# Patient Record
Sex: MALE | Race: WHITE | Employment: FULL TIME | URBAN - METROPOLITAN AREA
[De-identification: names, ages, dates, MRNs, and addresses within clinical notes are randomized per-mention and may not be internally consistent; named-entity substitution may affect disease eponyms.]

---

## 2020-05-29 ENCOUNTER — TELEPHONE (OUTPATIENT)
Dept: INTERNAL MEDICINE CLINIC | Age: 45
End: 2020-05-29

## 2020-05-29 NOTE — TELEPHONE ENCOUNTER
Spoke with patient. Two pt identifiers confirmed. Patient offered an appointment with Dr. Salty Farr on 06/09/2020. Appointment accepted. Pt verbalized understanding of information discussed w/ no further questions at this time.    Patient advised if anything changes or if unable to keep this appointment to call the office

## 2020-05-29 NOTE — TELEPHONE ENCOUNTER
Krystle Hotter [employer]   Phone Number: [phone]             Caller's first and last name: N/A   Reason for call: Pt wants new pt appointment in the month of June. Wants to establish doctor pt relationship.    Callback required yes/no and why: Yes, to inform   Best contact number(s): [phone]   Details to clarify the request: N/A     Copy/Paste  ENVERA

## 2020-06-09 ENCOUNTER — OFFICE VISIT (OUTPATIENT)
Dept: INTERNAL MEDICINE CLINIC | Age: 45
End: 2020-06-09

## 2020-06-09 VITALS
OXYGEN SATURATION: 98 % | RESPIRATION RATE: 18 BRPM | WEIGHT: 159 LBS | SYSTOLIC BLOOD PRESSURE: 156 MMHG | HEIGHT: 72 IN | TEMPERATURE: 98.1 F | HEART RATE: 65 BPM | DIASTOLIC BLOOD PRESSURE: 89 MMHG | BODY MASS INDEX: 21.54 KG/M2

## 2020-06-09 DIAGNOSIS — Z13.220 SCREENING CHOLESTEROL LEVEL: ICD-10-CM

## 2020-06-09 DIAGNOSIS — Z11.4 SCREENING FOR HIV (HUMAN IMMUNODEFICIENCY VIRUS): ICD-10-CM

## 2020-06-09 DIAGNOSIS — K62.5 RECTAL BLEEDING: Primary | ICD-10-CM

## 2020-06-09 DIAGNOSIS — R01.1 SYSTOLIC MURMUR: ICD-10-CM

## 2020-06-09 RX ORDER — PHENOL/SODIUM PHENOLATE
20 AEROSOL, SPRAY (ML) MUCOUS MEMBRANE DAILY
COMMUNITY

## 2020-06-09 NOTE — PROGRESS NOTES
Reviewed record in preparation for visit and have obtained necessary documentation. Identified pt with two pt identifiers(name and ). Chief Complaint   Patient presents with   [address] Rectal Bleeding       Health Maintenance Due   Topic Date Due    DTaP/Tdap/Td  (1 - Tdap) 1995    Cholesterol Test   2014       Mr. Carlos Toscano has a reminder for a \"due or due soon\" health maintenance. I have asked that he discuss this further with his primary care provider for follow-up on this health maintenance. Coordination of Care Questionnaire:  :     1) Have you been to an emergency room, urgent care clinic since your last visit? no   Hospitalized since your last visit? no             2) Have you seen or consulted any other health care providers outside of [address] [place] [address] since your last visit? no  (Include any pap smears or colon screenings in this section.)    3) In the event something were to happen to you and you were unable to speak on your behalf, do you have an Advance Directive/ Living Will in place stating your wishes? NO    Do you have an Advance Directive on file? no    4) Are you interested in receiving information on Advance Directives? NO    Patient is accompanied by self I have received verbal consent from Mary Lou Both to discuss any/all medical information while they are present in the room.

## 2020-06-09 NOTE — PROGRESS NOTES
Mr. Ritika Banks is a new patient who is here to establish care. CC:  Establish Care and Rectal Bleeding       HPI:    39 yo male presenting to establish care    Patient reports bleeding intermittently mixed in stool for one year   Showed pictured of blood mixed in the stool two weeks ago bright red blood mixed in regular stool  No blood noted upon wiping  Bleeding is random and painless  Denies straining and stool color is normal  Reports rare  anal intercourse no pain and no correlation with rectal bleeding - one partner     Taking prilosec for hiatal hernia   Does not have a gastroenterologist         for 3 years -  also my patient  No kids  3 cats   Works for [employer] smoking 3 years ago  Social ETOH       Review of systems:  Constitutional: negative for fever, chills, weight loss, night sweats   Eyes : negative for vision changes, eye pain and discharge  Nose and Throat: negative for tinnitus, sore throat   Cardiovascular: negative for chest pain, palpitations and shortness of breath  Respiratory: does note dyspnea with exertion and avoid exertion for years, quit smoking and no improvement  Gastroinstestinal: negative for abdominal pain, nausea, vomiting, diarrhea, constipation, and blood in the stool  Musculoskeletal: negative for back ache and joint ache   Genitourinary: negative for dysuria, nocturia, polyuria and hematuria   Neurologic: Negative for focal weakness, numbness or incoordination  Skin: negative for rash, pruritus  Hematologic: negative for easy bruising      Past Medical History:   Diagnosis Date    Hiatal hernia         History reviewed. No pertinent surgical history. Allergies not on file    Current Outpatient Medications on File Prior to Visit   Medication Sig Dispense Refill    Omeprazole delayed release (PRILOSEC D/R) 20 mg tablet Take 20 mg by mouth daily. No current facility-administered medications on file prior to visit.         family history includes Lung Cancer in his maternal grandmother; Other in his father. Social History     Socioeconomic History    Marital status: UNKNOWN     Spouse name: Not on file    Number of children: Not on file    Years of education: Not on file    Highest education level: Not on file   Occupational History    Not on file   Social Needs    Financial resource strain: Not on file    Food insecurity     Worry: Not on file     Inability: Not on file    Transportation needs     Medical: Not on file     Non-medical: Not on file   Tobacco Use    Smoking status: Former Smoker    Smokeless tobacco: Never Used   Substance and Sexual Activity    Alcohol use: Yes     Alcohol/week: 3.0 standard drinks     Types: 3 Glasses of wine per week     Comment: weekly    Drug use: Never    Sexual activity: Yes     Partners: Male   Lifestyle    Physical activity     Days per week: Not on file     Minutes per session: Not on file    Stress: Not on file   Relationships    Social connections     Talks on phone: Not on file     Gets together: Not on file     Attends Rastafari service: Not on file     Active member of club or organization: Not on file     Attends meetings of clubs or organizations: Not on file     Relationship status: Not on file    Intimate partner violence     Fear of current or ex partner: Not on file     Emotionally abused: Not on file     Physically abused: Not on file     Forced sexual activity: Not on file   Other Topics Concern    Not on file   Social History Narrative    Not on file       Visit Vitals  /89 (BP 1 Location: Right arm, BP Patient Position: Sitting)   Pulse 65   Temp 98.1 °F (36.7 °C) (Temporal)   Resp 18   Ht 6' (1.829 m)   Wt 159 lb (72.1 kg)   SpO2 98%   BMI 21.56 kg/m²     General:  Well appearing male no acute distress  HEENT:   PERRL,normal conjunctiva. External ear and canals normal, TMs normal.  Hearing normal to voice. Nose without edema or discharge, normal septum. Lips, teeth, gums normal.  Oropharynx: no erythema, no exudates, no lesions, normal tongue. Neck:  Supple. Thyroid normal size, nontender, without nodules. No carotid bruit. No masses or lymphadenopathy  Respiratory: no respiratory distress,  no wheezing, no rhonchi, no rales. No chest wall tenderness. Cardiovascular:  RRR, normal Z0T3, loud systolic murmur louderst on Right sternal border with radiation to carotid  Gastrointestinal: normal bowel sounds, soft, nontender, without masses. No hepatosplenomegaly. Extremities +2 pulses, no edema, normal sensation   Musculoskeletal:  Normal gait. Normal digits and nails. Normal strength and tone, no atrophy, and no abnormal movement. Skin:  Has a rash on legs bilateral which states chronic and has seen dermatologist  kin warm, normal turgor, without induration or nodules. Neuro:  A and OX4, fluent speech, cranial nerves normal 2-12. Sensation normal to light touch. DTR symmetrical  Psych:  Normal affect                Assessment and Plan:     39 yo male presenting to [facility] care    1. Rectal bleeding  Painless and intermittent for one year, showed picture of BRB mixed with stool.   - CBC WITH AUTOMATED DIFF  - METABOLIC PANEL, COMPREHENSIVE  - Rey Luo Dr Arlis Opitz   - SED RATE (ESR)  - CRP, HIGH SENSITIVITY    2. Screening cholesterol level  - LIPID PANEL    3. Screening for HIV (human immunodeficiency virus)  - HIV 1/2 AG/AB, 4TH GENERATION,W RFLX CONFIRM    4. Systolic murmur: loudest over aortic area with radiation to carotid, S1 and S2 present.  Does report dyspnea with exertion, will obtain TTE  - ECHO ADULT COMPLETE; Future       Follow up with be determined on results of labs and TTE     Aliza Stark MD

## 2020-06-09 NOTE — PATIENT INSTRUCTIONS
Schedule echo of the heart Schedule appointment with gastroenterology Will determine follow up based on results

## 2020-06-10 LAB
ALBUMIN SERPL-MCNC: 4.6 G/DL (ref 4–5)
ALBUMIN/GLOB SERPL: 2 {RATIO} (ref 1.2–2.2)
ALP SERPL-CCNC: 59 IU/L (ref 39–117)
ALT SERPL-CCNC: 12 IU/L (ref 0–44)
AST SERPL-CCNC: 16 IU/L (ref 0–40)
BASOPHILS # BLD AUTO: 0.1 X10E3/UL (ref 0–0.2)
BASOPHILS NFR BLD AUTO: 1 %
BILIRUB SERPL-MCNC: 0.4 MG/DL (ref 0–1.2)
BUN SERPL-MCNC: 16 MG/DL (ref 6–24)
BUN/CREAT SERPL: 14 (ref 9–20)
CALCIUM SERPL-MCNC: 9.9 MG/DL (ref 8.7–10.2)
CHLORIDE SERPL-SCNC: 103 MMOL/L (ref 96–106)
CHOLEST SERPL-MCNC: 204 MG/DL (ref 100–199)
CO2 SERPL-SCNC: 23 MMOL/L (ref 20–29)
CREAT SERPL-MCNC: 1.15 MG/DL (ref 0.76–1.27)
CRP SERPL HS-MCNC: 3.23 MG/L (ref 0–3)
EOSINOPHIL # BLD AUTO: 0.1 X10E3/UL (ref 0–0.4)
EOSINOPHIL NFR BLD AUTO: 3 %
ERYTHROCYTE [DISTWIDTH] IN BLOOD BY AUTOMATED COUNT: 13.1 % (ref 11.6–15.4)
ERYTHROCYTE [SEDIMENTATION RATE] IN BLOOD BY WESTERGREN METHOD: 7 MM/HR (ref 0–15)
GLOBULIN SER CALC-MCNC: 2.3 G/DL (ref 1.5–4.5)
GLUCOSE SERPL-MCNC: 98 MG/DL (ref 65–99)
HCT VFR BLD AUTO: 41.1 % (ref 37.5–51)
HDLC SERPL-MCNC: 50 MG/DL
HGB BLD-MCNC: 14.2 G/DL (ref 13–17.7)
HIV 1+2 AB+HIV1 P24 AG SERPL QL IA: NON REACTIVE
IMM GRANULOCYTES # BLD AUTO: 0 X10E3/UL (ref 0–0.1)
IMM GRANULOCYTES NFR BLD AUTO: 0 %
IRON SATN MFR SERPL: 44 % (ref 15–55)
IRON SERPL-MCNC: 129 UG/DL (ref 38–169)
LDLC SERPL CALC-MCNC: 131 MG/DL (ref 0–99)
LYMPHOCYTES # BLD AUTO: 1.2 X10E3/UL (ref 0.7–3.1)
LYMPHOCYTES NFR BLD AUTO: 22 %
MCH RBC QN AUTO: 32.6 PG (ref 26.6–33)
MCHC RBC AUTO-ENTMCNC: 34.5 G/DL (ref 31.5–35.7)
MCV RBC AUTO: 94 FL (ref 79–97)
MONOCYTES # BLD AUTO: 0.6 X10E3/UL (ref 0.1–0.9)
MONOCYTES NFR BLD AUTO: 11 %
NEUTROPHILS # BLD AUTO: 3.6 X10E3/UL (ref 1.4–7)
NEUTROPHILS NFR BLD AUTO: 63 %
PLATELET # BLD AUTO: 275 X10E3/UL (ref 150–450)
POTASSIUM SERPL-SCNC: 4.6 MMOL/L (ref 3.5–5.2)
PROT SERPL-MCNC: 6.9 G/DL (ref 6–8.5)
RBC # BLD AUTO: 4.36 X10E6/UL (ref 4.14–5.8)
SODIUM SERPL-SCNC: 140 MMOL/L (ref 134–144)
TIBC SERPL-MCNC: 292 UG/DL (ref 250–450)
TRIGL SERPL-MCNC: 114 MG/DL (ref 0–149)
TSH SERPL DL<=0.005 MIU/L-ACNC: 0.64 UIU/ML (ref 0.45–4.5)
UIBC SERPL-MCNC: 163 UG/DL (ref 111–343)
VLDLC SERPL CALC-MCNC: 23 MG/DL (ref 5–40)
WBC # BLD AUTO: 5.6 X10E3/UL (ref 3.4–10.8)

## 2020-06-19 ENCOUNTER — HOSPITAL ENCOUNTER (OUTPATIENT)
Dept: NON INVASIVE DIAGNOSTICS | Age: 45
Discharge: HOME | End: 2020-06-19
Attending: INTERNAL MEDICINE
Payer: COMMERCIAL

## 2020-06-19 VITALS — BODY MASS INDEX: 21.53 KG/M2 | HEIGHT: 72 IN | WEIGHT: 158.95 LBS

## 2020-06-19 DIAGNOSIS — R01.1 SYSTOLIC MURMUR: ICD-10-CM

## 2020-06-19 LAB
AV VELOCITY RATIO: 0.2
AV VTI RATIO: 0.2
ECHO AO ROOT DIAM: 3.39 CM
ECHO AV AREA PEAK VELOCITY: 0.7 CM2
ECHO AV AREA VTI: 0.7 CM2
ECHO AV AREA/BSA PEAK VELOCITY: 0.4 CM2/M2
ECHO AV AREA/BSA VTI: 0.4 CM2/M2
ECHO AV CUSP MM: 2.16 CM
ECHO AV MEAN GRADIENT: 27.8 MMHG
ECHO AV MEAN VELOCITY: 2.52 M/S
ECHO AV PEAK GRADIENT: 42.6 MMHG
ECHO AV PEAK VELOCITY: 326.17 CM/S
ECHO AV VTI: 79.26 CM
ECHO EST RA PRESSURE: 10 MMHG
ECHO LA AREA 4C: 14.8 CM2
ECHO LA MAJOR AXIS: 3.17 CM
ECHO LA TO AORTIC ROOT RATIO: 0.94
ECHO LA VOL 2C: 67.21 ML (ref 18–58)
ECHO LA VOL 4C: 39.12 ML (ref 18–58)
ECHO LA VOL BP: 61.36 ML (ref 18–58)
ECHO LA VOL/BSA BIPLANE: 31.76 ML/M2 (ref 16–28)
ECHO LA VOLUME INDEX A2C: 34.78 ML/M2 (ref 16–28)
ECHO LA VOLUME INDEX A4C: 20.25 ML/M2 (ref 16–28)
ECHO LV E' LATERAL VELOCITY: 11 CM/S
ECHO LV E' SEPTAL VELOCITY: 7.55 CM/S
ECHO LV EDV TEICHHOLZ: 64.63 ML
ECHO LV ESV TEICHHOLZ: 26.48 ML
ECHO LV INTERNAL DIMENSION DIASTOLIC: 5.07 CM (ref 4.2–5.9)
ECHO LV INTERNAL DIMENSION SYSTOLIC: 3.47 CM
ECHO LV IVSD: 1.82 CM (ref 0.6–1)
ECHO LV IVSS: 2.08 CM
ECHO LV MASS 2D: 449.1 G (ref 88–224)
ECHO LV MASS INDEX 2D: 232.4 G/M2 (ref 49–115)
ECHO LV POSTERIOR WALL DIASTOLIC: 1.43 CM (ref 0.6–1)
ECHO LV POSTERIOR WALL SYSTOLIC: 1.68 CM
ECHO LVOT CARDIAC OUTPUT: 3.2 L/MIN
ECHO LVOT DIAM: 2.1 CM
ECHO LVOT PEAK GRADIENT: 1.7 MMHG
ECHO LVOT PEAK VELOCITY: 64.69 CM/S
ECHO LVOT SV: 52.6 ML
ECHO LVOT VTI: 15.18 CM
ECHO MV A VELOCITY: 52.25 CM/S
ECHO MV E DECELERATION TIME (DT): 207.2 MS
ECHO MV E VELOCITY: 59.93 CM/S
ECHO MV E/A RATIO: 1.15
ECHO MV E/E' LATERAL: 5.45
ECHO MV E/E' RATIO (AVERAGED): 6.69
ECHO MV E/E' SEPTAL: 7.94
ECHO PV MAX VELOCITY: 83.73 CM/S
ECHO PV PEAK GRADIENT: 2.8 MMHG
ECHO RA AREA 4C: 12.1 CM2
ECHO RV INTERNAL DIMENSION: 3.05 CM
LVFS 2D: 31.44 %
LVOT MG: 1.07 MMHG
LVOT MV: 0.5 CM/S
LVSV (TEICH): 38.16 ML
MV DEC SLOPE: 2.89

## 2020-06-19 PROCEDURE — 93306 TTE W/DOPPLER COMPLETE: CPT

## 2020-06-22 NOTE — PROGRESS NOTES
Normal hemoglobin  Normal kidney and liver function  Normal thyroid and negative HIV  Has patient scheduled appointment with GI?   also he needs to schedule follow up on BP as his BP was high last visit.  In person BP check

## 2020-06-24 ENCOUNTER — PATIENT MESSAGE (OUTPATIENT)
Dept: INTERNAL MEDICINE CLINIC | Age: 45
End: 2020-06-24

## 2020-06-24 DIAGNOSIS — I35.0 AORTIC VALVE STENOSIS, ETIOLOGY OF CARDIAC VALVE DISEASE UNSPECIFIED: Primary | ICD-10-CM

## 2020-06-26 ENCOUNTER — TELEPHONE (OUTPATIENT)
Dept: INTERNAL MEDICINE CLINIC | Age: 45
End: 2020-06-26

## 2020-06-26 NOTE — TELEPHONE ENCOUNTER
Called patient and discussed results of ECHO of heart and moderate aortic stenosis, referral to Dr Nimisha Bustamante.   Discussed that if BP is high patient will need to start BP medication ( it was high during initial visit with me)

## 2020-07-16 ENCOUNTER — OFFICE VISIT (OUTPATIENT)
Dept: CARDIOLOGY CLINIC | Age: 45
End: 2020-07-16

## 2020-07-16 VITALS
RESPIRATION RATE: 16 BRPM | DIASTOLIC BLOOD PRESSURE: 102 MMHG | OXYGEN SATURATION: 98 % | BODY MASS INDEX: 21.29 KG/M2 | HEIGHT: 72 IN | WEIGHT: 157.2 LBS | HEART RATE: 67 BPM | SYSTOLIC BLOOD PRESSURE: 138 MMHG

## 2020-07-16 DIAGNOSIS — Z87.891 FORMER TOBACCO USE: ICD-10-CM

## 2020-07-16 DIAGNOSIS — I35.0 AORTIC VALVE STENOSIS, ETIOLOGY OF CARDIAC VALVE DISEASE UNSPECIFIED: Primary | ICD-10-CM

## 2020-07-16 DIAGNOSIS — Z82.49 FAMILY HISTORY OF CHRONIC ISCHEMIC HEART DISEASE: ICD-10-CM

## 2020-07-16 DIAGNOSIS — R06.09 DOE (DYSPNEA ON EXERTION): ICD-10-CM

## 2020-07-16 NOTE — PROGRESS NOTES
Subjective/HPI:     Filippo Serrano is a 38 y.o. male is here for new patient consultation. The patient has medical hx significant for a hiatal hernia. The pt presents with c/o GOODWIN. He reports he will get GOODWIN when walking up hills primarily. No worsening, however not improved either with exercising routinely. Symptoms started over a year ago, he quit smoking 3 years ago. He was seen by his PCP to establish care in June and was discussing this symptom. At that time a murmur was auscultated and an echo was performed. Echo showed NL EF and mod AS. Previous cardiac evaluation includes echo. Family med hx significant for maternal GF with MI in his 46s, and cancer. Patient Active Problem List    Diagnosis Date Noted    Hiatal hernia       Appa Evelio Rodriguez MD  Past Medical History:   Diagnosis Date    Hiatal hernia       History reviewed. No pertinent surgical history. No Known Allergies   Family History   Problem Relation Age of Onset    Other Father     Lung Cancer Maternal Grandmother     Coronary Artery Disease Maternal Grandfather     Heart Attack Maternal Grandfather       Current Outpatient Medications   Medication Sig    Omeprazole delayed release (PRILOSEC D/R) 20 mg tablet Take 20 mg by mouth daily. No current facility-administered medications for this visit. Vitals:    07/16/20 1009 07/16/20 1020   BP: (!) 142/110 (!) 138/102   Pulse: 67    Resp: 16    SpO2: 98%    Weight: 157 lb 3.2 oz (71.3 kg)    Height: 6' (1.829 m)        I have reviewed the nurses notes, vitals, problem list, allergy list, medical history, family, social history and medications. Review of Symptoms:  General: Pt denies excessive weight gain or loss. Pt is able to conduct ADL's  HEENT: Denies blurred vision, headaches, epistaxis and difficulty swallowing. Respiratory: Denies shortness of breath, +GOODWIN, denies wheezing or stridor.   Cardiovascular: Denies precordial pain, palpitations, edema or PND  Gastrointestinal: Denies poor appetite, indigestion, abdominal pain or blood in stool  Urinary: Denies dysuria, pyuria  Musculoskeletal: Denies pain or swelling from muscles or joints  Neurologic: Denies tremor, paresthesias, or sensory motor disturbance  Skin: Denies rash, itching or texture change. Psych: Denies depression        Physical Exam:      General: Well developed, cooperative, alert in no acute distress, appears states age. HEENT: Supple, No carotid bruits, no JVD, trach is midline. PERRL, EOM intact  Heart:  Normal S1/S2 negative S3 or S4. Regular, +VENESSA, no gallop or rub. Respiratory: Clear bilaterally x 4, no wheezing or rales  Abdomen:   Soft, non-tender, no masses, bowel sounds are active. Extremities:  No edema, normal cap refill, no cyanosis, atraumatic. Neuro: A&Ox3, speech clear, gait stable. Skin: Skin color is normal. No rashes or lesions. Non diaphoretic  Vascular: 2+ pulses symmetric in all extremities    Cardiographics    ECG: Sinus  Rhythm   -Peaked anterior T-waves    Echo 6/19/20  · Normal cavity size and systolic function (ejection fraction normal). Estimated left ventricular ejection fraction is 55 - 60%. Biplane method used to measure ejection fraction. Age-appropriate left ventricular diastolic function. · Probably trileaflet aortic valve. Aortic valve leaflet calcification present. Aortic valve mean gradient is 30.2 mmHg. Moderate aortic valve stenosis is present. · Trace mitral valve regurgitation is present.     Cardiology Labs:    Lab Results   Component Value Date/Time    Cholesterol, total 204 (H) 06/09/2020 09:54 AM    HDL Cholesterol 50 06/09/2020 09:54 AM    LDL, calculated 131 (H) 06/09/2020 09:54 AM    VLDL, calculated 23 06/09/2020 09:54 AM     Lab Results   Component Value Date/Time    Sodium 140 06/09/2020 09:54 AM    Potassium 4.6 06/09/2020 09:54 AM    Chloride 103 06/09/2020 09:54 AM    CO2 23 06/09/2020 09:54 AM    Glucose 98 06/09/2020 09:54 AM    BUN 16 06/09/2020 09:54 AM    Creatinine 1.15 06/09/2020 09:54 AM    BUN/Creatinine ratio 14 06/09/2020 09:54 AM    GFR est AA 88 06/09/2020 09:54 AM    GFR est non-AA 76 06/09/2020 09:54 AM    Calcium 9.9 06/09/2020 09:54 AM    Bilirubin, total 0.4 06/09/2020 09:54 AM    ALT (SGPT) 12 06/09/2020 09:54 AM    Alk. phosphatase 59 06/09/2020 09:54 AM    Protein, total 6.9 06/09/2020 09:54 AM    Albumin 4.6 06/09/2020 09:54 AM    A-G Ratio 2.0 06/09/2020 09:54 AM     No results found for: HBA1C, EZS8ISIO, VYN1RRYR, UXC2ZFBC     Assessment:     Assessment:      Diagnoses and all orders for this visit:    1. Aortic valve stenosis, etiology of cardiac valve disease unspecified  -     AMB POC EKG ROUTINE W/ 12 LEADS, INTER & REP  -     ECHO ADULT COMPLETE; Future  -     ECHO STRESS; Future    2. GOODWIN (dyspnea on exertion)  -     AMB POC EKG ROUTINE W/ 12 LEADS, INTER & REP  -     ECHO ADULT COMPLETE; Future  -     ECHO STRESS; Future    3. Former tobacco use  -     AMB POC EKG ROUTINE W/ 12 LEADS, INTER & REP  -     ECHO ADULT COMPLETE; Future  -     ECHO STRESS; Future      Specialty Problems     None          ICD-10-CM ICD-9-CM    1. Aortic valve stenosis, etiology of cardiac valve disease unspecified  I35.0 424.1 AMB POC EKG ROUTINE W/ 12 LEADS, INTER & REP      ECHO ADULT COMPLETE      ECHO STRESS   2. GOODWIN (dyspnea on exertion)  R06.00 786.09 AMB POC EKG ROUTINE W/ 12 LEADS, INTER & REP      ECHO ADULT COMPLETE      ECHO STRESS   3. Former tobacco use  Z87.891 V15.82 AMB POC EKG ROUTINE W/ 12 LEADS, INTER & REP      ECHO ADULT COMPLETE      ECHO STRESS         PLAN:  1. Aortic valve stenosis, etiology of cardiac valve disease unspecified: patient went to PCP, murmur identified and echo completed showing mod AS, mean gradient 30.2. Clinically, it seems most likely that the patient must have a bicuspid aortic valve.   Reviewed images personally, and it seems suspicious for bicuspid valve to me, but I cannot tell with certainty. Evaluate with repeat echo in 6 months. 2. GOODWIN:  With mod AS, fam hx of CAD from early onset, likely uncontrolled HTN, and former tobacco use will evaluate with stress echo. If stress echo is normal, coronary calcium scoring would be reassuring if totally normal and threshold for further testing/treatment would be decreased if high- the patient wishes to proceed. Test ordered to be scheduled only after we verify stress echo is normal.    3. Former tobacco use: quit smoking 3 years ago, remains abstinent from tobacco use. F/U in 6 months after echo if stress test is neg.     Tyrone Cochran MD

## 2020-07-16 NOTE — PROGRESS NOTES
1. Have you been to the ER, urgent care clinic since your last visit? Hospitalized since your last visit? No.    2. Have you seen or consulted any other health care providers outside of the [address] [place] [address] since your last visit? Include any pap smears or colon screening.    No.        Chief Complaint   Patient presents with    New Patient     referred by PCP for aortic valve stenosis-  pt denies any cardiac symptoms    Results     discuss echo results

## 2020-07-28 ENCOUNTER — TELEPHONE (OUTPATIENT)
Dept: CARDIOLOGY CLINIC | Age: 45
End: 2020-07-28

## 2020-07-28 NOTE — TELEPHONE ENCOUNTER
----- Message from Tawana Novak NP sent at 7/28/2020  2:10 PM EDT -----  Stress test was normal. Can proceed with CT heart scan as discussed

## 2021-01-28 ENCOUNTER — ANCILLARY PROCEDURE (OUTPATIENT)
Dept: CARDIOLOGY CLINIC | Age: 46
End: 2021-01-28
Payer: COMMERCIAL

## 2021-01-28 ENCOUNTER — OFFICE VISIT (OUTPATIENT)
Dept: CARDIOLOGY CLINIC | Age: 46
End: 2021-01-28

## 2021-01-28 VITALS
WEIGHT: 158 LBS | SYSTOLIC BLOOD PRESSURE: 140 MMHG | BODY MASS INDEX: 21.4 KG/M2 | DIASTOLIC BLOOD PRESSURE: 90 MMHG | HEIGHT: 72 IN

## 2021-01-28 VITALS
WEIGHT: 162.7 LBS | BODY MASS INDEX: 22.04 KG/M2 | HEIGHT: 72 IN | RESPIRATION RATE: 18 BRPM | SYSTOLIC BLOOD PRESSURE: 118 MMHG | HEART RATE: 56 BPM | OXYGEN SATURATION: 98 % | DIASTOLIC BLOOD PRESSURE: 88 MMHG

## 2021-01-28 DIAGNOSIS — E78.2 MIXED HYPERLIPIDEMIA: ICD-10-CM

## 2021-01-28 DIAGNOSIS — R06.09 DOE (DYSPNEA ON EXERTION): ICD-10-CM

## 2021-01-28 DIAGNOSIS — R01.1 SYSTOLIC MURMUR: ICD-10-CM

## 2021-01-28 DIAGNOSIS — I35.0 AORTIC VALVE STENOSIS, ETIOLOGY OF CARDIAC VALVE DISEASE UNSPECIFIED: Primary | ICD-10-CM

## 2021-01-28 DIAGNOSIS — I35.0 AORTIC VALVE STENOSIS, ETIOLOGY OF CARDIAC VALVE DISEASE UNSPECIFIED: ICD-10-CM

## 2021-01-28 DIAGNOSIS — Z82.49 FAMILY HISTORY OF CHRONIC ISCHEMIC HEART DISEASE: ICD-10-CM

## 2021-01-28 DIAGNOSIS — Q23.1 BICUSPID AORTIC VALVE: ICD-10-CM

## 2021-01-28 DIAGNOSIS — Z87.891 FORMER TOBACCO USE: ICD-10-CM

## 2021-01-28 LAB
ECHO AO ROOT DIAM: 3.5 CM
ECHO AV AREA PEAK VELOCITY: 0.81 CM2
ECHO AV AREA VTI: 0.91 CM2
ECHO AV AREA/BSA PEAK VELOCITY: 0.4 CM2/M2
ECHO AV AREA/BSA VTI: 0.5 CM2/M2
ECHO AV MEAN GRADIENT: 39.91 MMHG
ECHO AV PEAK GRADIENT: 68.75 MMHG
ECHO AV PEAK VELOCITY: 414.57 CM/S
ECHO AV VTI: 104.85 CM
ECHO EST RA PRESSURE: 3 MMHG
ECHO LA AREA 4C: 18.51 CM2
ECHO LA MAJOR AXIS: 3.34 CM
ECHO LA MINOR AXIS: 1.73 CM
ECHO LA VOL 2C: 57.13 ML (ref 18–58)
ECHO LA VOL 4C: 45.69 ML (ref 18–58)
ECHO LA VOL BP: 60.51 ML (ref 18–58)
ECHO LA VOL/BSA BIPLANE: 31.4 ML/M2 (ref 16–28)
ECHO LA VOLUME INDEX A2C: 29.64 ML/M2 (ref 16–28)
ECHO LA VOLUME INDEX A4C: 23.71 ML/M2 (ref 16–28)
ECHO LV E' LATERAL VELOCITY: 12.5 CM/S
ECHO LV INTERNAL DIMENSION DIASTOLIC: 4.55 CM (ref 4.2–5.9)
ECHO LV INTERNAL DIMENSION SYSTOLIC: 2.59 CM
ECHO LV ISOVOLUMETRIC RELAXATION TIME (IVRT): 93.24 MS
ECHO LV IVSD: 1.22 CM (ref 0.6–1)
ECHO LV MASS 2D: 202.7 G (ref 88–224)
ECHO LV MASS INDEX 2D: 105.2 G/M2 (ref 49–115)
ECHO LV POSTERIOR WALL DIASTOLIC: 1.19 CM (ref 0.6–1)
ECHO LVOT DIAM: 2.56 CM
ECHO LVOT PEAK GRADIENT: 1.72 MMHG
ECHO LVOT PEAK VELOCITY: 65.59 CM/S
ECHO LVOT SV: 95.3 ML
ECHO LVOT VTI: 18.55 CM
ECHO MV "A" WAVE DURATION: 157.94 MS
ECHO MV A VELOCITY: 59.33 CM/S
ECHO MV E DECELERATION TIME (DT): 211.86 MS
ECHO MV E VELOCITY: 76.34 CM/S
ECHO MV E/A RATIO: 1.29
ECHO MV E/E' LATERAL: 6.11
ECHO RIGHT VENTRICULAR SYSTOLIC PRESSURE (RVSP): 19.22 MMHG
ECHO RV TAPSE: 1.76 CM (ref 1.5–2)
ECHO TV REGURGITANT MAX VELOCITY: 201.36 CM/S
ECHO TV REGURGITANT PEAK GRADIENT: 16.22 MMHG
LA VOL DISK BP: 56.66 ML (ref 18–58)
LVOT MG: 1 MMHG

## 2021-01-28 PROCEDURE — 99214 OFFICE O/P EST MOD 30 MIN: CPT | Performed by: INTERNAL MEDICINE

## 2021-01-28 PROCEDURE — 93306 TTE W/DOPPLER COMPLETE: CPT | Performed by: INTERNAL MEDICINE

## 2021-01-28 PROCEDURE — 93000 ELECTROCARDIOGRAM COMPLETE: CPT | Performed by: INTERNAL MEDICINE

## 2021-01-28 NOTE — LETTER
1/28/2021 Patient: Brielle Toscano YOB: 1975 Date of Visit: 1/28/2021 Lele De La Rosa MD 
[address] [address] [address] [employer] Dear Lele De La Rosa MD, Thank you for referring Mr. Mukund Albarado to [address] [place] [address] for evaluation. My notes for this consultation are attached. If you have questions, please do not hesitate to call me. I look forward to following your patient along with you.  
 
 
Sincerely, 
 
Briseyda Rothman MD

## 2021-01-28 NOTE — PROGRESS NOTES
[address] [facility], [address], [address]  [phone]     Subjective:      Lizzette Houston is a 54 y.o. male is here for routine f/u. He has pmhx aortic stenosis, HLD and GERD. Last seen by us in 7/2020. He reports he has not had any change in his breathing. He gets a bit winded going up a hill/incline, but has not worsened compared to last year. He has been monitoring his BP and has trending in the 110s/70-80s. The patient denies chest pain, orthopnea, PND, LE edema, palpitations, syncope, or presyncope. Patient Active Problem List    Diagnosis Date Noted    Hiatal hernia       Appa Onelia Davis MD  Past Medical History:   Diagnosis Date    Hiatal hernia       History reviewed. No pertinent surgical history. No Known Allergies   Family History   Problem Relation Age of Onset    Other Father     Lung Cancer Maternal Grandmother     Coronary Artery Disease Maternal Grandfather     Heart Attack Maternal Grandfather       Social History     Socioeconomic History    Marital status:      Spouse name: Not on file    Number of children: Not on file    Years of education: Not on file    Highest education level: Not on file   Occupational History    Not on file   Social Needs    Financial resource strain: Not on file    Food insecurity     Worry: Not on file     Inability: Not on file    Transportation needs     Medical: Not on file     Non-medical: Not on file   Tobacco Use    Smoking status: Former Smoker     Types: Cigarettes     Quit date: 7/16/2017     Years since quitting: 3.5    Smokeless tobacco: Never Used   Substance and Sexual Activity    Alcohol use:  Yes     Alcohol/week: 3.0 standard drinks     Types: 3 Glasses of wine per week     Comment: weekly    Drug use: Never    Sexual activity: Yes     Partners: Male   Lifestyle    Physical activity     Days per week: Not on file     Minutes per session: Not on file    Stress: Not on file   Relationships    Social connections     Talks on phone: Not on file     Gets together: Not on file     Attends Yazidi service: Not on file     Active member of club or organization: Not on file     Attends meetings of clubs or organizations: Not on file     Relationship status: Not on file    Intimate partner violence     Fear of current or ex partner: Not on file     Emotionally abused: Not on file     Physically abused: Not on file     Forced sexual activity: Not on file   Other Topics Concern    Not on file   Social History Narrative    Not on file      Current Outpatient Medications   Medication Sig    Omeprazole delayed release (PRILOSEC D/R) 20 mg tablet Take 20 mg by mouth daily. No current facility-administered medications for this visit. Review of Symptoms:  11 systems reviewed, negative other than as stated in the HPI    Physical ExamPhysical Exam:    Vitals:    01/28/21 1034   BP: 118/88   Pulse: (!) 56   Resp: 18   SpO2: 98%   Weight: 162 lb 11.2 oz (73.8 kg)   Height: 6' (1.829 m)     Body mass index is 22.07 kg/m². General PE  Gen:  NAD  Mental Status - Alert. General Appearance - Not in acute distress. HEENT:  PERRL, no carotid bruits or JVD  Chest and Lung Exam   Inspection: Accessory muscles - No use of accessory muscles in breathing. Auscultation:   Breath sounds: - Normal.   Cardiovascular   Inspection: Jugular vein - Bilateral - Inspection Normal.   Palpation/Percussion:   Apical Impulse: - Normal.   Auscultation: Rhythm - Regular. Heart Sounds - S1 WNL and S2 WNL. No S3 or S4. Murmurs & Other Heart Sounds: Auscultation of the heart reveals -+VENESSA  Peripheral Vascular   Upper Extremity: Inspection - Bilateral - No Cyanotic nailbeds or Digital clubbing. Lower Extremity:   Palpation: Edema - Bilateral - No edema. Abdomen:   Soft, non-tender, bowel sounds are active.   Neuro: A&O times 3, CN and motor grossly WNL    Labs:   Lab Results   Component Value Date/Time    Cholesterol, total 204 (H) 06/09/2020 09:54 AM    HDL Cholesterol 50 06/09/2020 09:54 AM    LDL, calculated 131 (H) 06/09/2020 09:54 AM    Triglyceride 114 06/09/2020 09:54 AM     No results found for: CPK, CPKX, CPX  Lab Results   Component Value Date/Time    Sodium 140 06/09/2020 09:54 AM    Potassium 4.6 06/09/2020 09:54 AM    Chloride 103 06/09/2020 09:54 AM    CO2 23 06/09/2020 09:54 AM    Glucose 98 06/09/2020 09:54 AM    BUN 16 06/09/2020 09:54 AM    Creatinine 1.15 06/09/2020 09:54 AM    BUN/Creatinine ratio 14 06/09/2020 09:54 AM    GFR est AA 88 06/09/2020 09:54 AM    GFR est non-AA 76 06/09/2020 09:54 AM    Calcium 9.9 06/09/2020 09:54 AM    Bilirubin, total 0.4 06/09/2020 09:54 AM    Alk. phosphatase 59 06/09/2020 09:54 AM    Protein, total 6.9 06/09/2020 09:54 AM    Albumin 4.6 06/09/2020 09:54 AM    A-G Ratio 2.0 06/09/2020 09:54 AM    ALT (SGPT) 12 06/09/2020 09:54 AM       EKG: SR, peaked T waves, no significant change c/w ECG 7/24/20         Assessment:     Assessment:      1. Aortic valve stenosis, etiology of cardiac valve disease unspecified    2. Family history of chronic ischemic heart disease    3. GOODWIN (dyspnea on exertion)    4. Systolic murmur    5. Former tobacco use    6. Mixed hyperlipidemia    7.  Bicuspid aortic valve        Orders Placed This Encounter    CTA CHEST W OR W WO CONT     Standing Status:   Future     Standing Expiration Date:   2/28/2022     Scheduling Instructions:      Baseline detailed aortic measurements, high risk for aortopathy with bicuspid aortic valve     Order Specific Question:   STAT Creatinine as indicated     Answer:   Yes     Order Specific Question:   Reason for Exam     Answer:   TAVR protocol- bicuspid valve, severe AS    CTA ABDOMEN PELV W CONT     Standing Status:   Future     Standing Expiration Date:   2/28/2022     Scheduling Instructions:      Detailed baseline measurements, high risk for aortopathy with bicuspid aortic valve     Order Specific Question:   STAT Creatinine as indicated     Answer:   Yes     Order Specific Question:   Reason for Exam     Answer:   TAVR protocol- bicuspid valve, severe AS    AMB POC EKG ROUTINE W/ 12 LEADS, INTER & REP     Order Specific Question:   Reason for Exam:     Answer:   ROUTINE        Plan: Aortic valve stenosis, etiology of cardiac valve disease unspecified   He reports some SOB with steeper inclines, denies worsening. Denies other symptoms. Echo 6/2020 showing mod AS, mean gradient 30.2. Both clinically and after reviewing images it was felt that he must have a bicuspid aortic valve. Todays echo shows  bicuspid aortic valve with severe stenosis, mean gradient of 40  Recall ekg treadmill stress test 7/20 neg for ischemia, good exercise tolerance reaching stage 5 Saurabh protocol  As he is currently asymptomatic with excellent exercise tolerance, continue yearly surveillance and monitoring for symptoms. Discussed with Dr. Mark Mejía of the valve clinic who agrees. Check baseline chest abdomen pelvis CTA (TAVR protocol) for baseline measurements, high risk of aortopathy with bicuspid valve.     Hx GOODWIN:  With mod AS, fam hx of CAD from early onset, likely uncontrolled HTN, and former tobacco use will evaluate with stress echo. Normal stress EKG 7/2020  ekg treadmill stress test 7/20 neg for ischemia, good exercise tolerance reaching stage 5 Saurabh protocol     HLD  6/2020  Labs and lipids per PCP    GERD  On PPI    Hx tobacco use:   quit smoking 3 years ago, remains abstinent from tobacco use. Follow up in 1 year, sooner as needed.      Sonia Damico MD

## 2021-01-28 NOTE — PROGRESS NOTES
1. Have you been to the ER, urgent care clinic since your last visit? Hospitalized since your last visit? No    2. Have you seen or consulted any other health care providers outside of the [address] [place] [address] since your last visit? Include any pap smears or colon screening.  No       Chief Complaint   Patient presents with    Heart Problem     Pt denies cardiac symptoms

## 2021-02-12 ENCOUNTER — TELEPHONE (OUTPATIENT)
Dept: CARDIOLOGY CLINIC | Age: 46
End: 2021-02-12

## 2021-02-12 NOTE — TELEPHONE ENCOUNTER
[facility] @ [phone] auth # [id number] per Amisha Obando with insurance [address]

## 2021-02-12 NOTE — TELEPHONE ENCOUNTER
[facility] patient [facility] can't do the type of [facility] requested will need to have thru [facility].

## 2021-02-15 ENCOUNTER — TELEPHONE (OUTPATIENT)
Dept: CARDIOLOGY CLINIC | Age: 46
End: 2021-02-15

## 2021-02-15 NOTE — TELEPHONE ENCOUNTER
Please call Deejay Adjutant with [facility] informed  choice regarding CTA orders. Need orders faxed to [facility].     Thanks  Darcy Fischer

## 2021-02-25 ENCOUNTER — HOSPITAL ENCOUNTER (OUTPATIENT)
Dept: CT IMAGING | Age: 46
Discharge: HOME | End: 2021-02-25
Attending: INTERNAL MEDICINE
Payer: COMMERCIAL

## 2021-02-25 PROCEDURE — 74174 CTA ABD&PLVS W/CONTRAST: CPT

## 2021-02-25 PROCEDURE — 74011000636 HC RX REV CODE- 636: Performed by: INTERNAL MEDICINE

## 2021-02-25 PROCEDURE — 71275 CT ANGIOGRAPHY CHEST: CPT

## 2021-02-25 RX ADMIN — IOPAMIDOL 100 ML: 755 INJECTION, SOLUTION INTRAVENOUS at 10:32

## 2022-02-02 ENCOUNTER — OFFICE VISIT (OUTPATIENT)
Dept: CARDIOLOGY CLINIC | Age: 47
End: 2022-02-02

## 2022-02-02 VITALS
WEIGHT: 170.8 LBS | SYSTOLIC BLOOD PRESSURE: 128 MMHG | RESPIRATION RATE: 18 BRPM | OXYGEN SATURATION: 97 % | HEART RATE: 61 BPM | BODY MASS INDEX: 23.13 KG/M2 | DIASTOLIC BLOOD PRESSURE: 88 MMHG | HEIGHT: 72 IN

## 2022-02-02 DIAGNOSIS — I35.0 NONRHEUMATIC AORTIC VALVE STENOSIS: Primary | ICD-10-CM

## 2022-02-02 DIAGNOSIS — E78.2 MIXED HYPERLIPIDEMIA: ICD-10-CM

## 2022-02-02 DIAGNOSIS — Z82.49 FAMILY HISTORY OF CHRONIC ISCHEMIC HEART DISEASE: ICD-10-CM

## 2022-02-02 PROCEDURE — 99214 OFFICE O/P EST MOD 30 MIN: CPT | Performed by: INTERNAL MEDICINE

## 2022-02-02 PROCEDURE — 93000 ELECTROCARDIOGRAM COMPLETE: CPT | Performed by: INTERNAL MEDICINE

## 2022-02-02 NOTE — PROGRESS NOTES
1. Have you been to the ER, urgent care clinic since your last visit? Hospitalized since your last visit? No    2. Have you seen or consulted any other health care providers outside of the [address] [place] [address] since your last visit? Include any pap smears or colon screening.  No         Chief Complaint   Patient presents with    Follow-up     Pt denies cardiac symptoms

## 2022-02-02 NOTE — PROGRESS NOTES
Sravan Dooley, FNP-BC    Subjective/HPI:     Glendora Saint is a 51 y.o. male is here for routine f/u. He has a PMHx of bicuspid aortic valve with aortic stenosis, and hiatal hernia. He feels great. Bought a treadmill and runs 30 mins 5x/week, about 4.5 to 5 mph. Has no symptoms with this activity. Denies complaints of chest pains, dizziness, shortness of breath, LOC. Denies orthopnea, PND or edema. Past Medical History:   Diagnosis Date    Hiatal hernia     Murmur        History reviewed. No pertinent surgical history. Family History   Problem Relation Age of Onset    Other Father     Lung Cancer Maternal Grandmother     Coronary Art Dis Maternal Grandfather     Heart Attack Maternal Grandfather        Social History     Socioeconomic History    Marital status:      Spouse name: Not on file    Number of children: Not on file    Years of education: Not on file    Highest education level: Not on file   Occupational History    Not on file   Tobacco Use    Smoking status: Former Smoker     Types: Cigarettes     Quit date: 2017     Years since quittin.5    Smokeless tobacco: Never Used   Vaping Use    Vaping Use: Never used   Substance and Sexual Activity    Alcohol use: Yes     Alcohol/week: 3.0 standard drinks     Types: 3 Glasses of wine per week     Comment: weekly    Drug use: Never    Sexual activity: Yes     Partners: Male   Other Topics Concern    Not on file   Social History Narrative    Not on file     Social Determinants of Health     Financial Resource Strain:     Difficulty of Paying Living Expenses: Not on file   Food Insecurity:     Worried About Running Out of Food in the Last Year: Not on file    Jackelyn of Food in the Last Year: Not on file   Transportation Needs:     Lack of Transportation (Medical): Not on file    Lack of Transportation (Non-Medical):  Not on file   Physical Activity:     Days of Exercise per Week: Not on file    Minutes of Exercise per Session: Not on file   Stress:     Feeling of Stress : Not on file   Social Connections:     Frequency of Communication with Friends and Family: Not on file    Frequency of Social Gatherings with Friends and Family: Not on file    Attends Rastafari Services: Not on file    Active Member of [address] [place] [address] or Organizations: Not on file    Attends Club or Organization Meetings: Not on file    Marital Status: Not on file   Intimate Partner Violence:     Fear of Current or Ex-Partner: Not on file    Emotionally Abused: Not on file    Physically Abused: Not on file    Sexually Abused: Not on file   Housing Stability:     Unable to Pay for Housing in the Last Year: Not on file    Number of [place] in the Last Year: Not on file    Unstable Housing in the Last Year: Not on file       No Known Allergies    Current Outpatient Medications on File Prior to Visit   Medication Sig Dispense Refill    Omeprazole delayed release (PRILOSEC D/R) 20 mg tablet Take 20 mg by mouth daily. No current facility-administered medications on file prior to visit. Review of Symptoms:    Review of Systems   Constitutional: Negative for chills, fever and weight loss. HENT: Negative for nosebleeds. Eyes: Negative for blurred vision and double vision. Respiratory: Negative for cough, shortness of breath and wheezing. Cardiovascular: Negative for chest pain, palpitations, orthopnea, leg swelling and PND. Skin: Negative for rash. Neurological: Negative for dizziness and loss of consciousness. Physical Exam:      General: Well developed, in no acute distress, cooperative and alert  Heart:  reg rate and rhythm; normal S1; split S2; 2/6 VENESSA loudest over the apex; no gallops or rubs. Respiratory: Clear bilaterally x 4, no wheezing or rales  Extremities:  Normal cap refill, no cyanosis, atraumatic. No edema.   Vascular: 2+ pulses symmetric in all extremities    Vitals:    02/02/22 1358 BP: 128/88   BP 1 Location: Right upper arm   BP Patient Position: Sitting   BP Cuff Size: Large adult   Pulse: 61   Resp: 18   Height: 6' (1.829 m)   Weight: 170 lb 12.8 oz (77.5 kg)   SpO2: 97%       Lab Results   Component Value Date/Time    Cholesterol, total 204 (H) 06/09/2020 09:54 AM    HDL Cholesterol 50 06/09/2020 09:54 AM    LDL, calculated 131 (H) 06/09/2020 09:54 AM    VLDL, calculated 23 06/09/2020 09:54 AM    Triglyceride 114 06/09/2020 09:54 AM     Lab Results   Component Value Date/Time    WBC 5.6 06/09/2020 09:54 AM    HGB 14.2 06/09/2020 09:54 AM    HCT 41.1 06/09/2020 09:54 AM    PLATELET 077 97/05/2915 09:54 AM    MCV 94 06/09/2020 09:54 AM     Lab Results   Component Value Date/Time    Sodium 140 06/09/2020 09:54 AM    Potassium 4.6 06/09/2020 09:54 AM    Chloride 103 06/09/2020 09:54 AM    CO2 23 06/09/2020 09:54 AM    Glucose 98 06/09/2020 09:54 AM    BUN 16 06/09/2020 09:54 AM    Creatinine 1.15 06/09/2020 09:54 AM    BUN/Creatinine ratio 14 06/09/2020 09:54 AM    GFR est AA 88 06/09/2020 09:54 AM    GFR est non-AA 76 06/09/2020 09:54 AM    Calcium 9.9 06/09/2020 09:54 AM    Bilirubin, total 0.4 06/09/2020 09:54 AM    Alk. phosphatase 59 06/09/2020 09:54 AM    Protein, total 6.9 06/09/2020 09:54 AM    Albumin 4.6 06/09/2020 09:54 AM    A-G Ratio 2.0 06/09/2020 09:54 AM    ALT (SGPT) 12 06/09/2020 09:54 AM    AST (SGOT) 16 06/09/2020 09:54 AM       ECG done today sinus rhythm; RBBB     Assessment:       ICD-10-CM ICD-9-CM    1. Nonrheumatic aortic valve stenosis  I35.0 424.1 AMB POC EKG ROUTINE W/ 12 LEADS, INTER & REP   2. Mixed hyperlipidemia  E78.2 272.2    3. Family history of chronic ischemic heart disease  Z82.49 V17.3         Plan:     1. Nonrheumatic aortic valve stenosis  Echo done 1/2021 with preserved LVEF 55-60% with severe aortic stenosis with bicuspid aortic valve.  Mean PG 39.91 mmHg, Vmax 4.14 m/s, MUNIRA 0.91 cm^2  Abd/Chest CTA done 2/2021 with ectatic ascending aorta with mild aortic atherosclerosis  Exercise stress test done 7/2020 with excellent functional capacity  He continues to remain asymptomatic, excellent functional capacity -- running on a treadmill 5x/week without any new symptoms  Repeat echo for this year    2. Mixed hyperlipidemia  Continue statin therapy and low fat, low cholesterol diet  Lipids managed by PCP    F/u with Dr. Kitty Thomas in 1 year, sooner if new symptoms or echo abnormal    Patient seen and examined by me with the above nurse practitioner. I personally performed all components of the history, physical, and medical decision making and agree with the assessment and plan with minor modifications as noted. Today the patient presents with clinically stable, asymptomatic, borderline severe aortic stenosis through a bicuspid aortic valve. General PE  Gen:  NAD  Mental Status - Alert. General Appearance - Not in acute distress. HEENT:  PERRL, no carotid bruits or JVD  Chest and Lung Exam   Inspection: Accessory muscles - No use of accessory muscles in breathing. Auscultation:   Breath sounds: - Normal.   Cardiovascular   Inspection: Jugular vein - Bilateral - Inspection Normal.   Palpation/Percussion:   Apical Impulse: - Normal.   Auscultation: Rhythm - Regular. Heart Sounds - S1 WNL and S2 WNL. No S3 or S4. Murmurs & Other Heart Sounds: Auscultation of the heart reveals -2 out of 6 to 3 out of 6 murmur of aortic stenosis  Peripheral Vascular   Upper Extremity: Inspection - Bilateral - No Cyanotic nailbeds or Digital clubbing. Lower Extremity:   Palpation: Edema - Bilateral - No edema. Abdomen:   Soft, non-tender, bowel sounds are active. Neuro: A&O times 3, CN and motor grossly WNL    Check echo now to rule out significant change. As long as he continues to tolerate high levels of exercise, follow-up in 1 year.

## 2022-02-02 NOTE — LETTER
2/2/2022    Patient: Darylene Lawyer   YOB: 1975   Date of Visit: 2/2/2022     Shelia Koenig MD  [address]  [facility]  [address] [facility] [address]    Dear Shelia Koenig MD,      Thank you for referring Mr. Rudi Campbell to [address] [place] [address] for evaluation. My notes for this consultation are attached. If you have questions, please do not hesitate to call me. I look forward to following your patient along with you.       Sincerely,    Misty Pozo MD

## 2022-02-17 ENCOUNTER — ANCILLARY PROCEDURE (OUTPATIENT)
Dept: CARDIOLOGY CLINIC | Age: 47
End: 2022-02-17
Payer: COMMERCIAL

## 2022-02-17 VITALS
BODY MASS INDEX: 23.03 KG/M2 | HEIGHT: 72 IN | SYSTOLIC BLOOD PRESSURE: 128 MMHG | DIASTOLIC BLOOD PRESSURE: 88 MMHG | WEIGHT: 170 LBS

## 2022-02-17 DIAGNOSIS — I35.0 NONRHEUMATIC AORTIC VALVE STENOSIS: ICD-10-CM

## 2022-02-17 PROCEDURE — 93306 TTE W/DOPPLER COMPLETE: CPT | Performed by: INTERNAL MEDICINE

## 2022-02-21 LAB
ECHO AO ASC DIAM: 3.7 CM
ECHO AO ASCENDING AORTA INDEX: 1.86 CM/M2
ECHO AO ROOT DIAM: 3.3 CM
ECHO AO ROOT INDEX: 1.66 CM/M2
ECHO AV AREA PEAK VELOCITY: 0.6 CM2
ECHO AV AREA VTI: 0.8 CM2
ECHO AV AREA/BSA PEAK VELOCITY: 0.3 CM2/M2
ECHO AV AREA/BSA VTI: 0.4 CM2/M2
ECHO AV MEAN GRADIENT: 71 MMHG
ECHO AV MEAN VELOCITY: 4 M/S
ECHO AV PEAK GRADIENT: 134 MMHG
ECHO AV PEAK VELOCITY: 5.8 M/S
ECHO AV VELOCITY RATIO: 0.1
ECHO AV VTI: 123.9 CM
ECHO EST RA PRESSURE: 3 MMHG
ECHO LA DIAMETER INDEX: 1.81 CM/M2
ECHO LA DIAMETER: 3.6 CM
ECHO LA TO AORTIC ROOT RATIO: 1.09
ECHO LA VOL 2C: 51 ML (ref 18–58)
ECHO LA VOL 4C: 46 ML (ref 18–58)
ECHO LA VOL BP: 55 ML (ref 18–58)
ECHO LA VOL/BSA BIPLANE: 28 ML/M2 (ref 16–34)
ECHO LA VOLUME AREA LENGTH: 56 ML
ECHO LA VOLUME INDEX A2C: 26 ML/M2 (ref 16–34)
ECHO LA VOLUME INDEX A4C: 23 ML/M2 (ref 16–34)
ECHO LA VOLUME INDEX AREA LENGTH: 28 ML/M2 (ref 16–34)
ECHO LV E' LATERAL VELOCITY: 10 CM/S
ECHO LV E' SEPTAL VELOCITY: 7 CM/S
ECHO LV FRACTIONAL SHORTENING: 41 % (ref 28–44)
ECHO LV INTERNAL DIMENSION DIASTOLE INDEX: 2.46 CM/M2
ECHO LV INTERNAL DIMENSION DIASTOLIC: 4.9 CM (ref 4.2–5.9)
ECHO LV INTERNAL DIMENSION SYSTOLIC INDEX: 1.46 CM/M2
ECHO LV INTERNAL DIMENSION SYSTOLIC: 2.9 CM
ECHO LV ISOVOLUMETRIC RELAXATION TIME (IVRT): 78 MS
ECHO LV IVSD: 1.2 CM (ref 0.6–1)
ECHO LV MASS 2D: 226.4 G (ref 88–224)
ECHO LV MASS INDEX 2D: 113.8 G/M2 (ref 49–115)
ECHO LV POSTERIOR WALL DIASTOLIC: 1.2 CM (ref 0.6–1)
ECHO LV RELATIVE WALL THICKNESS RATIO: 0.49
ECHO LVOT AREA: 6.6 CM2
ECHO LVOT AV VTI INDEX: 0.13
ECHO LVOT DIAM: 2.9 CM
ECHO LVOT MEAN GRADIENT: 1 MMHG
ECHO LVOT PEAK GRADIENT: 1 MMHG
ECHO LVOT PEAK VELOCITY: 0.6 M/S
ECHO LVOT STROKE VOLUME INDEX: 52.1 ML/M2
ECHO LVOT SV: 103.6 ML
ECHO LVOT VTI: 15.7 CM
ECHO MV "A" WAVE DURATION: 144.6 MSEC
ECHO MV A VELOCITY: 0.7 M/S
ECHO MV E DECELERATION TIME (DT): 223 MS
ECHO MV E VELOCITY: 0.58 M/S
ECHO MV E/A RATIO: 0.83
ECHO MV E/E' LATERAL: 5.8
ECHO MV E/E' RATIO (AVERAGED): 7.04
ECHO MV E/E' SEPTAL: 8.29
ECHO RIGHT VENTRICULAR SYSTOLIC PRESSURE (RVSP): 19 MMHG
ECHO RV FREE WALL PEAK S': 12 CM/S
ECHO RV TAPSE: 1.7 CM (ref 1.5–2)
ECHO TV REGURGITANT MAX VELOCITY: 1.98 M/S
ECHO TV REGURGITANT PEAK GRADIENT: 16 MMHG

## 2022-02-21 NOTE — PROGRESS NOTES
Echo showed normal pumping heart strength. His aortic stenosis, however have progressed. Dr Aburto Punch left/right heart cath for workup for AVR/TAVR, likely on Tuesday. Will put orders in if in agreement. If he wants to speak with Ariel bueno, get best phone number to reach him and he can call tomorrow.   Thanks

## 2022-02-22 ENCOUNTER — TELEPHONE (OUTPATIENT)
Dept: CARDIOLOGY CLINIC | Age: 47
End: 2022-02-22

## 2022-02-22 NOTE — TELEPHONE ENCOUNTER
----- Message from Cammy Mireles NP sent at 2/21/2022  5:21 PM EST -----  Echo showed normal pumping heart strength. His aortic stenosis, however have progressed. Dr Pepe Rojo left/right heart cath for workup for AVR/TAVR, likely on Tuesday. Will put orders in if in agreement. If he wants to speak with Ariel bueno, get best phone number to reach him and he can call tomorrow.   Thanks

## 2022-03-01 NOTE — TELEPHONE ENCOUNTER
Please call pt he called today 3/1/22 and is returning your call.     [phone]     Thanks Santiago Moe

## 2022-03-22 ENCOUNTER — APPOINTMENT (OUTPATIENT)
Dept: CT IMAGING | Age: 47
End: 2022-03-22
Attending: NURSE PRACTITIONER
Payer: COMMERCIAL

## 2022-03-22 ENCOUNTER — HOSPITAL ENCOUNTER (OUTPATIENT)
Age: 47
Setting detail: OUTPATIENT SURGERY
Discharge: HOME | End: 2022-03-22
Attending: INTERNAL MEDICINE | Admitting: INTERNAL MEDICINE
Payer: COMMERCIAL

## 2022-03-22 ENCOUNTER — APPOINTMENT (OUTPATIENT)
Dept: VASCULAR SURGERY | Age: 47
End: 2022-03-22
Attending: NURSE PRACTITIONER
Payer: COMMERCIAL

## 2022-03-22 VITALS
DIASTOLIC BLOOD PRESSURE: 108 MMHG | OXYGEN SATURATION: 95 % | HEIGHT: 72 IN | BODY MASS INDEX: 22.08 KG/M2 | SYSTOLIC BLOOD PRESSURE: 151 MMHG | HEART RATE: 64 BPM | TEMPERATURE: 98.4 F | WEIGHT: 163 LBS | RESPIRATION RATE: 13 BRPM

## 2022-03-22 DIAGNOSIS — Z98.890 S/P CARDIAC CATH: ICD-10-CM

## 2022-03-22 DIAGNOSIS — I35.0 SEVERE AORTIC STENOSIS: ICD-10-CM

## 2022-03-22 DIAGNOSIS — R07.9 CHEST PAIN, UNSPECIFIED TYPE: ICD-10-CM

## 2022-03-22 DIAGNOSIS — R06.09 DOE (DYSPNEA ON EXERTION): ICD-10-CM

## 2022-03-22 DIAGNOSIS — I65.23 BILATERAL CAROTID ARTERY STENOSIS: ICD-10-CM

## 2022-03-22 DIAGNOSIS — Z01.810 PREOP CARDIOVASCULAR EXAM: ICD-10-CM

## 2022-03-22 DIAGNOSIS — Q23.1 BICUSPID AORTIC VALVE: ICD-10-CM

## 2022-03-22 LAB
ANION GAP SERPL CALC-SCNC: 2 MMOL/L (ref 5–15)
BUN SERPL-MCNC: 24 MG/DL (ref 6–20)
BUN/CREAT SERPL: 18 (ref 12–20)
CALCIUM SERPL-MCNC: 9.3 MG/DL (ref 8.5–10.1)
CHLORIDE SERPL-SCNC: 106 MMOL/L (ref 97–108)
CO2 SERPL-SCNC: 30 MMOL/L (ref 21–32)
CREAT SERPL-MCNC: 1.31 MG/DL (ref 0.7–1.3)
ERYTHROCYTE [DISTWIDTH] IN BLOOD BY AUTOMATED COUNT: 13.9 % (ref 11.5–14.5)
GLUCOSE SERPL-MCNC: 108 MG/DL (ref 65–100)
HCT VFR BLD AUTO: 45.6 % (ref 36.6–50.3)
HGB BLD-MCNC: 15.5 G/DL (ref 12.1–17)
LEFT CCA DIST DIAS: 25.8 CM/S
LEFT CCA DIST SYS: 62 CM/S
LEFT CCA PROX DIAS: 20.6 CM/S
LEFT CCA PROX SYS: 62 CM/S
LEFT ECA DIAS: 12.81 CM/S
LEFT ECA SYS: 54.2 CM/S
LEFT ICA DIST DIAS: 34.8 CM/S
LEFT ICA DIST SYS: 67.2 CM/S
LEFT ICA MID DIAS: 30.9 CM/S
LEFT ICA MID SYS: 63.3 CM/S
LEFT ICA PROX DIAS: 28.3 CM/S
LEFT ICA PROX SYS: 60.7 CM/S
LEFT ICA/CCA SYS: 1.08
LEFT VERTEBRAL DIAS: 23.16 CM/S
LEFT VERTEBRAL SYS: 49.1 CM/S
MCH RBC QN AUTO: 33.3 PG (ref 26–34)
MCHC RBC AUTO-ENTMCNC: 34 G/DL (ref 30–36.5)
MCV RBC AUTO: 97.9 FL (ref 80–99)
NRBC # BLD: 0 K/UL (ref 0–0.01)
NRBC BLD-RTO: 0 PER 100 WBC
PLATELET # BLD AUTO: 284 K/UL (ref 150–400)
PMV BLD AUTO: 9.6 FL (ref 8.9–12.9)
POTASSIUM SERPL-SCNC: 4.2 MMOL/L (ref 3.5–5.1)
RBC # BLD AUTO: 4.66 M/UL (ref 4.1–5.7)
RIGHT CCA DIST DIAS: 24.7 CM/S
RIGHT CCA DIST SYS: 66.4 CM/S
RIGHT CCA PROX DIAS: 15.5 CM/S
RIGHT CCA PROX SYS: 66.4 CM/S
RIGHT ECA DIAS: 11.97 CM/S
RIGHT ECA SYS: 49.3 CM/S
RIGHT ICA DIST DIAS: 38.4 CM/S
RIGHT ICA DIST SYS: 76.8 CM/S
RIGHT ICA MID DIAS: 34 CM/S
RIGHT ICA MID SYS: 79 CM/S
RIGHT ICA PROX DIAS: 19.7 CM/S
RIGHT ICA PROX SYS: 44.9 CM/S
RIGHT ICA/CCA SYS: 1.2
RIGHT VERTEBRAL DIAS: 12.74 CM/S
RIGHT VERTEBRAL SYS: 49 CM/S
SODIUM SERPL-SCNC: 138 MMOL/L (ref 136–145)
VAS LEFT SUBCLAVIAN PROX EDV: 0 CM/S
VAS LEFT SUBCLAVIAN PROX PSV: 103.2 CM/S
VAS RIGHT SUBCLAVIAN PROX EDV: 0 CM/S
VAS RIGHT SUBCLAVIAN PROX PSV: 76.8 CM/S
WBC # BLD AUTO: 4.7 K/UL (ref 4.1–11.1)

## 2022-03-22 PROCEDURE — 93880 EXTRACRANIAL BILAT STUDY: CPT

## 2022-03-22 PROCEDURE — 77030015766: Performed by: INTERNAL MEDICINE

## 2022-03-22 PROCEDURE — 93460 R&L HRT ART/VENTRICLE ANGIO: CPT | Performed by: INTERNAL MEDICINE

## 2022-03-22 PROCEDURE — 71250 CT THORAX DX C-: CPT

## 2022-03-22 PROCEDURE — 93880 EXTRACRANIAL BILAT STUDY: CPT | Performed by: PSYCHIATRY & NEUROLOGY

## 2022-03-22 PROCEDURE — 77030008543 HC TBNG MON PRSS MRTM -A: Performed by: INTERNAL MEDICINE

## 2022-03-22 PROCEDURE — 36415 COLL VENOUS BLD VENIPUNCTURE: CPT

## 2022-03-22 PROCEDURE — 77030042317 HC BND COMPR HEMSTAT -B: Performed by: INTERNAL MEDICINE

## 2022-03-22 PROCEDURE — 85027 COMPLETE CBC AUTOMATED: CPT

## 2022-03-22 PROCEDURE — 99153 MOD SED SAME PHYS/QHP EA: CPT | Performed by: INTERNAL MEDICINE

## 2022-03-22 PROCEDURE — 77030010221 HC SPLNT WR POS TELE -B: Performed by: INTERNAL MEDICINE

## 2022-03-22 PROCEDURE — 99152 MOD SED SAME PHYS/QHP 5/>YRS: CPT | Performed by: INTERNAL MEDICINE

## 2022-03-22 PROCEDURE — 74011250636 HC RX REV CODE- 250/636: Performed by: INTERNAL MEDICINE

## 2022-03-22 PROCEDURE — 77030004549 HC CATH ANGI DX PRF MRTM -A: Performed by: INTERNAL MEDICINE

## 2022-03-22 PROCEDURE — 77030019698 HC SYR ANGI MDLON MRTM -A: Performed by: INTERNAL MEDICINE

## 2022-03-22 PROCEDURE — 2709999900 HC NON-CHARGEABLE SUPPLY: Performed by: INTERNAL MEDICINE

## 2022-03-22 PROCEDURE — C1769 GUIDE WIRE: HCPCS | Performed by: INTERNAL MEDICINE

## 2022-03-22 PROCEDURE — 93456 R HRT CORONARY ARTERY ANGIO: CPT | Performed by: INTERNAL MEDICINE

## 2022-03-22 PROCEDURE — C1751 CATH, INF, PER/CENT/MIDLINE: HCPCS | Performed by: INTERNAL MEDICINE

## 2022-03-22 PROCEDURE — 74011000636 HC RX REV CODE- 636: Performed by: INTERNAL MEDICINE

## 2022-03-22 PROCEDURE — C1894 INTRO/SHEATH, NON-LASER: HCPCS | Performed by: INTERNAL MEDICINE

## 2022-03-22 PROCEDURE — 80048 BASIC METABOLIC PNL TOTAL CA: CPT

## 2022-03-22 PROCEDURE — APPNB30 APP NON BILLABLE TIME 0-30 MINS: Performed by: NURSE PRACTITIONER

## 2022-03-22 PROCEDURE — 74011000250 HC RX REV CODE- 250: Performed by: INTERNAL MEDICINE

## 2022-03-22 RX ORDER — HEPARIN SODIUM 1000 [USP'U]/ML
INJECTION, SOLUTION INTRAVENOUS; SUBCUTANEOUS AS NEEDED
Status: DISCONTINUED | OUTPATIENT
Start: 2022-03-22 | End: 2022-03-22 | Stop reason: HOSPADM

## 2022-03-22 RX ORDER — MIDAZOLAM HYDROCHLORIDE 1 MG/ML
INJECTION, SOLUTION INTRAMUSCULAR; INTRAVENOUS AS NEEDED
Status: DISCONTINUED | OUTPATIENT
Start: 2022-03-22 | End: 2022-03-22 | Stop reason: HOSPADM

## 2022-03-22 RX ORDER — HEPARIN SODIUM 200 [USP'U]/100ML
INJECTION, SOLUTION INTRAVENOUS
Status: COMPLETED | OUTPATIENT
Start: 2022-03-22 | End: 2022-03-22

## 2022-03-22 RX ORDER — LIDOCAINE HYDROCHLORIDE 10 MG/ML
INJECTION, SOLUTION EPIDURAL; INFILTRATION; INTRACAUDAL; PERINEURAL AS NEEDED
Status: DISCONTINUED | OUTPATIENT
Start: 2022-03-22 | End: 2022-03-22 | Stop reason: HOSPADM

## 2022-03-22 RX ORDER — FENTANYL CITRATE 50 UG/ML
INJECTION, SOLUTION INTRAMUSCULAR; INTRAVENOUS AS NEEDED
Status: DISCONTINUED | OUTPATIENT
Start: 2022-03-22 | End: 2022-03-22 | Stop reason: HOSPADM

## 2022-03-22 RX ORDER — VERAPAMIL HYDROCHLORIDE 2.5 MG/ML
INJECTION, SOLUTION INTRAVENOUS AS NEEDED
Status: DISCONTINUED | OUTPATIENT
Start: 2022-03-22 | End: 2022-03-22 | Stop reason: HOSPADM

## 2022-03-22 NOTE — Clinical Note
TRANSFER - OUT REPORT:     Verbal report given to: Lucio Barraza. Report consisted of patient's Situation, Background, Assessment and   Recommendations(SBAR). Opportunity for questions and clarification was provided. Patient transported with a Registered Nurse. Patient transported to: recovery.

## 2022-03-22 NOTE — Clinical Note
Sheath #1: Sheath: inserted. vein. Hemostasis achieved. Upon evaluation of the common femoral artery stick using fluoroscopy, the access site puncture was within the safe zone.

## 2022-03-22 NOTE — PROGRESS NOTES
Cardiac Cath Lab Recovery Arrival Note:      Jordan Crespo arrived to Cardiac Cath Lab, Recovery Area. Staff introduced to patient. Patient identifiers verified with NAME and DATE OF BIRTH. Procedure verified with patient. Consent forms reviewed and signed by patient or authorized representative and verified. Allergies verified. Patient and family oriented to department. Patient and family informed of procedure and plan of care. Questions answered with review. Patient prepped for procedure, per orders from physician, prior to arrival.    Patient on cardiac monitor, non-invasive blood pressure, SPO2 monitor. On RA. Patient is A&Ox 4. Patient reports no complaints. Patient in stretcher, in low position, with side rails up, call bell within reach, patient instructed to call if assistance as needed. Patient prep in: [address], [place] [address]. Patient family has pager #   Family in: Ame Corona, arian.    Prep by: Maximino Chen

## 2022-03-22 NOTE — PROGRESS NOTES
1000- hematoma noted around and above radial band. Pressure held. 2 cc air placed in band. Second TR band placed (with 10cc air) above original radial band.    Will monitor closely

## 2022-03-22 NOTE — H&P
Subjective/HPI:     Richard Rashid is a 51 y.o. male is here for routine f/u. He has a PMHx of bicuspid aortic valve with aortic stenosis, and hiatal hernia. Has some GOODWIN on heavier exertion. Recent echo with very severe AS through bicuspid valve, mean gradient 70 mm Hg. Denies complaints of chest pains, dizziness, LOC. Denies orthopnea, PND or edema. Past Medical History:   Diagnosis Date    Aortic stenosis     Bicuspid aortic valve     Hiatal hernia     Severe aortic stenosis 3/22/2022       No past surgical history on file. Family History   Problem Relation Age of Onset    Other Father     Lung Cancer Maternal Grandmother     Coronary Art Dis Maternal Grandfather     Heart Attack Maternal Grandfather        Social History     Socioeconomic History    Marital status:      Spouse name: Not on file    Number of children: Not on file    Years of education: Not on file    Highest education level: Not on file   Occupational History    Not on file   Tobacco Use    Smoking status: Former Smoker     Types: Cigarettes     Quit date: 2017     Years since quittin.6    Smokeless tobacco: Never Used   Vaping Use    Vaping Use: Never used   Substance and Sexual Activity    Alcohol use: Yes     Alcohol/week: 3.0 standard drinks     Types: 3 Glasses of wine per week     Comment: weekly    Drug use: Never    Sexual activity: Yes     Partners: Male   Other Topics Concern    Not on file   Social History Narrative    Not on file     Social Determinants of Health     Financial Resource Strain:     Difficulty of Paying Living Expenses: Not on file   Food Insecurity:     Worried About Running Out of Food in the Last Year: Not on file    Jackelyn of Food in the Last Year: Not on file   Transportation Needs:     Lack of Transportation (Medical): Not on file    Lack of Transportation (Non-Medical):  Not on file   Physical Activity:     Days of Exercise per Week: Not on file    Minutes of Exercise per Session: Not on file   Stress:     Feeling of Stress : Not on file   Social Connections:     Frequency of Communication with Friends and Family: Not on file    Frequency of Social Gatherings with Friends and Family: Not on file    Attends Cheondoism Services: Not on file    Active Member of Clubs or Organizations: Not on file    Attends Club or Organization Meetings: Not on file    Marital Status: Not on file   Intimate Partner Violence:     Fear of Current or Ex-Partner: Not on file    Emotionally Abused: Not on file    Physically Abused: Not on file    Sexually Abused: Not on file   Housing Stability:     Unable to Pay for Housing in the Last Year: Not on file    Number of [place] in the Last Year: Not on file    Unstable Housing in the Last Year: Not on file       No Known Allergies    No current facility-administered medications on file prior to encounter. Current Outpatient Medications on File Prior to Encounter   Medication Sig Dispense Refill    Omeprazole delayed release (PRILOSEC D/R) 20 mg tablet Take 20 mg by mouth daily. Review of Symptoms:    Review of Systems   Constitutional: Negative for chills, fever and weight loss. HENT: Negative for nosebleeds. Eyes: Negative for blurred vision and double vision. Respiratory: Negative for cough, shortness of breath and wheezing. Cardiovascular: Negative for chest pain, palpitations, orthopnea, leg swelling and PND. Skin: Negative for rash. Neurological: Negative for dizziness and loss of consciousness. Physical Exam:      General: Well developed, in no acute distress, cooperative and alert  Heart:  reg rate and rhythm; normal S1; split S2; 2/6 VENESSA loudest over the apex; no gallops or rubs. Respiratory: Clear bilaterally x 4, no wheezing or rales  Extremities:  Normal cap refill, no cyanosis, atraumatic. No edema.   Vascular: 2+ pulses symmetric in all extremities    Vitals: 03/22/22 0732   BP: (!) 185/115   BP 1 Location: Right upper arm   BP Patient Position: At rest   Pulse: 71   Temp: 98.4 °F (36.9 °C)   Resp: 17   Height: 6' (1.829 m)   Weight: 163 lb (73.9 kg)   SpO2: 99%       Lab Results   Component Value Date/Time    Cholesterol, total 204 (H) 06/09/2020 09:54 AM    HDL Cholesterol 50 06/09/2020 09:54 AM    LDL, calculated 131 (H) 06/09/2020 09:54 AM    VLDL, calculated 23 06/09/2020 09:54 AM    Triglyceride 114 06/09/2020 09:54 AM     Lab Results   Component Value Date/Time    WBC 4.7 03/22/2022 07:40 AM    HGB 15.5 03/22/2022 07:40 AM    HCT 45.6 03/22/2022 07:40 AM    PLATELET 560 95/66/2971 07:40 AM    MCV 97.9 03/22/2022 07:40 AM     Lab Results   Component Value Date/Time    Sodium 138 03/22/2022 07:40 AM    Potassium 4.2 03/22/2022 07:40 AM    Chloride 106 03/22/2022 07:40 AM    CO2 30 03/22/2022 07:40 AM    Anion gap 2 (L) 03/22/2022 07:40 AM    Glucose 108 (H) 03/22/2022 07:40 AM    BUN 24 (H) 03/22/2022 07:40 AM    Creatinine 1.31 (H) 03/22/2022 07:40 AM    BUN/Creatinine ratio 18 03/22/2022 07:40 AM    GFR est AA >60 03/22/2022 07:40 AM    GFR est non-AA 59 (L) 03/22/2022 07:40 AM    Calcium 9.3 03/22/2022 07:40 AM    Bilirubin, total 0.4 06/09/2020 09:54 AM    Alk. phosphatase 59 06/09/2020 09:54 AM    Protein, total 6.9 06/09/2020 09:54 AM    Albumin 4.6 06/09/2020 09:54 AM    A-G Ratio 2.0 06/09/2020 09:54 AM    ALT (SGPT) 12 06/09/2020 09:54 AM    AST (SGOT) 16 06/09/2020 09:54 AM       ECG done today sinus rhythm; RBBB       Assessment/ Plan:     1. Nonrheumatic aortic valve stenosis  Echo done 1/2021 with preserved LVEF 55-60% with severe aortic stenosis with bicuspid aortic valve.  Mean PG 39.91 mmHg, Vmax 4.14 m/s, MUNIRA 0.91 cm^2  Echo 3/2022 with mean gradient now 70 mm HG, some GOODWIN  Abd/Chest CTA done 2/2021 with ectatic ascending aorta with mild aortic atherosclerosis  I discussed the risks and benefits of cardiac catheterization +/- PCI with the patient who expressed understanding and wishes to proceed.      2. Mixed hyperlipidemia  Continue statin therapy and low fat, low cholesterol diet  Lipids managed by PCP

## 2022-03-22 NOTE — PROGRESS NOTES
Alba Howe is recovering post-procedure. R radial site dressing is CDI without swelling or bleeding. VSS. Rhythm sinus. Alba oHwe denies complaints at this time. If recovery continues to progress without complication, discharge is planned for later today after testing completed. Has follow up scheduled with valve clinic.            Pj Ronquillo NP DNP, RN, AGACNP-BC

## 2022-03-22 NOTE — PROGRESS NOTES
Discharge instructions reviewed with patient and . No medication changes. SAVR testing completed. IV removed, patient dressed and taken down in wheelchair.

## 2022-03-22 NOTE — DISCHARGE INSTRUCTIONS
[address]  [phone]        Patient ID:  Jordan Crespo  [id number]  46 y.o.  1975    Admit Date: 3/22/2022    Discharge Date: 3/22/2022     Admitting Physician: Tatiana Lieberman MD     Discharge Physician: Tatiana Lieberman MD    Admission Diagnoses:   Chest pain, unspecified type [R07.9]    Discharge Diagnoses:   Principal Problem:    Severe aortic stenosis (3/22/2022)    Active Problems:    Bicuspid aortic valve (3/22/2022)      S/P cardiac cath (3/22/2022)      Overview: 3/22/22: no CAD. Severe AS. Discharge Condition: Good    Cardiology Procedures this Admission:  Diagnostic left heart catheterization    Disposition: home    Reference discharge instructions provided by nursing for diet and activity. Signed:  Ramiro Mark NP  3/22/2022  12:32 PM        Radial Cardiac Catheterization/Angiography Discharge Instructions    It is normal to feel tired the first couple days. Take it easy and follow the physicians instructions. CHECK THE CATHETER INSERTION SITE DAILY:    Remove the wrist dressing 24 hours after the procedure. You may shower 24 hours after the procedure. Wash with soap and water and pat dry. Gentle cleaning of the site with soap and water is sufficient, cover with a dry clean dressing or bandage. Do not apply creams or powders to the area. No soaking the wrist for 3 days. Leave the puncture site open to air after 24 hours post-procedure. CALL THE PHYSICIANS:     If the site becomes red, swollen or feels warm to the touch  If there is bleeding or drainage or if there is unusual pain at the radial site. If there is any minor oozing, you may apply a band-aid and remove after 12 hours.    If the bleeding continues, hold pressure with the middle finger against the puncture site and the thumb against the back of the wrist,call 911 to be transported to the hospital.  DO NOT [address] ANYONE ELSE DRIVE YOU - CALL 360. ACTIVITY:   For the first 24 hours do not manipulate the wrist.  No lifting, pushing or pulling over 3-5 pounds with the affected wrist for 7 daysand no straining the insertion site. Do not life grocery bags or the garbage can, do not run the vacuum  or  for 7 days. Start with short walks as in the hospital and gradually increase as tolerated each day. It is recommended to walk 30 minutes 5-7 days per week. Follow your physicians instructions on activity. Avoid walking outside in extremes of heat or cold. Walk inside when it is cold and windy or hot and humid. Things to keep in mind:  No driving for at least 24 hours, or as designated by your physician. Limit the number of times you go up and down the stairs  Take rests and pace yourself with activity. Be careful and do not strain with bowel movements. MEDICATIONS:    Take all medications as prescribed  Call your physician if you have any questions  Keep an updated list of your medications with you at all times and give a list to your physician and pharmacist    SIGNS AND SYMPTOMS:   Be cautious of symptoms of angina or recurrent symptoms such as chest discomfort, unusual shortness of breath or fatigue. These could be symptoms of restenosis, a new blockage or a heart attack. If your symptoms are relieved with rest it is still recommended that you notify your physician of recurrent chest pain or discomfort. For CHEST PAIN or symptoms of angina not relieved with rest:  If the discomfort is not relieved with rest, and you have been prescribed Nitroglycerin, take as directed (taken under the tongue, one at a time 5 minutes apart for a total of 3 doses). If the discomfort is not relieved after the 3rd nitroglycerin, call 911. If you have not been prescribed Nitroglycerin  and your chest discomfort is not relieved with rest, call 911. AFTER CARE:   Follow up with your physician as instructed.   Follow a heart healthy diet with proper portion control, daily stress management, daily exercise, blood pressure and cholesterol control , and smoking cessation.

## 2022-03-22 NOTE — CARDIO/PULMONARY
Cardiac Rehab Note: chart review    CP. Cardiac cath, without intervention, 3/22/22    EF 55-60%  on 2/17/22 per Echo    Smoking history assessed. Patient is a former smoker who quit in 2017. Smoking Cessation Program link has not been added to the AVS.     Patient not seen at this time due to current condition listed above. No MI noted, \"Normal coronaries\" per cath report. EF does not qualify for CP Rehab at this time.     Pt being referred to the valve clinic to f/u OP

## 2022-03-22 NOTE — PROGRESS NOTES
1058- TR band removal initiated. TR band #2 removed. No hematoma noted. Radial band at site- 3 cc air removed. Site clear. 1130-  TR band removed. Site clear. No bleeding or hematoma noted. Area cleaned and dry gauze and tegaderm applied.

## 2022-03-24 PROBLEM — Q23.1 BICUSPID AORTIC VALVE: Status: ACTIVE | Noted: 2022-03-22

## 2022-03-24 PROBLEM — Q23.81 BICUSPID AORTIC VALVE: Status: ACTIVE | Noted: 2022-03-22

## 2022-03-24 PROBLEM — Z98.890 S/P CARDIAC CATH: Status: ACTIVE | Noted: 2022-03-22

## 2022-03-24 PROBLEM — I35.0 SEVERE AORTIC STENOSIS: Status: ACTIVE | Noted: 2022-03-22

## 2022-03-30 ENCOUNTER — OFFICE VISIT (OUTPATIENT)
Dept: CARDIOTHORACIC SURGERY | Age: 47
End: 2022-03-30
Payer: COMMERCIAL

## 2022-03-30 VITALS
BODY MASS INDEX: 22.59 KG/M2 | HEART RATE: 64 BPM | DIASTOLIC BLOOD PRESSURE: 80 MMHG | SYSTOLIC BLOOD PRESSURE: 164 MMHG | OXYGEN SATURATION: 98 % | HEIGHT: 72 IN | TEMPERATURE: 98.2 F | RESPIRATION RATE: 16 BRPM | WEIGHT: 166.8 LBS

## 2022-03-30 DIAGNOSIS — I35.0 SEVERE AORTIC STENOSIS: Primary | ICD-10-CM

## 2022-03-30 PROCEDURE — 99204 OFFICE O/P NEW MOD 45 MIN: CPT | Performed by: NURSE PRACTITIONER

## 2022-03-30 NOTE — PROGRESS NOTES
I agree with this consultation note. I personally interviewed and examined the patient, and reviewed their diagnostic studies in detail. I have also discussed the case in detail with the referring provider. After discussing the risks, benefits and alternatives to surgery with the patient, they requested that we proceed with surgery. We discussed options including mechanical aortic valves, tissue aortic valves and various considerations regarding his aortic aneurysm. His ascending aorta measured approximately 38 mm in diameter at the level of the main PA last year. This year it measures approximately 41 mm. It is currently growing at a rate of 2 to 3 mm/year. According to recent AHA guidelines for the management of thoracic aortic disease, intervention on this aorta in the setting of a bicuspid aortic valve would be warranted if the size was greater than 45 mm and/or it was growing at a rate of 5 mm/year or greater. In that regard, he does not meet either of these criteria. Moreover, his Kenn risk calculates out to 1.22, where a threshold of 1.5 would suggest that intervention should be considered. That being said, there is a significant probability that his aorta will continue to grow to a significant size over the next several years given his underlying aortopathy and the fact that he is only 51years old. After discussing all of these matters, the patient decided that he would like to have his ascending aorta replaced at the time of his aortic valve replacement. I believe this is an entirely reasonable course of action and given his aortic anatomy, does not come at significant increased risk at the time of operation. We will plan to proceed with an operation on May 2, 2022 comprising of replacement of his ascending aorta and an aortic valve replacement with an On-X valve. Emily Cook MD, PhD, [facility]. CSS   History and Physical    Subjective:      Braulio Villasenor is a 51 y.o. male who was referred for cardiac evaluation for severe AS from [facility]. He has a medical history of bicuspid AV, AS, hiatal hernia. BP elevated at doctors office but normal at home -likely white coat syndrome. He has SOB with activity -walking up hill. He is able to run slowly on treadmill -no SOB with that on flat surface. He denies dizziness. No falls. Chest tightness occasionally happens -at rest and activity. He is a former smoker -smoked 20 years. He drinks alcohol occasionally. He is , his  accompanied him to his appointment. He has a family history of heart disease. Covid vaccine moderna x3 He works as the  at [employer]. Cardiac Testing    Cardiac catheterization 3/22/22: Diagnostic  Dominance: Right    Left Main   The vessel is angiographically normal.   Left Anterior Descending   First Diagonal Branch   The vessel is angiographically normal.   Ramus Intermedius   The vessel is angiographically normal.   Left Circumflex   The vessel is angiographically normal.   First Obtuse Marginal Branch   The vessel is angiographically normal.   Second Obtuse Marginal Branch   The vessel is angiographically normal.   Right Coronary Artery   The vessel is angiographically normal.   Right Posterior Descending Artery   The vessel is angiographically normal.   Right Posterior Atrioventricular Artery   The vessel is angiographically normal.   First Right Posterolateral Branch   The vessel is small. The vessel is angiographically normal.   Second Right Posterolateral Branch   The vessel is small. The vessel is angiographically normal.     Intervention      No interventions have been documented. Right Heart    Right Heart Cath PA pressure = 26/7 mmHg. PA mean = 17 mmHg. PA Sat = 77.2 %. Mid RA mean = 7 mmHg. RV pressure = 26/7 mmHg. Wedge pressure = 12 mmHg. AO Sat = 97 %. Trang CO = 5.72 L/min.    Trang CI=2.93       ECHO 2/17/22:  Left Ventricle Left ventricle size is normal. Mildly increased wall thickness. Normal wall motion. Normal left ventricular systolic function with a visually estimated EF of 55 - 60%. Grade I diastolic dysfunction with normal LAP. There is a false chord. Left Atrium Left atrium size is normal.   Right Ventricle Right ventricle size is normal. Normal systolic function. Right Atrium Right atrium size is normal.   Aortic Valve Bicuspid valve. Moderately calcified cusp. Moderate annular calcification. Mild transvalvular regurgitation. Severe stenosis of the aortic valve. Mitral Valve Valve structure is normal. Mild transvalvular regurgitation. No stenosis noted. Tricuspid Valve Valve structure is normal. Trace transvalvular regurgitation. No stenosis noted. Normal RVSP. Pulmonic Valve Valve structure is normal. Mild transvalvular regurgitation. No stenosis noted. Aorta Normal sized aortic root and ascending aorta. Pericardium No pericardial effusion. Procedure Staff    Technologist/Clinician: Alfonso Saavedra  Supporting Staff: None  Performing Physician/Midlevel: None     Exam Completion Date/Time: 2/17/22 10:50 AM     Volumes and Function (Range)     ESV ESV Index   LA Biplane 55 mL (18 - 58)       28 ml/m2 (16 - 34)         LA Area-Length 56 mL       28 mL/m2 (16 - 34)         LA A4C 46 mL (18 - 58)       23 mL/m2 (16 - 34)         LA A2C 51 mL (18 - 58)       26 mL/m2 (16 - 34)           Left Ventricle Measurements    Dimensions   Fractional Shortening 2D 41 % (Range: 28 - 44)      LVIDd 4.9 cm (Range: 4.2 - 5. 9)      LVIDd Index 2.46 cm/m2      LVIDs 2.9 cm      LVIDs Index 1.46 cm/m2      IVSd 1.2 cm (Range: 0.6 - 1.0) Important       LVPWd 1.2 cm (Range: 0.6 - 1.0) Important       LV RWT Ratio 0.49       LV Mass 2D 226.4 g (Range: 88 - 224) Important       LV Mass 2D Index 113.8 g/m2 (Range: 49 - 115)             Right Ventricle Measurements    Dimensions   TAPSE 1.7 cm (Range: 1.5 - 2.0)       Function   RV Free Wall Peak S' 12 cm/s           Atrial Measurements    Left Atrium   LA Diameter 3.6 cm      LA Size Index 1.81 cm/m2      LA/AO Root Ratio 1.09               Tricuspid Valve Measurements    Regurgitation   Est. RA Pressure 3 mmHg      TR Max Velocity 1.98 m/s      RVSP 19 mmHg      TR Peak Gradient 16 mmHg             Aortic Valve Measurements    Stenosis   AV Mean Gradient 71 mmHg      AV Peak Gradient 134 mmHg      AV Peak Velocity 5.8 m/s      AV Velocity Ratio 0.1       AV .9 cm      LVOT:AV VTI Index 0.13       AV Mean Velocity 4 m/s      AV Area by VTI 0.8 cm2      MUNIRA/BSA VTI 0.4 cm2/m2      AV Area by Peak Velocity 0.6 cm2      MUNIRA/BSA Peak Velocity 0.3 cm2/m2       LVOT   LVOT Diameter 2.9 cm      LVOT Area 6.6 cm2      LVOT Mean Gradient 1 mmHg      LVOT Peak Gradient 1 mmHg      LVOT VTI 15.7 cm      LVOT Peak Velocity 0.6 m/s             Aorta Measurements    Dimensions   Measurement Value (Range)   Aortic Root 3.3 cm      Ao Root Index 1.66 cm/m2      Ascending Aorta 3.7 cm      Ascending Aorta Index 1.86 cm/m2            Diastolic Filling/Shunts    Diastolic Filling   MV E Velocity 0.58 m/s      MV A Velocity 0.7 m/s      MV E/A 0.83       E/E' Ratio (Averaged) 7.04       LV E' Lateral Velocity 10 cm/s      E/E' Lateral 5.8       LV E' Septal Velocity 7 cm/s      E/E' Septal 8.29       MV E Wave Deceleration Time 223 ms      MV \"A\" Wave Duration 144.6 msec      LV IVRT 78 ms       Shunt   LVOT Stroke Volume Index 52.1 mL/m2      LVOT .6 ml             CT Chest 3/22/22: FINDINGS:     THYROID: No nodule. MEDIASTINUM: No mass or lymphadenopathy. VALE: No mass or lymphadenopathy. THORACIC AORTA: There is calcification in the region of the aortic valve this is  greater than expected for patient this age. There continues to be mild  dilatation of ascending aorta. .     When measured at the level of the top of the right pulmonary artery. The  transverse diameter is 4.1 cm in AP diameter is 3.9 cm.  Previously the  measurements at this level were 3.8 cm in transverse diameter by 3.7 cm in AP  diameter.     When measured on axial image 30 at the level of the main pulmonary artery the  diameter is 4.1 cm. Previously the diameter was 3.9 cm.     When measured at the level of the aortic sinus the diameter is 3.9 cm. MAIN PULMONARY ARTERY: Normal in caliber. TRACHEA/BRONCHI: Patent. ESOPHAGUS: No wall thickening or dilatation. HEART: Normal in size. There is moderate coronary artery calcification. PLEURA: No effusion or pneumothorax. LUNGS: The lungs demonstrate stable 3 mm nodule in the lingula on image 75. No  new nodules are noted. There are mild changes of centrilobular and paraseptal  emphysema. There are subpleural curvilinear opacities dependently at each lung  base this is new in the interval. Most likely this is due to dependent  atelectasis. Interstitial lung abnormality could have similar appearance.     The central airways are patent. .  INCIDENTALLY IMAGED UPPER ABDOMEN: There is contrast material in the collecting  system of both kidneys and proximal ureters this is from recent intravenous  contrast administration. Clinical correlation is suggested. No adrenal lesions. Paullette Rakes BONES: No destructive bone lesion.     IMPRESSION     1. The dense calcification in the aortic valve is noted. 2. There is dilatation of ascending thoracic aorta measuring 4.1 x 3.9 cm. Previously this measured 3.8 x 3.7 cm.  2. New dependent opacities in the subpleural location of both lower lobes. Most  likely this is dependent atelectasis. Past Medical History:   Diagnosis Date    Aortic stenosis     Bicuspid aortic valve     Hiatal hernia     Severe aortic stenosis 3/22/2022     No past surgical history on file.    Social History     Tobacco Use    Smoking status: Former Smoker     Types: Cigarettes     Quit date: 2017     Years since quittin.7    Smokeless tobacco: Never Used   Substance Use Topics    Alcohol use: Yes     Alcohol/week: 3.0 standard drinks     Types: 3 Glasses of wine per week     Comment: weekly      Family History   Problem Relation Age of Onset    Other Father     Lung Cancer Maternal Grandmother     Coronary Art Dis Maternal Grandfather     Heart Attack Maternal Grandfather      Prior to Admission medications    Medication Sig Start Date End Date Taking? Authorizing Provider   Omeprazole delayed release (PRILOSEC D/R) 20 mg tablet Take 20 mg by mouth daily. Provider, Historical       No Known Allergies    Review of Systems: pertinent positives in HPI  Consititutional: Denies fever or chills. Eyes:  Denies use of glasses or vision problems(cataracts). ENT:  Denies hearing or swallowing difficulty. CV: Denies CP, claudication, HTN. Resp: Denies dyspnea, productive cough. : Denies dialysis or kidney problems. GI: Denies ulcers, esophageal strictures, liver problems. M/S: Denies joint or bone problems, or implanted artificial hardware. Skin: Denies varicose veins, edema. Neuro: Denies strokes, or TIAs. Psych: Denies anxiety or depression. Endocrine: Denies thyroid problems or diabetes. Heme/Lymphatic: Denies easy bruising or lymphedema. Objective:     VS:   Visit Vitals  BP (!) 164/80 (BP 1 Location: Right arm, BP Patient Position: Sitting)   Pulse 64   Temp 98.2 °F (36.8 °C) (Oral)   Resp 16   Ht 6' (1.829 m)   Wt 166 lb 12.8 oz (75.7 kg)   SpO2 98%   BMI 22.62 kg/m²       Physical Exam:    General appearance: alert, cooperative, no distress  Head: normocephalic, without obvious abnormality; atraumatic  Eyes: conjunctivae/corneas clear; EOM's intact. Nose: nares normal; no drainage. Neck: no carotid bruit and no JVD  Lungs: clear to auscultation bilaterally  Heart: regular rate and rhythm; + IV/VI murmur  Abdomen: soft, non-tender; bowel sounds normal  Extremities: moves all extremities; no weakness. Skin: Skin color normal; No varicose veins or edema.   Neurologic: Grossly normal Labs: No results for input(s): WBC, HGB, HCT, PLT, NA, K, BUN, CREA, GLU, GLUCPOC, INR, HGBEXT, HCTEXT, PLTEXT, INREXT in the last 72 hours. No lab exists for component: GLPOC    Diagnostics:   PA and lateral: none    Carotid doppler: Right Carotid    The right CCA is patent. The right ICA is normal. The right ECA is patent. The right vertebral is antegrade. The right subclavian is normal.     Left Carotid    The left CCA is patent. The left ICA is normal. The left ECA is patent. The left vertebral is antegrade. The left subclavian is normal.         PFTs -none    EKG: NSR RBBB    Assessment:     Severe AS    Plan:   The risk and benefit of surgery were reviewed with patient and family and all questions answered and the patient wishes to proceed. Risk include infection, bleeding, stroke, heart attack, irregular heart rhythm, kidney failure and death. Surgery is scheduled for 4/25/22. STS Risk Calculator V2.81 - Discussed by surgeon with patient. Procedure: Isolated AVR  Risk of Mortality:  0.412%  Renal Failure:  0.498%  Permanent Stroke:  0.386%  Prolonged Ventilation:  1.688%  DSW Infection:  0.039%  Reoperation:  2.298%  Morbidity or Mortality:  3.617%  Short Length of Stay:  77.608%  Long Length of Stay:  0.892%        Treatment Plan:    1. Severe AS: Bicuspid AV, MUNIAR 0.8 cm2 Mean 71 mmHg peak 5.8 m/s. Plan for AVR and ascending aorta replacement with Dr. Courtney Ayoub. 2. Dilated ascending aorta: Ascending aorta dilated and measures at 4.1 x 3.9 cm on CT last week. Last year this measured 3.8 x 3.7 cm. Long discussion between Dr. Courtney Ayoub and the patient about expected future changes in aorta size and timing of the need for surgery with this. Will plan on ascending aorta replacement at the time of AVR. 3. Hiatal hernia: on prilosec  4. White coat syndrome: Not on antihypertensives, pt monitors BP at home and numbers are all within normal limits, will monitor closely post op.        Signed By: Lizabeth Mac, DERIK     March 30, 2022

## 2022-03-30 NOTE — PROGRESS NOTES
Chief Complaint   Patient presents with    Aortic Stenosis     1. Have you been to the ER, urgent care clinic since your last visit? Hospitalized since your last visit? No    2. Have you seen or consulted any other health care providers outside of the [address] [place] [address] since your last visit? Include any pap smears or colon screening.  No

## 2022-04-20 ENCOUNTER — HOSPITAL ENCOUNTER (OUTPATIENT)
Dept: GENERAL RADIOLOGY | Age: 47
Discharge: HOME | End: 2022-04-20
Attending: NURSE PRACTITIONER
Payer: COMMERCIAL

## 2022-04-20 ENCOUNTER — HOSPITAL ENCOUNTER (OUTPATIENT)
Dept: PREADMISSION TESTING | Age: 47
Discharge: HOME | End: 2022-04-20
Attending: THORACIC SURGERY (CARDIOTHORACIC VASCULAR SURGERY)
Payer: COMMERCIAL

## 2022-04-20 VITALS
RESPIRATION RATE: 18 BRPM | WEIGHT: 168.43 LBS | BODY MASS INDEX: 22.81 KG/M2 | SYSTOLIC BLOOD PRESSURE: 133 MMHG | DIASTOLIC BLOOD PRESSURE: 88 MMHG | TEMPERATURE: 98.2 F | HEIGHT: 72 IN | HEART RATE: 63 BPM | OXYGEN SATURATION: 99 %

## 2022-04-20 DIAGNOSIS — I35.0 SEVERE AORTIC STENOSIS: Primary | ICD-10-CM

## 2022-04-20 LAB
ALBUMIN SERPL-MCNC: 3.5 G/DL (ref 3.5–5)
ALBUMIN/GLOB SERPL: 1 {RATIO} (ref 1.1–2.2)
ALP SERPL-CCNC: 50 U/L (ref 45–117)
ALT SERPL-CCNC: 22 U/L (ref 12–78)
ANION GAP SERPL CALC-SCNC: 5 MMOL/L (ref 5–15)
APPEARANCE UR: CLEAR
APTT PPP: 27.4 SEC (ref 22.1–31)
ARTERIAL PATENCY WRIST A: ABNORMAL
AST SERPL-CCNC: 12 U/L (ref 15–37)
ATRIAL RATE: 61 BPM
BACTERIA URNS QL MICRO: NEGATIVE /HPF
BASE DEFICIT BLDA-SCNC: 3.4 MMOL/L
BDY SITE: ABNORMAL
BILIRUB SERPL-MCNC: 0.2 MG/DL (ref 0.2–1)
BILIRUB UR QL: NEGATIVE
BNP SERPL-MCNC: 173 PG/ML
BUN SERPL-MCNC: 15 MG/DL (ref 6–20)
BUN/CREAT SERPL: 14 (ref 12–20)
CALCIUM SERPL-MCNC: 8.6 MG/DL (ref 8.5–10.1)
CALCULATED P AXIS, ECG09: 55 DEGREES
CALCULATED R AXIS, ECG10: 71 DEGREES
CALCULATED T AXIS, ECG11: 33 DEGREES
CHLORIDE SERPL-SCNC: 107 MMOL/L (ref 97–108)
CO2 SERPL-SCNC: 28 MMOL/L (ref 21–32)
COLOR UR: NORMAL
CREAT SERPL-MCNC: 1.1 MG/DL (ref 0.7–1.3)
DIAGNOSIS, 93000: NORMAL
EPITH CASTS URNS QL MICRO: NORMAL /LPF
ERYTHROCYTE [DISTWIDTH] IN BLOOD BY AUTOMATED COUNT: 13.2 % (ref 11.5–14.5)
EST. AVERAGE GLUCOSE BLD GHB EST-MCNC: 111 MG/DL
GLOBULIN SER CALC-MCNC: 3.6 G/DL (ref 2–4)
GLUCOSE SERPL-MCNC: 91 MG/DL (ref 65–100)
GLUCOSE UR STRIP.AUTO-MCNC: NEGATIVE MG/DL
HBA1C MFR BLD: 5.5 % (ref 4–5.6)
HCO3 BLDA-SCNC: 20 MMOL/L (ref 22–26)
HCT VFR BLD AUTO: 43.1 % (ref 36.6–50.3)
HGB BLD-MCNC: 14.3 G/DL (ref 12.1–17)
HGB UR QL STRIP: NEGATIVE
HISTORY CHECKED?,CKHIST: NORMAL
HYALINE CASTS URNS QL MICRO: NORMAL /LPF (ref 0–5)
INR PPP: 0.9 (ref 0.9–1.1)
KETONES UR QL STRIP.AUTO: NEGATIVE MG/DL
LEUKOCYTE ESTERASE UR QL STRIP.AUTO: NEGATIVE
MAGNESIUM SERPL-MCNC: 2.2 MG/DL (ref 1.6–2.4)
MCH RBC QN AUTO: 32.5 PG (ref 26–34)
MCHC RBC AUTO-ENTMCNC: 33.2 G/DL (ref 30–36.5)
MCV RBC AUTO: 98 FL (ref 80–99)
NITRITE UR QL STRIP.AUTO: NEGATIVE
NRBC # BLD: 0 K/UL (ref 0–0.01)
NRBC BLD-RTO: 0 PER 100 WBC
P-R INTERVAL, ECG05: 172 MS
PCO2 BLDA: 33 MMHG (ref 35–45)
PH BLDA: 7.4 [PH] (ref 7.35–7.45)
PH UR STRIP: 7 [PH] (ref 5–8)
PLATELET # BLD AUTO: 250 K/UL (ref 150–400)
PMV BLD AUTO: 9.8 FL (ref 8.9–12.9)
PO2 BLDA: 95 MMHG (ref 80–100)
POTASSIUM SERPL-SCNC: 4.1 MMOL/L (ref 3.5–5.1)
PROT SERPL-MCNC: 7.1 G/DL (ref 6.4–8.2)
PROT UR STRIP-MCNC: NEGATIVE MG/DL
PROTHROMBIN TIME: 9.7 SEC (ref 9–11.1)
Q-T INTERVAL, ECG07: 430 MS
QRS DURATION, ECG06: 160 MS
QTC CALCULATION (BEZET), ECG08: 432 MS
RBC # BLD AUTO: 4.4 M/UL (ref 4.1–5.7)
RBC #/AREA URNS HPF: NORMAL /HPF (ref 0–5)
SAO2 % BLD: 97 % (ref 92–97)
SAO2% DEVICE SAO2% SENSOR NAME: ABNORMAL
SODIUM SERPL-SCNC: 140 MMOL/L (ref 136–145)
SP GR UR REFRACTOMETRY: 1.01 (ref 1–1.03)
SPECIMEN SITE: ABNORMAL
THERAPEUTIC RANGE,PTTT: NORMAL SECS (ref 58–77)
TSH SERPL DL<=0.05 MIU/L-ACNC: 0.46 UIU/ML (ref 0.36–3.74)
UA: UC IF INDICATED,UAUC: NORMAL
UROBILINOGEN UR QL STRIP.AUTO: 0.2 EU/DL (ref 0.2–1)
VENTRICULAR RATE, ECG03: 61 BPM
WBC # BLD AUTO: 4.9 K/UL (ref 4.1–11.1)
WBC URNS QL MICRO: NORMAL /HPF (ref 0–4)

## 2022-04-20 PROCEDURE — 36600 WITHDRAWAL OF ARTERIAL BLOOD: CPT

## 2022-04-20 PROCEDURE — 81001 URINALYSIS AUTO W/SCOPE: CPT

## 2022-04-20 PROCEDURE — 83036 HEMOGLOBIN GLYCOSYLATED A1C: CPT

## 2022-04-20 PROCEDURE — 36415 COLL VENOUS BLD VENIPUNCTURE: CPT

## 2022-04-20 PROCEDURE — 84443 ASSAY THYROID STIM HORMONE: CPT

## 2022-04-20 PROCEDURE — 85730 THROMBOPLASTIN TIME PARTIAL: CPT

## 2022-04-20 PROCEDURE — 71046 X-RAY EXAM CHEST 2 VIEWS: CPT

## 2022-04-20 PROCEDURE — 93005 ELECTROCARDIOGRAM TRACING: CPT

## 2022-04-20 PROCEDURE — 83880 ASSAY OF NATRIURETIC PEPTIDE: CPT

## 2022-04-20 PROCEDURE — 85027 COMPLETE CBC AUTOMATED: CPT

## 2022-04-20 PROCEDURE — 86900 BLOOD TYPING SEROLOGIC ABO: CPT

## 2022-04-20 PROCEDURE — 86923 COMPATIBILITY TEST ELECTRIC: CPT

## 2022-04-20 PROCEDURE — 83735 ASSAY OF MAGNESIUM: CPT

## 2022-04-20 PROCEDURE — 82803 BLOOD GASES ANY COMBINATION: CPT

## 2022-04-20 PROCEDURE — 80053 COMPREHEN METABOLIC PANEL: CPT

## 2022-04-20 PROCEDURE — 85610 PROTHROMBIN TIME: CPT

## 2022-04-20 RX ORDER — CHLORHEXIDINE GLUCONATE 1.2 MG/ML
15 RINSE ORAL EVERY 12 HOURS
Qty: 420 ML | Refills: 0 | Status: SHIPPED | OUTPATIENT
Start: 2022-05-01 | End: 2022-05-07

## 2022-04-20 RX ORDER — MUPIROCIN 20 MG/G
OINTMENT TOPICAL DAILY
Qty: 22 G | Refills: 0 | Status: SHIPPED | OUTPATIENT
Start: 2022-05-01 | End: 2022-05-07

## 2022-04-20 NOTE — PERIOP NOTES
[facility]  Preoperative Instructions        Surgery Date Tuesday, May 3rd          Time of Arrival TBD/TBC @ [phone]    1. On the day of your surgery, please report to the Surgical Services Registration Desk and sign in at your designated time. The Surgery Center is located to the right of the Emergency Room. 2. You must have someone with you to drive you home. You should not drive a car for 24 hours following surgery. Please make arrangements for a friend or family member to stay with you for the first 24 hours after your surgery. 3. Do not have anything to eat or drink (including water, gum, mints, coffee, juice) after midnight Monday, May 2nd. ? This may not apply to medications prescribed by your physician. ?(Please note below the special instructions with medications to take the morning of your procedure.)    4. We recommend you do not drink any alcoholic beverages for 24 hours before and after your surgery. 5. Contact your surgeons office for instructions on the following medications: non-steroidal anti-inflammatory drugs (i.e. Advil, Aleve), vitamins, and supplements. (Some surgeons will want you to stop these medications prior to surgery and others may allow you to take them)  **If you are currently taking Plavix, Coumadin, Aspirin and/or other blood-thinning agents, contact your surgeon for instructions. ** Your surgeon will partner with the physician prescribing these medications to determine if it is safe to stop or if you need to continue taking. Please do not stop taking these medications without instructions from your surgeon    6. Wear comfortable clothes. Wear glasses instead of contacts. Do not bring any money or jewelry. Please bring picture ID, insurance card, and any prearranged co-payment or hospital payment. Do not wear make-up, particularly mascara the morning of your surgery.   Do not wear nail polish, particularly if you are having foot /hand surgery. Wear your hair loose or down, no ponytails, buns, yudy pins or clips. All body piercings must be removed. Please shower with antibacterial soap for three consecutive days before and on the morning of surgery, but do not apply any lotions, powders or deodorants after the shower on the day of surgery. Please use a fresh towels after each shower. Please sleep in clean clothes and change bed linens the night before surgery. Please do not shave for 48 hours prior to surgery. Shaving of the face is acceptable. 7. You should understand that if you do not follow these instructions your surgery may be cancelled. If your physical condition changes (I.e. fever, cold or flu) please contact your surgeon as soon as possible. 8. It is important that you be on time. If a situation occurs where you may be late, please call [phone] (OR Holding Area). 9. If you have any questions and or problems, please call [phone] (Pre-admission Testing). 10. Your surgery time may be subject to change. You will receive a phone call the evening prior if your time changes. Special Instructions: Start Bactroban nasal ointment and Peridex mouthwash on May 1st and bring with you day of surgery. TAKE ALL MEDICATIONS DAY OF SURGERY EXCEPT: no medications day of surgery      I understand a pre-operative phone call will be made to verify my surgery time. In the event that I am not available, I give permission for a message to be left on my answering service and/or with another person?   yes         ___________________      __________   _________    (Signature of Patient)             (Witness)                (Date and Time)

## 2022-04-20 NOTE — PERIOP NOTES
Hibiclens/Chlorhexidine    Preventing Infections Before and After - Your Surgery    IMPORTANT INSTRUCTIONS    Please read and follow these instructions carefully. If you are unable to comply with the below instructions your procedure will be cancelled. Every Night for Three (3) nights before your surgery:  1. Shower with an antibacterial soap, such as Dial, or the soap provided at your preassessment appointment. A shower is better than a bath for cleaning your skin. 2. If needed, ask someone to help you reach all areas of your body. Dont forget to clean your belly button with every shower. The night before your surgery: If you lose your Hibiclens/chlorhexidine please contact surgery center or you can purchase it at a local pharmacy  1. On the night before your surgery, shower with an antibacterial soap, such as Dial, or the soap provided at your preassessment appointment. 2. With one packet of Hibiclens/Chlorhexidine in hand, turn water off.  3. Apply Hibiclens antiseptic skin cleanser with a clean, freshly washed washcloth. ? Gently apply to your body from chin to toes (except the genital area) and especially the area(s) where your incision(s) will be. ? Leave Hibiclens/Chlorhexidine on your skin for at least 20 seconds. CAUTION: If needed, Hibiclens/chlorhexidine may be used to clean the folds of skin of the legs (such as in the area of the groin) and on your buttocks and hips. However, do not use Hibiclens/Chlorhexidine above the neck or in the genital area (your bottom) or put inside any area of your body. 4. Turn the water back on and rinse. 5. Dry gently with a clean, freshly washed towel. 6. After your shower, do not use any powder, deodorant, perfumes or lotion. 7. Use clean, freshly washed towels and washcloths every time you shower. 8. Wear clean, freshly washed pajamas to bed the night before surgery. 9. Sleep on clean, freshly washed sheets.   10. Do not allow pets to sleep in your bed with you. The Morning of your surgery:  1. Shower again thoroughly with an antibacterial soap, such as Dial or the soap provided at your preassessment appointment. If needed, ask someone for help to reach all areas of your body. Dont forget to clean your belly button! Rinse. 2. Dry gently with a clean, freshly washed towel. 3. After your shower, do not use any powder, deodorant, perfumes or lotion prior to surgery. 4. Put on clean, freshly washed clothing. Tips to help prevent infections after your surgery:  1. Protect your surgical wound from germs:  ? Hand washing is the most important thing you and your caregivers can do to prevent infections. ? Keep your bandage clean and dry! ? Do not touch your surgical wound. 2. Use clean, freshly washed towels and washcloths every time you shower; do not share bath linens with others. 3. Until your surgical wound is healed, wear clothing and sleep on bed linens each day that are clean and freshly washed. 4. Do not allow pets to sleep in your bed with you or touch your surgical wound. 5. Do not smoke - smoking delays wound healing. This may be a good time to stop smoking. 6. If you have diabetes, it is important for you to manage your blood sugar levels properly before your surgery as well as after your surgery. Poorly managed blood sugar levels slow down wound healing and prevent you from healing completely. If you lose your Hibiclens/chlorhexidine, please call the [facility], or it is available for purchase at your pharmacy.                ___________________      ___________________      ________________  (Signature of Patient)          (Witness)                   (Date and Time)

## 2022-04-20 NOTE — PERIOP NOTES
Incentive Spirometer        Using the incentive spirometer helps expand the small air sacs of your lungs, helps you breathe deeply, and helps improve your lung function. Use your incentive spirometer twice a day (10 breaths each time) prior to surgery. How to Use Your Incentive Spirometer:  1. Hold the incentive spirometer in an upright position. 2. Breathe out as usual.   3. Place the mouthpiece in your mouth and seal your lips tightly around it. 4. Take a deep breath. Breathe in slowly and as deeply as possible. Keep the blue flow rate guide between the arrows. 5. Hold your breath as long as possible. Then exhale slowly and allow the piston to fall to the bottom of the column. 6. Rest for a few seconds and repeat steps one through five at least 10 times. PAT Tidal Volume_____2500_______  X___2___  Date___4/20/22___    Cherl John THE INCENTIVE SPIROMETER WITH YOU TO THE HOSPITAL ON THE DAY OF YOUR SURGERY. Opportunity given to ask and answer questions as well as to observe return demonstration.       Patient signature_____________________________          Witness____________________________

## 2022-04-20 NOTE — PROGRESS NOTES
Anesthesia  Consult note     Anesthesia consult requested by the surgeon for:  suitability of patient for General anesthesia for  minimal  Invasive surgery   contraindications for  One lung ventilation  Contraindication for SPRING  Suitability for invasive lines    Subjective:      Patient:  Dale Pires     Procedure: Procedure(s):  AORTIC VALVE REPLACEMENT AND ASCENDING AORTA REPLACEMENT WITH CARDIOPLEGIA      Allergies   Allergen Reactions    Soap Rash     Dial Soap       No current facility-administered medications for this encounter. Current Outpatient Medications   Medication Sig    docosahexaenoic acid/epa (FISH OIL PO) Take 2 Tablets by mouth daily.  Omeprazole delayed release (PRILOSEC D/R) 20 mg tablet Take 20 mg by mouth daily. Past Medical History:   Diagnosis Date    Acid reflux     Aortic stenosis     Bicuspid aortic valve     Hiatal hernia     Murmur     Severe aortic stenosis 3/22/2022       Past Surgical History:   Procedure Laterality Date    HX ADENOIDECTOMY      HX HEART CATHETERIZATION         Social History     Tobacco Use    Smoking status: Former Smoker     Packs/day: 0.25     Years: 20.00     Pack years: 5.00     Types: Cigarettes     Quit date: 2017     Years since quittin.7    Smokeless tobacco: Never Used   Substance Use Topics    Alcohol use: Yes     Alcohol/week: 3.0 standard drinks     Types: 3 Glasses of wine per week     Comment: weekly         Anesthetic Problems: None in the past   patient denies any problems with swallowing, esophageal stricture, upper GI bleed, GI surgery. No contraindications for SPRING. Objective:     Physical Exam:    No data found. Temp (24hrs), Av.8 °C (98.2 °F), Min:36.8 °C (98.2 °F), Max:36.8 °C (98.2 °F)      Airway Class: 1  Heart-  Regular rate and rhythm,   s1 s2 heard 2/6 murmer.   Lungs- Clear bilateral on ascultation  Abd- Soft, non tender no organomegaly  Limbs- Normal  Neuro- Normal    Labs:   Recent Results (from the past 24 hour(s))   URINALYSIS W/ REFLEX CULTURE    Collection Time: 04/20/22  1:18 PM    Specimen: Urine   Result Value Ref Range    Color YELLOW/STRAW      Appearance CLEAR CLEAR      Specific gravity 1.009 1.003 - 1.030      pH (UA) 7.0 5.0 - 8.0      Protein Negative NEG mg/dL    Glucose Negative NEG mg/dL    Ketone Negative NEG mg/dL    Bilirubin Negative NEG      Blood Negative NEG      Urobilinogen 0.2 0.2 - 1.0 EU/dL    Nitrites Negative NEG      Leukocyte Esterase Negative NEG      WBC 0-4 0 - 4 /hpf    RBC 0-5 0 - 5 /hpf    Epithelial cells FEW FEW /lpf    Bacteria Negative NEG /hpf    UA:UC IF INDICATED CULTURE NOT INDICATED BY UA RESULT CNI      Hyaline cast 0-2 0 - 5 /lpf   CBC W/O DIFF    Collection Time: 04/20/22  1:18 PM   Result Value Ref Range    WBC 4.9 4.1 - 11.1 K/uL    RBC 4.40 4. 10 - 5.70 M/uL    HGB 14.3 12.1 - 17.0 g/dL    HCT 43.1 36.6 - 50.3 %    MCV 98.0 80.0 - 99.0 FL    MCH 32.5 26.0 - 34.0 PG    MCHC 33.2 30.0 - 36.5 g/dL    RDW 13.2 11.5 - 14.5 %    PLATELET 557 937 - 437 K/uL    MPV 9.8 8.9 - 12.9 FL    NRBC 0.0 0  WBC    ABSOLUTE NRBC 0.00 0.00 - 0.01 K/uL         Assessment:       Active Problems:    * No active hospital problems. *      ASA4    Plan/Recommendations/Medical Decision Making:       Anesthetic Plan: GETA with invasive monitoring, post op ventilation and SPRING monitoring  Risks and benefits of the anesthetic plan discussed and agreed upon by the patient.       no contraindications  For one lung ventilation, large bore lines, SPRING  Signed By: Mardy Gowers, MD  Date:           4/20/2022  Time:          2:17 PM

## 2022-04-20 NOTE — H&P
CSS   History and Physical    Subjective:    **information obtained from previous consult note -no changes    Dejuan Rincon is a 51 y.o. male who was referred for cardiac evaluation for severe AS from Tiffany Gutierrez. He has a medical history of bicuspid AV, AS, hiatal hernia. BP elevated at doctors office but normal at home -likely white coat syndrome. He has SOB with activity -walking up hill. He is able to run slowly on treadmill -no SOB with that on flat surface. He denies dizziness. No falls. Chest tightness occasionally happens -at rest and activity. He is a former smoker -smoked 20 years. He drinks alcohol occasionally. He is , his  accompanied him to his appointment. He has a family history of heart disease. Covid vaccine moderna x3. He works as the  at [employer]. Cardiac Testing    Cardiac catheterization 3/22/22: Diagnostic  Dominance: Right    Left Main   The vessel is angiographically normal.   Left Anterior Descending   First Diagonal Branch   The vessel is angiographically normal.   Ramus Intermedius   The vessel is angiographically normal.   Left Circumflex   The vessel is angiographically normal.   First Obtuse Marginal Branch   The vessel is angiographically normal.   Second Obtuse Marginal Branch   The vessel is angiographically normal.   Right Coronary Artery   The vessel is angiographically normal.   Right Posterior Descending Artery   The vessel is angiographically normal.   Right Posterior Atrioventricular Artery   The vessel is angiographically normal.   First Right Posterolateral Branch   The vessel is small. The vessel is angiographically normal.   Second Right Posterolateral Branch   The vessel is small. The vessel is angiographically normal.     Intervention      No interventions have been documented. Right Heart    Right Heart Cath PA pressure = 26/7 mmHg. PA mean = 17 mmHg. PA Sat = 77.2 %. Mid RA mean = 7 mmHg. RV pressure = 26/7 mmHg.  Wedge pressure = 12 mmHg. AO Sat = 97 %. Trang CO = 5.72 L/min. Trang CI=2.93       ECHO 2/17/22:  Left Ventricle Left ventricle size is normal. Mildly increased wall thickness. Normal wall motion. Normal left ventricular systolic function with a visually estimated EF of 55 - 60%. Grade I diastolic dysfunction with normal LAP. There is a false chord. Left Atrium Left atrium size is normal.   Right Ventricle Right ventricle size is normal. Normal systolic function. Right Atrium Right atrium size is normal.   Aortic Valve Bicuspid valve. Moderately calcified cusp. Moderate annular calcification. Mild transvalvular regurgitation. Severe stenosis of the aortic valve. Mitral Valve Valve structure is normal. Mild transvalvular regurgitation. No stenosis noted. Tricuspid Valve Valve structure is normal. Trace transvalvular regurgitation. No stenosis noted. Normal RVSP. Pulmonic Valve Valve structure is normal. Mild transvalvular regurgitation. No stenosis noted. Aorta Normal sized aortic root and ascending aorta. Pericardium No pericardial effusion. Procedure Staff    Technologist/Clinician: Courtney Carson  Supporting Staff: None  Performing Physician/Midlevel: None     Exam Completion Date/Time: 2/17/22 10:50 AM     Volumes and Function (Range)     ESV ESV Index   LA Biplane 55 mL (18 - 58)       28 ml/m2 (16 - 34)         LA Area-Length 56 mL       28 mL/m2 (16 - 34)         LA A4C 46 mL (18 - 58)       23 mL/m2 (16 - 34)         LA A2C 51 mL (18 - 58)       26 mL/m2 (16 - 34)           Left Ventricle Measurements    Dimensions   Fractional Shortening 2D 41 % (Range: 28 - 44)      LVIDd 4.9 cm (Range: 4.2 - 5. 9)      LVIDd Index 2.46 cm/m2      LVIDs 2.9 cm      LVIDs Index 1.46 cm/m2      IVSd 1.2 cm (Range: 0.6 - 1.0) Important       LVPWd 1.2 cm (Range: 0.6 - 1.0) Important       LV RWT Ratio 0.49       LV Mass 2D 226.4 g (Range: 88 - 224) Important       LV Mass 2D Index 113.8 g/m2 (Range: 49 - 115)             Right Ventricle Measurements    Dimensions   TAPSE 1.7 cm (Range: 1.5 - 2.0)       Function   RV Free Wall Peak S' 12 cm/s           Atrial Measurements    Left Atrium   LA Diameter 3.6 cm      LA Size Index 1.81 cm/m2      LA/AO Root Ratio 1.09               Tricuspid Valve Measurements    Regurgitation   Est. RA Pressure 3 mmHg      TR Max Velocity 1.98 m/s      RVSP 19 mmHg      TR Peak Gradient 16 mmHg             Aortic Valve Measurements    Stenosis   AV Mean Gradient 71 mmHg      AV Peak Gradient 134 mmHg      AV Peak Velocity 5.8 m/s      AV Velocity Ratio 0.1       AV .9 cm      LVOT:AV VTI Index 0.13       AV Mean Velocity 4 m/s      AV Area by VTI 0.8 cm2      MUNIRA/BSA VTI 0.4 cm2/m2      AV Area by Peak Velocity 0.6 cm2      MUNIRA/BSA Peak Velocity 0.3 cm2/m2       LVOT   LVOT Diameter 2.9 cm      LVOT Area 6.6 cm2      LVOT Mean Gradient 1 mmHg      LVOT Peak Gradient 1 mmHg      LVOT VTI 15.7 cm      LVOT Peak Velocity 0.6 m/s             Aorta Measurements    Dimensions   Measurement Value (Range)   Aortic Root 3.3 cm      Ao Root Index 1.66 cm/m2      Ascending Aorta 3.7 cm      Ascending Aorta Index 1.86 cm/m2            Diastolic Filling/Shunts    Diastolic Filling   MV E Velocity 0.58 m/s      MV A Velocity 0.7 m/s      MV E/A 0.83       E/E' Ratio (Averaged) 7.04       LV E' Lateral Velocity 10 cm/s      E/E' Lateral 5.8       LV E' Septal Velocity 7 cm/s      E/E' Septal 8.29       MV E Wave Deceleration Time 223 ms      MV \"A\" Wave Duration 144.6 msec      LV IVRT 78 ms       Shunt   LVOT Stroke Volume Index 52.1 mL/m2      LVOT .6 ml             CT Chest 3/22/22: FINDINGS:     THYROID: No nodule. MEDIASTINUM: No mass or lymphadenopathy. VALE: No mass or lymphadenopathy. THORACIC AORTA: There is calcification in the region of the aortic valve this is  greater than expected for patient this age. There continues to be mild  dilatation of ascending aorta. .     When measured at the level of the top of the right pulmonary artery. The  transverse diameter is 4.1 cm in AP diameter is 3.9 cm. Previously the  measurements at this level were 3.8 cm in transverse diameter by 3.7 cm in AP  diameter.     When measured on axial image 30 at the level of the main pulmonary artery the  diameter is 4.1 cm. Previously the diameter was 3.9 cm.     When measured at the level of the aortic sinus the diameter is 3.9 cm. MAIN PULMONARY ARTERY: Normal in caliber. TRACHEA/BRONCHI: Patent. ESOPHAGUS: No wall thickening or dilatation. HEART: Normal in size. There is moderate coronary artery calcification. PLEURA: No effusion or pneumothorax. LUNGS: The lungs demonstrate stable 3 mm nodule in the lingula on image 75. No  new nodules are noted. There are mild changes of centrilobular and paraseptal  emphysema. There are subpleural curvilinear opacities dependently at each lung  base this is new in the interval. Most likely this is due to dependent  atelectasis. Interstitial lung abnormality could have similar appearance.     The central airways are patent. .  INCIDENTALLY IMAGED UPPER ABDOMEN: There is contrast material in the collecting  system of both kidneys and proximal ureters this is from recent intravenous  contrast administration. Clinical correlation is suggested. No adrenal lesions. Thania Galloway BONES: No destructive bone lesion.     IMPRESSION     1. The dense calcification in the aortic valve is noted. 2. There is dilatation of ascending thoracic aorta measuring 4.1 x 3.9 cm. Previously this measured 3.8 x 3.7 cm.  2. New dependent opacities in the subpleural location of both lower lobes. Most  likely this is dependent atelectasis.     Past Medical History:   Diagnosis Date    Acid reflux     Aortic stenosis     Bicuspid aortic valve     Hiatal hernia     Murmur     Severe aortic stenosis 3/22/2022     Past Surgical History:   Procedure Laterality Date    HX ADENOIDECTOMY      HX HEART CATHETERIZATION        Social History     Tobacco Use    Smoking status: Former Smoker     Packs/day: 0.25     Years: 20.00     Pack years: 5.00     Types: Cigarettes     Quit date: 2017     Years since quittin.7    Smokeless tobacco: Never Used   Substance Use Topics    Alcohol use: Yes     Alcohol/week: 3.0 standard drinks     Types: 3 Glasses of wine per week     Comment: weekly      Family History   Problem Relation Age of Onset    Other Father     Lung Cancer Maternal Grandmother     Coronary Art Dis Maternal Grandfather     Heart Attack Maternal Grandfather     Stroke Paternal Grandfather     Hypertension Paternal Grandfather      Prior to Admission medications    Medication Sig Start Date End Date Taking? Authorizing Provider   docosahexaenoic acid/epa (FISH OIL PO) Take 2 Tablets by mouth daily. Provider, Historical   Omeprazole delayed release (PRILOSEC D/R) 20 mg tablet Take 20 mg by mouth daily. Provider, Historical       Allergies   Allergen Reactions    Soap Rash     Dial Soap       Review of Systems: pertinent positives in HPI  Consititutional: Denies fever or chills. Eyes:  Denies use of glasses or vision problems(cataracts). ENT:  Denies hearing or swallowing difficulty. CV: Denies CP, claudication, HTN. Resp: Denies dyspnea, productive cough. : Denies dialysis or kidney problems. GI: Denies ulcers, esophageal strictures, liver problems. M/S: Denies joint or bone problems, or implanted artificial hardware. Skin: Denies varicose veins, edema. Neuro: Denies strokes, or TIAs. Psych: Denies anxiety or depression. Endocrine: Denies thyroid problems or diabetes. Heme/Lymphatic: Denies easy bruising or lymphedema. Objective:     VS: T 98.2 HR 63 RR 18 /88 Sats 99    Physical Exam:    General appearance: alert, cooperative, no distress  Head: normocephalic, without obvious abnormality; atraumatic  Eyes: conjunctivae/corneas clear; EOM's intact.    Nose: nares normal; no drainage. Neck: no carotid bruit and no JVD  Lungs: clear to auscultation bilaterally  Heart: regular rate and rhythm; + IV/VI murmur  Abdomen: soft, non-tender; bowel sounds normal  Extremities: moves all extremities; no weakness. Skin: Skin color normal; No varicose veins or edema. Neurologic: Grossly normal      Labs: No results for input(s): WBC, HGB, HCT, PLT, NA, K, BUN, CREA, GLU, GLUCPOC, INR, HGBEXT, HCTEXT, PLTEXT, INREXT, HGBEXT, HCTEXT, PLTEXT, INREXT in the last 72 hours. No lab exists for component: GLPOC    Diagnostics:   PA and lateral: pending    Carotid doppler: Right Carotid    The right CCA is patent. The right ICA is normal. The right ECA is patent. The right vertebral is antegrade. The right subclavian is normal.     Left Carotid    The left CCA is patent. The left ICA is normal. The left ECA is patent. The left vertebral is antegrade. The left subclavian is normal.         PFTs -none    EKG: NSR RBBB    Assessment:     Severe AS    Plan:   The risk and benefit of surgery were reviewed with patient and family and all questions answered and the patient wishes to proceed. Risk include infection, bleeding, stroke, heart attack, irregular heart rhythm, kidney failure and death. Instructed to start bactroban, peridex BID 48 hrs preop. Surgery is scheduled for 4/25/22. STS Risk Calculator V2.81 - Discussed by surgeon with patient. Procedure: Isolated AVR  Risk of Mortality:  0.412%  Renal Failure:  0.498%  Permanent Stroke:  0.386%  Prolonged Ventilation:  1.688%  DSW Infection:  0.039%  Reoperation:  2.298%  Morbidity or Mortality:  3.617%  Short Length of Stay:  77.608%  Long Length of Stay:  0.892%        Treatment Plan:    1. Severe AS: Bicuspid AV, MUNIRA 0.8 cm2 Mean 71 mmHg peak 5.8 m/s. Plan for AVR and ascending aorta replacement with Dr. Bulmaro Head. 2. Dilated ascending aorta: Ascending aorta dilated and measures at 4.1 x 3.9 cm on CT last week.  Last year this measured 3.8 x 3.7 cm. Long discussion between Dr. Mireille West and the patient about expected future changes in aorta size and timing of the need for surgery with this. Will plan on ascending aorta replacement at the time of AVR. 3. Hiatal hernia: on prilosec  4. White coat syndrome: Not on antihypertensives, pt monitors BP at home and numbers are all within normal limits, will monitor closely post op.    5. RBBB: no preop amio, monitor rhythm      Signed By: Alise De La Paz NP     April 20, 2022

## 2022-04-21 ENCOUNTER — DOCUMENTATION ONLY (OUTPATIENT)
Dept: CASE MANAGEMENT | Age: 47
End: 2022-04-21

## 2022-04-21 LAB
BACTERIA SPEC CULT: NORMAL
BACTERIA SPEC CULT: NORMAL
SERVICE CMNT-IMP: NORMAL

## 2022-04-21 NOTE — PROGRESS NOTES
Cardiac Surgery Care Coordinator-  Met with Nuno Melara in [facility] on 4/20/22. Introduced role of the Cardiac Surgery Care Coordinator. Reviewed plan of care and began pre-op education. Discussed day of surgery expectations for the pt and family. Provided Valve educational folder and reviewed content, including Cardiac Surgery Pathway. Reinforced sternal precautions and keeping your move in the tube. Discussed preparing for surgery and returning home. Nuno Melara was instructed to bring Valve folder with him on the day of surgery. Encouraged Nuno Melara to verbalize and offered emotional support.  Jason Feng RN

## 2022-05-02 ENCOUNTER — ANESTHESIA EVENT (OUTPATIENT)
Dept: CARDIOTHORACIC SURGERY | Age: 47
DRG: 220 | End: 2022-05-02
Payer: COMMERCIAL

## 2022-05-02 RX ORDER — DOBUTAMINE HYDROCHLORIDE 200 MG/100ML
0-10 INJECTION INTRAVENOUS
Status: DISCONTINUED | OUTPATIENT
Start: 2022-05-03 | End: 2022-05-04 | Stop reason: ALTCHOICE

## 2022-05-02 RX ORDER — DEXMEDETOMIDINE HYDROCHLORIDE 4 UG/ML
.1-1.5 INJECTION, SOLUTION INTRAVENOUS
Status: DISCONTINUED | OUTPATIENT
Start: 2022-05-03 | End: 2022-05-04 | Stop reason: ALTCHOICE

## 2022-05-02 RX ORDER — POTASSIUM CHLORIDE 29.8 MG/ML
20 INJECTION INTRAVENOUS ONCE
Status: COMPLETED | OUTPATIENT
Start: 2022-05-03 | End: 2022-05-03

## 2022-05-02 RX ORDER — DESMOPRESSIN ACETATE 4 UG/ML
2 INJECTION, SOLUTION INTRAVENOUS; SUBCUTANEOUS ONCE
Status: DISCONTINUED | OUTPATIENT
Start: 2022-05-03 | End: 2022-05-03

## 2022-05-02 RX ORDER — NOREPINEPHRINE BITARTRATE/D5W 8 MG/250ML
2-16 PLASTIC BAG, INJECTION (ML) INTRAVENOUS
Status: DISCONTINUED | OUTPATIENT
Start: 2022-05-03 | End: 2022-05-03

## 2022-05-02 RX ORDER — NITROGLYCERIN 20 MG/100ML
0-20 INJECTION INTRAVENOUS
Status: DISCONTINUED | OUTPATIENT
Start: 2022-05-03 | End: 2022-05-03

## 2022-05-02 RX ORDER — DOPAMINE HYDROCHLORIDE 320 MG/100ML
0-20 INJECTION, SOLUTION INTRAVENOUS
Status: DISCONTINUED | OUTPATIENT
Start: 2022-05-03 | End: 2022-05-03

## 2022-05-02 RX ORDER — PROTAMINE SULFATE 10 MG/ML
250 INJECTION, SOLUTION INTRAVENOUS
Status: COMPLETED | OUTPATIENT
Start: 2022-05-03 | End: 2022-05-03

## 2022-05-02 RX ORDER — MAGNESIUM SULFATE HEPTAHYDRATE 40 MG/ML
2 INJECTION, SOLUTION INTRAVENOUS ONCE
Status: COMPLETED | OUTPATIENT
Start: 2022-05-03 | End: 2022-05-03

## 2022-05-03 ENCOUNTER — APPOINTMENT (OUTPATIENT)
Dept: GENERAL RADIOLOGY | Age: 47
DRG: 220 | End: 2022-05-03
Attending: PHYSICIAN ASSISTANT
Payer: COMMERCIAL

## 2022-05-03 ENCOUNTER — HOSPITAL ENCOUNTER (INPATIENT)
Age: 47
LOS: 4 days | Discharge: HOME HEALTH | DRG: 220 | End: 2022-05-07
Attending: THORACIC SURGERY (CARDIOTHORACIC VASCULAR SURGERY) | Admitting: THORACIC SURGERY (CARDIOTHORACIC VASCULAR SURGERY)
Payer: COMMERCIAL

## 2022-05-03 ENCOUNTER — ANESTHESIA (OUTPATIENT)
Dept: CARDIOTHORACIC SURGERY | Age: 47
DRG: 220 | End: 2022-05-03
Payer: COMMERCIAL

## 2022-05-03 ENCOUNTER — HOSPITAL ENCOUNTER (OUTPATIENT)
Dept: NON INVASIVE DIAGNOSTICS | Age: 47
Discharge: HOME | End: 2022-05-03
Attending: THORACIC SURGERY (CARDIOTHORACIC VASCULAR SURGERY)

## 2022-05-03 DIAGNOSIS — Z95.2 S/P AVR: ICD-10-CM

## 2022-05-03 PROBLEM — Z98.890 S/P CARDIAC CATH: Status: RESOLVED | Noted: 2022-03-22 | Resolved: 2022-05-03

## 2022-05-03 PROBLEM — I35.0 AORTIC STENOSIS: Status: ACTIVE | Noted: 2022-05-03

## 2022-05-03 PROBLEM — I35.0 AS (AORTIC STENOSIS): Status: ACTIVE | Noted: 2022-05-03

## 2022-05-03 LAB
ADMINISTERED INITIALS, ADMINIT: NORMAL
ALBUMIN SERPL-MCNC: 3.9 G/DL (ref 3.5–5)
ALBUMIN SERPL-MCNC: 4.1 G/DL (ref 3.5–5)
ALBUMIN/GLOB SERPL: 1.8 {RATIO} (ref 1.1–2.2)
ALBUMIN/GLOB SERPL: 2 {RATIO} (ref 1.1–2.2)
ALP SERPL-CCNC: 33 U/L (ref 45–117)
ALP SERPL-CCNC: 33 U/L (ref 45–117)
ALT SERPL-CCNC: 24 U/L (ref 12–78)
ALT SERPL-CCNC: 25 U/L (ref 12–78)
ANION GAP SERPL CALC-SCNC: 1 MMOL/L (ref 5–15)
ANION GAP SERPL CALC-SCNC: 7 MMOL/L (ref 5–15)
APTT PPP: 32.6 SEC (ref 22.1–31)
ARTERIAL PATENCY WRIST A: ABNORMAL
ARTERIAL PATENCY WRIST A: ABNORMAL
AST SERPL-CCNC: 26 U/L (ref 15–37)
AST SERPL-CCNC: 30 U/L (ref 15–37)
ATRIAL RATE: 55 BPM
BASE DEFICIT BLDA-SCNC: 3.8 MMOL/L
BASE DEFICIT BLDA-SCNC: 4.4 MMOL/L
BASOPHILS # BLD: 0 K/UL (ref 0–0.1)
BASOPHILS NFR BLD: 0 % (ref 0–1)
BDY SITE: ABNORMAL
BILIRUB SERPL-MCNC: 0.6 MG/DL (ref 0.2–1)
BILIRUB SERPL-MCNC: 0.7 MG/DL (ref 0.2–1)
BUN SERPL-MCNC: 15 MG/DL (ref 6–20)
BUN SERPL-MCNC: 18 MG/DL (ref 6–20)
BUN/CREAT SERPL: 12 (ref 12–20)
BUN/CREAT SERPL: 14 (ref 12–20)
CA-I BLD-SCNC: 1.25 MMOL/L (ref 1.13–1.32)
CALCIUM SERPL-MCNC: 8.3 MG/DL (ref 8.5–10.1)
CALCIUM SERPL-MCNC: 8.5 MG/DL (ref 8.5–10.1)
CALCULATED P AXIS, ECG09: 35 DEGREES
CALCULATED R AXIS, ECG10: 79 DEGREES
CALCULATED T AXIS, ECG11: 46 DEGREES
CHLORIDE SERPL-SCNC: 114 MMOL/L (ref 97–108)
CHLORIDE SERPL-SCNC: 118 MMOL/L (ref 97–108)
CO2 SERPL-SCNC: 23 MMOL/L (ref 21–32)
CO2 SERPL-SCNC: 26 MMOL/L (ref 21–32)
COHGB MFR BLD: 0.2 % (ref 1–2)
COHGB MFR BLD: 0.3 % (ref 1–2)
CREAT SERPL-MCNC: 1.28 MG/DL (ref 0.7–1.3)
CREAT SERPL-MCNC: 1.31 MG/DL (ref 0.7–1.3)
D50 ADMINISTERED, D50ADM: 0 ML
D50 ADMINISTERED, D50ADM: 20 ML
D50 ORDER, D50ORD: 0 ML
D50 ORDER, D50ORD: 20 ML
DIAGNOSIS, 93000: NORMAL
DIFFERENTIAL METHOD BLD: ABNORMAL
EOSINOPHIL # BLD: 0.1 K/UL (ref 0–0.4)
EOSINOPHIL NFR BLD: 1 % (ref 0–7)
ERYTHROCYTE [DISTWIDTH] IN BLOOD BY AUTOMATED COUNT: 13.8 % (ref 11.5–14.5)
FIO2 ON VENT: 50 %
FIO2 ON VENT: 60 %
GAS FLOW.O2 SETTING OXYMISER: 12 L/MIN
GLOBULIN SER CALC-MCNC: 2.1 G/DL (ref 2–4)
GLOBULIN SER CALC-MCNC: 2.2 G/DL (ref 2–4)
GLSCOM COMMENTS: NORMAL
GLUCOSE BLD STRIP.AUTO-MCNC: 110 MG/DL (ref 65–117)
GLUCOSE BLD STRIP.AUTO-MCNC: 123 MG/DL (ref 65–117)
GLUCOSE BLD STRIP.AUTO-MCNC: 124 MG/DL (ref 65–117)
GLUCOSE BLD STRIP.AUTO-MCNC: 128 MG/DL (ref 65–117)
GLUCOSE BLD STRIP.AUTO-MCNC: 130 MG/DL (ref 65–117)
GLUCOSE BLD STRIP.AUTO-MCNC: 134 MG/DL (ref 65–117)
GLUCOSE BLD STRIP.AUTO-MCNC: 143 MG/DL (ref 65–117)
GLUCOSE BLD STRIP.AUTO-MCNC: 163 MG/DL (ref 65–117)
GLUCOSE BLD STRIP.AUTO-MCNC: 182 MG/DL (ref 65–117)
GLUCOSE BLD STRIP.AUTO-MCNC: 50 MG/DL (ref 65–117)
GLUCOSE BLD STRIP.AUTO-MCNC: 50 MG/DL (ref 65–117)
GLUCOSE BLD STRIP.AUTO-MCNC: 81 MG/DL (ref 65–117)
GLUCOSE BLD STRIP.AUTO-MCNC: 90 MG/DL (ref 65–117)
GLUCOSE SERPL-MCNC: 151 MG/DL (ref 65–100)
GLUCOSE SERPL-MCNC: 54 MG/DL (ref 65–100)
GLUCOSE, GLC: 123 MG/DL
GLUCOSE, GLC: 124 MG/DL
GLUCOSE, GLC: 128 MG/DL
GLUCOSE, GLC: 130 MG/DL
GLUCOSE, GLC: 134 MG/DL
GLUCOSE, GLC: 143 MG/DL
GLUCOSE, GLC: 163 MG/DL
GLUCOSE, GLC: 166 MG/DL
GLUCOSE, GLC: 169 MG/DL
GLUCOSE, GLC: 182 MG/DL
GLUCOSE, GLC: 182 MG/DL
GLUCOSE, GLC: 322 MG/DL
GLUCOSE, GLC: 50 MG/DL
GLUCOSE, GLC: 81 MG/DL
GLUCOSE, GLC: 90 MG/DL
GLUCOSE, GLC: 90 MG/DL
HCO3 BLDA-SCNC: 22 MMOL/L (ref 22–26)
HCO3 BLDA-SCNC: 22 MMOL/L (ref 22–26)
HCT VFR BLD AUTO: 31.8 % (ref 36.6–50.3)
HCT VFR BLD AUTO: 35.2 % (ref 36.6–50.3)
HGB BLD OXIMETRY-MCNC: 10.1 G/DL (ref 14–17)
HGB BLD OXIMETRY-MCNC: 12.4 G/DL (ref 14–17)
HGB BLD-MCNC: 10.8 G/DL (ref 12.1–17)
HGB BLD-MCNC: 11.8 G/DL (ref 12.1–17)
HIGH TARGET, HITG: 135 MG/DL
HIGH TARGET, HITG: 140 MG/DL
HIGH TARGET, HITG: 145 MG/DL
IMM GRANULOCYTES # BLD AUTO: 0.1 K/UL (ref 0–0.04)
IMM GRANULOCYTES NFR BLD AUTO: 1 % (ref 0–0.5)
INR PPP: 1.2 (ref 0.9–1.1)
INSULIN ADMINSTERED, INSADM: 0 UNITS/HOUR
INSULIN ADMINSTERED, INSADM: 0.3 UNITS/HOUR
INSULIN ADMINSTERED, INSADM: 1.4 UNITS/HOUR
INSULIN ADMINSTERED, INSADM: 1.5 UNITS/HOUR
INSULIN ADMINSTERED, INSADM: 1.5 UNITS/HOUR
INSULIN ADMINSTERED, INSADM: 1.7 UNITS/HOUR
INSULIN ADMINSTERED, INSADM: 1.7 UNITS/HOUR
INSULIN ADMINSTERED, INSADM: 15.7 UNITS/HOUR
INSULIN ADMINSTERED, INSADM: 2.5 UNITS/HOUR
INSULIN ADMINSTERED, INSADM: 3.2 UNITS/HOUR
INSULIN ADMINSTERED, INSADM: 4.4 UNITS/HOUR
INSULIN ADMINSTERED, INSADM: 6.1 UNITS/HOUR
INSULIN ORDER, INSORD: 0 UNITS/HOUR
INSULIN ORDER, INSORD: 0.1 UNITS/HOUR
INSULIN ORDER, INSORD: 0.3 UNITS/HOUR
INSULIN ORDER, INSORD: 0.4 UNITS/HOUR
INSULIN ORDER, INSORD: 1.4 UNITS/HOUR
INSULIN ORDER, INSORD: 1.5 UNITS/HOUR
INSULIN ORDER, INSORD: 1.5 UNITS/HOUR
INSULIN ORDER, INSORD: 1.7 UNITS/HOUR
INSULIN ORDER, INSORD: 1.7 UNITS/HOUR
INSULIN ORDER, INSORD: 15.7 UNITS/HOUR
INSULIN ORDER, INSORD: 2.5 UNITS/HOUR
INSULIN ORDER, INSORD: 3.2 UNITS/HOUR
INSULIN ORDER, INSORD: 4.4 UNITS/HOUR
INSULIN ORDER, INSORD: 6.1 UNITS/HOUR
LOW TARGET, LOT: 100 MG/DL
LOW TARGET, LOT: 95 MG/DL
LYMPHOCYTES # BLD: 0.8 K/UL (ref 0.8–3.5)
LYMPHOCYTES NFR BLD: 8 % (ref 12–49)
MAGNESIUM SERPL-MCNC: 2.6 MG/DL (ref 1.6–2.4)
MAGNESIUM SERPL-MCNC: 3.3 MG/DL (ref 1.6–2.4)
MCH RBC QN AUTO: 32.6 PG (ref 26–34)
MCHC RBC AUTO-ENTMCNC: 33.5 G/DL (ref 30–36.5)
MCV RBC AUTO: 97.2 FL (ref 80–99)
METHGB MFR BLD: 1.1 % (ref 0–1.4)
METHGB MFR BLD: 1.4 % (ref 0–1.4)
MINUTES UNTIL NEXT BG, NBG: 15 MIN
MINUTES UNTIL NEXT BG, NBG: 60 MIN
MONOCYTES # BLD: 0.9 K/UL (ref 0–1)
MONOCYTES NFR BLD: 9 % (ref 5–13)
MULTIPLIER, MUL: 0
MULTIPLIER, MUL: 0.01
MULTIPLIER, MUL: 0.01
MULTIPLIER, MUL: 0.02
MULTIPLIER, MUL: 0.03
MULTIPLIER, MUL: 0.04
MULTIPLIER, MUL: 0.05
MULTIPLIER, MUL: 0.05
MULTIPLIER, MUL: 0.06
NEUTS SEG # BLD: 7.8 K/UL (ref 1.8–8)
NEUTS SEG NFR BLD: 81 % (ref 32–75)
NRBC # BLD: 0 K/UL (ref 0–0.01)
NRBC BLD-RTO: 0 PER 100 WBC
ORDER INITIALS, ORDINIT: NORMAL
OXYHGB MFR BLD: 67.7 % (ref 94–97)
OXYHGB MFR BLD: 85.5 % (ref 94–97)
P-R INTERVAL, ECG05: 182 MS
PCO2 BLDA: 42 MMHG (ref 35–45)
PCO2 BLDA: 47 MMHG (ref 35–45)
PEEP RESPIRATORY: 5 CM[H2O]
PEEP RESPIRATORY: 5 CM[H2O]
PH BLDA: 7.29 [PH] (ref 7.35–7.45)
PH BLDA: 7.34 [PH] (ref 7.35–7.45)
PLATELET # BLD AUTO: 145 K/UL (ref 150–400)
PMV BLD AUTO: 9.8 FL (ref 8.9–12.9)
PO2 BLDA: 228 MMHG (ref 80–100)
PO2 BLDA: 232 MMHG (ref 80–100)
POTASSIUM SERPL-SCNC: 4.3 MMOL/L (ref 3.5–5.1)
POTASSIUM SERPL-SCNC: 4.3 MMOL/L (ref 3.5–5.1)
PRESSURE SUPPORT SETTING VENT: 8 CM[H2O]
PRESSURE SUPPORT SETTING VENT: 8 CM[H2O]
PROT SERPL-MCNC: 6.1 G/DL (ref 6.4–8.2)
PROT SERPL-MCNC: 6.2 G/DL (ref 6.4–8.2)
PROTHROMBIN TIME: 12.1 SEC (ref 9–11.1)
Q-T INTERVAL, ECG07: 464 MS
QRS DURATION, ECG06: 166 MS
QTC CALCULATION (BEZET), ECG08: 443 MS
RBC # BLD AUTO: 3.62 M/UL (ref 4.1–5.7)
RBC MORPH BLD: ABNORMAL
RBC MORPH BLD: ABNORMAL
SAO2 % BLD: 69 % (ref 95–99)
SAO2 % BLD: 87 % (ref 95–99)
SAO2 % BLD: 99 % (ref 92–97)
SAO2 % BLD: 99 % (ref 92–97)
SAO2% DEVICE SAO2% SENSOR NAME: ABNORMAL
SAO2% DEVICE SAO2% SENSOR NAME: ABNORMAL
SERVICE CMNT-IMP: ABNORMAL
SERVICE CMNT-IMP: NORMAL
SODIUM SERPL-SCNC: 144 MMOL/L (ref 136–145)
SODIUM SERPL-SCNC: 145 MMOL/L (ref 136–145)
SPECIMEN SITE: ABNORMAL
THERAPEUTIC RANGE,PTTT: ABNORMAL SECS (ref 58–77)
VENTILATION MODE VENT: ABNORMAL
VENTRICULAR RATE, ECG03: 55 BPM
VT SETTING VENT: 450 ML
WBC # BLD AUTO: 9.7 K/UL (ref 4.1–11.1)

## 2022-05-03 PROCEDURE — 93005 ELECTROCARDIOGRAM TRACING: CPT

## 2022-05-03 PROCEDURE — 74011000250 HC RX REV CODE- 250: Performed by: PHYSICIAN ASSISTANT

## 2022-05-03 PROCEDURE — 74011250636 HC RX REV CODE- 250/636: Performed by: PHYSICIAN ASSISTANT

## 2022-05-03 PROCEDURE — 77030005401 HC CATH RAD ARRO -A: Performed by: ANESTHESIOLOGY

## 2022-05-03 PROCEDURE — 82375 ASSAY CARBOXYHB QUANT: CPT

## 2022-05-03 PROCEDURE — 88305 TISSUE EXAM BY PATHOLOGIST: CPT

## 2022-05-03 PROCEDURE — 77030008684 HC TU ET CUF COVD -B: Performed by: ANESTHESIOLOGY

## 2022-05-03 PROCEDURE — 77030041244 HC CBL PACE EXT TEMP REMG -B: Performed by: THORACIC SURGERY (CARDIOTHORACIC VASCULAR SURGERY)

## 2022-05-03 PROCEDURE — 77030019579 HC CBL PACE DISP REMG -B: Performed by: THORACIC SURGERY (CARDIOTHORACIC VASCULAR SURGERY)

## 2022-05-03 PROCEDURE — 74011000258 HC RX REV CODE- 258: Performed by: PHYSICIAN ASSISTANT

## 2022-05-03 PROCEDURE — 74011250636 HC RX REV CODE- 250/636: Performed by: ANESTHESIOLOGY

## 2022-05-03 PROCEDURE — 74011000250 HC RX REV CODE- 250: Performed by: NURSE ANESTHETIST, CERTIFIED REGISTERED

## 2022-05-03 PROCEDURE — 85730 THROMBOPLASTIN TIME PARTIAL: CPT

## 2022-05-03 PROCEDURE — P9045 ALBUMIN (HUMAN), 5%, 250 ML: HCPCS

## 2022-05-03 PROCEDURE — 74011000250 HC RX REV CODE- 250: Performed by: THORACIC SURGERY (CARDIOTHORACIC VASCULAR SURGERY)

## 2022-05-03 PROCEDURE — P9047 ALBUMIN (HUMAN), 25%, 50ML: HCPCS | Performed by: THORACIC SURGERY (CARDIOTHORACIC VASCULAR SURGERY)

## 2022-05-03 PROCEDURE — 76010000199 HC CV SURG 4.5 TO 5 HR INTENSV-TIER 1: Performed by: THORACIC SURGERY (CARDIOTHORACIC VASCULAR SURGERY)

## 2022-05-03 PROCEDURE — 03HY32Z INSERTION OF MONITORING DEVICE INTO UPPER ARTERY, PERCUTANEOUS APPROACH: ICD-10-PCS | Performed by: ANESTHESIOLOGY

## 2022-05-03 PROCEDURE — 77030019908 HC STETH ESOPH SIMS -A: Performed by: ANESTHESIOLOGY

## 2022-05-03 PROCEDURE — 76060000040 HC ANESTHESIA 4.5 TO 5 HR: Performed by: THORACIC SURGERY (CARDIOTHORACIC VASCULAR SURGERY)

## 2022-05-03 PROCEDURE — 77030013079 HC BLNKT BAIR HGGR 3M -A: Performed by: ANESTHESIOLOGY

## 2022-05-03 PROCEDURE — 77030014008 HC SPNG HEMSTAT J&J -C: Performed by: THORACIC SURGERY (CARDIOTHORACIC VASCULAR SURGERY)

## 2022-05-03 PROCEDURE — P9045 ALBUMIN (HUMAN), 5%, 250 ML: HCPCS | Performed by: NURSE ANESTHETIST, CERTIFIED REGISTERED

## 2022-05-03 PROCEDURE — 77030003010 HC SUT SURG STL J&J -B: Performed by: THORACIC SURGERY (CARDIOTHORACIC VASCULAR SURGERY)

## 2022-05-03 PROCEDURE — 74011636637 HC RX REV CODE- 636/637: Performed by: THORACIC SURGERY (CARDIOTHORACIC VASCULAR SURGERY)

## 2022-05-03 PROCEDURE — 77030013313: Performed by: THORACIC SURGERY (CARDIOTHORACIC VASCULAR SURGERY)

## 2022-05-03 PROCEDURE — 80053 COMPREHEN METABOLIC PANEL: CPT

## 2022-05-03 PROCEDURE — 74011000258 HC RX REV CODE- 258: Performed by: THORACIC SURGERY (CARDIOTHORACIC VASCULAR SURGERY)

## 2022-05-03 PROCEDURE — 74011000250 HC RX REV CODE- 250

## 2022-05-03 PROCEDURE — 74011250636 HC RX REV CODE- 250/636: Performed by: THORACIC SURGERY (CARDIOTHORACIC VASCULAR SURGERY)

## 2022-05-03 PROCEDURE — C1751 CATH, INF, PER/CENT/MIDLINE: HCPCS | Performed by: ANESTHESIOLOGY

## 2022-05-03 PROCEDURE — 83735 ASSAY OF MAGNESIUM: CPT

## 2022-05-03 PROCEDURE — 77030020167 HC PERF SET MULT MEDT -B: Performed by: THORACIC SURGERY (CARDIOTHORACIC VASCULAR SURGERY)

## 2022-05-03 PROCEDURE — 2709999900 HC NON-CHARGEABLE SUPPLY: Performed by: THORACIC SURGERY (CARDIOTHORACIC VASCULAR SURGERY)

## 2022-05-03 PROCEDURE — 77030014491 HC PLEDG PTFE BARD -A: Performed by: THORACIC SURGERY (CARDIOTHORACIC VASCULAR SURGERY)

## 2022-05-03 PROCEDURE — 77030020061 HC IV BLD WRMR ADMIN SET 3M -B: Performed by: ANESTHESIOLOGY

## 2022-05-03 PROCEDURE — 85025 COMPLETE CBC W/AUTO DIFF WBC: CPT

## 2022-05-03 PROCEDURE — 77030012390 HC DRN CHST BTL GTNG -B: Performed by: THORACIC SURGERY (CARDIOTHORACIC VASCULAR SURGERY)

## 2022-05-03 PROCEDURE — 85610 PROTHROMBIN TIME: CPT

## 2022-05-03 PROCEDURE — 77030002912 HC SUT ETHBND J&J -A: Performed by: THORACIC SURGERY (CARDIOTHORACIC VASCULAR SURGERY)

## 2022-05-03 PROCEDURE — 77030037878 HC DRSG MEPILEX >48IN BORD MOLN -B: Performed by: THORACIC SURGERY (CARDIOTHORACIC VASCULAR SURGERY)

## 2022-05-03 PROCEDURE — 77030026438 HC STYL ET INTUB CARD -A: Performed by: ANESTHESIOLOGY

## 2022-05-03 PROCEDURE — 88311 DECALCIFY TISSUE: CPT

## 2022-05-03 PROCEDURE — 77030002996 HC SUT SLK J&J -A: Performed by: THORACIC SURGERY (CARDIOTHORACIC VASCULAR SURGERY)

## 2022-05-03 PROCEDURE — 71045 X-RAY EXAM CHEST 1 VIEW: CPT

## 2022-05-03 PROCEDURE — 77030008771 HC TU NG SALEM SUMP -A: Performed by: THORACIC SURGERY (CARDIOTHORACIC VASCULAR SURGERY)

## 2022-05-03 PROCEDURE — 74011636637 HC RX REV CODE- 636/637: Performed by: NURSE ANESTHETIST, CERTIFIED REGISTERED

## 2022-05-03 PROCEDURE — 02RF0JZ REPLACEMENT OF AORTIC VALVE WITH SYNTHETIC SUBSTITUTE, OPEN APPROACH: ICD-10-PCS | Performed by: THORACIC SURGERY (CARDIOTHORACIC VASCULAR SURGERY)

## 2022-05-03 PROCEDURE — 77030011235 HC DRN PERCRD SUMP MEDT -B: Performed by: THORACIC SURGERY (CARDIOTHORACIC VASCULAR SURGERY)

## 2022-05-03 PROCEDURE — 82803 BLOOD GASES ANY COMBINATION: CPT

## 2022-05-03 PROCEDURE — 82962 GLUCOSE BLOOD TEST: CPT

## 2022-05-03 PROCEDURE — 77030018729 HC ELECTRD DEFIB PAD CARD -B: Performed by: THORACIC SURGERY (CARDIOTHORACIC VASCULAR SURGERY)

## 2022-05-03 PROCEDURE — C1725 CATH, TRANSLUMIN NON-LASER: HCPCS | Performed by: THORACIC SURGERY (CARDIOTHORACIC VASCULAR SURGERY)

## 2022-05-03 PROCEDURE — 74011250636 HC RX REV CODE- 250/636

## 2022-05-03 PROCEDURE — 77030008771 HC TU NG SALEM SUMP -A: Performed by: ANESTHESIOLOGY

## 2022-05-03 PROCEDURE — 77030010797: Performed by: THORACIC SURGERY (CARDIOTHORACIC VASCULAR SURGERY)

## 2022-05-03 PROCEDURE — 77030033069 HC RLD QLC SGL COR-KNOT LSIS -B: Performed by: THORACIC SURGERY (CARDIOTHORACIC VASCULAR SURGERY)

## 2022-05-03 PROCEDURE — 77030002986 HC SUT PROL J&J -A: Performed by: THORACIC SURGERY (CARDIOTHORACIC VASCULAR SURGERY)

## 2022-05-03 PROCEDURE — 02HV33Z INSERTION OF INFUSION DEVICE INTO SUPERIOR VENA CAVA, PERCUTANEOUS APPROACH: ICD-10-PCS | Performed by: ANESTHESIOLOGY

## 2022-05-03 PROCEDURE — B24BZZ4 ULTRASONOGRAPHY OF HEART WITH AORTA, TRANSESOPHAGEAL: ICD-10-PCS | Performed by: THORACIC SURGERY (CARDIOTHORACIC VASCULAR SURGERY)

## 2022-05-03 PROCEDURE — 02RX07Z REPLACEMENT OF THORACIC AORTA, ASCENDING/ARCH WITH AUTOLOGOUS TISSUE SUBSTITUTE, OPEN APPROACH: ICD-10-PCS | Performed by: THORACIC SURGERY (CARDIOTHORACIC VASCULAR SURGERY)

## 2022-05-03 PROCEDURE — 74011250637 HC RX REV CODE- 250/637: Performed by: PHYSICIAN ASSISTANT

## 2022-05-03 PROCEDURE — 85018 HEMOGLOBIN: CPT

## 2022-05-03 PROCEDURE — 77030034936 HC DEV MIN COR-KNOT KT LSIS -F: Performed by: THORACIC SURGERY (CARDIOTHORACIC VASCULAR SURGERY)

## 2022-05-03 PROCEDURE — 74011000250 HC RX REV CODE- 250: Performed by: NURSE PRACTITIONER

## 2022-05-03 PROCEDURE — 77030010410 HC WRE VENT PACE AEMC -A: Performed by: THORACIC SURGERY (CARDIOTHORACIC VASCULAR SURGERY)

## 2022-05-03 PROCEDURE — 77030018793 HC ORG SUT GAB FRAT TELE -B: Performed by: THORACIC SURGERY (CARDIOTHORACIC VASCULAR SURGERY)

## 2022-05-03 PROCEDURE — 77030011266 HC ELECTRD BLD INSL COVD -A: Performed by: THORACIC SURGERY (CARDIOTHORACIC VASCULAR SURGERY)

## 2022-05-03 PROCEDURE — 77030002933 HC SUT MCRYL J&J -A: Performed by: THORACIC SURGERY (CARDIOTHORACIC VASCULAR SURGERY)

## 2022-05-03 PROCEDURE — 5A1221Z PERFORMANCE OF CARDIAC OUTPUT, CONTINUOUS: ICD-10-PCS | Performed by: THORACIC SURGERY (CARDIOTHORACIC VASCULAR SURGERY)

## 2022-05-03 PROCEDURE — 77030013798 HC KT TRNSDUC PRSSR EDWD -B: Performed by: THORACIC SURGERY (CARDIOTHORACIC VASCULAR SURGERY)

## 2022-05-03 PROCEDURE — 77030010818: Performed by: THORACIC SURGERY (CARDIOTHORACIC VASCULAR SURGERY)

## 2022-05-03 PROCEDURE — 77030010505 HC ADH BIOGLU CRYO -F: Performed by: THORACIC SURGERY (CARDIOTHORACIC VASCULAR SURGERY)

## 2022-05-03 PROCEDURE — 77030040090 HC VLV AORT MECH CRYO -I1: Performed by: THORACIC SURGERY (CARDIOTHORACIC VASCULAR SURGERY)

## 2022-05-03 PROCEDURE — 36415 COLL VENOUS BLD VENIPUNCTURE: CPT

## 2022-05-03 PROCEDURE — 33405 REPLACEMENT AORTIC VALVE OPN: CPT | Performed by: THORACIC SURGERY (CARDIOTHORACIC VASCULAR SURGERY)

## 2022-05-03 PROCEDURE — 77030013798 HC KT TRNSDUC PRSSR EDWD -B: Performed by: ANESTHESIOLOGY

## 2022-05-03 PROCEDURE — 76998 US GUIDE INTRAOP: CPT | Performed by: THORACIC SURGERY (CARDIOTHORACIC VASCULAR SURGERY)

## 2022-05-03 PROCEDURE — 65620000000 HC RM CCU GENERAL

## 2022-05-03 PROCEDURE — 94002 VENT MGMT INPAT INIT DAY: CPT

## 2022-05-03 PROCEDURE — 74011250636 HC RX REV CODE- 250/636: Performed by: NURSE ANESTHETIST, CERTIFIED REGISTERED

## 2022-05-03 PROCEDURE — 74011000250 HC RX REV CODE- 250: Performed by: ANESTHESIOLOGY

## 2022-05-03 DEVICE — TEMP PACING WIRE
Type: IMPLANTABLE DEVICE | Status: FUNCTIONAL
Brand: MYO/WIRE

## 2022-05-03 DEVICE — VALVE AORT H16.1MM OD23MM ID21.4MM FLARE DIA24.3MM SEW RNG: Type: IMPLANTABLE DEVICE | Site: HEART | Status: FUNCTIONAL

## 2022-05-03 DEVICE — 6 FOOT DISPOSABLE EXTENSION CABLE WITH SAFE CONNECT / SCREW-DOWN: Type: IMPLANTABLE DEVICE | Status: FUNCTIONAL

## 2022-05-03 RX ORDER — DEXTROSE MONOHYDRATE 100 MG/ML
INJECTION, SOLUTION INTRAVENOUS
Status: DISCONTINUED | OUTPATIENT
Start: 2022-05-03 | End: 2022-05-03 | Stop reason: HOSPADM

## 2022-05-03 RX ORDER — SODIUM CHLORIDE 9 MG/ML
10 INJECTION, SOLUTION INTRAVENOUS CONTINUOUS
Status: DISCONTINUED | OUTPATIENT
Start: 2022-05-03 | End: 2022-05-04

## 2022-05-03 RX ORDER — POLYETHYLENE GLYCOL 3350 17 G/17G
17 POWDER, FOR SOLUTION ORAL DAILY
Status: DISCONTINUED | OUTPATIENT
Start: 2022-05-04 | End: 2022-05-07 | Stop reason: HOSPADM

## 2022-05-03 RX ORDER — OXYCODONE HYDROCHLORIDE 5 MG/1
10 TABLET ORAL
Status: DISCONTINUED | OUTPATIENT
Start: 2022-05-03 | End: 2022-05-07 | Stop reason: HOSPADM

## 2022-05-03 RX ORDER — INSULIN LISPRO 100 [IU]/ML
INJECTION, SOLUTION INTRAVENOUS; SUBCUTANEOUS
Status: DISCONTINUED | OUTPATIENT
Start: 2022-05-04 | End: 2022-05-06 | Stop reason: ALTCHOICE

## 2022-05-03 RX ORDER — FUROSEMIDE 10 MG/ML
INJECTION INTRAMUSCULAR; INTRAVENOUS AS NEEDED
Status: DISCONTINUED | OUTPATIENT
Start: 2022-05-03 | End: 2022-05-03 | Stop reason: HOSPADM

## 2022-05-03 RX ORDER — ROCURONIUM BROMIDE 10 MG/ML
INJECTION, SOLUTION INTRAVENOUS AS NEEDED
Status: DISCONTINUED | OUTPATIENT
Start: 2022-05-03 | End: 2022-05-03 | Stop reason: HOSPADM

## 2022-05-03 RX ORDER — CEFAZOLIN SODIUM 1 G/3ML
1 INJECTION, POWDER, FOR SOLUTION INTRAMUSCULAR; INTRAVENOUS ONCE
Status: COMPLETED | OUTPATIENT
Start: 2022-05-03 | End: 2022-05-03

## 2022-05-03 RX ORDER — HEPARIN SODIUM 1000 [USP'U]/ML
INJECTION, SOLUTION INTRAVENOUS; SUBCUTANEOUS AS NEEDED
Status: DISCONTINUED | OUTPATIENT
Start: 2022-05-03 | End: 2022-05-03 | Stop reason: HOSPADM

## 2022-05-03 RX ORDER — SODIUM CHLORIDE 9 MG/ML
INJECTION, SOLUTION INTRAVENOUS
Status: DISCONTINUED | OUTPATIENT
Start: 2022-05-03 | End: 2022-05-03 | Stop reason: HOSPADM

## 2022-05-03 RX ORDER — DEXTROSE MONOHYDRATE 100 MG/ML
INJECTION, SOLUTION INTRAVENOUS
Status: COMPLETED
Start: 2022-05-03 | End: 2022-05-03

## 2022-05-03 RX ORDER — GUAIFENESIN 100 MG/5ML
81 LIQUID (ML) ORAL DAILY
Status: DISCONTINUED | OUTPATIENT
Start: 2022-05-04 | End: 2022-05-07 | Stop reason: HOSPADM

## 2022-05-03 RX ORDER — FENTANYL CITRATE 50 UG/ML
INJECTION, SOLUTION INTRAMUSCULAR; INTRAVENOUS AS NEEDED
Status: DISCONTINUED | OUTPATIENT
Start: 2022-05-03 | End: 2022-05-03 | Stop reason: HOSPADM

## 2022-05-03 RX ORDER — SODIUM CHLORIDE 0.9 % (FLUSH) 0.9 %
5-40 SYRINGE (ML) INJECTION EVERY 8 HOURS
Status: DISCONTINUED | OUTPATIENT
Start: 2022-05-03 | End: 2022-05-05 | Stop reason: SDUPTHER

## 2022-05-03 RX ORDER — MUPIROCIN 20 MG/G
OINTMENT TOPICAL 2 TIMES DAILY
Status: DISCONTINUED | OUTPATIENT
Start: 2022-05-03 | End: 2022-05-07 | Stop reason: HOSPADM

## 2022-05-03 RX ORDER — SUFENTANIL CITRATE 50 UG/ML
INJECTION EPIDURAL; INTRAVENOUS
Status: DISCONTINUED | OUTPATIENT
Start: 2022-05-03 | End: 2022-05-03 | Stop reason: HOSPADM

## 2022-05-03 RX ORDER — DIPHENHYDRAMINE HCL 25 MG
25 CAPSULE ORAL
Status: DISCONTINUED | OUTPATIENT
Start: 2022-05-03 | End: 2022-05-07 | Stop reason: HOSPADM

## 2022-05-03 RX ORDER — MAGNESIUM SULFATE 1 G/100ML
1 INJECTION INTRAVENOUS AS NEEDED
Status: DISCONTINUED | OUTPATIENT
Start: 2022-05-03 | End: 2022-05-04

## 2022-05-03 RX ORDER — INSULIN GLARGINE 100 [IU]/ML
1-50 INJECTION, SOLUTION SUBCUTANEOUS
Status: DISCONTINUED | OUTPATIENT
Start: 2022-05-03 | End: 2022-05-04

## 2022-05-03 RX ORDER — ACETAMINOPHEN 500 MG
1000 TABLET ORAL EVERY 6 HOURS
Status: DISPENSED | OUTPATIENT
Start: 2022-05-03 | End: 2022-05-04

## 2022-05-03 RX ORDER — LANOLIN ALCOHOL/MO/W.PET/CERES
400 CREAM (GRAM) TOPICAL 2 TIMES DAILY
Status: DISCONTINUED | OUTPATIENT
Start: 2022-05-04 | End: 2022-05-07 | Stop reason: HOSPADM

## 2022-05-03 RX ORDER — SODIUM CHLORIDE 450 MG/100ML
10 INJECTION, SOLUTION INTRAVENOUS CONTINUOUS
Status: DISCONTINUED | OUTPATIENT
Start: 2022-05-03 | End: 2022-05-04

## 2022-05-03 RX ORDER — MIDAZOLAM HYDROCHLORIDE 1 MG/ML
1 INJECTION, SOLUTION INTRAMUSCULAR; INTRAVENOUS
Status: DISCONTINUED | OUTPATIENT
Start: 2022-05-03 | End: 2022-05-04

## 2022-05-03 RX ORDER — SODIUM CHLORIDE 0.9 % (FLUSH) 0.9 %
5-40 SYRINGE (ML) INJECTION AS NEEDED
Status: DISCONTINUED | OUTPATIENT
Start: 2022-05-03 | End: 2022-05-03

## 2022-05-03 RX ORDER — CHLORHEXIDINE GLUCONATE 1.2 MG/ML
10 RINSE ORAL EVERY 12 HOURS
Status: DISCONTINUED | OUTPATIENT
Start: 2022-05-03 | End: 2022-05-04

## 2022-05-03 RX ORDER — BACITRACIN 500 UNIT/G
1 PACKET (EA) TOPICAL AS NEEDED
Status: DISCONTINUED | OUTPATIENT
Start: 2022-05-03 | End: 2022-05-04

## 2022-05-03 RX ORDER — HEPARIN SODIUM 1000 [USP'U]/ML
2000 INJECTION, SOLUTION INTRAVENOUS; SUBCUTANEOUS ONCE
Status: COMPLETED | OUTPATIENT
Start: 2022-05-03 | End: 2022-05-03

## 2022-05-03 RX ORDER — PROPOFOL 10 MG/ML
INJECTION, EMULSION INTRAVENOUS AS NEEDED
Status: DISCONTINUED | OUTPATIENT
Start: 2022-05-03 | End: 2022-05-03 | Stop reason: HOSPADM

## 2022-05-03 RX ORDER — MORPHINE SULFATE 2 MG/ML
2-4 INJECTION, SOLUTION INTRAMUSCULAR; INTRAVENOUS
Status: DISCONTINUED | OUTPATIENT
Start: 2022-05-03 | End: 2022-05-07 | Stop reason: HOSPADM

## 2022-05-03 RX ORDER — ACETAMINOPHEN 325 MG/1
650 TABLET ORAL
Status: DISCONTINUED | OUTPATIENT
Start: 2022-05-03 | End: 2022-05-04

## 2022-05-03 RX ORDER — NITROGLYCERIN 20 MG/100ML
INJECTION INTRAVENOUS AS NEEDED
Status: DISCONTINUED | OUTPATIENT
Start: 2022-05-03 | End: 2022-05-03 | Stop reason: HOSPADM

## 2022-05-03 RX ORDER — FAMOTIDINE 20 MG/1
20 TABLET, FILM COATED ORAL EVERY 12 HOURS
Status: DISCONTINUED | OUTPATIENT
Start: 2022-05-04 | End: 2022-05-04

## 2022-05-03 RX ORDER — SODIUM CHLORIDE, SODIUM LACTATE, POTASSIUM CHLORIDE, CALCIUM CHLORIDE 600; 310; 30; 20 MG/100ML; MG/100ML; MG/100ML; MG/100ML
INJECTION, SOLUTION INTRAVENOUS
Status: DISCONTINUED | OUTPATIENT
Start: 2022-05-03 | End: 2022-05-03 | Stop reason: HOSPADM

## 2022-05-03 RX ORDER — ALBUTEROL SULFATE 0.83 MG/ML
2.5 SOLUTION RESPIRATORY (INHALATION)
Status: DISCONTINUED | OUTPATIENT
Start: 2022-05-03 | End: 2022-05-07 | Stop reason: HOSPADM

## 2022-05-03 RX ORDER — OXYCODONE HYDROCHLORIDE 5 MG/1
5 TABLET ORAL
Status: DISCONTINUED | OUTPATIENT
Start: 2022-05-03 | End: 2022-05-07 | Stop reason: HOSPADM

## 2022-05-03 RX ORDER — AMIODARONE HYDROCHLORIDE 200 MG/1
400 TABLET ORAL EVERY 12 HOURS
Status: DISCONTINUED | OUTPATIENT
Start: 2022-05-04 | End: 2022-05-04

## 2022-05-03 RX ORDER — ALBUMIN HUMAN 50 G/1000ML
SOLUTION INTRAVENOUS AS NEEDED
Status: DISCONTINUED | OUTPATIENT
Start: 2022-05-03 | End: 2022-05-03 | Stop reason: HOSPADM

## 2022-05-03 RX ORDER — FACIAL-BODY WIPES
10 EACH TOPICAL DAILY PRN
Status: DISCONTINUED | OUTPATIENT
Start: 2022-05-03 | End: 2022-05-07 | Stop reason: HOSPADM

## 2022-05-03 RX ORDER — ONDANSETRON 2 MG/ML
4 INJECTION INTRAMUSCULAR; INTRAVENOUS
Status: DISCONTINUED | OUTPATIENT
Start: 2022-05-03 | End: 2022-05-07 | Stop reason: HOSPADM

## 2022-05-03 RX ORDER — SODIUM CHLORIDE, SODIUM LACTATE, POTASSIUM CHLORIDE, CALCIUM CHLORIDE 600; 310; 30; 20 MG/100ML; MG/100ML; MG/100ML; MG/100ML
25 INJECTION, SOLUTION INTRAVENOUS CONTINUOUS
Status: DISCONTINUED | OUTPATIENT
Start: 2022-05-03 | End: 2022-05-03

## 2022-05-03 RX ORDER — MAGNESIUM SULFATE 100 %
4 CRYSTALS MISCELLANEOUS AS NEEDED
Status: DISCONTINUED | OUTPATIENT
Start: 2022-05-03 | End: 2022-05-07 | Stop reason: HOSPADM

## 2022-05-03 RX ORDER — GLYCOPYRROLATE 0.2 MG/ML
INJECTION INTRAMUSCULAR; INTRAVENOUS AS NEEDED
Status: DISCONTINUED | OUTPATIENT
Start: 2022-05-03 | End: 2022-05-03 | Stop reason: HOSPADM

## 2022-05-03 RX ORDER — CHLORHEXIDINE GLUCONATE 1.2 MG/ML
15 RINSE ORAL EVERY 12 HOURS
Status: DISCONTINUED | OUTPATIENT
Start: 2022-05-03 | End: 2022-05-07 | Stop reason: HOSPADM

## 2022-05-03 RX ORDER — AMOXICILLIN 250 MG
1 CAPSULE ORAL 2 TIMES DAILY
Status: DISCONTINUED | OUTPATIENT
Start: 2022-05-04 | End: 2022-05-07 | Stop reason: HOSPADM

## 2022-05-03 RX ORDER — LANOLIN ALCOHOL/MO/W.PET/CERES
3-6 CREAM (GRAM) TOPICAL
Status: DISCONTINUED | OUTPATIENT
Start: 2022-05-03 | End: 2022-05-07 | Stop reason: HOSPADM

## 2022-05-03 RX ORDER — NALOXONE HYDROCHLORIDE 0.4 MG/ML
0.4 INJECTION, SOLUTION INTRAMUSCULAR; INTRAVENOUS; SUBCUTANEOUS AS NEEDED
Status: DISCONTINUED | OUTPATIENT
Start: 2022-05-03 | End: 2022-05-07 | Stop reason: HOSPADM

## 2022-05-03 RX ORDER — SUFENTANIL CITRATE 50 UG/ML
INJECTION EPIDURAL; INTRAVENOUS AS NEEDED
Status: DISCONTINUED | OUTPATIENT
Start: 2022-05-03 | End: 2022-05-03 | Stop reason: HOSPADM

## 2022-05-03 RX ORDER — ALBUMIN HUMAN 50 G/1000ML
12.5 SOLUTION INTRAVENOUS
Status: DISCONTINUED | OUTPATIENT
Start: 2022-05-03 | End: 2022-05-04

## 2022-05-03 RX ORDER — INSULIN LISPRO 100 [IU]/ML
INJECTION, SOLUTION INTRAVENOUS; SUBCUTANEOUS
Status: DISCONTINUED | OUTPATIENT
Start: 2022-05-05 | End: 2022-05-07 | Stop reason: HOSPADM

## 2022-05-03 RX ORDER — MIDAZOLAM HYDROCHLORIDE 1 MG/ML
INJECTION, SOLUTION INTRAMUSCULAR; INTRAVENOUS AS NEEDED
Status: DISCONTINUED | OUTPATIENT
Start: 2022-05-03 | End: 2022-05-03 | Stop reason: HOSPADM

## 2022-05-03 RX ORDER — CEFAZOLIN SODIUM 1 G/3ML
INJECTION, POWDER, FOR SOLUTION INTRAMUSCULAR; INTRAVENOUS AS NEEDED
Status: DISCONTINUED | OUTPATIENT
Start: 2022-05-03 | End: 2022-05-03 | Stop reason: HOSPADM

## 2022-05-03 RX ORDER — SODIUM CHLORIDE 0.9 % (FLUSH) 0.9 %
5-40 SYRINGE (ML) INJECTION AS NEEDED
Status: DISCONTINUED | OUTPATIENT
Start: 2022-05-03 | End: 2022-05-05 | Stop reason: SDUPTHER

## 2022-05-03 RX ORDER — BUPIVACAINE HYDROCHLORIDE 2.5 MG/ML
30 INJECTION, SOLUTION EPIDURAL; INFILTRATION; INTRACAUDAL ONCE
Status: COMPLETED | OUTPATIENT
Start: 2022-05-03 | End: 2022-05-03

## 2022-05-03 RX ORDER — SUCCINYLCHOLINE CHLORIDE 20 MG/ML
INJECTION INTRAMUSCULAR; INTRAVENOUS AS NEEDED
Status: DISCONTINUED | OUTPATIENT
Start: 2022-05-03 | End: 2022-05-03 | Stop reason: HOSPADM

## 2022-05-03 RX ORDER — ALBUMIN HUMAN 50 G/1000ML
SOLUTION INTRAVENOUS
Status: COMPLETED
Start: 2022-05-03 | End: 2022-05-03

## 2022-05-03 RX ORDER — INSULIN LISPRO 100 [IU]/ML
INJECTION, SOLUTION INTRAVENOUS; SUBCUTANEOUS
Status: DISCONTINUED | OUTPATIENT
Start: 2022-05-03 | End: 2022-05-03

## 2022-05-03 RX ORDER — NEOSTIGMINE METHYLSULFATE 1 MG/ML
INJECTION, SOLUTION INTRAVENOUS AS NEEDED
Status: DISCONTINUED | OUTPATIENT
Start: 2022-05-03 | End: 2022-05-03 | Stop reason: HOSPADM

## 2022-05-03 RX ADMIN — FENTANYL CITRATE 50 MCG: 50 INJECTION, SOLUTION INTRAMUSCULAR; INTRAVENOUS at 07:10

## 2022-05-03 RX ADMIN — GLYCOPYRROLATE 0.4 MG: 0.2 INJECTION, SOLUTION INTRAMUSCULAR; INTRAVENOUS at 12:39

## 2022-05-03 RX ADMIN — MORPHINE SULFATE 2 MG: 2 INJECTION, SOLUTION INTRAMUSCULAR; INTRAVENOUS at 21:27

## 2022-05-03 RX ADMIN — NITROGLYCERIN 20 MCG: 200 INJECTION, SOLUTION INTRAVENOUS at 08:24

## 2022-05-03 RX ADMIN — BUPIVACAINE HYDROCHLORIDE 30 ML: 2.5 INJECTION, SOLUTION EPIDURAL; INFILTRATION; INTRACAUDAL at 11:23

## 2022-05-03 RX ADMIN — PHENYLEPHRINE HYDROCHLORIDE 40 MCG/MIN: 10 INJECTION INTRAVENOUS at 11:07

## 2022-05-03 RX ADMIN — NITROGLYCERIN 40 MCG: 200 INJECTION, SOLUTION INTRAVENOUS at 11:02

## 2022-05-03 RX ADMIN — SODIUM CHLORIDE, PRESERVATIVE FREE 10 ML: 5 INJECTION INTRAVENOUS at 21:25

## 2022-05-03 RX ADMIN — PROPOFOL 100 MG: 10 INJECTION, EMULSION INTRAVENOUS at 07:44

## 2022-05-03 RX ADMIN — SUFENTANIL CITRATE 20 MCG: 50 INJECTION EPIDURAL; INTRAVENOUS at 07:44

## 2022-05-03 RX ADMIN — FENTANYL CITRATE 50 MCG: 50 INJECTION, SOLUTION INTRAMUSCULAR; INTRAVENOUS at 07:25

## 2022-05-03 RX ADMIN — DEXTROSE MONOHYDRATE: 10 INJECTION, SOLUTION INTRAVENOUS at 10:34

## 2022-05-03 RX ADMIN — MIDAZOLAM 1 MG: 1 INJECTION INTRAMUSCULAR; INTRAVENOUS at 07:25

## 2022-05-03 RX ADMIN — AMIODARONE HYDROCHLORIDE 150 MG: 50 INJECTION, SOLUTION INTRAVENOUS at 10:50

## 2022-05-03 RX ADMIN — MORPHINE SULFATE 2 MG: 2 INJECTION, SOLUTION INTRAMUSCULAR; INTRAVENOUS at 23:30

## 2022-05-03 RX ADMIN — Medication 2 G: at 10:51

## 2022-05-03 RX ADMIN — SODIUM CHLORIDE, PRESERVATIVE FREE 10 ML: 5 INJECTION INTRAVENOUS at 16:33

## 2022-05-03 RX ADMIN — Medication 3 MG: at 12:39

## 2022-05-03 RX ADMIN — NITROGLYCERIN 20 MCG: 200 INJECTION, SOLUTION INTRAVENOUS at 08:09

## 2022-05-03 RX ADMIN — WATER 2 G: 1 INJECTION INTRAMUSCULAR; INTRAVENOUS; SUBCUTANEOUS at 23:20

## 2022-05-03 RX ADMIN — SUFENTANIL CITRATE 20 MCG: 50 INJECTION EPIDURAL; INTRAVENOUS at 08:05

## 2022-05-03 RX ADMIN — SODIUM CHLORIDE 0.4 MCG/KG/HR: 9 INJECTION, SOLUTION INTRAVENOUS at 10:21

## 2022-05-03 RX ADMIN — MIDAZOLAM 2 MG: 1 INJECTION INTRAMUSCULAR; INTRAVENOUS at 07:12

## 2022-05-03 RX ADMIN — ROCURONIUM BROMIDE 60 MG: 10 INJECTION INTRAVENOUS at 07:45

## 2022-05-03 RX ADMIN — DEXTROSE MONOHYDRATE 100 ML: 100 INJECTION, SOLUTION INTRAVENOUS at 12:52

## 2022-05-03 RX ADMIN — SUFENTANIL CITRATE 20 MCG: 50 INJECTION EPIDURAL; INTRAVENOUS at 08:09

## 2022-05-03 RX ADMIN — SODIUM CHLORIDE, PRESERVATIVE FREE 10 ML: 5 INJECTION INTRAVENOUS at 16:34

## 2022-05-03 RX ADMIN — WATER 2 G: 1 INJECTION INTRAMUSCULAR; INTRAVENOUS; SUBCUTANEOUS at 17:20

## 2022-05-03 RX ADMIN — PROTAMINE SULFATE 350 MG: 10 INJECTION, SOLUTION INTRAVENOUS at 11:05

## 2022-05-03 RX ADMIN — POTASSIUM CHLORIDE 20 MEQ: 29.8 INJECTION INTRAVENOUS at 12:13

## 2022-05-03 RX ADMIN — SODIUM CHLORIDE, POTASSIUM CHLORIDE, SODIUM LACTATE AND CALCIUM CHLORIDE: 600; 310; 30; 20 INJECTION, SOLUTION INTRAVENOUS at 07:33

## 2022-05-03 RX ADMIN — MORPHINE SULFATE 2 MG: 2 INJECTION, SOLUTION INTRAMUSCULAR; INTRAVENOUS at 17:15

## 2022-05-03 RX ADMIN — PROPOFOL 60 MG: 10 INJECTION, EMULSION INTRAVENOUS at 07:45

## 2022-05-03 RX ADMIN — CHLORHEXIDINE GLUCONATE 15 ML: 1.2 RINSE ORAL at 21:26

## 2022-05-03 RX ADMIN — HEPARIN SODIUM 2000 UNITS: 1000 INJECTION, SOLUTION INTRAVENOUS; SUBCUTANEOUS at 08:41

## 2022-05-03 RX ADMIN — SUCCINYLCHOLINE CHLORIDE 160 MG: 20 INJECTION, SOLUTION INTRAMUSCULAR; INTRAVENOUS at 07:45

## 2022-05-03 RX ADMIN — SODIUM CHLORIDE: 9 INJECTION, SOLUTION INTRAVENOUS at 07:50

## 2022-05-03 RX ADMIN — FUROSEMIDE 10 MG: 10 INJECTION, SOLUTION INTRAMUSCULAR; INTRAVENOUS at 10:31

## 2022-05-03 RX ADMIN — CEFAZOLIN 2 G: 1 INJECTION, POWDER, FOR SOLUTION INTRAMUSCULAR; INTRAVENOUS; PARENTERAL at 10:56

## 2022-05-03 RX ADMIN — ROCURONIUM BROMIDE 40 MG: 10 INJECTION INTRAVENOUS at 08:57

## 2022-05-03 RX ADMIN — SODIUM CHLORIDE: 9 INJECTION, SOLUTION INTRAVENOUS at 07:38

## 2022-05-03 RX ADMIN — Medication 5 UNITS: at 10:34

## 2022-05-03 RX ADMIN — ALBUMIN (HUMAN) 12.5 G: 12.5 INJECTION, SOLUTION INTRAVENOUS at 13:00

## 2022-05-03 RX ADMIN — CEFAZOLIN 1 G: 1 INJECTION, POWDER, FOR SOLUTION INTRAMUSCULAR; INTRAVENOUS; PARENTERAL at 12:00

## 2022-05-03 RX ADMIN — PROPOFOL 40 MG: 10 INJECTION, EMULSION INTRAVENOUS at 11:07

## 2022-05-03 RX ADMIN — SODIUM CHLORIDE 10 ML/HR: 4.5 INJECTION, SOLUTION INTRAVENOUS at 14:28

## 2022-05-03 RX ADMIN — MORPHINE SULFATE 4 MG: 2 INJECTION, SOLUTION INTRAMUSCULAR; INTRAVENOUS at 19:08

## 2022-05-03 RX ADMIN — ALBUMIN (HUMAN) 12.5 G: 2.5 SOLUTION INTRAVENOUS at 11:50

## 2022-05-03 RX ADMIN — FAMOTIDINE 20 MG: 10 INJECTION, SOLUTION INTRAVENOUS at 16:29

## 2022-05-03 RX ADMIN — CEFAZOLIN 2 G: 1 INJECTION, POWDER, FOR SOLUTION INTRAMUSCULAR; INTRAVENOUS; PARENTERAL at 08:01

## 2022-05-03 RX ADMIN — FAMOTIDINE 20 MG: 10 INJECTION, SOLUTION INTRAVENOUS at 21:26

## 2022-05-03 RX ADMIN — AMINOCAPROIC ACID 10 G/HR: 250 INJECTION, SOLUTION INTRAVENOUS at 07:50

## 2022-05-03 RX ADMIN — SODIUM CHLORIDE 10 ML/HR: 9 INJECTION, SOLUTION INTRAVENOUS at 13:00

## 2022-05-03 RX ADMIN — ALBUMIN HUMAN 12.5 G: 50 SOLUTION INTRAVENOUS at 13:00

## 2022-05-03 RX ADMIN — PHENYLEPHRINE HYDROCHLORIDE 30 MCG/MIN: 10 INJECTION INTRAVENOUS at 14:00

## 2022-05-03 RX ADMIN — ACETAMINOPHEN 1000 MG: 500 TABLET ORAL at 16:28

## 2022-05-03 RX ADMIN — SODIUM CHLORIDE 2.5 MG/HR: 9 INJECTION, SOLUTION INTRAVENOUS at 23:30

## 2022-05-03 RX ADMIN — ALBUMIN (HUMAN) 12.5 G: 2.5 SOLUTION INTRAVENOUS at 12:00

## 2022-05-03 RX ADMIN — HEPARIN SODIUM 30000 UNITS: 1000 INJECTION, SOLUTION INTRAVENOUS; SUBCUTANEOUS at 08:34

## 2022-05-03 RX ADMIN — AMIODARONE HYDROCHLORIDE 1 MG/MIN: 50 INJECTION, SOLUTION INTRAVENOUS at 11:01

## 2022-05-03 RX ADMIN — MIDAZOLAM 2 MG: 1 INJECTION INTRAMUSCULAR; INTRAVENOUS at 07:10

## 2022-05-03 RX ADMIN — PHENYLEPHRINE HYDROCHLORIDE 40 MCG/MIN: 10 INJECTION INTRAVENOUS at 07:52

## 2022-05-03 RX ADMIN — Medication 3.2 UNITS/HR: at 09:54

## 2022-05-03 RX ADMIN — ROCURONIUM BROMIDE 20 MG: 10 INJECTION INTRAVENOUS at 09:59

## 2022-05-03 RX ADMIN — MUPIROCIN: 20 OINTMENT TOPICAL at 21:26

## 2022-05-03 RX ADMIN — SUFENTANIL CITRATE 0.1 MCG/KG/HR: 50 INJECTION EPIDURAL; INTRAVENOUS at 07:50

## 2022-05-03 NOTE — CONSULTS
[facility]      Name: Kennie Opitz   : 1975   MRN: [id number]   Date: 5/3/2022      Brief patient summary:  51 M former smoker with documented history of GERD admitted to ICU after semi-elective aortic valve replacement for severe aortic stenosis. No coronary disease on [facility] performed . Echocardiogram  revealed only severe AS and mild LV hypertrophy. Right sided pressures and all other valves normal.     SUBJ:  Intubated post anesthesia status        COMPREHENSIVE [facility] ASSESSMENT/PLAN (by systems)     Ventilator status after sternotomy for AVR  History of HH with GERD      PLAN   - Ventilator settings established with RT and adjusted as indicated  - rapid vent wean post op  - [facility] Service will assist in assessing for extubation  - cardiac/hemodynamic monitoring  - MAP goal > 65 mmHg  - SBP goal > 100 mmHg  - CSS post op hemodynamic mgmt protocol  - Monitor chemistry panel daily  - Correct electrolytes as indicated  - SUP per protocol  - Monitor daily CBC  - Transfuse as needed to maintain Hgb > 7.0 gm/dL or for hemodynamically significant bleeding  - ABCDEF mobility bundle  - Early PT/OT involvement after extubation  - Target glucose: 100-150  - Glycemic control: CCS protocol  - Full code      HPI/Consult/Subjective:   HPI:  As above      24 Hr Events 5/3/2022:     SUBJ:   Minimally responsive immediately post op. Intubated. Synchronous with vent      Past Medical History:      has a past medical history of Aortic stenosis, Bicuspid aortic valve, GERD (gastroesophageal reflux disease), Hiatal hernia, and Severe aortic stenosis (3/22/2022). Past Surgical History:      has a past surgical history that includes hx adenoidectomy; hx colonoscopy; hx endoscopy; and hx heart catheterization (2022). Home Medications:     Prior to Admission medications    Medication Sig Start Date End Date Taking? Authorizing Provider   mupirocin (BACTROBAN) 2 % ointment Apply  to affected area daily. 22  Yes Eagle Moffett NP   chlorhexidine (PERIDEX) 0.12 % solution 15 mL by Swish and Spit route every twelve (12) hours for 14 days. 5/1/22 5/15/22 Yes Eagle Moffett NP   docosahexaenoic acid/epa (FISH OIL PO) Take 2 Tablets by mouth daily. Yes Provider, Historical   Omeprazole delayed release (PRILOSEC D/R) 20 mg tablet Take 20 mg by mouth daily. Yes Provider, Historical       Allergies/Social/Family History: Allergies   Allergen Reactions    Soap Rash     Dial Soap      Social History     Tobacco Use    Smoking status: Former Smoker     Packs/day: 0.75     Years: 25.00     Pack years: 18.75     Types: Cigarettes     Quit date: 2017     Years since quittin.8    Smokeless tobacco: Never Used   Substance Use Topics    Alcohol use:  Yes     Alcohol/week: 4.0 standard drinks     Types: 2 Glasses of wine, 1 Cans of beer, 1 Shots of liquor per week      Family History   Problem Relation Age of Onset    No Known Problems Father     Lung Cancer Maternal Grandmother     Coronary Art Dis Maternal Grandfather     Heart Attack Maternal Grandfather     Stroke Paternal Grandfather     Hypertension Paternal Grandfather     Hypertension Mother     No Known Problems Sister            Objective:   Vital Signs:  Visit Vitals  BP (!) 137/99 (BP 1 Location: Right upper arm, BP Patient Position: At rest)   Pulse (!) 56   Temp 98.2 °F (36.8 °C)   Resp 13   Ht 6' (1.829 m)   Wt 75.3 kg (166 lb 0.1 oz)   SpO2 97%   BMI 22.51 kg/m²    O2 Flow Rate (L/min): 2 l/min O2 Device: Nasal cannula Temp (24hrs), Av.2 °F (36.8 °C), Min:98.2 °F (36.8 °C), Max:98.2 °F (36.8 °C)           Intake/Output:     Intake/Output Summary (Last 24 hours) at 5/3/2022 1131  Last data filed at 5/3/2022 1039  Gross per 24 hour   Intake 125 ml   Output 400 ml   Net -275 ml       Physical Exam:  GEN: inutbated, sedated, synchronous with vent  HEENT: NCAT, sclerae white  NECK: No JVD noted  CHEST: Clear to auscultation anteriorly  CARDIAC: sinus rhythm, regular, mechanical S2  ABD: Soft, NT, hypoactive BS  EXT: Warm, no edema, normal capillary refill  NEURO: Cranial nerves intact, no focal deficits noted  DERM: No lesions noted    I have examined the patient on this day 5/3/2022 and the above documented exam is accurate including the components that have been copied forward    LABS AND  DATA: Personally reviewed  No results for input(s): WBC, HGB, HCT, PLT, HGBEXT, HCTEXT, PLTEXT in the last 72 hours. No results for input(s): NA, K, CL, CO2, BUN, CREA, GLU, CA, MG, PHOS in the last 72 hours. No results for input(s): AP, TBIL, TP, ALB, GLOB, AML, LPSE in the last 72 hours. No lab exists for component: SGOT, GPT, AMYP  No results for input(s): INR, PTP, APTT, INREXT in the last 72 hours. No results for input(s): PHI, PCO2I, PO2I, FIO2I in the last 72 hours. No results for input(s): CPK, CKMB, TROIQ, BNPP in the last 72 hours. Hemodynamics:   PAP:   CO:     Wedge:   CI:     CVP:    SVR:       PVR:       Ventilator Settings:  Mode Rate Tidal Volume Pressure FiO2 PEEP                    Peak airway pressure:      Minute ventilation:          MEDS: Reviewed    Chest X-Ray:  CXR Results  (Last 48 hours)      None            I have reviewed the above films and agree with official interpretation         Multidisciplinary Rounds Completed:  N/A      SPECIAL EQUIPMENT  PA Catheter    DISPOSITION  Stay in ICU    CRITICAL CARE CONSULTANT NOTE  I had a in-person encounter with Ya Preston, reviewed and interpreted patient data including events, labs, images, vital signs, I/O's, and examined patient.   I have discussed the case and the plan and management of the patient's care with the consulting services, the bedside nurses and the respiratory therapist.      NOTE OF PERSONAL INVOLVEMENT IN CARE   This patient is at high risk for sudden and clinically significant deterioration, which requires the highest level of preparedness to intervene urgently. I participated in the decision-making and personally managed or directed the management of the following life and organ supporting interventions that required my frequent assessment to treat or prevent imminent deterioration. I personally spent -- minutes of critical care time. This is time spent at patient's bedside actively involved in patient care as well as the coordination of care and discussions with the patient's family. This does not include any procedural time which has been billed separately.     Giselle Davis MD  Pulmonary/CCM  [phone]  [phone]  5/3/2022

## 2022-05-03 NOTE — H&P
Date of Surgery Update:  Cirilo Garza was seen and examined. History and physical has been reviewed. The patient has been examined.  There have been no significant clinical changes since the completion of the originally dated History and Physical.    Signed By: SHERITA Ring     May 3, 2022 7:38 AM

## 2022-05-03 NOTE — PROGRESS NOTES
Patient extubated and placed on 2lpm nasal cannula. Patient spo2 is 98% with equal breath sounds. Patient has a nonproductive cough. Rt check for cuff leak.

## 2022-05-03 NOTE — PERIOP NOTES
TRANSFER - OUT REPORT:    Verbal report given to ELLA Smith RN on Dejuan Rincon  being transferred to CCU for routine post - op       Report consisted of patients Situation, Background, Assessment and   Recommendations(SBAR). Information from the following report(s) SBAR, OR Summary, Procedure Summary and MAR was reviewed with the receiving nurse. Lines:   Triple Lumen 05/03/22 Right (Active)       Peripheral IV 05/03/22 Left Antecubital (Active)   Site Assessment Clean, dry, & intact 05/03/22 0708   Phlebitis Assessment 0 05/03/22 0708   Infiltration Assessment 0 05/03/22 0708   Dressing Status Clean, dry, & intact 05/03/22 0708   Dressing Type Tape;Transparent 05/03/22 0708   Hub Color/Line Status Pink; Infusing 05/03/22 0708       Arterial Line 05/03/22 Left (Active)        Opportunity for questions and clarification was provided.       Patient transported with:   Monitor  O2 @ 10 liters  Registered Nurse   Perfusion  PA  CRNA

## 2022-05-03 NOTE — BRIEF OP NOTE
BRIEF OP NOTE  Pre-Op Diagnosis: AORTIC STENOSIS, ASCENDING AROTIC ANEYRSUM    Post-Op Diagnosis: Aortic stenosis    Procedure:   AORTIC VALVE REPLACEMENT ( ONX-23 ) with Mechanical valve  Parasternal block with marcaine     Surgeon: Orestes Parra MD    Assistant(s): Theodore Skinner PA-C    Anesthesia: General     Infusions: Amiodarone, precedex, insulin, leodan    Estimated Blood Loss:  1400ml    Cell Saver:  600ml    Specimens:   ID Type Source Tests Collected by Time Destination   1 : Aortic valve leaflets Preservative Aortic Valve  Evaristo Carter MD 5/3/2022 6006 Pathology     Drains and pacing wires: 2 atrial wires, 2 bipolar ventricular wire, 2 dave drains    Complications: None    Findings: AS    Implants:   Implant Name Type Inv.  Item Serial No.  Lot No. LRB No. Used Action   VALVE AORT H16.1MM OD23MM ID21.4MM FLARE DIA24.3MM SEW RNG - [id number]  VALVE AORT H16.1MM OD23MM ID21.4MM FLARE DIA24.3MM SEW RNG [id number] [employer]_ N/A N/A 1 Implanted

## 2022-05-03 NOTE — ANESTHESIA POSTPROCEDURE EVALUATION
Procedure(s):  AORTIC VALVE REPLACEMENT ( ONX-23 ) WITH EXTRA CORPOREAL CIRCULATION. SPRING AND EPIAORTIC ULTRASOUND DONE BY DR BRODERICK Cox [place]. general    Anesthesia Post Evaluation        Patient location during evaluation: PACU  Note status: Adequate. Level of consciousness: responsive to verbal stimuli and sleepy but conscious  Pain management: satisfactory to patient  Airway patency: patent  Anesthetic complications: no  Cardiovascular status: acceptable  Respiratory status: acceptable  Hydration status: acceptable  Comments: +Post-Anesthesia Evaluation and Assessment    Patient: Chance Cervantes MRN: [id number]  SSN: [id number]   YOB: 1975  Age: 51 y.o. Sex: male          Cardiovascular Function/Vital Signs    BP (!) 92/54   Pulse (!) 56   Temp (!) 35.8 °C (96.4 °F)   Resp 14   Ht 6' (1.829 m)   Wt 75.3 kg (166 lb 0.1 oz)   SpO2 99%   BMI 22.51 kg/m²     Patient is status post Procedure(s):  AORTIC VALVE REPLACEMENT ( ONX-23 ) WITH EXTRA CORPOREAL CIRCULATION. SPRING AND EPIAORTIC ULTRASOUND DONE BY DR BRODERICK Cox [place]. Nausea/Vomiting: Controlled. Postoperative hydration reviewed and adequate. Pain:  Pain Scale 1: Numeric (0 - 10) (05/03/22 0629)  Pain Intensity 1: 0 (05/03/22 0629)   Managed. Neurological Status:   Neuro (WDL): Within Defined Limits (05/03/22 0700)   At baseline. Mental Status and Level of Consciousness: Arousable. Pulmonary Status:   O2 Device: Ventilator (05/03/22 1318)   Adequate oxygenation and airway patent. Complications related to anesthesia: None    Post-anesthesia assessment completed. No concerns. I have evaluated the patient and the patient is stable and ready to be discharged from PACU .     Signed By: Vicente Primrose, MD    5/3/2022        INITIAL Post-op Vital signs:   Vitals Value Taken Time   BP 82/63 05/03/22 1400   Temp 35.8 °C (96.4 °F) 05/03/22 1330   Pulse 55 05/03/22 1405   Resp 14 05/03/22 1405   SpO2 99 % 05/03/22 1405   Vitals shown include unvalidated device data.

## 2022-05-03 NOTE — PROGRESS NOTES
1225  Received from OR intubated and sedated. Currently on insulin and precedex drips. 1235 albumin x 2 for CI 1.8, BP 88/48.    1345  Patient awake. 1400  Vent decreased from imv 12 to imv 6. Cardiac index noq 1.4. Adarsh started and infuDr. Tsirigotis updated. 1425  Cpap.      1504  Extubated to 2 l NC.     3096  Bathed. 1730  Morphine given for back pain 5/10. Tolerating ice water. 1800  Labs sent.

## 2022-05-03 NOTE — ANESTHESIA PROCEDURE NOTES
Central Line Placement    Start time: 5/3/2022 6:54 AM  End time: 5/3/2022 7:13 AM  Performed by: Khushi eBnnett MD  Authorized by: Khushi Bennett MD     Indications: vascular access, central pressure monitoring and need for vasopressors  Preanesthetic Checklist: patient identified, risks and benefits discussed, anesthesia consent, site marked, patient being monitored and timeout performed      Pre-procedure: All elements of maximal sterile barrier technique followed? Yes    2% Chlorhexidine for cutaneous antisepsis, Hand hygiene performed prior to catheter insertion and Ultrasound guidance    Sterile Ultrasound Technique followed?: Yes            Procedure:   Prep:  Chlorhexidine    Orientation:  Right    Catheter type:  Triple lumen  Catheter size:  9 Fr  Catheter length:  16 cm  Number of attempts:  1  Successful placement: Yes      Assessment:   Post-procedure:  Catheter secured, sterile dressing applied and sterile dressing with CHG applied  Assessment:  Blood return through all ports and free fluid flow  Insertion:  Uncomplicated  Patient tolerance:  Patient tolerated the procedure well with no immediate complications  PA catheter inserted .

## 2022-05-03 NOTE — CARDIO/PULMONARY
Cardiac Rehab Note: chart review/referral     Consult has been acknowledged     AVR 5/3/22    EF 55-60  on 2/17/22 per echo    Smoking history assessed. Patient is a former smoker. Smoking Cessation Program link has not been added to the AVS.     Patient in CCU from [address] [place] [address] today. CP Rehab will follow.

## 2022-05-03 NOTE — ANESTHESIA PREPROCEDURE EVALUATION
Relevant Problems   CARDIOVASCULAR   (+) Aortic stenosis   (+) Severe aortic stenosis      GASTROINTESTINAL   (+) Hiatal hernia       Anesthetic History   No history of anesthetic complications            Review of Systems / Medical History  Patient summary reviewed, nursing notes reviewed and pertinent labs reviewed    Pulmonary  Within defined limits                 Neuro/Psych   Within defined limits           Cardiovascular      Valvular problems/murmurs: aortic stenosis        PAD    Exercise tolerance: >4 METS  Comments: : Estimated LVEF is 55 - 60%. Normal cavity size, systolic function (ejection fraction normal) and diastolic function. Mild concentric hypertrophy. Wall motion: normal.  · AV: Aortic valve is congenitally bicuspid. Severe aortic valve leaflet calcification present with reduced excursion. Severe aortic valve stenosis is present. · Normal aortic root. Ascending aorta not well visualized.      GI/Hepatic/Renal  Within defined limits         Hiatal hernia     Endo/Other  Within defined limits           Other Findings              Physical Exam    Airway  Mallampati: II  TM Distance: > 6 cm  Neck ROM: normal range of motion   Mouth opening: Normal     Cardiovascular  Regular rate and rhythm,  S1 and S2 normal,  no murmur, click, rub, or gallop             Dental  No notable dental hx       Pulmonary  Breath sounds clear to auscultation               Abdominal  GI exam deferred       Other Findings            Anesthetic Plan    ASA: 4  Anesthesia type: general    Monitoring Plan: Arterial line, BIS, CVP, [place] and SPRING    Post procedure ventilation   Induction: Intravenous  Anesthetic plan and risks discussed with: Patient

## 2022-05-03 NOTE — PROGRESS NOTES
Transition of Care Plan:    RUR: 7%    Disposition: Home with spouse with home health services vs rehab. Follow up appointments: To be made prior to discharge. DME needed:None    Transportation at [facility] or means to access home: Spouse has keys         IM Medicare Letter: N/A    Is patient a BCPI-A Bundle:  No           If yes, was Bundle Letter given?: N/A     Is patient a [place] and connected with the [place]? No                 If yes, was [employer] transfer form completed and VA notified? N/A    Caregiver Contact:    Discharge Caregiver contacted prior to discharge? Caregiver to be contacted prior to discharge. Care Conference needed?: No      Reason for Admission:  Patient came in with dx aortic stenosis and had Aortic Valve Replacement don with mechanical valve today. RUR Score:  7%                   Plan for utilizing home health:   He has not used home health services or rehab in the past but open to services if needed. PCP: First and Last name:  Meme Wiley MD     Name of Practice: [facility] [place] [facility]. Are you a current patient: Yes/No: Yes     Approximate date of last visit: Couple years ago. Can you participate in a virtual visit with your PCP: Yes                    Current Advanced Directive/Advance Care Plan: Full Code  Advance Care Planning     General Advance Care Planning (ACP) Conversation      Date of Conversation: 5/3/22  Conducted with: Patient with Decision Making Capacity    Healthcare Decision Maker:   No healthcare decision makers have been documented.    Click here to complete [address] including selection of the Healthcare Decision Maker Relationship (ie \"Primary\")        Content/Action Overview:   DECLINED ACP conversation - will revisit periodically   Reviewed DNR/DNI and patient elects Full Code (Attempt Resuscitation)         Length of Voluntary ACP Conversation in minutes:  <16 minutes (Non-Billable)    Chema Mendoza RN               Healthcare Decision Maker:   Click here to complete [address] including selection of the Healthcare Decision Maker Relationship (ie \"Primary\")                             Confirmed demographics and pcp information with patient's . They live in two story home. Patient was independent prior to coming to the hospital. He works. He does not use home oxygen or cpap. Will continue to monitor. Tanika Huntley RN BSN CRM        [phone]

## 2022-05-03 NOTE — PROGRESS NOTES
Cardiac Surgery Care Coordinator-  Met with Noemi Blake spouse and mother. Introduced role of the Cardiac Surgery Care Coordinator. Reviewed plan of care and began pre-op education. Discussed day of surgery expectations for the pt and family. Reviewed material in Valve educational folder, including the Cardiac Surgery Pathway. Reinforced sternal precautions and keeping your move in the tube. Covered how to prepare the home for Dale Pires to return home. Discussed incision care, showering and signs and symptoms of infection. Discussed discharge instructions, normal problems after discharge, and what the discharge medication list will look like. Discussion also included participation in Cardiac Rehab and follow-up appointments. Encouraged family to verbalize and offered emotional support.  Eden Maurice RN

## 2022-05-03 NOTE — OP NOTES
Cardiothoracic Surgery Operative Note    Date of Dictation: 05/03/22    Date of Procedure: 05/03/22    Referring Physician: Sang Doe MD.    Preoperative Diagnosis:    Severe Aortic Stenosis   Bicuspid Aortic Valve    Postoperative Diagnosis:    Same    Procedure:    1. Aortic valve replacement with #23 Austen mechanical valve. 2. Epiaortic Ultrasound. Surgeon: Youlanda Brunner, MD, PhD, AHMET, [facility]. Assistant Surgeon(s):  Yoan Rothman MD.    Assistant(s): Caden Ziegler. The assistance of the PA was required due to the critical nature of the surgery. Anesthesia: General endotracheal anesthesia and SPRING. Anesthesiologist(s):  Phyllis Gabriel MD..    Findings: The ascending aorta was found to have no atheromatous disease by epiaortic ultrasound examination. Post-bypass LV systolic function was good. Post-bypass RV systolic function was good. Estimated Blood Loss:  Documented in the anesthesia record    STS Data: STS Calculated Mortality Risk 0.412 %. The risks, benefits and alternatives to surgery were discussed with the patient and they requested to proceed with surgery. Dulce-operative antibiotics and beta blockers were given within the STS-recommended windows. Operative Details: The patient was placed supine on the operating table. Standard monitors and lines were placed, anesthesia was given and the patient was intubated. The patient was prepped and draped in a sterile manner. A pre-incision time-out was performed. A median sternotomy was performed using a scalpel on the skin and electrocautery down to the sternum. The sternum was divided in the midline using a vertical oscillating saw. Hemostasis was achieved. The pericardium was exposed and the heart and aorta were inspected. Given that his ascending aorta was mildly aneurysmal, we had previously discussed the possibility of replacing it at the time of operation.   Upon inspection, his aorta was not at all impressive it appeared to be of good quality. It was not appreciably aneurysmal.  Dr. Jesús Santoyo and I both agreed that there was no strong indication to replace his ascending aorta so we decided to proceed with aortic valve replacement alone. IV heparin was given to maintain an ACT over 400. A pericardial cradle was created and cannulation sutures were placed in the ascending aorta, right atrium and aortic root. The aorta and right atrium were cannulated for cardiopulmonary bypass and cardioplegia cannulae were placed in the root and coronary sinus. Cardiopulmonary bypass was initiated. An LV vent was placed via the LSPV. The aorta was cross-clamped. One liter of cold blood cardioplegia was given in an antegrade fashion to achieved diastolic arrest with subsequent doses at appropriate intervals during the operation. The ascending aorta was liberated free of surrounding tissues down to the level of the right coronary artery. The aorta was incised with an 11 blade followed by scissors to create a partial transverse aortotomy. The aortic valve and coronary ostia were clearly visualized. The aortic valve was bicuspid and very heavily calcified. The valve was carefully excised and the calcium was removed from the annulus with great care so as to not lose any fragments or injure the aorta. Once the annulus had been prepared, the left ventricular cavity was flushed aggressively with saline to remove any possible debris. A series of 16 pledgeted Ethibond sutures were then placed circumferentially around the annulus with the pledgets on the ventricular aspect. The valve orifice was sized to a #23 On-X valve which was then installed in position and secured with cor knot device. The aortotomy was then closed with 2 rows of 4-0 Prolene suture. The heart was de-aired and the cross-clamp was removed. All surgical sites were inspected for hemostasis. Atrial and ventricular pacing wires were installed.  The heart did require defibrillation and did   require pacing prior to the resumption of a regular atrial rhythm. The patient was weaned from cardiopulmonary bypass. The cardioplegia and venous cannulae were removed. A heparin-reversing dose of protamine was administered and the aortic cannula was removed. Chest drains were placed in the mediastinal and posterior pericardial spaces. Hemostasis was reconfirmed at all surgical sites. The sternum was reapproximated with stainless steel wires. The presternal fascia, subcutaneous and dermal layers were reapproximated with running sutures. The wounds were covered with sterile dressings. The PA was critical for cannulation, assistance with valve implantation, decannulation, and closure of the wound. Specimens: Aortic valve leaflets and clacific debris. Pacing Wires: Atrial x 2, Ventricular X 2. Chest Tubes: Posterior pericardial and mediastinal.    CPB Time:  126 min. Aortic Clamp Time:  95 min. Complications:  None. Disposition: Cardiovascular recovery room. Condition: Critical but stable.

## 2022-05-03 NOTE — ANESTHESIA PROCEDURE NOTES
SPRING        Procedure Details: probe placement, image aquisition & interpretation    Site marked  Risks and benefits discussed with the patient and plans are to proceed    Procedure Note    Performed by: Candy Hill MD  Authorized by: Candy Hill MD       Indications: assessment of ascending aorta and assessment of surgical repair  Modalities: 2D, CF, CWD, contrast, PWD  Probe Type: epiaortic and multiplane  Insertion: atraumatic  Patient Status: intubated    Echocardiographic and Doppler Measurements   Aorta  Size  Diam(cm)  Dissection PlaqueThick(mm)  Plaque Mobile    Ascending dilated 3.8 No 0-3 No    Arch normal 3.4 No 0-3 No    Descending normal 3.2 No >3 No          Valves  Annulus  Stenosis  Area/Grad  Regurg  Leaflet   Morph  Leaflet   Motion    Aortic dilated severe 66/45,ava0.75 0 calcified, bicuspid restricted    Mitral normal   1+ normal normal    Tricuspid normal none  1+ normal normal          Atria  Size  SEC (smoke)  Thrombus  Tumor  Device    Rt Atrium normal No No No No    Lt Atrium normal No No No No     Interatrial Septum Morphology: normal    Interventricular Septum Morphology: normal    Ventricle  Cavity Size  Cavity Dimension Hypertrophy  Thrombus  Gloal FXN  EF    RV normal  No no normal     LV normal  Yes No normal 60       Regional Function  (1 = normal, 2 = mildly hypokinetic, 3 = severely hypokinetic, 4 = akinetic, 5 = dyskinetic) LAV - Long [place] View   ME LAV = 0  ME LAV = 90  ME LAV = 130   Basal Sept:1 Basal Ant:1 Basal Post:1   Mid Sept:1 Mid Ant:1 Mid Post:1   Apical Sept:1 Apical Ant:1 Basal Ant Sept:1   Basal Lat:1 Basal Inf:1 Mid Ant Sept:1   Mid Lat:1 Mid Inf:1    Apical Lat:1 Apical Inf:1        Pericardium: normal    Post Intervention Follow-up Study  Ventricular Global Function: improved  Ventricular Regional Function: improved     Valve  Function  Regurgitation  Area    Aortic no change      Mitral no change      Tricuspid no change      Prosthetic normal Complications: None  Comments: Post SPRING   LV fn preserved, ef 65%. 23 size mechanical valve , well placed. No PVL, mean gradient 7 across the valve. No .

## 2022-05-04 ENCOUNTER — APPOINTMENT (OUTPATIENT)
Dept: GENERAL RADIOLOGY | Age: 47
DRG: 220 | End: 2022-05-04
Attending: PHYSICIAN ASSISTANT
Payer: COMMERCIAL

## 2022-05-04 LAB
ADMINISTERED INITIALS, ADMINIT: NORMAL
ALBUMIN SERPL-MCNC: 3.9 G/DL (ref 3.5–5)
ALBUMIN/GLOB SERPL: 1.7 {RATIO} (ref 1.1–2.2)
ALP SERPL-CCNC: 32 U/L (ref 45–117)
ALT SERPL-CCNC: 22 U/L (ref 12–78)
ANION GAP SERPL CALC-SCNC: 2 MMOL/L (ref 5–15)
AST SERPL-CCNC: 31 U/L (ref 15–37)
ATRIAL RATE: 69 BPM
BILIRUB SERPL-MCNC: 0.6 MG/DL (ref 0.2–1)
BUN SERPL-MCNC: 17 MG/DL (ref 6–20)
BUN/CREAT SERPL: 16 (ref 12–20)
CALCIUM SERPL-MCNC: 8.4 MG/DL (ref 8.5–10.1)
CALCULATED P AXIS, ECG09: 51 DEGREES
CALCULATED R AXIS, ECG10: 41 DEGREES
CALCULATED T AXIS, ECG11: 19 DEGREES
CHLORIDE SERPL-SCNC: 113 MMOL/L (ref 97–108)
CO2 SERPL-SCNC: 26 MMOL/L (ref 21–32)
CREAT SERPL-MCNC: 1.08 MG/DL (ref 0.7–1.3)
D50 ADMINISTERED, D50ADM: 0 ML
D50 ORDER, D50ORD: 0 ML
DIAGNOSIS, 93000: NORMAL
ERYTHROCYTE [DISTWIDTH] IN BLOOD BY AUTOMATED COUNT: 14.5 % (ref 11.5–14.5)
GLOBULIN SER CALC-MCNC: 2.3 G/DL (ref 2–4)
GLSCOM COMMENTS: NORMAL
GLUCOSE BLD STRIP.AUTO-MCNC: 102 MG/DL (ref 65–117)
GLUCOSE BLD STRIP.AUTO-MCNC: 106 MG/DL (ref 65–117)
GLUCOSE BLD STRIP.AUTO-MCNC: 107 MG/DL (ref 65–117)
GLUCOSE BLD STRIP.AUTO-MCNC: 110 MG/DL (ref 65–117)
GLUCOSE BLD STRIP.AUTO-MCNC: 111 MG/DL (ref 65–117)
GLUCOSE BLD STRIP.AUTO-MCNC: 114 MG/DL (ref 65–117)
GLUCOSE BLD STRIP.AUTO-MCNC: 121 MG/DL (ref 65–117)
GLUCOSE BLD STRIP.AUTO-MCNC: 124 MG/DL (ref 65–117)
GLUCOSE BLD STRIP.AUTO-MCNC: 125 MG/DL (ref 65–117)
GLUCOSE BLD STRIP.AUTO-MCNC: 128 MG/DL (ref 65–117)
GLUCOSE BLD STRIP.AUTO-MCNC: 133 MG/DL (ref 65–117)
GLUCOSE BLD STRIP.AUTO-MCNC: 139 MG/DL (ref 65–117)
GLUCOSE BLD STRIP.AUTO-MCNC: 140 MG/DL (ref 65–117)
GLUCOSE BLD STRIP.AUTO-MCNC: 141 MG/DL (ref 65–117)
GLUCOSE BLD STRIP.AUTO-MCNC: 147 MG/DL (ref 65–117)
GLUCOSE BLD STRIP.AUTO-MCNC: 148 MG/DL (ref 65–117)
GLUCOSE BLD STRIP.AUTO-MCNC: 161 MG/DL (ref 65–117)
GLUCOSE BLD STRIP.AUTO-MCNC: 90 MG/DL (ref 65–117)
GLUCOSE SERPL-MCNC: 120 MG/DL (ref 65–100)
GLUCOSE, GLC: 102 MG/DL
GLUCOSE, GLC: 106 MG/DL
GLUCOSE, GLC: 107 MG/DL
GLUCOSE, GLC: 110 MG/DL
GLUCOSE, GLC: 111 MG/DL
GLUCOSE, GLC: 114 MG/DL
GLUCOSE, GLC: 121 MG/DL
GLUCOSE, GLC: 124 MG/DL
GLUCOSE, GLC: 125 MG/DL
GLUCOSE, GLC: 128 MG/DL
GLUCOSE, GLC: 133 MG/DL
GLUCOSE, GLC: 139 MG/DL
GLUCOSE, GLC: 140 MG/DL
GLUCOSE, GLC: 141 MG/DL
GLUCOSE, GLC: 147 MG/DL
GLUCOSE, GLC: 148 MG/DL
GLUCOSE, GLC: 161 MG/DL
GLUCOSE, GLC: 90 MG/DL
HCT VFR BLD AUTO: 30.6 % (ref 36.6–50.3)
HGB BLD-MCNC: 10.2 G/DL (ref 12.1–17)
HIGH TARGET, HITG: 145 MG/DL
INSULIN ADMINSTERED, INSADM: 0.4 UNITS/HOUR
INSULIN ADMINSTERED, INSADM: 1.1 UNITS/HOUR
INSULIN ADMINSTERED, INSADM: 1.1 UNITS/HOUR
INSULIN ADMINSTERED, INSADM: 1.2 UNITS/HOUR
INSULIN ADMINSTERED, INSADM: 1.3 UNITS/HOUR
INSULIN ADMINSTERED, INSADM: 1.4 UNITS/HOUR
INSULIN ADMINSTERED, INSADM: 1.5 UNITS/HOUR
INSULIN ADMINSTERED, INSADM: 1.5 UNITS/HOUR
INSULIN ADMINSTERED, INSADM: 1.6 UNITS/HOUR
INSULIN ADMINSTERED, INSADM: 1.7 UNITS/HOUR
INSULIN ADMINSTERED, INSADM: 1.8 UNITS/HOUR
INSULIN ADMINSTERED, INSADM: 1.9 UNITS/HOUR
INSULIN ADMINSTERED, INSADM: 1.9 UNITS/HOUR
INSULIN ADMINSTERED, INSADM: 2.1 UNITS/HOUR
INSULIN ADMINSTERED, INSADM: 2.4 UNITS/HOUR
INSULIN ADMINSTERED, INSADM: 3 UNITS/HOUR
INSULIN ORDER, INSORD: 0.4 UNITS/HOUR
INSULIN ORDER, INSORD: 1.1 UNITS/HOUR
INSULIN ORDER, INSORD: 1.1 UNITS/HOUR
INSULIN ORDER, INSORD: 1.2 UNITS/HOUR
INSULIN ORDER, INSORD: 1.3 UNITS/HOUR
INSULIN ORDER, INSORD: 1.4 UNITS/HOUR
INSULIN ORDER, INSORD: 1.5 UNITS/HOUR
INSULIN ORDER, INSORD: 1.5 UNITS/HOUR
INSULIN ORDER, INSORD: 1.6 UNITS/HOUR
INSULIN ORDER, INSORD: 1.7 UNITS/HOUR
INSULIN ORDER, INSORD: 1.8 UNITS/HOUR
INSULIN ORDER, INSORD: 1.9 UNITS/HOUR
INSULIN ORDER, INSORD: 1.9 UNITS/HOUR
INSULIN ORDER, INSORD: 2.1 UNITS/HOUR
INSULIN ORDER, INSORD: 2.4 UNITS/HOUR
INSULIN ORDER, INSORD: 3 UNITS/HOUR
LOW TARGET, LOT: 100 MG/DL
MAGNESIUM SERPL-MCNC: 2.5 MG/DL (ref 1.6–2.4)
MCH RBC QN AUTO: 32.9 PG (ref 26–34)
MCHC RBC AUTO-ENTMCNC: 33.3 G/DL (ref 30–36.5)
MCV RBC AUTO: 98.7 FL (ref 80–99)
MINUTES UNTIL NEXT BG, NBG: 120 MIN
MINUTES UNTIL NEXT BG, NBG: 60 MIN
MULTIPLIER, MUL: 0.01
MULTIPLIER, MUL: 0.01
MULTIPLIER, MUL: 0.02
MULTIPLIER, MUL: 0.03
NRBC # BLD: 0 K/UL (ref 0–0.01)
NRBC BLD-RTO: 0 PER 100 WBC
ORDER INITIALS, ORDINIT: NORMAL
P-R INTERVAL, ECG05: 152 MS
PLATELET # BLD AUTO: 171 K/UL (ref 150–400)
PMV BLD AUTO: 9.9 FL (ref 8.9–12.9)
POTASSIUM SERPL-SCNC: 4.1 MMOL/L (ref 3.5–5.1)
PROT SERPL-MCNC: 6.2 G/DL (ref 6.4–8.2)
Q-T INTERVAL, ECG07: 418 MS
QRS DURATION, ECG06: 138 MS
QTC CALCULATION (BEZET), ECG08: 447 MS
RBC # BLD AUTO: 3.1 M/UL (ref 4.1–5.7)
SERVICE CMNT-IMP: ABNORMAL
SERVICE CMNT-IMP: NORMAL
SODIUM SERPL-SCNC: 141 MMOL/L (ref 136–145)
VENTRICULAR RATE, ECG03: 69 BPM
WBC # BLD AUTO: 11.6 K/UL (ref 4.1–11.1)

## 2022-05-04 PROCEDURE — 74011250637 HC RX REV CODE- 250/637: Performed by: PHYSICIAN ASSISTANT

## 2022-05-04 PROCEDURE — 74011250636 HC RX REV CODE- 250/636: Performed by: PHYSICIAN ASSISTANT

## 2022-05-04 PROCEDURE — APPSS45 APP SPLIT SHARED TIME 31-45 MINUTES: Performed by: NURSE PRACTITIONER

## 2022-05-04 PROCEDURE — 97530 THERAPEUTIC ACTIVITIES: CPT

## 2022-05-04 PROCEDURE — 97162 PT EVAL MOD COMPLEX 30 MIN: CPT

## 2022-05-04 PROCEDURE — 83735 ASSAY OF MAGNESIUM: CPT

## 2022-05-04 PROCEDURE — 93005 ELECTROCARDIOGRAM TRACING: CPT

## 2022-05-04 PROCEDURE — 85027 COMPLETE CBC AUTOMATED: CPT

## 2022-05-04 PROCEDURE — 36415 COLL VENOUS BLD VENIPUNCTURE: CPT

## 2022-05-04 PROCEDURE — 74011636637 HC RX REV CODE- 636/637: Performed by: PHYSICIAN ASSISTANT

## 2022-05-04 PROCEDURE — 82962 GLUCOSE BLOOD TEST: CPT

## 2022-05-04 PROCEDURE — 97116 GAIT TRAINING THERAPY: CPT

## 2022-05-04 PROCEDURE — 74011000250 HC RX REV CODE- 250: Performed by: PHYSICIAN ASSISTANT

## 2022-05-04 PROCEDURE — 65270000046 HC RM TELEMETRY

## 2022-05-04 PROCEDURE — 74011000258 HC RX REV CODE- 258: Performed by: PHYSICIAN ASSISTANT

## 2022-05-04 PROCEDURE — 74011000250 HC RX REV CODE- 250: Performed by: NURSE PRACTITIONER

## 2022-05-04 PROCEDURE — 77010033678 HC OXYGEN DAILY

## 2022-05-04 PROCEDURE — 97110 THERAPEUTIC EXERCISES: CPT

## 2022-05-04 PROCEDURE — 71045 X-RAY EXAM CHEST 1 VIEW: CPT

## 2022-05-04 PROCEDURE — 74011000250 HC RX REV CODE- 250: Performed by: THORACIC SURGERY (CARDIOTHORACIC VASCULAR SURGERY)

## 2022-05-04 PROCEDURE — 99232 SBSQ HOSP IP/OBS MODERATE 35: CPT | Performed by: CLINICAL NURSE SPECIALIST

## 2022-05-04 PROCEDURE — 97165 OT EVAL LOW COMPLEX 30 MIN: CPT

## 2022-05-04 PROCEDURE — 74011250637 HC RX REV CODE- 250/637: Performed by: THORACIC SURGERY (CARDIOTHORACIC VASCULAR SURGERY)

## 2022-05-04 PROCEDURE — 80053 COMPREHEN METABOLIC PANEL: CPT

## 2022-05-04 PROCEDURE — 74011636637 HC RX REV CODE- 636/637: Performed by: THORACIC SURGERY (CARDIOTHORACIC VASCULAR SURGERY)

## 2022-05-04 PROCEDURE — 74011250636 HC RX REV CODE- 250/636: Performed by: THORACIC SURGERY (CARDIOTHORACIC VASCULAR SURGERY)

## 2022-05-04 RX ORDER — PANTOPRAZOLE SODIUM 40 MG/1
40 TABLET, DELAYED RELEASE ORAL
Status: DISCONTINUED | OUTPATIENT
Start: 2022-05-04 | End: 2022-05-07 | Stop reason: HOSPADM

## 2022-05-04 RX ORDER — KETOROLAC TROMETHAMINE 30 MG/ML
15 INJECTION, SOLUTION INTRAMUSCULAR; INTRAVENOUS EVERY 6 HOURS
Status: COMPLETED | OUTPATIENT
Start: 2022-05-04 | End: 2022-05-05

## 2022-05-04 RX ORDER — SODIUM CHLORIDE 0.9 % (FLUSH) 0.9 %
5-40 SYRINGE (ML) INJECTION AS NEEDED
Status: DISCONTINUED | OUTPATIENT
Start: 2022-05-04 | End: 2022-05-07 | Stop reason: HOSPADM

## 2022-05-04 RX ORDER — WARFARIN 2.5 MG/1
2.5 TABLET ORAL ONCE
Status: COMPLETED | OUTPATIENT
Start: 2022-05-04 | End: 2022-05-04

## 2022-05-04 RX ORDER — FUROSEMIDE 10 MG/ML
20 INJECTION INTRAMUSCULAR; INTRAVENOUS ONCE
Status: COMPLETED | OUTPATIENT
Start: 2022-05-04 | End: 2022-05-04

## 2022-05-04 RX ORDER — HYDRALAZINE HYDROCHLORIDE 20 MG/ML
20 INJECTION INTRAMUSCULAR; INTRAVENOUS
Status: DISCONTINUED | OUTPATIENT
Start: 2022-05-04 | End: 2022-05-07 | Stop reason: HOSPADM

## 2022-05-04 RX ORDER — SODIUM CHLORIDE 0.9 % (FLUSH) 0.9 %
5-40 SYRINGE (ML) INJECTION EVERY 8 HOURS
Status: DISCONTINUED | OUTPATIENT
Start: 2022-05-04 | End: 2022-05-07 | Stop reason: HOSPADM

## 2022-05-04 RX ADMIN — KETOROLAC TROMETHAMINE 15 MG: 30 INJECTION, SOLUTION INTRAMUSCULAR at 17:58

## 2022-05-04 RX ADMIN — OXYCODONE 5 MG: 5 TABLET ORAL at 21:08

## 2022-05-04 RX ADMIN — SENNOSIDES AND DOCUSATE SODIUM 1 TABLET: 50; 8.6 TABLET ORAL at 09:17

## 2022-05-04 RX ADMIN — WATER 2 G: 1 INJECTION INTRAMUSCULAR; INTRAVENOUS; SUBCUTANEOUS at 05:54

## 2022-05-04 RX ADMIN — SODIUM CHLORIDE, PRESERVATIVE FREE 10 ML: 5 INJECTION INTRAVENOUS at 06:31

## 2022-05-04 RX ADMIN — CHLORHEXIDINE GLUCONATE 15 ML: 1.2 RINSE ORAL at 09:14

## 2022-05-04 RX ADMIN — SODIUM CHLORIDE 12.5 MG/HR: 9 INJECTION, SOLUTION INTRAVENOUS at 04:55

## 2022-05-04 RX ADMIN — Medication 1 UNITS: at 12:47

## 2022-05-04 RX ADMIN — SODIUM CHLORIDE, PRESERVATIVE FREE 10 ML: 5 INJECTION INTRAVENOUS at 14:21

## 2022-05-04 RX ADMIN — OXYCODONE 10 MG: 5 TABLET ORAL at 05:54

## 2022-05-04 RX ADMIN — MORPHINE SULFATE 4 MG: 2 INJECTION, SOLUTION INTRAMUSCULAR; INTRAVENOUS at 04:50

## 2022-05-04 RX ADMIN — MUPIROCIN: 20 OINTMENT TOPICAL at 21:06

## 2022-05-04 RX ADMIN — SODIUM CHLORIDE, PRESERVATIVE FREE 10 ML: 5 INJECTION INTRAVENOUS at 13:40

## 2022-05-04 RX ADMIN — Medication 400 MG: at 17:57

## 2022-05-04 RX ADMIN — SODIUM CHLORIDE 12.5 MG/HR: 9 INJECTION, SOLUTION INTRAVENOUS at 09:14

## 2022-05-04 RX ADMIN — KETOROLAC TROMETHAMINE 15 MG: 30 INJECTION, SOLUTION INTRAMUSCULAR at 13:18

## 2022-05-04 RX ADMIN — MUPIROCIN: 20 OINTMENT TOPICAL at 09:14

## 2022-05-04 RX ADMIN — SODIUM CHLORIDE 10 MG/HR: 9 INJECTION, SOLUTION INTRAVENOUS at 02:34

## 2022-05-04 RX ADMIN — POLYETHYLENE GLYCOL 3350 17 G: 17 POWDER, FOR SOLUTION ORAL at 09:17

## 2022-05-04 RX ADMIN — ACETAMINOPHEN 1000 MG: 500 TABLET ORAL at 05:55

## 2022-05-04 RX ADMIN — OXYCODONE 10 MG: 5 TABLET ORAL at 00:25

## 2022-05-04 RX ADMIN — ASPIRIN 81 MG: 81 TABLET, CHEWABLE ORAL at 09:17

## 2022-05-04 RX ADMIN — SENNOSIDES AND DOCUSATE SODIUM 1 TABLET: 50; 8.6 TABLET ORAL at 17:57

## 2022-05-04 RX ADMIN — ACETAMINOPHEN 1000 MG: 500 TABLET ORAL at 00:25

## 2022-05-04 RX ADMIN — SODIUM CHLORIDE, PRESERVATIVE FREE 10 ML: 5 INJECTION INTRAVENOUS at 17:59

## 2022-05-04 RX ADMIN — Medication 4 UNITS: at 17:58

## 2022-05-04 RX ADMIN — SODIUM CHLORIDE, PRESERVATIVE FREE 10 ML: 5 INJECTION INTRAVENOUS at 22:05

## 2022-05-04 RX ADMIN — FUROSEMIDE 20 MG: 10 INJECTION, SOLUTION INTRAMUSCULAR; INTRAVENOUS at 11:56

## 2022-05-04 RX ADMIN — SODIUM CHLORIDE 12.5 MG/HR: 9 INJECTION, SOLUTION INTRAVENOUS at 07:00

## 2022-05-04 RX ADMIN — ACETAMINOPHEN 1000 MG: 500 TABLET ORAL at 17:57

## 2022-05-04 RX ADMIN — CHLORHEXIDINE GLUCONATE 15 ML: 1.2 RINSE ORAL at 22:05

## 2022-05-04 RX ADMIN — MORPHINE SULFATE 4 MG: 2 INJECTION, SOLUTION INTRAMUSCULAR; INTRAVENOUS at 02:30

## 2022-05-04 RX ADMIN — PANTOPRAZOLE SODIUM 40 MG: 40 TABLET, DELAYED RELEASE ORAL at 09:17

## 2022-05-04 RX ADMIN — WARFARIN SODIUM 2.5 MG: 2.5 TABLET ORAL at 17:58

## 2022-05-04 RX ADMIN — KETOROLAC TROMETHAMINE 15 MG: 30 INJECTION, SOLUTION INTRAMUSCULAR at 09:17

## 2022-05-04 RX ADMIN — ACETAMINOPHEN 1000 MG: 500 TABLET ORAL at 12:10

## 2022-05-04 RX ADMIN — WATER 2 G: 1 INJECTION INTRAMUSCULAR; INTRAVENOUS; SUBCUTANEOUS at 17:56

## 2022-05-04 RX ADMIN — WATER 2 G: 1 INJECTION INTRAMUSCULAR; INTRAVENOUS; SUBCUTANEOUS at 11:52

## 2022-05-04 NOTE — PROGRESS NOTES
Pharmacist Daily Dosing of Warfarin    Indication & Goal INR:   AVR with Harry mechanical valve on 5/3. INR Goal 2-3    PTA Warfarin Dose: new start    Notable concurrent conditions and medications: None    Labs:  Recent Labs     05/04/22  0433 05/03/22  1755 05/03/22  1755 05/03/22  1235 05/03/22  1235   INR  --   --   --   --  1.2*   HGB 10.2*   < > 10.8*   < > 11.8*     --   --   --  145*   TBILI 0.6  --  0.6  --  0.7   ALB 3.9   < > 4.1   < > 3.9    < > = values in this interval not displayed. Impression/Plan:   Will order warfarin 2.5 mg PO x 1 dose. Daily INR has been ordered  CBC w/o differential every other day has been ordered     Pharmacy will follow daily and adjust the dose as appropriate.     Thank you,  Shilo Dawn, PHARMD      Warfarin Protocol    Located on pharmacy Teams site: Clinical Practice -> Anticoagulation & Cardiology -> Anticoagulation Policies, Protocols, Guidance

## 2022-05-04 NOTE — PROGRESS NOTES
1900: Bedside shift change report given to [facility] (oncoming nurse) by Spike Escobar and Tavares Ross RN (offgoing nurse). Report included the following information SBAR, Kardex, Intake/Output, MAR, Recent Results and Cardiac Rhythm NSR.       2108: PRN oxycodone given for pain    0123: PRN oxycodone given for pain    0637: PRN oxycodone given for pain    0700: End of Shift Note    Bedside shift change report given to [facility] (oncoming nurse) by Jay Bledsoe RN (offgoing nurse). Report included the following information SBAR, Kardex, Intake/Output, MAR, Recent Results and Cardiac Rhythm NSR    Shift worked:  1329-1135     Shift summary and any significant changes:     PRN oxycodone given 3x. Concerns for physician to address:       [facility]  [phone]        Activity:  Activity Level: Chair  Number times ambulated in hallways past shift: 0  Number of times OOB to chair past shift: 2    Cardiac:   Cardiac Monitoring: Yes      Cardiac Rhythm: Sinus Rhythm    Access:   Current line(s): PIV     Genitourinary:   Urinary status: voiding    Respiratory:   O2 Device: None (Room air)  Chronic home O2 use?: NO  Incentive spirometer at bedside: YES  Actual Volume (ml): 1000 ml    GI:  Last Bowel Movement Date: 05/02/22  Current diet:  ADULT DIET Regular  Passing flatus: YES  Tolerating current diet: YES       Pain Management:   Patient states pain is manageable on current regimen: YES    Skin:  Efrem Score: 17  Interventions: increase time out of bed    Patient Safety:  Fall Score:  Total Score: 2  Interventions: bed/chair alarm, assistive device (walker, cane, etc), gripper socks and pt to call before getting OOB       Length of Stay:  Expected LOS: 5d 21h  Actual LOS: 2      Jay Bledsoe, RN

## 2022-05-04 NOTE — PROGRESS NOTES
[facility] ICU Progress Note    Admit Date: 5/3/2022  POD:  1 Day Post-Op    Procedure:  Procedure(s):  AORTIC VALVE REPLACEMENT ( ONX-23 ) WITH EXTRA CORPOREAL CIRCULATION. SPRING AND EPIAORTIC ULTRASOUND DONE BY DR BRODERICK [facility]        Subjective:   Pt seen with Dr. Carmelo Light. Sitting up in chair. On insulin and cardene. No acute issues overnight. C/o sternal pain. CTOP: 520ml x 24 hours (140ml overnight)     Objective:   Vitals:  Blood pressure 128/75, pulse 72, temperature 98.1 °F (36.7 °C), resp. rate 21, height 6' (1.829 m), weight 166 lb 0.1 oz (75.3 kg), SpO2 94 %. Temp (24hrs), Av.2 °F (36.8 °C), Min:96.4 °F (35.8 °C), Max:99.4 °F (37.4 °C)    Hemodynamics:   CO: CO (l/min): 6.3 l/min   CI: CI (l/min/m2): 3.2 l/min/m2   CVP: CVP (mmHg): 15 mmHg (22)   SVR: SVR (dyne*sec)/cm5: 904 (dyne*sec)/cm5 (22 6683)   PAP Systolic: PAP Systolic: 35 ( 6344)   PAP Diastolic: PAP Diastolic: 16 ( 3082)   PVR:     SV02: SVO2 (%): 66 % (22)   SCV02:      EKG/Rhythm: NSR    Extubation Date / Time: 5/3/22 @ 9352    Ventilator:  Ventilator Volumes  Vt Set (ml): 500 ml (22 1406)  Vt Exhaled (Machine Breath) (ml): 476 ml (22 1229)  Ve Observed (l/min): 5.73 l/min (22 1229)    Oxygen Therapy:  Oxygen Therapy  O2 Sat (%): 94 % (22 0900)  Pulse via Oximetry: 72 beats per minute (22 0900)  O2 Device: Nasal cannula (22 0800)  O2 Flow Rate (L/min): 2 l/min (22 0800)  FIO2 (%): 50 % (22 1426)    CXR:   CXR Results  (Last 48 hours)               22 05  XR CHEST PORT Final result    Impression:  Persistent low lung volumes with mild pulmonary edema. Narrative:  INDICATION: Postop heart surgery       COMPARISON: 5/3/2022       FINDINGS: Single AP portable view of the chest obtained at 4:54 AM demonstrates   no change in position of the [place]-Markell catheter or mediastinal drain. The   endotracheal tube and nasogastric tube have been removed.  The cardiomediastinal   silhouette is stable. The patient is status post median sternotomy. Lung volumes   remain low. Mild pulmonary edema persists. There is no new airspace disease. No   pneumothorax is seen. There is no evidence of pleural effusion. 05/03/22 1253  XR CHEST PORT Final result    Impression:      1. Support and surgical apparatus as detailed above. 2.  Mild pulmonary edema pattern. Narrative:  EXAM:  XR CHEST PORT       INDICATION: Postop heart       COMPARISON: Chest radiographs 4/20/2022       TECHNIQUE: Supine portable chest AP view       FINDINGS:        ET tube terminates over the mid intrathoracic trachea. Median sternotomy wires. Prosthetic cardiac valve. NG tube terminates over the gastric fundus. Several   subxiphoid approach thoracic drains. Right jugular approach [place]-Markell catheter   terminates over the region of the main pulmonary artery. ? Epidural catheter. Multiple overlying electrodes. Cardiac mediastinal silhouette is upper limits of normal in size. Pulmonary   vascular congestion and possible mild edema pattern. No focal consolidation. Pleural spaces grossly clear. Admission Weight: Last Weight   Weight: 168 lb 6.9 oz (76.4 kg) (WT FROM 4/20 TO ENTER CABG ORDERS ) Weight: 166 lb 0.1 oz (75.3 kg)     Intake / Output / Drain:  Current Shift: 05/04 0701 - 05/04 1900  In: 146.8 [I.V.:146.8]  Out: 60 [Urine:60]  Last 24 hrs.:     Intake/Output Summary (Last 24 hours) at 5/4/2022 0938  Last data filed at 5/4/2022 0800  Gross per 24 hour   Intake 3960.05 ml   Output 4435 ml   Net -474.95 ml       EXAM:  General:  No acute distress                   Lungs:   Clear to auscultation bilaterally. Incision:  Prineo intact   Heart:  Regular rate and rhythm, S1, S2 normal, no murmur, click, rub or gallop. Abdomen:   Soft, non-tender. Bowel sounds hypoactive. No masses,  No organomegaly. Extremities:  No edema. PPP.     Neurologic:  Gross motor and sensory apparatus intact. Labs:   Recent Labs     22  0837 22  0630 22  0433 22  1245 22  1235   WBC  --   --  11.6*   < > 9.7   HGB  --   --  10.2*   < > 11.8*   HCT  --   --  30.6*   < > 35.2*   PLT  --   --  171   < > 145*   NA  --   --  141   < > 145   K  --   --  4.1   < > 4.3   BUN  --   --  17   < > 15   CREA  --   --  1.08   < > 1.28   GLU  --   --  120*   < > 54*   GLUCPOC 121*   < >  --    < >  --    INR  --   --   --   --  1.2*    < > = values in this interval not displayed. Assessment:     Active Problems: Aortic stenosis (5/3/2022)      S/P AVR (5/3/2022)      Overview: AORTIC VALVE REPLACEMENT ( ONX- ) with Mechanical valve            AS (aortic stenosis) (5/3/2022)         Plan/Recommendations/Medical Decision Makin. Bicuspid AV stenosis s/p AVR with #23mm On-X mechanical valve: On insulin and cardene gtts. Wean cardene for systolic <938. Start coumadin 2mg this evening. INR goal 2-3 x 3 months and then 1.5-2.5 thereafter. 2. Ascending aortic enlargement: On cardene. Wean for systolics <079. IV lasix 20mg. Annual CTA's for monitoring as outpatient. 3. Acute respiratory insufficiency: Extubated yesterday without complication. On 2L NC. Wean for sats >92%. IS use and OOB activity. 4. Pain management: Scheduled tylenol. Creatinine 1.08. Add Toradol 15mg Q6 x 4 doses. Oxy PRN  5. Hiatal hernia: On protonix  6. RBBB: avoid amio, monitor rhythm    Dispo: PT/OT. Remove central and arterial line. Remove garcia. Wean cardene. Transfer to floor.     Signed By: Francesco Hernandez PA-C

## 2022-05-04 NOTE — PROGRESS NOTES
[facility]      Name: Christi Wallace   : 1975   MRN: [id number]   Date: 2022      Brief patient summary:  51 M former smoker with documented history of GERD admitted to ICU after semi-elective aortic valve replacement for severe aortic stenosis. No coronary disease on [facility] performed . Echocardiogram  revealed only severe AS and mild LV hypertrophy. Right sided pressures and all other valves normal.     SUBJ:  Intubated post anesthesia status        COMPREHENSIVE CCM ASSESSMENT/PLAN (by systems)     Ventilator status after sternotomy for AVR  History of HH with GERD      PLAN   - rapid vent wean post op with uncomplicated post op  - cardiac/hemodynamic monitoring  - MAP goal > 65 mmHg  - SBP goal > 100 mmHg  - CSS post op hemodynamic mgmt protocol  - Monitor chemistry panel daily  - Correct electrolytes as indicated  - SUP per protocol  - Monitor daily CBC  - Transfuse as needed to maintain Hgb > 7.0 gm/dL or for hemodynamically significant bleeding  - ABCDEF mobility bundle  - Early PT/OT in  - Target glucose: 100-150  - Glycemic control: CCS protocol  - Full code      HPI/Consult/Subjective:   HPI:  As above      24 Hr Events 2022:     SUBJ:   Extubated yesterday, No new issues overnight. Only complaining of pain       Past Medical History:      has a past medical history of Aortic stenosis, Bicuspid aortic valve, GERD (gastroesophageal reflux disease), Hiatal hernia, and Severe aortic stenosis (3/22/2022). Past Surgical History:      has a past surgical history that includes hx adenoidectomy; hx colonoscopy; hx endoscopy; and hx heart catheterization (2022). Home Medications:     Prior to Admission medications    Medication Sig Start Date End Date Taking? Authorizing Provider   mupirocin (BACTROBAN) 2 % ointment Apply  to affected area daily.  22  Yes Tisha Doll NP   chlorhexidine (PERIDEX) 0.12 % solution 15 mL by Swish and Spit route every twelve (12) hours for 14 days. 5/1/22 5/15/22 Yes dEna Angry, NP   docosahexaenoic acid/epa (FISH OIL PO) Take 2 Tablets by mouth daily. Yes Provider, Historical   Omeprazole delayed release (PRILOSEC D/R) 20 mg tablet Take 20 mg by mouth daily. Yes Provider, Historical       Allergies/Social/Family History: Allergies   Allergen Reactions    Soap Rash     Dial Soap      Social History     Tobacco Use    Smoking status: Former Smoker     Packs/day: 0.75     Years: 25.00     Pack years: 18.75     Types: Cigarettes     Quit date: 2017     Years since quittin.8    Smokeless tobacco: Never Used   Substance Use Topics    Alcohol use:  Yes     Alcohol/week: 4.0 standard drinks     Types: 2 Glasses of wine, 1 Cans of beer, 1 Shots of liquor per week      Family History   Problem Relation Age of Onset    No Known Problems Father     Lung Cancer Maternal Grandmother     Coronary Art Dis Maternal Grandfather     Heart Attack Maternal Grandfather     Stroke Paternal Grandfather     Hypertension Paternal Grandfather     Hypertension Mother     No Known Problems Sister            Objective:   Vital Signs:  Visit Vitals  BP (!) 123/59 (BP 1 Location: Right upper arm, BP Patient Position: At rest;Sitting)   Pulse 72   Temp 98.1 °F (36.7 °C)   Resp 23   Ht 6' (1.829 m)   Wt 75.3 kg (166 lb 0.1 oz)   SpO2 92%   BMI 22.51 kg/m²    O2 Flow Rate (L/min): 2 l/min O2 Device: Nasal cannula Temp (24hrs), Av.2 °F (36.8 °C), Min:96.4 °F (35.8 °C), Max:99.4 °F (37.4 °C)    CVP (mmHg): 15 mmHg (22 2005)      Intake/Output:     Intake/Output Summary (Last 24 hours) at 2022 0956  Last data filed at 2022 0914  Gross per 24 hour   Intake 3960.05 ml   Output 4560 ml   Net -599.95 ml       Physical Exam:  GEN: Awake oriented sitting in chair  HEENT: NCAT, sclerae white  NECK: No JVD noted  CHEST: Clear to auscultation anteriorly  CARDIAC: sinus rhythm, regular, mechanical S2  ABD: Soft, NT, hypoactive BS  EXT: Warm, no edema, normal capillary refill  NEURO: Cranial nerves intact, no focal deficits noted  DERM: No lesions noted    I have examined the patient on this day 5/4/2022 and the above documented exam is accurate including the components that have been copied forward    LABS AND  DATA: Personally reviewed  Recent Labs     05/04/22 0433 05/03/22  1755 05/03/22  1235 05/03/22  1235   WBC 11.6*  --   --  9.7   HGB 10.2* 10.8*   < > 11.8*   HCT 30.6* 31.8*   < > 35.2*     --   --  145*    < > = values in this interval not displayed. Recent Labs     05/04/22 0433 05/03/22  1755    144   K 4.1 4.3   * 114*   CO2 26 23   BUN 17 18   CREA 1.08 1.31*   * 151*   CA 8.4* 8.3*   MG 2.5* 2.6*     Recent Labs     05/04/22 0433 05/03/22  1755   AP 32* 33*   TP 6.2* 6.2*   ALB 3.9 4.1   GLOB 2.3 2.1     Recent Labs     05/03/22  1235   INR 1.2*   PTP 12.1*   APTT 32.6*      No results for input(s): PHI, PCO2I, PO2I, FIO2I in the last 72 hours. No results for input(s): CPK, CKMB, TROIQ, BNPP in the last 72 hours. Hemodynamics:   PAP: PAP Systolic: 35 (94/21/84 4117) CO: CO (l/min): 6.3 l/min (05/04/22 0445)   Wedge:   CI: CI (l/min/m2): 3.2 l/min/m2 (05/04/22 0445)   CVP:  CVP (mmHg): 15 mmHg (05/04/22 0445) SVR:       PVR:       Ventilator Settings:  Mode Rate Tidal Volume Pressure FiO2 PEEP   Spontaneous   500 ml  8 cm H2O 50 % 5 cm H20     Peak airway pressure: 18 cm H2O    Minute ventilation: 5.73 l/min        MEDS: Reviewed    Chest X-Ray:  CXR Results  (Last 48 hours)               05/04/22 0516  XR CHEST PORT Final result    Impression:  Persistent low lung volumes with mild pulmonary edema. Narrative:  INDICATION: Postop heart surgery       COMPARISON: 5/3/2022       FINDINGS: Single AP portable view of the chest obtained at 4:54 AM demonstrates   no change in position of the [place]-Markell catheter or mediastinal drain. The   endotracheal tube and nasogastric tube have been removed.  The cardiomediastinal   silhouette is stable. The patient is status post median sternotomy. Lung volumes   remain low. Mild pulmonary edema persists. There is no new airspace disease. No   pneumothorax is seen. There is no evidence of pleural effusion. 05/03/22 1253  XR CHEST PORT Final result    Impression:      1. Support and surgical apparatus as detailed above. 2.  Mild pulmonary edema pattern. Narrative:  EXAM:  XR CHEST PORT       INDICATION: Postop heart       COMPARISON: Chest radiographs 4/20/2022       TECHNIQUE: Supine portable chest AP view       FINDINGS:        ET tube terminates over the mid intrathoracic trachea. Median sternotomy wires. Prosthetic cardiac valve. NG tube terminates over the gastric fundus. Several   subxiphoid approach thoracic drains. Right jugular approach [place]-Markell catheter   terminates over the region of the main pulmonary artery. ? Epidural catheter. Multiple overlying electrodes. Cardiac mediastinal silhouette is upper limits of normal in size. Pulmonary   vascular congestion and possible mild edema pattern. No focal consolidation. Pleural spaces grossly clear. I have reviewed the above films and agree with official interpretation         Multidisciplinary Rounds Completed:  Yes      SPECIAL EQUIPMENT  None    DISPOSITION  Stay in ICU, transfer when okay with primary team  No acute critical care needs. Will sign off     CRITICAL CARE CONSULTANT NOTE  I had a in-person encounter with Beata Risa, reviewed and interpreted patient data including events, labs, images, vital signs, I/O's, and examined patient.   I have discussed the case and the plan and management of the patient's care with the consulting services, the bedside nurses and the respiratory therapist.      NOTE OF PERSONAL INVOLVEMENT IN CARE   This patient is at high risk for sudden and clinically significant deterioration, which requires the highest level of preparedness to intervene urgently. I participated in the decision-making and personally managed or directed the management of the following life and organ supporting interventions that required my frequent assessment to treat or prevent imminent deterioration. I personally spent -- minutes of critical care time. This is time spent at patient's bedside actively involved in patient care as well as the coordination of care and discussions with the patient's family. This does not include any procedural time which has been billed separately.     Shilpa Hough MD [address] [place] [phone]  [phone]    5/4/2022

## 2022-05-04 NOTE — DIABETES MGMT
[facility]    CLINICAL NURSE SPECIALIST CONSULT     Initial Presentation   Amado Alcaazr is a 51 y.o. male admitted 5/3/22 for surgical intervention related to aortic stenosis. Pre-admission evaluation included cardiac cath, echo and CT chest.    HX:   Past Medical History:   Diagnosis Date    Aortic stenosis     Bicuspid aortic valve     GERD (gastroesophageal reflux disease)     Hiatal hernia     Severe aortic stenosis 3/22/2022      INITIAL DX:   Aortic stenosis [I35.0]  AS (aortic stenosis) [I35.0]     Current Treatment     TX: Glucostabilizer Abx. Pain management. Consulted by Provider for advanced diabetes nursing assessment and care for:   [x] Transitioning off Judene Gia   [x] Inpatient management strategy  [] Home management assessment  [] Survival skill education    Hospital Course   Clinical progress has been complicated by need for ICU level of care. Diabetes History   The patient did not have a personal or family history of diabetes PTA. Subjective   I don't have diabetes.      Objective   Physical exam  General Normal weight male in no acute distress. Conversant and cooperative.   at bedside  Neuro  Alert, oriented   Vital Signs   Visit Vitals  /66   Pulse 72   Temp 98.1 °F (36.7 °C)   Resp 9   Ht 6' (1.829 m)   Wt 75.3 kg (166 lb 0.1 oz)   SpO2 94%   BMI 22.51 kg/m²     Laboratory  Recent Labs     05/04/22  0433 05/03/22  1755 05/03/22  1235   * 151* 54*   AGAP 2* 7 1*   WBC 11.6*  --  9.7   CREA 1.08 1.31* 1.28   GFRNA >60 59* >60   AST 31 30 26   ALT 22 25 24     Factors impacting BG management  Factor Dose Comments   Nutrition:  Standard meals     Pain Scheduled Tylenol & Toradol Q6 hrs  MS & oxycodone PRN    Infection Prophylactic Ancef Q6 hrs    Other:   Kidney function  Liver function   Normal  Normal      Blood glucose pattern      Significant diabetes-related events over the past 24-72 hours  No personal or family history of diabetes. Admission . A1c 5.5%. On Glucostabilizer per protocol    Assessment and Plan   Nursing Diagnosis Risk for unstable blood glucose pattern   Nursing Intervention Domain 9477 Decision-making Support   Nursing Interventions Examined current inpatient diabetes/blood glucose control   Explored factors facilitating and impeding inpatient management  Informed patient of rational for insulin strategy while hospitalized     Evaluation   This normal weight  male did not have a personal or family history of diabetes PTA. He underwent CV surgery before being placed on Glucostabilizer to manage BGs postoperatively. Using 1.5 units/hour. Expect he will transition off GS with ease. Recommendations     [x] Glucostabilizer per CV surgery protocol; transition off 5/5/22 after first consumed meal using dosing in protocol. Switch to QID BG monitoring for 1-2 days    Billing Code(s)     [x] 76717 IP subsequent hospital care - 25 minutes     Before making these care recommendations, I personally reviewed the hospitalization record, including notes, laboratory & diagnostic data and current medications, and examined the patient at the bedside (circumstances permitting) before making care recommendations. More than fifty (50) percent of the time was spent in patient counseling and/or care coordination.   Total minutes: [address], CNS  Diabetes Clinical Nurse Specialist  [facility]  Access via [facility]

## 2022-05-04 NOTE — PROGRESS NOTES
Problem: Mobility Impaired (Adult and Pediatric)  Goal: *Acute Goals and Plan of Care (Insert Text)  Description: FUNCTIONAL STATUS PRIOR TO ADMISSION: Patient was independent and active without use of DME.     HOME SUPPORT PRIOR TO ADMISSION: The patient lived with Nagi Iraheta. Physical Therapy Goals  Initiated 5/4/2022  1. Patient will move from supine to sit and sit to supine  in bed with independence within 5 days. 2.  Patient will perform sit to/from stand with independence within 5 days. 3.  Patient will ambulate 400 feet with least restrictive assistive device and independence within 5 days. 4.  Patient will ascend/descend 17 stairs with 1 handrail(s) with modified independence within 5 days. 5.  Patient will perform cardiac exercises per protocol with independence within 5 days. 6.  Patient will verbally recall and functionally demonstrate mindful-based movements (\"move in the tube\") principles without cues within 5 days. Outcome: Not Met     PHYSICAL THERAPY EVALUATION  Patient: Jessi Enriquez (31 y.o. male)  Date: 5/4/2022  Primary Diagnosis: Aortic stenosis [I35.0]  AS (aortic stenosis) [I35.0]  Procedure(s) (LRB):  AORTIC VALVE REPLACEMENT ( ONX-23 ) WITH EXTRA CORPOREAL CIRCULATION. SPRING AND EPIAORTIC ULTRASOUND DONE BY DR BROD[facility] (N/A) 1 Day Post-Op   Precautions: sternal         ASSESSMENT  Based on the objective data described below, the patient presents with good strength and mobility. Pt was received sitting up in the chair on 2L and cleared by nursing to mobilize. Vitals stable during session. Nursing decreased pressors during activity and pt tolerated well. He was able to perform cardiac exercises. Stood and ambulated down the branch without difficulty. He was returned to the chair and left sitting. Expect pt to progress well.      Patient is demonstrating understanding of mindful-based movements (\"move in the tube\") principles of keeping UEs proximal to ribcage to prevent lateral pull on the sternum during load-bearing activities with verbal cues required for compliance. Current Level of Function Impacting Discharge (mobility/balance): CGA    Functional Outcome Measure: The patient scored 50/100 on the barthel outcome measure which is indicative of partially dependent in basic care. Other factors to consider for discharge: CGA     Patient will benefit from skilled therapy intervention to address the above noted impairments. PLAN :  Recommendations and Planned Interventions: bed mobility training, transfer training, gait training, therapeutic exercises, patient and family training/education and therapeutic activities      Frequency/Duration: Patient will be followed by physical therapy:  daily to address goals. Recommendation for discharge: (in order for the patient to meet his/her long term goals)  OP cardiac when cleared by MD    This discharge recommendation:  Has not yet been discussed the attending provider and/or case management    IF patient discharges home will need the following DME: none         SUBJECTIVE:   Patient stated I feel better than I did this morning.     OBJECTIVE DATA SUMMARY:   Patient mobilized on continuous portable monitor/telemetry.   HISTORY:    Past Medical History:   Diagnosis Date    Aortic stenosis     Bicuspid aortic valve     GERD (gastroesophageal reflux disease)     Hiatal hernia     Severe aortic stenosis 3/22/2022     Past Surgical History:   Procedure Laterality Date    HX ADENOIDECTOMY      HX COLONOSCOPY      HX ENDOSCOPY      HX HEART CATHETERIZATION  03/2022       Personal factors and/or comorbidities impacting plan of care:     Home Situation  Home Environment: Private residence  # Steps to Enter: 5  Rails to Enter: Yes  Hand Rails : Bilateral  One/Two Story Residence: Two story  Height of Each Step (in): 17 inches  Living Alone: No  Support Systems: Spouse/Significant Other  Patient Expects to be Discharged to[de-identified] Home  Current DME Used/Available at Home: None  Tub or Shower Type: Tub/Shower combination (has option for walk in shower)    EXAMINATION/PRESENTATION/DECISION MAKING:     Critical Behavior:  Neurologic State: Alert  Orientation Level: Oriented X4  Cognition: Appropriate decision making  Safety/Judgement: Awareness of environment  Hearing: Auditory  Auditory Impairment: None  Skin:  Incision intact  Edema: none  Range Of Motion:  AROM: Within functional limits           PROM: Within functional limits           Strength:    Strength: Within functional limits                    Tone & Sensation:   Tone: Normal              Sensation: Intact               Coordination:  Coordination: Within functional limits  Vision:   Acuity: Within Defined Limits  Functional Mobility:  Bed Mobility:      session began and ended in sitting        Transfers:  Sit to Stand: Contact guard assistance  Stand to Sit: Contact guard assistance                       Balance:   Sitting: Intact  Standing: Intact; With support  Ambulation/Gait Training:  Distance (ft): 200 Feet (ft)  Assistive Device: Gait belt;Walker, rollator  Ambulation - Level of Assistance: Contact guard assistance        Gait Abnormalities: Antalgic              Speed/Carly: Slow  Step Length: Left shortened;Right shortened                Cardiac diagnosis intervention:  Patient instructed and educated on mindful movement principles based on Move in The Tube concept to include maintaining bilateral elbows close to rib cage when performing any load-bearing activity such as getting in/out of bed, pushing up from a chair, opening a door, or lifting a box. Patient was given a handout with diagrams of each correct/incorrect method of performing each of the above tasks. Therapeutic Exercises:   Patient instructed on the benefits and demonstrated cardiac exercises while sitting with Contact guard assistance.  Instructed and indicated understanding on how to progress reps, sets against gravity, pacing through progressive muscle strengthening standing based on surgeon clearance for more weight in prep for functional activity. Instruction on the use of household items in place of weights as needed. CARDIAC   EXERCISE    Sets    Reps    Active  Active Assist    Passive  Self ROM    Comments    Shoulder flexion  1  5   [x]                            []                             []                             []                                Shoulder abduction  1  5  [x]                             []                             []                             []                                Scapular elevation  1  5  [x]                             []                              []                             []                                Scapular retraction  1  5  [x]                             []                             []                             []                                Trunk rotation  1  5  [x]                             []                             []                             []                                Trunk sidebending  1  5  [x]                             []                              []                             []                                          Functional Measure:  Barthel Index:    Bathin  Bladder: 10  Bowels: 10  Groomin  Dressin  Feeding: 10  Mobility: 0  Stairs: 0  Toilet Use: 5  Transfer (Bed to Chair and Back): 10  Total: 50/100       The Barthel ADL Index: Guidelines  1. The index should be used as a record of what a patient does, not as a record of what a patient could do. 2. The main aim is to establish degree of independence from any help, physical or verbal, however minor and for whatever reason. 3. The need for supervision renders the patient not independent. 4. A patient's performance should be established using the best available evidence.  Asking the patient, friends/relatives and nurses are the usual sources, but direct observation and common sense are also important. However direct testing is not needed. 5. Usually the patient's performance over the preceding 24-48 hours is important, but occasionally longer periods will be relevant. 6. Middle categories imply that the patient supplies over 50 per cent of the effort. 7. Use of aids to be independent is allowed. Score Interpretation (from [address] [facility] 83)    Independent   60-79 Minimally independent   40-59 Partially dependent   20-39 Very dependent   <20 Totally dependent     -Yolanda Wynn., Barthel, D.W. (1965). Functional evaluation: the Barthel Index. [address] [facility] ([address] [place] [address] (1997). The Barthel activities of daily living index: self-reporting versus actual performance in the old (> or = 75 years). Journal of [address] [place] [address] 45(7), 14 [facility], WIL, Ector Childs., William Hale. (1999). Measuring the change in disability after inpatient rehabilitation; comparison of the responsiveness of the Barthel Index and Functional [place] Measure. Journal of Neurology, Neurosurgery, and Psychiatry, 66(4), 732-001. Ana Britt, N.J.A, SISSY Moyer, & Stevie Vines, M.A. (2004) Assessment of post-stroke quality of life in cost-effectiveness studies: The usefulness of the Barthel Index and the EuroQoL-5D.  Quality of Life Research, 15, 281-77            Physical Therapy Evaluation Charge Determination   History Examination Presentation Decision-Making   HIGH Complexity :3+ comorbidities / personal factors will impact the outcome/ POC  MEDIUM Complexity : 3 Standardized tests and measures addressing body structure, function, activity limitation and / or participation in recreation  MEDIUM Complexity : Evolving with changing characteristics  Other outcome measures barthel  MEDIUM      Based on the above components, the patient evaluation is determined to be of the following complexity level: MEDIUM    Pain Ratin/10    Activity Tolerance:   Good    After treatment patient left in no apparent distress:   Sitting in chair and Call bell within reach    COMMUNICATION/EDUCATION:   The patients plan of care was discussed with: Occupational therapist and Registered nurse. Fall prevention education was provided and the patient/caregiver indicated understanding. and Patient/family agree to work toward stated goals and plan of care.     Thank you for this referral.  Schuyler Hays, PT, DPT   Time Calculation: 31 mins

## 2022-05-04 NOTE — PROGRESS NOTES
0700  Report from [facility]  Interdisciplinary team rounds were held 5/4/2022 with the following team members:Nursing, Nurse Practitioner, Pharmacy and Physician and the n/a. Plan of care discussed. See clinical pathway and/or care plan for interventions and desired outcomes. Goals of the Day: Wean Cardene gtt and remove lines    0800  Assessment complete  Patient awake alert oriented X4 c/o pain in chest area with movement  and cough along with pain in neck for CVL. O2 2 lpm nc in place. Right MAC in place with KVO and Cardene 12.5 mg/hr. Left radial A-line in place. Lopez in place. Left AC #20 with kvo and Insulin gtt. Pacemaker attached to patient but off NSR with BBB. Chest tube in place with sanguinous output. Cardene in place to keep SBP around 120    0915  Lopez pulled per orders  Pain better after Toradol IV     1040  Cardene weaned down per orders see MAR. PT/OT in with patient walking halls    1100  Cardene weaned down see MAR    1141  Cardene off see MAR    1200  Assessment complete no changes  VSS  CT output 80 cc see flow sheet    1300  A-line and MAC pulled dressing in place no bleeding or oozing noted. 1600  Assessment complete no changes  A-line and Right IJ dressing both c/d/i. Chest tube dressing intact  Pacer wires capped. 1620  TRANSFER - OUT REPORT:    Verbal report given to [facility] RN on Steven Raya  being transferred to [phone] PCU(unit) for routine progression of care       Report consisted of patients Situation, Background, Assessment and   Recommendations(SBAR). Information from the following report(s) SBAR, Kardex, OR Summary, Intake/Output, MAR, Recent Results and Cardiac Rhythm SR was reviewed with the receiving nurse.     Lines:   Peripheral IV 05/03/22 Left Antecubital (Active)   Site Assessment Clean, dry, & intact 05/04/22 1200   Phlebitis Assessment 0 05/04/22 1200   Infiltration Assessment 0 05/04/22 1200   Dressing Status Clean, dry, & intact 05/04/22 1200   Dressing Type Transparent;Tape 05/04/22 1200   Hub Color/Line Status Pink;Flushed; Infusing 05/04/22 1200   Action Taken Open ports on tubing capped 05/04/22 1200   Alcohol Cap Used Yes 05/04/22 1200        Opportunity for questions and clarification was provided.       Patient transported with:   Monitor  O2 @ 2 liters  Registered Nurse DVT Prophylaxis-on Eliquis  General precautions

## 2022-05-04 NOTE — PROGRESS NOTES
Problem: Self Care Deficits Care Plan (Adult)  Goal: *Acute Goals and Plan of Care (Insert Text)  Description: FUNCTIONAL STATUS PRIOR TO ADMISSION: Patient was independent and active without use of DME. Reports he was driving and working full time as manager at [employer]. HOME SUPPORT: The patient lived with spouse. Spouse at bedside, reports he works from home and is able to provide assistance at home PRN. Occupational Therapy Goals  Initiated 5/4/2022  1. Patient will perform ADLs standing 5 mins without fatigue or LOB with supervision/set-up within 7 day(s). 2.  Patient will perform lower body ADLs with supervision/set-up within 7 day(s). 3.  Patient will perform gathering ADL items high and low 2/2 with supervision/set-up within 7 day(s). 4.  Patient will perform toilet transfers with supervision/set-up within 7 day(s). 5.  Patient will perform all aspects of toileting with supervision/set-up within 7 day(s). 6.  Patient will participate in cardiac/sternal upper extremity therapeutic exercise/activities to increase independence with ADLs with supervision/set-up for 5 minutes within 7 day(s). Outcome: Progressing Towards Goal    OCCUPATIONAL THERAPY EVALUATION  Patient: Salena Lares (26 y.o. male)  Date: 5/4/2022  Primary Diagnosis: Aortic stenosis [I35.0]  AS (aortic stenosis) [I35.0]  Procedure(s) (LRB):  AORTIC VALVE REPLACEMENT ( ONX-23 ) WITH EXTRA CORPOREAL CIRCULATION. SPRING AND EPIAORTIC ULTRASOUND DONE BY DR Jorge Wolfe (N/A) 1 Day Post-Op   Precautions:      ASSESSMENT  Based on the objective data described below, the patient is POD #1 s/p AVR and presents with generalized deconditioning, mildly impaired balance, and mild weakness. Patient is functioning below their independent baseline for self-care and functional mobility, now completing self-care with min assist and functional mobility with CGA at rollator level.  Patient received seated in recliner chair with spouse at bedside, on 2L O2 via NC. Patient and spouse educated on moving within the tube during functional activity. Pt able to complete seated cardiac exercises on this date with cues for rest break initiation. Following pt complete increased hallway mobility at rollator level with CGA. Pt reporting fatigue however recovering quickly with seated rest break. Vitals stable throughout session on 2L O2. Anticipate pt will progress well toward set goals. Rn at bedside at conclusion of session. Patient is verbalizing understanding of mindful-based movements (\"move in the tube\") principles of keeping UEs proximal to ribcage to prevent lateral pull on the sternum during load-bearing activities with verbal and tactile cues required for compliance. Current Level of Function Impacting Discharge (ADLs/self-care):   Feeding: Independent    Oral Facial Hygiene/Grooming: Stand-by assistance    Bathing: Minimum assistance    Upper Body Dressing: Minimum assistance    Lower Body Dressing: Minimum assistance    Toileting: Minimum assistance    Functional Outcome Measure: The patient scored 50/100 on the barthel outcome measure which is indicative of partial dependence. Other factors to consider for discharge: below baseline, AVR     Patient will benefit from skilled therapy intervention to address the above noted impairments. PLAN :  Recommendations and Planned Interventions: self care training, functional mobility training, therapeutic exercise, balance training, therapeutic activities, endurance activities, patient education, home safety training, and family training/education    Frequency/Duration: Patient will be followed by occupational therapy 5 times a week to address goals.     Recommendation for discharge: (in order for the patient to meet his/her long term goals)  To be determined: HHOT vs no OT pending progress     This discharge recommendation:  Has been made in collaboration with the attending provider and/or case management    IF patient discharges home will need the following DME: TBD; possibly shower chair        SUBJECTIVE:   Patient stated im feeling okay, a little sore.     OBJECTIVE DATA SUMMARY:   HISTORY:   Past Medical History:   Diagnosis Date    Aortic stenosis     Bicuspid aortic valve     GERD (gastroesophageal reflux disease)     Hiatal hernia     Severe aortic stenosis 3/22/2022     Past Surgical History:   Procedure Laterality Date    HX ADENOIDECTOMY      HX COLONOSCOPY      HX ENDOSCOPY      HX HEART CATHETERIZATION  03/2022       Expanded or extensive additional review of patient history:     Home Situation  Home Environment: Private residence  # Steps to Enter: 5  Rails to Enter: Yes  Hand Rails : Bilateral  One/Two Story Residence: Two story  Height of Each Step (in): 17 inches  Living Alone: No  Support Systems: Spouse/Significant Other  Patient Expects to be Discharged to[de-identified] Home  Current DME Used/Available at Home: None  Tub or Shower Type: Tub/Shower combination (has option for walk in shower)    Hand dominance: Right    EXAMINATION OF PERFORMANCE DEFICITS:  Cognitive/Behavioral Status:  Neurologic State: Alert  Orientation Level: Oriented X4  Cognition: Appropriate decision making  Perception: Appears intact  Perseveration: No perseveration noted  Safety/Judgement: Awareness of environment    Skin: intact; sternal incision; lines/leads    Edema: no edema noted     Hearing: Auditory  Auditory Impairment: None    Vision/Perceptual:                           Acuity: Within Defined Limits         Range of Motion:    AROM: Within functional limits  PROM: Within functional limits                      Strength:    Strength: Within functional limits                Coordination:  Coordination: Within functional limits  Fine Motor Skills-Upper: Left Intact; Right Intact    Gross Motor Skills-Upper: Left Intact; Right Intact    Tone & Sensation:    Tone: Normal  Sensation: Intact Balance:  Sitting: Intact  Standing: Intact; With support    Functional Mobility and Transfers for ADLs:  Bed Mobility:       Transfers:  Sit to Stand: Contact guard assistance  Stand to Sit: Contact guard assistance    ADL Assessment:  Feeding: Independent    Oral Facial Hygiene/Grooming: Stand-by assistance    Bathing: Minimum assistance    Upper Body Dressing: Minimum assistance    Lower Body Dressing: Minimum assistance    Toileting: Minimum assistance                ADL Intervention and task modifications:                                     Cognitive Retraining  Safety/Judgement: Awareness of environment    Patient instructed and educated on mindful movement principles based on Move in The Tube concept to include maintaining bilateral elbows close to rib cage when performing any load-bearing activity such as getting in/out of bed, pushing up from a chair, opening a door, or lifting a box. Patient was given a handout with diagrams of each correct/incorrect method of performing each of the above tasks. Patient instructed on the ability to utilize upper extremities outside the tube when doing any non-load bearing activity such as washing hair/body, brushing teeth, retrieving clothing items, or scratching your back. Patient encouraged to also perform upper extremity exercises \"outside of the tube\" to prevent scar tissue formation around sternal incision site. Patient instructed in detail about activities to heed with caution, allowing pain to be the guide. These activities include but are not limited to: mowing the lawn, riding a bike, walking a dog, lifting a child, workshop hobbies, golfing, sexual activity, vacuuming, fishing, scrubbing the floors, and moving furniture. Patient was given the [address] in the [place] handout to describe each of these activities in detail.    Patient instructed no asymmetrical reaching over head to ensure B UEs when shoulders >90* i.e. reaching in cabinets and dressing. Instruction on upper body dressing techniques of over head, then arms through to decrease pain and unilateral shoulder flexion >90*. Instruction on the benefits of utilizing B UEs during functional tasks i.e. opening the fridge, stepping into the tub. Therapeutic Exercises:   Patient instructed on the benefits and demonstrated cardiac exercises while seated in recliner with Contact guard assistance. Instructed and indicated understanding on how to progress reps, sets against gravity, pacing through progressive muscle strengthening standing based on surgeon clearance for more weight in prep for basic and instrumental ADLs. Instruction on the use of household items in place of weights as needed.     CARDIAC   EXERCISE    Sets    Reps    Active  Active Assist    Passive  Self ROM    Comments    Shoulder flexion  1  5   [x]                            []                             []                             []                                Shoulder abduction  1  5  [x]                             []                             []                             []                                Scapular elevation  1  5  [x]                             []                              []                             []                                Scapular retraction  1  5  [x]                             []                             []                             []                                Trunk rotation  1  5  [x]                             []                             []                             []                                Trunk sidebending  1  5  [x]                             []                              []                             []                                        Functional Measure:    Barthel Index:  Bathin  Bladder: 10  Bowels: 10  Groomin  Dressin  Feeding: 10  Mobility: 0  Stairs: 0  Toilet Use: 5  Transfer (Bed to Chair and Back): 10  Total: 50/100      The Barthel ADL Index: Guidelines  1. The index should be used as a record of what a patient does, not as a record of what a patient could do. 2. The main aim is to establish degree of independence from any help, physical or verbal, however minor and for whatever reason. 3. The need for supervision renders the patient not independent. 4. A patient's performance should be established using the best available evidence. Asking the patient, friends/relatives and nurses are the usual sources, but direct observation and common sense are also important. However direct testing is not needed. 5. Usually the patient's performance over the preceding 24-48 hours is important, but occasionally longer periods will be relevant. 6. Middle categories imply that the patient supplies over 50 per cent of the effort. 7. Use of aids to be independent is allowed. Score Interpretation (from [address] [address])    Independent   60-79 Minimally independent   40-59 Partially dependent   20-39 Very dependent   <20 Totally dependent     -Yolanda Wynn., Barthel, D.W. (1965). Functional evaluation: the Barthel Index. [address] [facility] ([address] (1997). The Barthel activities of daily living index: self-reporting versus actual performance in the old (> or = 75 years). Journal of [address] [place] [address] 457), [facility], EVY.JESSICA, Charli Gonzales., Simona Shahid. (1999). Measuring the change in disability after inpatient rehabilitation; comparison of the responsiveness of the Barthel Index and Functional [place] Measure. Journal of Neurology, Neurosurgery, and Psychiatry, 66(4), 916-221. Tino Hamman, N.[place].CHANDLER, SISSY Moyer, & Luba Coloraod MJF. (2004) Assessment of post-stroke quality of life in cost-effectiveness studies: The usefulness of the Barthel Index and the EuroQoL-5D.  Quality of Life Research, 15, 705-98         Occupational Therapy Evaluation Charge Determination   History Examination Decision-Making   MEDIUM Complexity : Expanded review of history including physical, cognitive and psychosocial  history  MEDIUM Complexity : 3-5 performance deficits relating to physical, cognitive , or psychosocial skils that result in activity limitations and / or participation restrictions MEDIUM Complexity : Patient may present with comorbidities that affect occupational performnce. Miniml to moderate modification of tasks or assistance (eg, physical or verbal ) with assesment(s) is necessary to enable patient to complete evaluation       Based on the above components, the patient evaluation is determined to be of the following complexity level: MEDIUM  Pain Rating:  Pt did endorse slight discomfort with cardiac exercises, typical post op pain. Activity Tolerance:   Fair    After treatment patient left in no apparent distress:    Sitting in chair and Call bell within reach    COMMUNICATION/EDUCATION:   The patients plan of care was discussed with: Physical therapist, Occupational therapist, and Registered nurse. Patient/family have participated as able in goal setting and plan of care. This patients plan of care is appropriate for delegation to [facility].     Thank you for this referral.  Sarita Alonzo OT  Time Calculation: 32 mins

## 2022-05-04 NOTE — PROGRESS NOTES
End of Shift Note    Bedside shift change report given to Yakelin Myers RN (oncoming nurse) by Elliott Vizcaino RN (offgoing nurse). Report included the following information SBAR, Kardex, OR Summary, Intake/Output, MAR, Recent Results and Cardiac Rhythm sinus rhythm    Shift worked:  [id number]     Shift summary and any significant changes:     Transferred from ICU around 1615, dual skin completed, chest tube output 50 mL documented      Concerns for physician to address:  no BM since admission      Zone phone for oncoming shift:   1150       Activity:  Activity Level: Chair  Number times ambulated in hallways past shift: 0  Number of times OOB to chair past shift: 1    Cardiac:   Cardiac Monitoring: Yes      Cardiac Rhythm: Sinus Rhythm    Access:   Current line(s): PIV     Genitourinary:   Urinary status: voiding    Respiratory:   O2 Device: Nasal cannula  Chronic home O2 use?: NO  Incentive spirometer at bedside: YES  Actual Volume (ml): 1000 ml    GI:  Last Bowel Movement Date: 05/02/22  Current diet:  ADULT DIET Regular  Passing flatus: YES  Tolerating current diet: YES       Pain Management:   Patient states pain is manageable on current regimen: YES    Skin:  Efrme Score: 16  Interventions: increase time out of bed and PT/OT consult    Patient Safety:  Fall Score:  Total Score: 2  Interventions: bed/chair alarm, gripper socks and pt to call before getting OOB       Length of Stay:  Expected LOS: 5d 21h  Actual LOS: 1      Elliott Vizcaino RN

## 2022-05-04 NOTE — PROGRESS NOTES
[facility]  [address]  [phone]  [email or web address]    Cardiology Progress Note      5/4/2022 1:20PM    Admit Date: 5/3/2022    Admit Diagnosis:   Aortic stenosis [I35.0]  AS (aortic stenosis) [I35.0]    Interval History/Subjective:     Beata Lord is a 51 y.o. male s/p surgical aortic valve replacement    Regular cardiology patient of Dr. Pamela Lyman. Asked to follow patient while admitted. -VSS  -labs steady  -Mr. Christy Charles is feeling okay. Pain is better now than it was this morning or last night. Ambulated earlier today. No SOB. Visitor present at bedside.       Visit Vitals  /68 (BP 1 Location: Right upper arm, BP Patient Position: At rest;Sitting)   Pulse 73   Temp 98.4 °F (36.9 °C)   Resp 27   Ht 6' (1.829 m)   Wt 75.3 kg (166 lb 0.1 oz)   SpO2 96%   BMI 22.51 kg/m²       Current Facility-Administered Medications   Medication Dose Route Frequency    pantoprazole (PROTONIX) tablet 40 mg  40 mg Oral ACB    ketorolac (TORADOL) injection 15 mg  15 mg IntraVENous Q6H    niCARdipine (CARDENE) 50 mg in 0.9% sodium chloride 100 mL infusion  0-15 mg/hr IntraVENous TITRATE    sodium chloride (NS) flush 5-40 mL  5-40 mL IntraVENous Q8H    alteplase (CATHFLO) 1 mg in sterile water (preservative free) 1 mL injection  1 mg InterCATHeter PRN    bacitracin 500 unit/gram packet 1 Packet  1 Packet Topical PRN    sodium chloride (NS) flush 5-40 mL  5-40 mL IntraVENous Q8H    sodium chloride (NS) flush 5-40 mL  5-40 mL IntraVENous PRN    albumin human 5% (BUMINATE) solution 12.5 g  12.5 g IntraVENous Q2H PRN    0.45% sodium chloride infusion  10 mL/hr IntraVENous CONTINUOUS    0.9% sodium chloride infusion  10 mL/hr IntraVENous CONTINUOUS    acetaminophen (TYLENOL) tablet 1,000 mg  1,000 mg Oral Q6H    oxyCODONE IR (ROXICODONE) tablet 5 mg  5 mg Oral Q4H PRN    oxyCODONE IR (ROXICODONE) tablet 10 mg  10 mg Oral Q4H PRN    morphine injection 2-4 mg  2-4 mg IntraVENous Q2H PRN    naloxone (NARCAN) injection 0.4 mg  0.4 mg IntraVENous PRN    mupirocin (BACTROBAN) 2 % ointment   Both Nostrils BID    ceFAZolin (ANCEF) 2 g in sterile water (preservative free) 20 mL IV syringe  2 g IntraVENous Q6H    ondansetron (ZOFRAN) injection 4 mg  4 mg IntraVENous Q4H PRN    albuterol (PROVENTIL VENTOLIN) nebulizer solution 2.5 mg  2.5 mg Nebulization Q4H PRN    aspirin chewable tablet 81 mg  81 mg Oral DAILY    midazolam (VERSED) injection 1 mg  1 mg IntraVENous Q1H PRN    magnesium oxide (MAG-OX) tablet 400 mg  400 mg Oral BID    calcium chloride 1 g in 0.9% sodium chloride 250 mL IVPB  1 g IntraVENous PRN    bisacodyL (DULCOLAX) suppository 10 mg  10 mg Rectal DAILY PRN    senna-docusate (PERICOLACE) 8.6-50 mg per tablet 1 Tablet  1 Tablet Oral BID    polyethylene glycol (MIRALAX) packet 17 g  17 g Oral DAILY    ELECTROLYTE REPLACEMENT NOTE: Nurse to review Serum Potassium and Magnesuim levels and Initiate Electrolyte Replacement Protocol as needed  1 Each Other PRN    magnesium sulfate 1 g/100 ml IVPB (premix or compounded)  1 g IntraVENous PRN    glucose chewable tablet 16 g  4 Tablet Oral PRN    glucagon (GLUCAGEN) injection 1 mg  1 mg IntraMUSCular PRN    [START ON 5/5/2022] insulin lispro (HUMALOG) injection   SubCUTAneous AC&HS    insulin glargine (LANTUS) injection 1-50 Units  1-50 Units SubCUTAneous ONCE OVER 24 HOURS    dextrose 10 % infusion 0-250 mL  0-250 mL IntraVENous PRN    melatonin tablet 3-6 mg  3-6 mg Oral QHS PRN    diphenhydrAMINE (BENADRYL) capsule 25 mg  25 mg Oral QHS PRN    acetaminophen (TYLENOL) tablet 650 mg  650 mg Oral Q4H PRN    insulin lispro (HUMALOG) injection   SubCUTAneous TIDAC    chlorhexidine (PERIDEX) 0.12 % mouthwash 15 mL  15 mL Oral Q12H    DOBUTamine (DOBUTREX) 500 mg/250 mL (2,000 mcg/mL) infusion  0-10 mcg/kg/min IntraVENous TITRATE    amiodarone (CORDARONE) 900 mg/250 ml D5W infusion  0.5-1 mg/min IntraVENous TITRATE    dexmedeTOMidine in 0.9 % NaCl (PRECEDEX) 400 mcg/100 mL (4 mcg/mL) infusion soln  0.1-1.5 mcg/kg/hr IntraVENous TITRATE    insulin regular (NOVOLIN R, HUMULIN R) 100 Units in 0.9% sodium chloride 100 mL infusion  1-10 Units/hr IntraVENous TITRATE    PHENYLephrine (CHRIS-SYNEPHRINE) 30 mg in 0.9% sodium chloride 250 mL infusion   mcg/min IntraVENous TITRATE       Objective:      Physical Exam:  General Appearance:  pleasant, adult  male sitting in chair in NAD  Chest:   Clear/dim; Shallow breaths  Cardiovascular:  Regular rate and rhythm, no murmur. valve click   Abdomen:   Soft, non-tender, bowel sounds are active. Extremities: palpable distal pulses; no edema  Skin:  Warm and dry. large amount of ecchymosis left radial site. Midline sternotomy well approximated. Chest tube     Data Review:   Recent Labs     05/04/22  0433 05/03/22  1755 05/03/22  1235   WBC 11.6*  --  9.7   HGB 10.2* 10.8* 11.8*   HCT 30.6* 31.8* 35.2*     --  145*     Recent Labs     05/04/22  0433 05/03/22  1755 05/03/22  1235    144 145   K 4.1 4.3 4.3   * 114* 118*   CO2 26 23 26   * 151* 54*   BUN 17 18 15   CREA 1.08 1.31* 1.28   CA 8.4* 8.3* 8.5   MG 2.5* 2.6* 3.3*   ALB 3.9 4.1 3.9   TBILI 0.6 0.6 0.7   ALT 22 25 24   INR  --   --  1.2*       No results for input(s): TROIQ, CPK, CKMB in the last 72 hours. Intake/Output Summary (Last 24 hours) at 5/4/2022 1353  Last data filed at 5/4/2022 1200  Gross per 24 hour   Intake 2685.05 ml   Output 2335 ml   Net 350.05 ml        Telemetry: SR;  Previous pacing   EKG: SB; RBBB  Cxray: \"Persistent low lung volumes with mild pulmonary edema. \"    Assessment:     Active Problems:     Aortic stenosis (5/3/2022)      S/P AVR (5/3/2022)      Overview: AORTIC VALVE REPLACEMENT ( ONX-23 ) with Mechanical valve            AS (aortic stenosis) (5/3/2022)        Plan:     Bicuspid AV with severe AS s/p SAVR 5/3/22: Recovering well. · Starting coumadin tonight with goal INR 2-3 for 3 months and then 1.5-2.5 thereafter    · Chest tube per CTS  · Annual CTAs planned for ascending aortic enlargement that wasn't significant enough to address during this surgery  · Encouraged incentive spirometer and activity as tolerated. Moisés Almazan NP  DNP, RN, [facility]    Patient seen, examined by me personally. Plan discussed as detailed. Agree with note as outlined by NP with modifications as noted. My independent physical exam reveals : Physical Exam  Vitals and nursing note reviewed. Cardiovascular:      Rate and Rhythm: Normal rate and regular rhythm. Heart sounds: Normal heart sounds. Pulmonary:      Breath sounds: Normal breath sounds. No additional findings noted. Agree with plan as outlined above with modifications as noted.      Sathish Rocha MD

## 2022-05-04 NOTE — PROGRESS NOTES
Cardiac Surgery Care Coordinator-  Met with Mitch Avila. Reviewed plan of care and discussed goals for the day. Mitch Avila has a good understanding of his plan for the day. Reinforced sternal precautions and encouraged continued use of the incentive spirometer. Mitch Avila can pull 500ml. Encouraged Mitch Avila to verbalize. Will continue to follow for educational and emotional needs.  Elizabeth Cunningham RN

## 2022-05-04 NOTE — PROGRESS NOTES
1900 - Verbal shift change report given to SHARONDA Vega (oncoming nurse) by Bessie Victoria (offgoing nurse). Report included the following information SBAR, Kardex, Intake/Output, MAR, Recent Results, Cardiac Rhythm normal sinus rhythm and BBB, and dual skin assessment at the bedside. 2000 - Assessment complete. Receiving oxygen via Nasal cannula 2L. Lines: PIV: x1, R double lumen MAC, L Art line, draining Lopez, 2 blakes. Infusions: insulin gtt    Skin/Wounds: sternal incision    0000 - Reassessment complete. VSS. Cardene restarted at this time to keep SBP<120.    0400 - Reassessment complete. VSS. Oral care complete. Labs sent. 0691 Pulmonary artery catheter removed per protocol. Patient hemodynamically stable off vasopressors for four or more hours with the current values:  SVO2 66  PAD 17  CVP 13  CI 3.2  Systolic arterial blood pressure 125  Chest tube drainage remains <50 ml per hour and urine output remains ? 30 ml per hour. Patient maintaining SpO2 > 94% on 2L NC . Patient has underlying cardiac rhythm and is not pacer dependent. 0645 Pt up to chair. Pain control moderate. 0700 - Verbal shift change report given to Saulo Myers RN (oncoming nurse) by Emory Sanderson (offgoing nurse). Report included the following information SBAR, Kardex, Intake/Output, MAR, Recent Results, and Cardiac Rhythm normal sinus rhythm and BBB.

## 2022-05-05 ENCOUNTER — APPOINTMENT (OUTPATIENT)
Dept: GENERAL RADIOLOGY | Age: 47
DRG: 220 | End: 2022-05-05
Attending: PHYSICIAN ASSISTANT
Payer: COMMERCIAL

## 2022-05-05 ENCOUNTER — HOME HEALTH ADMISSION (OUTPATIENT)
Dept: HOME HEALTH SERVICES | Facility: HOME HEALTH | Age: 47
End: 2022-05-05
Payer: COMMERCIAL

## 2022-05-05 LAB
ABO + RH BLD: NORMAL
ADMINISTERED INITIALS, ADMINIT: NORMAL
ANION GAP SERPL CALC-SCNC: 2 MMOL/L (ref 5–15)
APTT PPP: 37.6 SEC (ref 22.1–31)
BLD PROD TYP BPU: NORMAL
BLD PROD TYP BPU: NORMAL
BLOOD GROUP ANTIBODIES SERPL: NORMAL
BPU ID: NORMAL
BPU ID: NORMAL
BUN SERPL-MCNC: 19 MG/DL (ref 6–20)
BUN/CREAT SERPL: 19 (ref 12–20)
CALCIUM SERPL-MCNC: 8.6 MG/DL (ref 8.5–10.1)
CHLORIDE SERPL-SCNC: 107 MMOL/L (ref 97–108)
CO2 SERPL-SCNC: 29 MMOL/L (ref 21–32)
CREAT SERPL-MCNC: 1.01 MG/DL (ref 0.7–1.3)
CROSSMATCH RESULT,%XM: NORMAL
CROSSMATCH RESULT,%XM: NORMAL
D50 ADMINISTERED, D50ADM: 0 ML
D50 ORDER, D50ORD: 0 ML
ERYTHROCYTE [DISTWIDTH] IN BLOOD BY AUTOMATED COUNT: 14.3 % (ref 11.5–14.5)
GLSCOM COMMENTS: NORMAL
GLUCOSE BLD STRIP.AUTO-MCNC: 103 MG/DL (ref 65–117)
GLUCOSE BLD STRIP.AUTO-MCNC: 108 MG/DL (ref 65–117)
GLUCOSE BLD STRIP.AUTO-MCNC: 111 MG/DL (ref 65–117)
GLUCOSE BLD STRIP.AUTO-MCNC: 113 MG/DL (ref 65–117)
GLUCOSE BLD STRIP.AUTO-MCNC: 120 MG/DL (ref 65–117)
GLUCOSE BLD STRIP.AUTO-MCNC: 142 MG/DL (ref 65–117)
GLUCOSE BLD STRIP.AUTO-MCNC: 144 MG/DL (ref 65–117)
GLUCOSE BLD STRIP.AUTO-MCNC: 83 MG/DL (ref 65–117)
GLUCOSE BLD STRIP.AUTO-MCNC: 94 MG/DL (ref 65–117)
GLUCOSE BLD STRIP.AUTO-MCNC: 95 MG/DL (ref 65–117)
GLUCOSE BLD STRIP.AUTO-MCNC: 96 MG/DL (ref 65–117)
GLUCOSE BLD STRIP.AUTO-MCNC: 97 MG/DL (ref 65–117)
GLUCOSE SERPL-MCNC: 89 MG/DL (ref 65–100)
GLUCOSE, GLC: 103 MG/DL
GLUCOSE, GLC: 108 MG/DL
GLUCOSE, GLC: 111 MG/DL
GLUCOSE, GLC: 113 MG/DL
GLUCOSE, GLC: 120 MG/DL
GLUCOSE, GLC: 142 MG/DL
GLUCOSE, GLC: 83 MG/DL
GLUCOSE, GLC: 94 MG/DL
GLUCOSE, GLC: 95 MG/DL
GLUCOSE, GLC: 97 MG/DL
HCT VFR BLD AUTO: 29.7 % (ref 36.6–50.3)
HGB BLD-MCNC: 9.9 G/DL (ref 12.1–17)
HIGH TARGET, HITG: 145 MG/DL
INR PPP: 1.1 (ref 0.9–1.1)
INSULIN ADMINSTERED, INSADM: 0.1 UNITS/HOUR
INSULIN ADMINSTERED, INSADM: 0.1 UNITS/HOUR
INSULIN ADMINSTERED, INSADM: 0.2 UNITS/HOUR
INSULIN ADMINSTERED, INSADM: 0.2 UNITS/HOUR
INSULIN ADMINSTERED, INSADM: 0.3 UNITS/HOUR
INSULIN ADMINSTERED, INSADM: 0.5 UNITS/HOUR
INSULIN ADMINSTERED, INSADM: 0.6 UNITS/HOUR
INSULIN ADMINSTERED, INSADM: 1 UNITS/HOUR
INSULIN ADMINSTERED, INSADM: 1.2 UNITS/HOUR
INSULIN ADMINSTERED, INSADM: 1.2 UNITS/HOUR
INSULIN ORDER, INSORD: 0.1 UNITS/HOUR
INSULIN ORDER, INSORD: 0.1 UNITS/HOUR
INSULIN ORDER, INSORD: 0.2 UNITS/HOUR
INSULIN ORDER, INSORD: 0.2 UNITS/HOUR
INSULIN ORDER, INSORD: 0.3 UNITS/HOUR
INSULIN ORDER, INSORD: 0.5 UNITS/HOUR
INSULIN ORDER, INSORD: 0.6 UNITS/HOUR
INSULIN ORDER, INSORD: 1 UNITS/HOUR
INSULIN ORDER, INSORD: 1.2 UNITS/HOUR
INSULIN ORDER, INSORD: 1.2 UNITS/HOUR
LOW TARGET, LOT: 100 MG/DL
MAGNESIUM SERPL-MCNC: 2.5 MG/DL (ref 1.6–2.4)
MCH RBC QN AUTO: 33.6 PG (ref 26–34)
MCHC RBC AUTO-ENTMCNC: 33.3 G/DL (ref 30–36.5)
MCV RBC AUTO: 100.7 FL (ref 80–99)
MINUTES UNTIL NEXT BG, NBG: 60 MIN
MULTIPLIER, MUL: 0
MULTIPLIER, MUL: 0.01
MULTIPLIER, MUL: 0.02
NRBC # BLD: 0 K/UL (ref 0–0.01)
NRBC BLD-RTO: 0 PER 100 WBC
ORDER INITIALS, ORDINIT: NORMAL
PLATELET # BLD AUTO: 165 K/UL (ref 150–400)
PMV BLD AUTO: 10.4 FL (ref 8.9–12.9)
POTASSIUM SERPL-SCNC: 3.7 MMOL/L (ref 3.5–5.1)
PROTHROMBIN TIME: 11.1 SEC (ref 9–11.1)
RBC # BLD AUTO: 2.95 M/UL (ref 4.1–5.7)
SERVICE CMNT-IMP: ABNORMAL
SERVICE CMNT-IMP: NORMAL
SODIUM SERPL-SCNC: 138 MMOL/L (ref 136–145)
SPECIMEN EXP DATE BLD: NORMAL
STATUS OF UNIT,%ST: NORMAL
STATUS OF UNIT,%ST: NORMAL
THERAPEUTIC RANGE,PTTT: ABNORMAL SECS (ref 58–77)
UNIT DIVISION, %UDIV: 0
UNIT DIVISION, %UDIV: 0
WBC # BLD AUTO: 8.3 K/UL (ref 4.1–11.1)

## 2022-05-05 PROCEDURE — 74011250637 HC RX REV CODE- 250/637: Performed by: PHYSICIAN ASSISTANT

## 2022-05-05 PROCEDURE — 74011250636 HC RX REV CODE- 250/636: Performed by: PHYSICIAN ASSISTANT

## 2022-05-05 PROCEDURE — 65270000046 HC RM TELEMETRY

## 2022-05-05 PROCEDURE — 74011000250 HC RX REV CODE- 250: Performed by: PHYSICIAN ASSISTANT

## 2022-05-05 PROCEDURE — 97116 GAIT TRAINING THERAPY: CPT

## 2022-05-05 PROCEDURE — 80048 BASIC METABOLIC PNL TOTAL CA: CPT

## 2022-05-05 PROCEDURE — 74011250637 HC RX REV CODE- 250/637: Performed by: NURSE PRACTITIONER

## 2022-05-05 PROCEDURE — 82962 GLUCOSE BLOOD TEST: CPT

## 2022-05-05 PROCEDURE — 74011250637 HC RX REV CODE- 250/637: Performed by: THORACIC SURGERY (CARDIOTHORACIC VASCULAR SURGERY)

## 2022-05-05 PROCEDURE — 97535 SELF CARE MNGMENT TRAINING: CPT

## 2022-05-05 PROCEDURE — 83735 ASSAY OF MAGNESIUM: CPT

## 2022-05-05 PROCEDURE — APPSS45 APP SPLIT SHARED TIME 31-45 MINUTES: Performed by: NURSE PRACTITIONER

## 2022-05-05 PROCEDURE — 85027 COMPLETE CBC AUTOMATED: CPT

## 2022-05-05 PROCEDURE — 36415 COLL VENOUS BLD VENIPUNCTURE: CPT

## 2022-05-05 PROCEDURE — 85730 THROMBOPLASTIN TIME PARTIAL: CPT

## 2022-05-05 PROCEDURE — 71045 X-RAY EXAM CHEST 1 VIEW: CPT

## 2022-05-05 PROCEDURE — 74011250636 HC RX REV CODE- 250/636: Performed by: THORACIC SURGERY (CARDIOTHORACIC VASCULAR SURGERY)

## 2022-05-05 PROCEDURE — 85610 PROTHROMBIN TIME: CPT

## 2022-05-05 PROCEDURE — 74011636637 HC RX REV CODE- 636/637: Performed by: THORACIC SURGERY (CARDIOTHORACIC VASCULAR SURGERY)

## 2022-05-05 RX ORDER — INSULIN GLARGINE 100 [IU]/ML
4 INJECTION, SOLUTION SUBCUTANEOUS ONCE
Status: COMPLETED | OUTPATIENT
Start: 2022-05-05 | End: 2022-05-05

## 2022-05-05 RX ORDER — WARFARIN 1 MG/1
4 TABLET ORAL ONCE
Status: COMPLETED | OUTPATIENT
Start: 2022-05-05 | End: 2022-05-05

## 2022-05-05 RX ORDER — ACETAMINOPHEN 500 MG
1000 TABLET ORAL EVERY 6 HOURS
Status: DISCONTINUED | OUTPATIENT
Start: 2022-05-05 | End: 2022-05-07 | Stop reason: HOSPADM

## 2022-05-05 RX ORDER — ENOXAPARIN SODIUM 100 MG/ML
80 INJECTION SUBCUTANEOUS EVERY 12 HOURS
Status: DISCONTINUED | OUTPATIENT
Start: 2022-05-05 | End: 2022-05-07 | Stop reason: HOSPADM

## 2022-05-05 RX ADMIN — KETOROLAC TROMETHAMINE 15 MG: 30 INJECTION, SOLUTION INTRAMUSCULAR at 06:10

## 2022-05-05 RX ADMIN — SENNOSIDES AND DOCUSATE SODIUM 1 TABLET: 50; 8.6 TABLET ORAL at 08:38

## 2022-05-05 RX ADMIN — WATER 2 G: 1 INJECTION INTRAMUSCULAR; INTRAVENOUS; SUBCUTANEOUS at 00:03

## 2022-05-05 RX ADMIN — MORPHINE SULFATE 2 MG: 2 INJECTION, SOLUTION INTRAMUSCULAR; INTRAVENOUS at 15:51

## 2022-05-05 RX ADMIN — CHLORHEXIDINE GLUCONATE 15 ML: 1.2 RINSE ORAL at 21:20

## 2022-05-05 RX ADMIN — OXYCODONE 10 MG: 5 TABLET ORAL at 06:37

## 2022-05-05 RX ADMIN — ACETAMINOPHEN 1000 MG: 500 TABLET ORAL at 11:30

## 2022-05-05 RX ADMIN — SENNOSIDES AND DOCUSATE SODIUM 1 TABLET: 50; 8.6 TABLET ORAL at 18:03

## 2022-05-05 RX ADMIN — ASPIRIN 81 MG: 81 TABLET, CHEWABLE ORAL at 08:38

## 2022-05-05 RX ADMIN — PANTOPRAZOLE SODIUM 40 MG: 40 TABLET, DELAYED RELEASE ORAL at 08:39

## 2022-05-05 RX ADMIN — OXYCODONE 10 MG: 5 TABLET ORAL at 11:30

## 2022-05-05 RX ADMIN — ENOXAPARIN SODIUM 80 MG: 100 INJECTION SUBCUTANEOUS at 21:20

## 2022-05-05 RX ADMIN — OXYCODONE 10 MG: 5 TABLET ORAL at 01:23

## 2022-05-05 RX ADMIN — KETOROLAC TROMETHAMINE 15 MG: 30 INJECTION, SOLUTION INTRAMUSCULAR at 00:03

## 2022-05-05 RX ADMIN — ACETAMINOPHEN 1000 MG: 500 TABLET ORAL at 18:03

## 2022-05-05 RX ADMIN — Medication 400 MG: at 18:02

## 2022-05-05 RX ADMIN — SODIUM CHLORIDE, PRESERVATIVE FREE 10 ML: 5 INJECTION INTRAVENOUS at 21:22

## 2022-05-05 RX ADMIN — MELATONIN 3 MG: at 21:20

## 2022-05-05 RX ADMIN — SODIUM CHLORIDE, PRESERVATIVE FREE 10 ML: 5 INJECTION INTRAVENOUS at 14:00

## 2022-05-05 RX ADMIN — ENOXAPARIN SODIUM 80 MG: 100 INJECTION SUBCUTANEOUS at 08:45

## 2022-05-05 RX ADMIN — MORPHINE SULFATE 2 MG: 2 INJECTION, SOLUTION INTRAMUSCULAR; INTRAVENOUS at 20:34

## 2022-05-05 RX ADMIN — MUPIROCIN: 20 OINTMENT TOPICAL at 08:38

## 2022-05-05 RX ADMIN — POLYETHYLENE GLYCOL 3350 17 G: 17 POWDER, FOR SOLUTION ORAL at 08:38

## 2022-05-05 RX ADMIN — WARFARIN SODIUM 4 MG: 1 TABLET ORAL at 18:02

## 2022-05-05 RX ADMIN — SODIUM CHLORIDE, PRESERVATIVE FREE 10 ML: 5 INJECTION INTRAVENOUS at 06:13

## 2022-05-05 RX ADMIN — ACETAMINOPHEN 1000 MG: 500 TABLET ORAL at 23:23

## 2022-05-05 RX ADMIN — INSULIN GLARGINE 4 UNITS: 100 INJECTION, SOLUTION SUBCUTANEOUS at 10:41

## 2022-05-05 RX ADMIN — Medication 400 MG: at 08:38

## 2022-05-05 RX ADMIN — MUPIROCIN: 20 OINTMENT TOPICAL at 21:22

## 2022-05-05 RX ADMIN — CHLORHEXIDINE GLUCONATE 15 ML: 1.2 RINSE ORAL at 08:45

## 2022-05-05 NOTE — PROGRESS NOTES
[facility] follow-up PCP transitional care appointment has been scheduled with Dr.Raquel Johnston on 5/9/22 at 1430. Pending patient discharge. Reyna Caba, Care Management Assistant     Attempted to schedule hospital follow up PCP appointment. Sent a message to PCP office to find patient an appointment. Awaiting callback from PCP office.  Latha Beauchamp

## 2022-05-05 NOTE — PROCEDURES
CTs discontinued without issues. Patient tolerated well. V wires removed with little resistance, A wires cut due to significant resistance met. Bedrest x 1 hour. RN in the room during the procedure.

## 2022-05-05 NOTE — PROGRESS NOTES
0732 Bedside and Verbal shift change report given to Anamaria Burks (oncoming nurse) by Joshua Marcano (offgoing nurse). Report included the following information SBAR, Kardex, ED Summary, OR Summary, Procedure Summary, Intake/Output, MAR, Recent Results and Cardiac Rhythm NSR.     0815 Patient ate breakfast in recliner, practiced morning arm exercises and achieved 750 mL on incentive spirometer. 0830 Cardiothoracic PA at bedside with patient - chest tubes and V pacer wire removed and A pacer wire cut. Currently, the patient is resting quietly with family at bedside, VSS, and no complaints of pain. The patient has been educated to stay in bed for 1 hour post chest tube removal.    0908 Lovenox administration education given. RN taught patient via teach back method and patient demonstrated understanding of how to administer medication at home. 7482 Patient walked the entire length of PCU hallway with PT, the patient tolerated activity well and VSS. 1010 Per protocol 48 hours after insulin drip was initiated the cumulative insulin gtt dose over past 6 hours has been added for the Lantus dose. 1012 A dose of 4 units of Lantus has been verified with Chela Contreras PharmD. 1014 Per protocol lantus dose of 4 units administered. 1210 Patient ate lunch in recliner and achieved 1000 mL on incentive spirometer. 1241 Per protocol insulin drip turned off 2 hours after administering the Lantus dose. 1426 Patient completed afternoon arm exercises and achieved 750 mL on incentive spirometer. 1558 Patient walked the entire length of the hallway and tolerated activity well. Patient back in recliner, VSS, and patient is resting quietly with no complaints of pain. 1730 Patient ate dinner in recliner and evening wound care completed     End of Shift Note    Bedside shift change report given to Maggie Knox (oncoming nurse) by Phyllis Griffith RN (offgoing nurse).   Report included the following information SBAR, Kardex, ED Summary, Procedure Summary, Intake/Output, MAR, Recent Results and Cardiac Rhythm NSR    Shift worked:  7am-7pm     Shift summary and any significant changes:     read note above      Concerns for physician to address:  n/a     Zone phone for oncoming shift:          Activity:  Activity Level: Up with Assistance,Chair  Number times ambulated in hallways past shift: 3  Number of times OOB to chair past shift: 3    Cardiac:   Cardiac Monitoring: Yes      Cardiac Rhythm: Sinus Rhythm    Access:   Current line(s): PIV     Genitourinary:   Urinary status: voiding    Respiratory:   O2 Device: None (Room air)  Chronic home O2 use?: NO  Incentive spirometer at bedside: YES  Actual Volume (ml): 750 ml    GI:  Last Bowel Movement Date: 05/02/22  Current diet:  ADULT DIET Regular  Passing flatus: YES  Tolerating current diet: YES       Pain Management:   Patient states pain is manageable on current regimen: YES    Skin:  Efrem Score: 18  Interventions: increase time out of bed, PT/OT consult, limit briefs and nutritional support     Patient Safety:  Fall Score:  Total Score: 2  Interventions: bed/chair alarm, assistive device (walker, cane, etc), gripper socks, pt to call before getting OOB and stay with me (per policy)       Length of Stay:  Expected LOS: 5d 21h  Actual LOS: 2      Janiya Candelaria RN

## 2022-05-05 NOTE — PROGRESS NOTES
[facility]  [address] [facility] [place], [address] [place] [address]  [phone]  [email or web address]. [employer]    Cardiology Progress Note      5/5/2022 926AM    Admit Date: 5/3/2022    Admit Diagnosis:   Aortic stenosis [I35.0]  AS (aortic stenosis) [I35.0]    Interval History/Subjective:     Topher Carrero is a 51 y.o. male s/p surgical aortic valve replacement    Regular cardiology patient of Dr. Edmond Han. Asked to follow patient while admitted. -VSS  -labs steady  -Mr. Robert Courser is feeling good. Chest tube gone this morning. Is out at PCU. Pain controlled. Breathing well, but ICS is still difficult for him. Answered questions that he and his partner had.       Visit Vitals  /71 (BP 1 Location: Right upper arm, BP Patient Position: At rest;Sitting)   Pulse 82   Temp 98.4 °F (36.9 °C)   Resp 20   Ht 6' (1.829 m)   Wt 79.2 kg (174 lb 9.7 oz)   SpO2 95%   BMI 23.68 kg/m²       Current Facility-Administered Medications   Medication Dose Route Frequency    enoxaparin (LOVENOX) injection 80 mg  80 mg SubCUTAneous Q12H    warfarin (COUMADIN) tablet 4 mg  4 mg Oral ONCE    pantoprazole (PROTONIX) tablet 40 mg  40 mg Oral ACB    sodium chloride (NS) flush 5-40 mL  5-40 mL IntraVENous Q8H    sodium chloride (NS) flush 5-40 mL  5-40 mL IntraVENous PRN    hydrALAZINE (APRESOLINE) 20 mg/mL injection 20 mg  20 mg IntraVENous Q6H PRN    WARFARIN INFORMATION NOTE (COUMADIN)   Other QPM    insulin regular (NOVOLIN R, HUMULIN R) 100 Units in 0.9% sodium chloride 100 mL infusion  1-10 Units/hr IntraVENous TITRATE    sodium chloride (NS) flush 5-40 mL  5-40 mL IntraVENous Q8H    sodium chloride (NS) flush 5-40 mL  5-40 mL IntraVENous Q8H    sodium chloride (NS) flush 5-40 mL  5-40 mL IntraVENous PRN    oxyCODONE IR (ROXICODONE) tablet 5 mg  5 mg Oral Q4H PRN    oxyCODONE IR (ROXICODONE) tablet 10 mg  10 mg Oral Q4H PRN    morphine injection 2-4 mg  2-4 mg IntraVENous Q2H PRN    naloxone [facility]) injection 0.4 mg  0.4 mg IntraVENous PRN    mupirocin (BACTROBAN) 2 % ointment   Both Nostrils BID    ondansetron (ZOFRAN) injection 4 mg  4 mg IntraVENous Q4H PRN    albuterol (PROVENTIL VENTOLIN) nebulizer solution 2.5 mg  2.5 mg Nebulization Q4H PRN    aspirin chewable tablet 81 mg  81 mg Oral DAILY    magnesium oxide (MAG-OX) tablet 400 mg  400 mg Oral BID    bisacodyL (DULCOLAX) suppository 10 mg  10 mg Rectal DAILY PRN    senna-docusate (PERICOLACE) 8.6-50 mg per tablet 1 Tablet  1 Tablet Oral BID    polyethylene glycol (MIRALAX) packet 17 g  17 g Oral DAILY    glucose chewable tablet 16 g  4 Tablet Oral PRN    glucagon (GLUCAGEN) injection 1 mg  1 mg IntraMUSCular PRN    insulin lispro (HUMALOG) injection   SubCUTAneous AC&HS    melatonin tablet 3-6 mg  3-6 mg Oral QHS PRN    diphenhydrAMINE (BENADRYL) capsule 25 mg  25 mg Oral QHS PRN    insulin lispro (HUMALOG) injection   SubCUTAneous TIDAC    chlorhexidine (PERIDEX) 0.12 % mouthwash 15 mL  15 mL Oral Q12H       Objective:      Physical Exam:  General Appearance:  pleasant, adult  male resting in bed in NAD  Chest:   Clear/dim; Shallow breaths  Cardiovascular:  Regular rate and rhythm, no murmur. valve click   Abdomen:   Soft, non-tender, bowel sounds are active. Extremities: palpable distal pulses; no edema  Skin:  Warm and dry. large amount of ecchymosis left radial site. Midline sternotomy well approximated. Data Review:   Recent Labs     05/05/22 0444 05/04/22  0433 05/03/22  1755 05/03/22  1235 05/03/22  1235   WBC 8.3 11.6*  --   --  9.7   HGB 9.9* 10.2* 10.8*   < > 11.8*   HCT 29.7* 30.6* 31.8*   < > 35.2*    171  --   --  145*    < > = values in this interval not displayed.      Recent Labs     05/05/22  0444 05/04/22  0433 05/03/22  1755 05/03/22  1235 05/03/22  1235    141 144   < > 145   K 3.7 4.1 4.3   < > 4.3    113* 114*   < > 118*   CO2 29 26 23   < > 26   GLU 89 120* 151*   < > 54*   BUN 19 17 18   < > 15   CREA 1.01 1.08 1.31*   < > 1.28   CA 8.6 8.4* 8.3*   < > 8.5   MG 2.5* 2.5* 2.6*   < > 3.3*   ALB  --  3.9 4.1  --  3.9   TBILI  --  0.6 0.6  --  0.7   ALT  --  22 25  --  24   INR 1.1  --   --   --  1.2*    < > = values in this interval not displayed. No results for input(s): TROIQ, CPK, CKMB in the last 72 hours. Intake/Output Summary (Last 24 hours) at 5/5/2022 0933  Last data filed at 5/5/2022 0732  Gross per 24 hour   Intake 1046.3 ml   Output 1190 ml   Net -143.7 ml        Telemetry: SR;   EKG: SB; RBBB  Cxray: \"Persistent low lung volumes with mild pulmonary edema. \"    Assessment:     Active Problems: Aortic stenosis (5/3/2022)      S/P AVR (5/3/2022)      Overview: AORTIC VALVE REPLACEMENT ( ONX-23 ) with Mechanical valve            AS (aortic stenosis) (5/3/2022)        Plan:     Bicuspid AV with severe AS s/p SAVR 5/3/22:  Recovering well. · Started Coumadin with goal INR 2-3 for 3 months and then 1.5-2.5 thereafter. Getting bridged with lovenox   · Chest tube removed this morning   · Annual CTAs planned for ascending aortic enlargement that wasn't significant enough to address during this surgery  · Encouraged incentive spirometer and activity as tolerated. Will sign off. Patient should follow up with Dr. Edmond Han out patient in ~1 month. Please call if any questions or concerns.        Janny Le, DERIK  DNP, RN, AGACNP-BC

## 2022-05-05 NOTE — PROGRESS NOTES
Problem: Mobility Impaired (Adult and Pediatric)  Goal: *Acute Goals and Plan of Care (Insert Text)  Description: FUNCTIONAL STATUS PRIOR TO ADMISSION: Patient was independent and active without use of DME.     HOME SUPPORT PRIOR TO ADMISSION: The patient lived with Lorin Mejia. Physical Therapy Goals  Initiated 5/4/2022  1. Patient will move from supine to sit and sit to supine  in bed with independence within 5 days. 2.  Patient will perform sit to/from stand with independence within 5 days. 3.  Patient will ambulate 400 feet with least restrictive assistive device and independence within 5 days. 4.  Patient will ascend/descend 17 stairs with 1 handrail(s) with modified independence within 5 days. 5.  Patient will perform cardiac exercises per protocol with independence within 5 days. 6.  Patient will verbally recall and functionally demonstrate mindful-based movements (\"move in the tube\") principles without cues within 5 days. Outcome: Progressing Towards Goal     PHYSICAL THERAPY TREATMENT  Patient: Salena Lares (49 y.o. male)  Date: 5/5/2022  Diagnosis: Aortic stenosis [I35.0]  AS (aortic stenosis) [I35.0] <principal problem not specified>  Procedure(s) (LRB):  AORTIC VALVE REPLACEMENT ( ONX-23 ) WITH EXTRA CORPOREAL CIRCULATION. SPRING AND EPIAORTIC ULTRASOUND DONE BY DR BRODERICK [facility] (N/A) 2 Days Post-Op  Precautions:    Chart, physical therapy assessment, plan of care and goals were reviewed. ASSESSMENT  Patient continues with skilled PT services and is progressing towards goals. Pt was received in supine on room air and cleared by nursing to mobilize. Vitals stable during session. He performed bed mobility with a few cues for technique. Stood and ambulated the branch without difficulty. Negotiated 15 steps without difficulty. Ambulated back down the entire branch and then left sitting up. He has met all PT goals and likely plan for discharge tomorrow.        Patient is demonstrating understanding of mindful-based movements (\"move in the tube\") principles of keeping UEs proximal to ribcage to prevent lateral pull on the sternum during load-bearing activities with verbal cues required for compliance. Current Level of Function Impacting Discharge (mobility/balance): SBA    Other factors to consider for discharge:          PLAN :  Patient continues to benefit from skilled intervention to address the above impairments. Continue treatment per established plan of care. to address goals. Recommendation for discharge: (in order for the patient to meet his/her long term goals)  OP cardiac when cleared by MD    This discharge recommendation:  Has not yet been discussed the attending provider and/or case management    IF patient discharges home will need the following DME: none       SUBJECTIVE:   Patient stated Trista Lam said I may go home tomorrow.     OBJECTIVE DATA SUMMARY:   Patient mobilized on continuous portable monitor/telemetry.   Critical Behavior:  Neurologic State: Alert  Orientation Level: Oriented X4  Cognition: Appropriate decision making,Appropriate safety awareness,Appropriate for age attention/concentration,Follows commands  Safety/Judgement: Awareness of environment    Functional Mobility Training:  Bed Mobility:  Rolling: Independent  Supine to Sit: Independent     Scooting: Independent          Transfers:  Sit to Stand: Supervision  Stand to Sit: Supervision                               Balance:  Sitting: Intact  Standing: Intact    Ambulation/Gait Training:  Distance (ft): 300 Feet (ft)     Ambulation - Level of Assistance: Stand-by assistance                       Speed/Carly: Slow  Step Length: Left shortened;Right shortened           Stairs:  Number of Stairs Trained: 15  Stairs - Level of Assistance: Contact guard assistance;Stand-by assistance  Rail Use: Right     Cardiac diagnosis intervention:  Patient instructed and educated on mindful movement principles based on Move in The Tube concept to include maintaining bilateral elbows close to rib cage when performing any load-bearing activity such as getting in/out of bed, pushing up from a chair, opening a door, or lifting a box. Patient was given a handout with diagrams of each correct/incorrect method of performing each of the above tasks. Therapeutic Exercises:   Patient instructed on the benefits and demonstrated cardiac exercises while sitting on his own. Instructed and indicated understanding on how to progress reps, sets against gravity, pacing through progressive muscle strengthening standing based on surgeon clearance for more weight in prep for functional activity. Instruction on the use of household items in place of weights as needed.      CARDIAC  EXERCISE   Sets   Reps   Active Active Assist   Passive Self ROM   Comments   Shoulder flexion 1 5 [x]                                            []                                            []                                            []                                               Shoulder abduction 1      5 [x]                                            []                                            []                                            []                                               Scapular elevation 1 5 [x]                                            []                                            []                                            []                                               Scapular retraction 1 5 [x]                                            []                                            []                                            []                                               Trunk rotation 1 5 [x]                                            []                                            []                                            [x]                                               Trunk sidebending 1 5 [x]                                            [] []                                            []                                                  []                                            []                                            []                                            []                                                   Pain Rating:  No complaints     Activity Tolerance:   Good    After treatment patient left in no apparent distress:   Sitting in chair and Call bell within reach    COMMUNICATION/COLLABORATION:   The patients plan of care was discussed with: Occupational therapist and Registered nurse.      Schuyler Hays, PT, DPT   Time Calculation: 18 mins

## 2022-05-05 NOTE — PROGRESS NOTES
Transition of Care Plan:     RUR: 7%  Disposition: Home with home health  Follow up appointments:PCP; Cardiologist  DME needed:None  Transportation at Discharge: Pt's  will transport at d/c   [place] or means to access home: Pt has access  IM Medicare Letter: N/A  Is patient a BCPI-A Bundle:  No        If yes, was Bundle Letter given?: N/A   Is patient a [place] and connected with the [place]? No        If yes, was [employer] transfer form completed and [facility] notified? N/A  Caregiver Contact:  Discharge Caregiver contacted prior to discharge? Caregiver to be contacted prior to discharge. Care Conference needed?: No    9:40am- CM met with pt and pt's  at bedside to discus d/c plan. CM discussed with pt and pt's  about home health. CM reviewed with pt the home health health. [facility]. Referral was sent to [facility] via [facility]. CM will continue to follow patient for discharge planning needs and arrange for services as deemed necessary.     Mitzi Christianson [facility]  [phone]

## 2022-05-05 NOTE — PROGRESS NOTES
Problem: Self Care Deficits Care Plan (Adult)  Goal: *Acute Goals and Plan of Care (Insert Text)  Description: FUNCTIONAL STATUS PRIOR TO ADMISSION: Patient was independent and active without use of DME. Reports he was driving and working full time as manager at [employer]. HOME SUPPORT: The patient lived with spouse. Spouse at bedside, reports he works from home and is able to provide assistance at home PRN. Occupational Therapy Goals  Initiated 5/4/2022  1. Patient will perform ADLs standing 5 mins without fatigue or LOB with supervision/set-up within 7 day(s). 2.  Patient will perform lower body ADLs with supervision/set-up within 7 day(s). 3.  Patient will perform gathering ADL items high and low 2/2 with supervision/set-up within 7 day(s). 4.  Patient will perform toilet transfers with supervision/set-up within 7 day(s). 5.  Patient will perform all aspects of toileting with supervision/set-up within 7 day(s). 6.  Patient will participate in cardiac/sternal upper extremity therapeutic exercise/activities to increase independence with ADLs with supervision/set-up for 5 minutes within 7 day(s). Outcome: Progressing Towards Goal    OCCUPATIONAL THERAPY TREATMENT  Patient: Salena Lares (89 y.o. male)  Date: 5/5/2022  Diagnosis: Aortic stenosis [I35.0]  AS (aortic stenosis) [I35.0] <principal problem not specified>  Procedure(s) (LRB):  AORTIC VALVE REPLACEMENT ( ONX-23 ) WITH EXTRA CORPOREAL CIRCULATION. SPRING AND EPIAORTIC ULTRASOUND DONE BY DR BRODERICK [facility] (N/A) 2 Days Post-Op  Precautions:    Chart, occupational therapy assessment, plan of care, and goals were reviewed. ASSESSMENT  Patient continues with skilled OT services and is progressing towards goals. Patient progressing very well toward set goals, able to complete LB dressing and UB dressing with excellent adherence to precautions.  Pt able to manage socks and underwear via tailor sit method, able to stand to manage over unilateral hips with good adherence with verbal cues. Following pt donned gown via jacket simulation, required physical and verbal cues to elevate BUEs overhead when managing through sleeves. Pt completing increased activity via hallway mobility and stair navigation with PT/OT, endorsing slight fatigue however good activity tolerance overall. Educated on energy conservation and rest break initiation, receptive. Pt spouse present for majority of session and indicated good understanding. Patient educated on sternal incision hygiene, agreeable to offered therapeutic shower. Plan to complete tomorrow AM as scheduling allows. Patient is verbalizing understanding of mindful-based movements (\"move in the tube\") principles of keeping UEs proximal to ribcage to prevent lateral pull on the sternum during load-bearing activities with verbal cues required for compliance. Current Level of Function Impacting Discharge (ADLs): SPV - INDP    Other factors to consider for discharge: below baseline, CABG          PLAN :  Patient continues to benefit from skilled intervention to address the above impairments. Continue treatment per established plan of care to address goals. Recommend with staff: up to chair daily     Recommend next OT session: therapeutic shower     Recommendation for discharge: (in order for the patient to meet his/her long term goals)  Occupational therapy at least 2 days/week in the home     This discharge recommendation:  Has been made in collaboration with the attending provider and/or case management    IF patient discharges home will need the following DME: none       SUBJECTIVE:   Patient stated I can do a shower tomorrow, that's good.     OBJECTIVE DATA SUMMARY:   Cognitive/Behavioral Status:  Neurologic State: Alert  Orientation Level: Oriented X4  Cognition: Appropriate decision making; Appropriate for age attention/concentration; Appropriate safety awareness; Follows commands             Functional Mobility and Transfers for ADLs:  Bed Mobility:  Rolling: Independent  Supine to Sit: Independent  Scooting: Independent    Transfers:  Sit to Stand: Supervision          Balance:  Sitting: Intact  Standing: Intact    ADL Intervention:          Upper Body Dressing Assistance  Shirt simulation with hospital gown: Supervision (hospital gown situation )  Cues: Verbal cues provided    Lower Body Dressing Assistance  Dressing Assistance: Supervision  Underpants: Supervision  Socks: Supervision  Cues: Verbal cues provided              Patient instructed and educated on mindful movement principles based on Move in The Tube concept to include maintaining bilateral elbows close to rib cage when performing any load-bearing activity such as getting in/out of bed, pushing up from a chair, opening a door, or lifting a box. Patient was given a handout with diagrams of each correct/incorrect method of performing each of the above tasks. Patient instructed on the ability to utilize upper extremities outside the tube when doing any non-load bearing activity such as washing hair/body, brushing teeth, retrieving clothing items, or scratching your back. Patient encouraged to also perform upper extremity exercises \"outside of the tube\" to prevent scar tissue formation around sternal incision site. Patient instructed in detail about activities to heed with caution, allowing pain to be the guide. These activities include but are not limited to: mowing the lawn, riding a bike, walking a dog, lifting a child, workshop hobbies, golfing, sexual activity, vacuuming, fishing, scrubbing the floors, and moving furniture. Patient was given the [address] in the [place] handout to describe each of these activities in detail. Patient instructed no asymmetrical reaching over head to ensure B UEs when shoulders >90* i.e. reaching in cabinets and dressing.  Instruction on upper body dressing techniques of over head, then arms through to decrease pain and unilateral shoulder flexion >90*. Instruction on the benefits of utilizing B UEs during functional tasks i.e. opening the fridge, stepping into the tub. Instruction if continued pain at home with shoulder IR for BM hygiene can use wet wipes and toilet tongs PRN. Avoid valsalva maneuvers. May have to adjust home setup to increase ease with items closer to waist height to prevent deep bending and the automatic  of asymmetrical UE WB/pushing for stabilization during bending. Benefit to don clothing tailor sitting and don all clothing while sitting prior to standing. Patient demonstrated lower body dressing with Supervision. Instruction and indicated understanding on the benefits of loose clothing throughout to accommodate for post surgical swelling, decreased ROM and increased pain. Instruction and indicated understanding the technique of pull over shirt versus front open clothing. Increase activity tolerance for home, work, and sexual intercourse by pacing self with increasing the arm exercises, sitting duration, frequency OOB, walking, standing, and ADLs. Instructed and indicated understanding of s/s of too much activity, how to respond to s/s safely. Pain:  Pt did not endorse pain during session     Activity Tolerance:   Good and SpO2 stable on RA    After treatment patient left in no apparent distress:   Sitting in chair, Call bell within reach, and Caregiver / family present    COMMUNICATION/COLLABORATION:   The patients plan of care was discussed with: Physical therapist, Occupational therapist, and Registered nurse.      Ilana Franks OT  Time Calculation: 20 mins

## 2022-05-05 NOTE — PROGRESS NOTES
Pharmacist Daily Dosing of Warfarin    Indication & Goal INR:   AVR with Harry mechanical valve on 5/3. INR Goal 2-3  and to reduce the goal range to 1.5 to 2 by August 1st 2022    PTA Warfarin Dose: new start    Notable concurrent conditions and medications: None    Labs:  Recent Labs     05/05/22  0444 05/04/22  0433 05/04/22  0433 05/03/22  1755 05/03/22  1755 05/03/22  1235 05/03/22  1235   INR 1.1  --   --   --   --   --  1.2*   HGB 9.9*   < > 10.2*   < > 10.8*   < > 11.8*     --  171  --   --   --  145*   TBILI  --   --  0.6  --  0.6  --  0.7   ALB  --   --  3.9   < > 4.1   < > 3.9    < > = values in this interval not displayed. Impression/Plan:   Will order warfarin 4 mg PO x 1 dose. Enoxaparin 80 mg BID  Daily INR has been ordered  CBC w/o differential every other day has been ordered     Pharmacy will follow daily and adjust the dose as appropriate.     Thank you,  Shilo Dawn, PHARMD      Warfarin Protocol    Located on pharmacy Teams site: Clinical Practice -> Anticoagulation & Cardiology -> Anticoagulation Policies, Protocols, Guidance

## 2022-05-05 NOTE — DIABETES MGMT
[address]     CLINICAL NURSE SPECIALIST CONSULT      Initial Presentation   Amado Alcazar is a 51 y.o. male admitted 5/3/22 for surgical intervention related to aortic stenosis. Pre-admission evaluation included cardiac cath, echo and CT chest.     HX:   Past Medical History (click to expand or collapse)[]Expand by Default        Past Medical History:   Diagnosis Date    Aortic stenosis      Bicuspid aortic valve      GERD (gastroesophageal reflux disease)      Hiatal hernia      Severe aortic stenosis 3/22/2022         INITIAL DX:   Aortic stenosis [I35.0]  AS (aortic stenosis) [I35.0]     TREATMENT:  AORTIC VALVE REPLACEMENT ( ONX-23 ) WITH EXTRA CORPOREAL CIRCULATION. SPRING AND EPIAORTIC ULTRASOUND DONE BY DR Tomas Vargasvard off GS today. Insulin needs trended down since surgery. Using 0.1-0.3 units/hour. As previously stated, patient will not require further intervention related to BG management. Signing off.     Silvia Yost DNP, RN, ACNS-BC, BC-ADM, [facility]  Clinical Nurse Specialist-Diabetes & Endocrine disorders    [facility] (In-patient CNS consult service)  [phone]

## 2022-05-05 NOTE — PROGRESS NOTES
Problem: Falls - Risk of  Goal: *Absence of Falls  Description: Document Wojciech Oakley Fall Risk and appropriate interventions in the flowsheet.   Outcome: Progressing Towards Goal  Note: Fall Risk Interventions:            Medication Interventions: Bed/chair exit alarm,Patient to call before getting OOB,Teach patient to arise slowly    Elimination Interventions: Bed/chair exit alarm,Call light in reach,Elevated toilet seat,Stay With Me (per policy),Toilet paper/wipes in reach,Toileting schedule/hourly rounds,Urinal in reach

## 2022-05-05 NOTE — PROGRESS NOTES
[facility] Progress Note    Admit Date: 5/3/2022  POD:  1 Day Post-Op    Procedure:  Procedure(s):  AORTIC VALVE REPLACEMENT ( ONX-23 ) WITH EXTRA CORPOREAL CIRCULATION. SPRING AND EPIAORTIC ULTRASOUND DONE BY DR BRODERICK [facility]        Subjective:   Pt seen with Dr. Kiya Garcia. Sitting up in chair. On insulin gtt. Up in chair. Complaining of some incisional pain. Objective:   Vitals:  Blood pressure 116/71, pulse 82, temperature 98.4 °F (36.9 °C), resp. rate 20, height 6' (1.829 m), weight 174 lb 9.7 oz (79.2 kg), SpO2 95 %. Temp (24hrs), Av.5 °F (36.9 °C), Min:98.4 °F (36.9 °C), Max:98.7 °F (37.1 °C)    EKG/Rhythm: NSR    Oxygen Therapy:  Oxygen Therapy  O2 Sat (%): 95 % (22 0732)  Pulse via Oximetry: 83 beats per minute (22 0732)  O2 Device: None (Room air) (22 0732)  O2 Flow Rate (L/min): 1 l/min (22 1653)  FIO2 (%): 50 % (22 1426)    CXR:   CXR Results  (Last 48 hours)               22 0518  XR CHEST PORT Final result    Impression:  1. Persistent bibasilar areas of atelectasis medially in each lower lobe. Narrative:  EXAM: XR CHEST PORT       INDICATION: postop heart surgery       COMPARISON: 2022       FINDINGS: A portable AP radiograph of the chest was obtained at 0501 hours. The   patient is on a cardiac monitor. Patient is status post median sternotomy there   is mild cardiomegaly. Mediastinal drain remains in place. [place]-Markell catheter has   been removed. The lungs demonstrate low lung volumes with bibasilar atelectasis right greater   than left which is unchanged. Mild edema pattern has decreased. No pneumothorax. 22 0516  XR CHEST PORT Final result    Impression:  Persistent low lung volumes with mild pulmonary edema.        Narrative:  INDICATION: Postop heart surgery       COMPARISON: 5/3/2022       FINDINGS: Single AP portable view of the chest obtained at 4:54 AM demonstrates   no change in position of the [place]-Markell catheter or mediastinal drain. The   endotracheal tube and nasogastric tube have been removed. The cardiomediastinal   silhouette is stable. The patient is status post median sternotomy. Lung volumes   remain low. Mild pulmonary edema persists. There is no new airspace disease. No   pneumothorax is seen. There is no evidence of pleural effusion. 05/03/22 1253  XR CHEST PORT Final result    Impression:      1. Support and surgical apparatus as detailed above. 2.  Mild pulmonary edema pattern. Narrative:  EXAM:  XR CHEST PORT       INDICATION: Postop heart       COMPARISON: Chest radiographs 4/20/2022       TECHNIQUE: Supine portable chest AP view       FINDINGS:        ET tube terminates over the mid intrathoracic trachea. Median sternotomy wires. Prosthetic cardiac valve. NG tube terminates over the gastric fundus. Several   subxiphoid approach thoracic drains. Right jugular approach [place]-Markell catheter   terminates over the region of the main pulmonary artery. ? Epidural catheter. Multiple overlying electrodes. Cardiac mediastinal silhouette is upper limits of normal in size. Pulmonary   vascular congestion and possible mild edema pattern. No focal consolidation. Pleural spaces grossly clear. Admission Weight: Last Weight   Weight: 168 lb 6.9 oz (76.4 kg) (WT FROM 4/20 TO ENTER CABG ORDERS ) Weight: 174 lb 9.7 oz (79.2 kg)     Intake / Output / Drain:  Current Shift: No intake/output data recorded. Last 24 hrs.:     Intake/Output Summary (Last 24 hours) at 5/5/2022 1053  Last data filed at 5/5/2022 0732  Gross per 24 hour   Intake 1046.3 ml   Output 1190 ml   Net -143.7 ml       EXAM:  General:  No acute distress                   Lungs:   Clear to auscultation bilaterally. Incision:  Prineo intact   Heart:  Regular rate and rhythm, S1, S2 normal, no murmur, click, rub or gallop. Abdomen:   Soft, non-tender. Bowel sounds hypoactive. No masses,  No organomegaly.    Extremities:  No edema. PPP. Neurologic:  Gross motor and sensory apparatus intact. Labs:   Recent Labs     22  1008 22  0603 22  0444   WBC  --   --  8.3   HGB  --   --  9.9*   HCT  --   --  29.7*   PLT  --   --  165   NA  --   --  138   K  --   --  3.7   BUN  --   --  19   CREA  --   --  1.01   GLU  --   --  89   GLUCPOC 142*   < >  --    INR  --   --  1.1    < > = values in this interval not displayed. Assessment:     Active Problems: Aortic stenosis (5/3/2022)      S/P AVR (5/3/2022)      Overview: AORTIC VALVE REPLACEMENT ( ONX- ) with Mechanical valve            AS (aortic stenosis) (5/3/2022)         Plan/Recommendations/Medical Decision Makin. Bicuspid AV stenosis s/p AVR with #23mm On-X mechanical valve: Cont coumadin, start lovenox bridge. INR goal 2-3 x 3 months and then 1.5-2.5 thereafter. 2. Ascending aortic enlargement: Recommend Annual CTA's for monitoring as outpatient. 3. Acute respiratory insufficiency: Resolved, on RA  4. Pain management: Scheduled tylenol. Creatinine 1.08. Completed toradol. Oxy PRN  5. Hiatal hernia: On protonix  6. RBBB: avoid amio, monitor rhythm    Dispo: PT/OT. Possibly home tomorrow with [place]. Will need INR checks.      Signed By: Delta Lawrence NP

## 2022-05-06 ENCOUNTER — TRANSCRIBE ORDER (OUTPATIENT)
Dept: CARDIOTHORACIC SURGERY | Age: 47
End: 2022-05-06

## 2022-05-06 ENCOUNTER — APPOINTMENT (OUTPATIENT)
Dept: GENERAL RADIOLOGY | Age: 47
DRG: 220 | End: 2022-05-06
Attending: PHYSICIAN ASSISTANT
Payer: COMMERCIAL

## 2022-05-06 ENCOUNTER — TRANSCRIBE ORDER (OUTPATIENT)
Dept: CARDIAC REHAB | Age: 47
End: 2022-05-06

## 2022-05-06 DIAGNOSIS — Z95.2 STATUS POST HEART VALVE REPLACEMENT WITH MECHANICAL VALVE: Primary | ICD-10-CM

## 2022-05-06 DIAGNOSIS — Z95.2 STATUS POST AORTIC VALVE REPLACEMENT: Primary | ICD-10-CM

## 2022-05-06 LAB
ANION GAP SERPL CALC-SCNC: 4 MMOL/L (ref 5–15)
BUN SERPL-MCNC: 20 MG/DL (ref 6–20)
BUN/CREAT SERPL: 20 (ref 12–20)
CALCIUM SERPL-MCNC: 8.7 MG/DL (ref 8.5–10.1)
CHLORIDE SERPL-SCNC: 104 MMOL/L (ref 97–108)
CO2 SERPL-SCNC: 28 MMOL/L (ref 21–32)
CREAT SERPL-MCNC: 0.99 MG/DL (ref 0.7–1.3)
ERYTHROCYTE [DISTWIDTH] IN BLOOD BY AUTOMATED COUNT: 14.2 % (ref 11.5–14.5)
GLUCOSE BLD STRIP.AUTO-MCNC: 114 MG/DL (ref 65–117)
GLUCOSE BLD STRIP.AUTO-MCNC: 119 MG/DL (ref 65–117)
GLUCOSE BLD STRIP.AUTO-MCNC: 123 MG/DL (ref 65–117)
GLUCOSE BLD STRIP.AUTO-MCNC: 133 MG/DL (ref 65–117)
GLUCOSE SERPL-MCNC: 127 MG/DL (ref 65–100)
HCT VFR BLD AUTO: 28.3 % (ref 36.6–50.3)
HGB BLD-MCNC: 9.2 G/DL (ref 12.1–17)
INR PPP: 1.3 (ref 0.9–1.1)
MAGNESIUM SERPL-MCNC: 2.3 MG/DL (ref 1.6–2.4)
MCH RBC QN AUTO: 33 PG (ref 26–34)
MCHC RBC AUTO-ENTMCNC: 32.5 G/DL (ref 30–36.5)
MCV RBC AUTO: 101.4 FL (ref 80–99)
NRBC # BLD: 0 K/UL (ref 0–0.01)
NRBC BLD-RTO: 0 PER 100 WBC
PLATELET # BLD AUTO: 159 K/UL (ref 150–400)
PMV BLD AUTO: 10.3 FL (ref 8.9–12.9)
POTASSIUM SERPL-SCNC: 4 MMOL/L (ref 3.5–5.1)
PROTHROMBIN TIME: 13.2 SEC (ref 9–11.1)
RBC # BLD AUTO: 2.79 M/UL (ref 4.1–5.7)
SERVICE CMNT-IMP: ABNORMAL
SERVICE CMNT-IMP: NORMAL
SODIUM SERPL-SCNC: 136 MMOL/L (ref 136–145)
WBC # BLD AUTO: 6 K/UL (ref 4.1–11.1)

## 2022-05-06 PROCEDURE — 82962 GLUCOSE BLOOD TEST: CPT

## 2022-05-06 PROCEDURE — 74011250637 HC RX REV CODE- 250/637: Performed by: PHYSICIAN ASSISTANT

## 2022-05-06 PROCEDURE — 74011250637 HC RX REV CODE- 250/637: Performed by: NURSE PRACTITIONER

## 2022-05-06 PROCEDURE — 74011250636 HC RX REV CODE- 250/636: Performed by: THORACIC SURGERY (CARDIOTHORACIC VASCULAR SURGERY)

## 2022-05-06 PROCEDURE — 97535 SELF CARE MNGMENT TRAINING: CPT

## 2022-05-06 PROCEDURE — 65270000046 HC RM TELEMETRY

## 2022-05-06 PROCEDURE — 80048 BASIC METABOLIC PNL TOTAL CA: CPT

## 2022-05-06 PROCEDURE — 36415 COLL VENOUS BLD VENIPUNCTURE: CPT

## 2022-05-06 PROCEDURE — 83735 ASSAY OF MAGNESIUM: CPT

## 2022-05-06 PROCEDURE — 85027 COMPLETE CBC AUTOMATED: CPT

## 2022-05-06 PROCEDURE — 85610 PROTHROMBIN TIME: CPT

## 2022-05-06 PROCEDURE — 97530 THERAPEUTIC ACTIVITIES: CPT

## 2022-05-06 PROCEDURE — 71045 X-RAY EXAM CHEST 1 VIEW: CPT

## 2022-05-06 PROCEDURE — 74011000250 HC RX REV CODE- 250: Performed by: PHYSICIAN ASSISTANT

## 2022-05-06 PROCEDURE — 97116 GAIT TRAINING THERAPY: CPT

## 2022-05-06 RX ORDER — METOPROLOL TARTRATE 25 MG/1
12.5 TABLET, FILM COATED ORAL EVERY 12 HOURS
Qty: 30 TABLET | Refills: 1 | Status: SHIPPED | OUTPATIENT
Start: 2022-05-06 | End: 2022-09-02 | Stop reason: SDUPTHER

## 2022-05-06 RX ORDER — POLYETHYLENE GLYCOL 3350 17 G/17G
17 POWDER, FOR SOLUTION ORAL DAILY
Qty: 14 PACKET | Refills: 0 | Status: SHIPPED | OUTPATIENT
Start: 2022-05-07 | End: 2022-05-31

## 2022-05-06 RX ORDER — AMOXICILLIN 250 MG
1 CAPSULE ORAL 2 TIMES DAILY
Qty: 60 TABLET | Refills: 0 | Status: SHIPPED | OUTPATIENT
Start: 2022-05-06 | End: 2022-05-31

## 2022-05-06 RX ORDER — ENOXAPARIN SODIUM 100 MG/ML
80 INJECTION SUBCUTANEOUS EVERY 12 HOURS
Qty: 10 EACH | Refills: 0 | Status: SHIPPED | OUTPATIENT
Start: 2022-05-06 | End: 2022-05-11

## 2022-05-06 RX ORDER — OXYCODONE HYDROCHLORIDE 5 MG/1
5 TABLET ORAL
Qty: 25 TABLET | Refills: 0 | Status: SHIPPED | OUTPATIENT
Start: 2022-05-06 | End: 2022-05-11

## 2022-05-06 RX ORDER — METOPROLOL TARTRATE 25 MG/1
12.5 TABLET, FILM COATED ORAL EVERY 12 HOURS
Status: DISCONTINUED | OUTPATIENT
Start: 2022-05-06 | End: 2022-05-07 | Stop reason: HOSPADM

## 2022-05-06 RX ORDER — GUAIFENESIN 100 MG/5ML
81 LIQUID (ML) ORAL DAILY
Qty: 30 TABLET | Refills: 1 | Status: SHIPPED | OUTPATIENT
Start: 2022-05-07

## 2022-05-06 RX ADMIN — WARFARIN SODIUM 3.5 MG: 2.5 TABLET ORAL at 17:53

## 2022-05-06 RX ADMIN — POLYETHYLENE GLYCOL 3350 17 G: 17 POWDER, FOR SOLUTION ORAL at 08:29

## 2022-05-06 RX ADMIN — ENOXAPARIN SODIUM 80 MG: 100 INJECTION SUBCUTANEOUS at 08:29

## 2022-05-06 RX ADMIN — SENNOSIDES AND DOCUSATE SODIUM 1 TABLET: 50; 8.6 TABLET ORAL at 08:29

## 2022-05-06 RX ADMIN — ACETAMINOPHEN 1000 MG: 500 TABLET ORAL at 17:54

## 2022-05-06 RX ADMIN — ASPIRIN 81 MG: 81 TABLET, CHEWABLE ORAL at 08:28

## 2022-05-06 RX ADMIN — OXYCODONE 10 MG: 5 TABLET ORAL at 17:54

## 2022-05-06 RX ADMIN — OXYCODONE 10 MG: 5 TABLET ORAL at 21:57

## 2022-05-06 RX ADMIN — OXYCODONE 10 MG: 5 TABLET ORAL at 04:57

## 2022-05-06 RX ADMIN — MELATONIN 6 MG: at 21:39

## 2022-05-06 RX ADMIN — METOPROLOL TARTRATE 12.5 MG: 25 TABLET, FILM COATED ORAL at 11:51

## 2022-05-06 RX ADMIN — OXYCODONE 10 MG: 5 TABLET ORAL at 13:28

## 2022-05-06 RX ADMIN — ACETAMINOPHEN 1000 MG: 500 TABLET ORAL at 05:28

## 2022-05-06 RX ADMIN — SODIUM CHLORIDE, PRESERVATIVE FREE 10 ML: 5 INJECTION INTRAVENOUS at 05:28

## 2022-05-06 RX ADMIN — SODIUM CHLORIDE, PRESERVATIVE FREE 10 ML: 5 INJECTION INTRAVENOUS at 21:40

## 2022-05-06 RX ADMIN — CHLORHEXIDINE GLUCONATE 15 ML: 1.2 RINSE ORAL at 21:55

## 2022-05-06 RX ADMIN — OXYCODONE 10 MG: 5 TABLET ORAL at 08:28

## 2022-05-06 RX ADMIN — ACETAMINOPHEN 1000 MG: 500 TABLET ORAL at 11:52

## 2022-05-06 RX ADMIN — CHLORHEXIDINE GLUCONATE 15 ML: 1.2 RINSE ORAL at 08:30

## 2022-05-06 RX ADMIN — MUPIROCIN: 20 OINTMENT TOPICAL at 08:30

## 2022-05-06 RX ADMIN — Medication 400 MG: at 08:29

## 2022-05-06 RX ADMIN — SODIUM CHLORIDE, PRESERVATIVE FREE 10 ML: 5 INJECTION INTRAVENOUS at 14:00

## 2022-05-06 RX ADMIN — METOPROLOL TARTRATE 12.5 MG: 25 TABLET, FILM COATED ORAL at 21:39

## 2022-05-06 RX ADMIN — PANTOPRAZOLE SODIUM 40 MG: 40 TABLET, DELAYED RELEASE ORAL at 08:29

## 2022-05-06 RX ADMIN — ENOXAPARIN SODIUM 80 MG: 100 INJECTION SUBCUTANEOUS at 21:39

## 2022-05-06 RX ADMIN — Medication 400 MG: at 17:54

## 2022-05-06 RX ADMIN — MUPIROCIN: 20 OINTMENT TOPICAL at 21:55

## 2022-05-06 NOTE — PROGRESS NOTES
0719 Bedside and Verbal shift change report given to Anamaria Burks (oncoming nurse) by Jazmine Madison (offgoing nurse). Report included the following information SBAR, Kardex, ED Summary, Procedure Summary, Intake/Output, MAR, Recent Results and Cardiac Rhythm NSR.    0810 Patient ate breakfast in recliner, completed morning arm exercises, and achieved 1000 mL on the incentive spirometer. 1134 OT at bedside with patient - patient completed shower and was able to tolerate standing at the sink for 5 minutes     1215 Patient ate lunch in recliner, practiced afternoon arm exercises, and achieved 1000 mL on the incentive spirometer     1348 Patient walked the entire length of the hallway and tolerated activity well and no complaints of SOB. 1432 PT at bedside with patient     1534 Patient achieved 1000 mL on incentive spirometer     1720 Patient walked the entire length of the hallway and tolerated activity well. 1815 Patient ate dinner in recliner   End of Shift Note    Bedside shift change report given to SHARONDA Ludwig (oncoming nurse) by Vera Mcmahan RN (offgoing nurse). Report included the following information SBAR, Kardex, ED Summary, Procedure Summary, Intake/Output, MAR, Recent Results and Cardiac Rhythm NSR    Shift worked:  7am - 7pm      Shift summary and any significant changes:     read note above      Concerns for physician to address:  n/a      Zone phone for oncoming shift:          Activity:  Activity Level:  Up with Assistance  Number times ambulated in hallways past shift: 3  Number of times OOB to chair past shift: 3    Cardiac:   Cardiac Monitoring: Yes      Cardiac Rhythm: Sinus Rhythm    Access:   Current line(s): PIV     Genitourinary:   Urinary status: voiding    Respiratory:   O2 Device: None (Room air)  Chronic home O2 use?: NO  Incentive spirometer at bedside: YES  Actual Volume (ml): 1000 ml    GI:  Last Bowel Movement Date: 05/06/22  Current diet:  ADULT DIET Regular  Passing flatus: YES  Tolerating current diet: YES       Pain Management:   Patient states pain is manageable on current regimen: YES    Skin:  Efrem Score: 19  Interventions: increase time out of bed, PT/OT consult, limit briefs and nutritional support     Patient Safety:  Fall Score:  Total Score: 2  Interventions: bed/chair alarm, assistive device (walker, cane, etc), gripper socks, pt to call before getting OOB and stay with me (per policy)       Length of Stay:  Expected LOS: 5d 21h  Actual LOS: 3      Elizabeth Pichardo RN

## 2022-05-06 NOTE — PROGRESS NOTES
[facility] Progress Note    Admit Date: 5/3/2022  POD:  3 Day Post-Op    Procedure:  Procedure(s):  AORTIC VALVE REPLACEMENT ( ONX-23 ) WITH EXTRA CORPOREAL CIRCULATION. SPRING AND EPIAORTIC ULTRASOUND DONE BY DR BRODERICK [facility]        Subjective:   Pt seen with Dr. Holli Chaparro. Afebrile, on RA. In bed. Did not sleep well and not feeling as well today. Objective:   Vitals:  Blood pressure 128/88, pulse 80, temperature 98.4 °F (36.9 °C), resp. rate 18, height 6' (1.829 m), weight 174 lb 9.7 oz (79.2 kg), SpO2 97 %. Temp (24hrs), Av.7 °F (37.1 °C), Min:98.4 °F (36.9 °C), Max:99 °F (37.2 °C)    EKG/Rhythm: NSR    Oxygen Therapy:  Oxygen Therapy  O2 Sat (%): 97 % (22 07)  Pulse via Oximetry: 80 beats per minute (22 07)  O2 Device: None (Room air) (22 2322)  O2 Flow Rate (L/min): 1 l/min (22 1653)  FIO2 (%): 50 % (22 1426)    CXR:   CXR Results  (Last 48 hours)               22 0541  XR CHEST PORT Final result    Impression:  No acute infiltrate. Minimal vascular prominence has improved. Narrative:  Clinical indication: Postop heart. Portable AP semiupright view of the chest obtained, comparison May 5, 2022. The   inspiration is shallow. There is no focal infiltrate. The heart size is stable. 22 0518  XR CHEST PORT Final result    Impression:  1. Persistent bibasilar areas of atelectasis medially in each lower lobe. Narrative:  EXAM: XR CHEST PORT       INDICATION: postop heart surgery       COMPARISON: 2022       FINDINGS: A portable AP radiograph of the chest was obtained at 0501 hours. The   patient is on a cardiac monitor. Patient is status post median sternotomy there   is mild cardiomegaly. Mediastinal drain remains in place. [place]-Markell catheter has   been removed. The lungs demonstrate low lung volumes with bibasilar atelectasis right greater   than left which is unchanged. Mild edema pattern has decreased. No pneumothorax. Admission Weight: Last Weight   Weight: 168 lb 6.9 oz (76.4 kg) (WT FROM  TO ENTER CABG ORDERS ) Weight: 174 lb 9.7 oz (79.2 kg)     Intake / Output / Drain:  Current Shift: No intake/output data recorded. Last 24 hrs.:     Intake/Output Summary (Last 24 hours) at 2022 0945  Last data filed at 2022 0445  Gross per 24 hour   Intake 730.9 ml   Output 1520 ml   Net -789.1 ml       EXAM:  General:  No acute distress                   Lungs:   Clear to auscultation bilaterally. Incision:  Prineo intact   Heart:  Regular rate and rhythm, S1, S2 normal, no murmur, click, rub or gallop. Abdomen:   Soft, non-tender. Bowel sounds hypoactive. No masses,  No organomegaly. Extremities:  No edema. PPP. Neurologic:  Gross motor and sensory apparatus intact. Labs:   Recent Labs     22  0730 22  0409 22  0408 22  2111 22  0444   WBC  --   --  6.0  --   --    HGB  --   --  9.2*  --   --    HCT  --   --  28.3*  --   --    PLT  --   --  159  --   --    NA  --   --  136  --   --    K  --   --  4.0  --   --    BUN  --   --  20  --   --    CREA  --   --  0.99  --   --    GLU  --   --  127*  --   --    GLUCPOC 119*  --   --    < >  --    INR  --  1.3*  --   --    < >    < > = values in this interval not displayed. Assessment:     Active Problems: Aortic stenosis (5/3/2022)      S/P AVR (5/3/2022)      Overview: AORTIC VALVE REPLACEMENT ( ONX-23 ) with Mechanical valve            AS (aortic stenosis) (5/3/2022)         Plan/Recommendations/Medical Decision Makin. Bicuspid AV stenosis s/p AVR with #23mm On-X mechanical valve: Cont coumadin, lovenox bridge. INR goal 2-3 x 3 months and then 1.5-2.5 thereafter. Start BB for HR 90s per [place]ot[place]. 2. Ascending aortic enlargement: Recommend Annual CTA's for monitoring as outpatient. 3. Acute respiratory insufficiency: Resolved, on RA  4. Pain management: Scheduled tylenol. Completed toradol. Oxy PRN  5.  Hiatal hernia: On protonix  6. RBBB: avoid amio, monitor rhythm    Dispo: PT/OT. Possibly home tomorrow with [facility]. Will need INR checks.      Signed By: Thuan Okeefe NP

## 2022-05-06 NOTE — PROGRESS NOTES
Problem: Mobility Impaired (Adult and Pediatric)  Goal: *Acute Goals and Plan of Care (Insert Text)  Description: FUNCTIONAL STATUS PRIOR TO ADMISSION: Patient was independent and active without use of DME.     HOME SUPPORT PRIOR TO ADMISSION: The patient lived with Aleksandar Hitchcock. Physical Therapy Goals  Initiated 5/4/2022  1. Patient will move from supine to sit and sit to supine  in bed with independence within 5 days. Met 5/6/22.  2.  Patient will perform sit to/from stand with independence within 5 days. Met 5/6/22.  3.  Patient will ambulate 400 feet with least restrictive assistive device and independence within 5 days. Met 5/6/22.  4.  Patient will ascend/descend 17 stairs with 1 handrail(s) with modified independence within 5 days. Met 5/6/22.  5.  Patient will perform cardiac exercises per protocol with independence within 5 days. Met 5/6/22.  6.  Patient will verbally recall and functionally demonstrate mindful-based movements (\"move in the tube\") principles without cues within 5 days. Met 5/6/22. Outcome: Resolved/Met  PHYSICAL THERAPY TREATMENT/DISCHARGE  Patient: Stef Conrad (89 y.o. male)  Date: 5/6/2022  Diagnosis: Aortic stenosis [I35.0]  AS (aortic stenosis) [I35.0] <principal problem not specified>  Procedure(s) (LRB):  AORTIC VALVE REPLACEMENT ( ONX-23 ) WITH EXTRA CORPOREAL CIRCULATION. SPRING AND EPIAORTIC ULTRASOUND DONE BY DR BRODERICK [facility] (N/A) 3 Days Post-Op  Precautions: Sternal (move in the tube precautions)  Chart, physical therapy assessment, plan of care and goals were reviewed. ASSESSMENT  Patient continues with skilled PT services and is progressing towards goals. Patient now demonstrates independent skills with bed mobility, transfers and ambulation including stairs. He is compliant with cardiac exercises and move in the tube precautions. He is ambulating in the halls with staff and family 3 x daily without issues. No further need for skilled acute care services needed.  Will sign off.    Patient is verbalizing understanding of mindful-based movements (\"move in the tube\") principles of keeping UEs proximal to ribcage to prevent lateral pull on the sternum during load-bearing activities with no cues required for compliance. Current Level of Function (mobility/balance): independent supine to sit, sit to stand, bed to chair and ambulation for 600 feet; modified independent up/down 25 steps with right rail. Other factors to consider for discharge: supportive spouse/significant other; independent and active PLOF; all PT goals met or exceeded. PLAN :  Patient will be discharged from acute skilled physical therapy at this time. Rationale for discharge:  Goals achieved    Recommendation for discharge: (in order for the patient to meet his/her long term goals)  No skilled physical therapy/ follow up rehabilitation needs identified at this time. Outpatient cardiac rehab per MD guidence    This discharge recommendation:  Has been made in collaboration with the attending provider and/or case management    IF patient discharges home will need the following DME: none       SUBJECTIVE:   Patient stated  I feel much better today. Looking forward to going home tomorrow.     OBJECTIVE DATA SUMMARY:   Patient mobilized on continuous portable monitor/telemetry.   Critical Behavior:  Neurologic State: Alert  Orientation Level: Oriented X4  Cognition: Appropriate decision making,Appropriate for age attention/concentration,Appropriate safety awareness,Follows commands  Safety/Judgement: Awareness of environment,Fall prevention,Good awareness of safety precautions    Functional Mobility Training:  Bed Mobility:  Rolling:  (up in chair when PT arrived)  Supine to Sit: Independent  Sit to Supine: Independent  Scooting: Independent          Transfers:  Sit to Stand: Independent  Stand to Sit: Independent        Bed to Chair: Independent                      Balance:  Sitting: Intact  Standing: Intact    Ambulation/Gait Training:  Distance (ft): 600 Feet (ft)  Assistive Device: Gait belt  Ambulation - Level of Assistance: Independent     Gait Description (WDL): Exceptions to WDL  Gait Abnormalities: Altered arm swing (mildly decreased arm swing)              Speed/Carly: Pace decreased (<100 feet/min)           Interventions:  (self pacing)        Stairs:  Number of Stairs Trained: 25  Stairs - Level of Assistance: Modified independent  Rail Use: Right     Cardiac diagnosis intervention:  Patient instructed and educated on mindful movement principles based on Move in The [place]ha concept to include maintaining bilateral elbows close to rib cage when performing any load-bearing activity such as getting in/out of bed, pushing up from a chair, opening a door, or lifting a box. Patient was given a handout with diagrams of each correct/incorrect method of performing each of the above tasks. Therapeutic Exercises:   Patient instructed on the benefits and demonstrated cardiac exercises while seated with Independent. Instructed and indicated understanding on how to progress reps, sets against gravity, pacing through progressive muscle strengthening standing based on surgeon clearance for more weight in prep for functional activity. Instruction on the use of household items in place of weights as needed.      CARDIAC  EXERCISE   Sets   Reps   Active Active Assist   Passive Self ROM   Comments   Shoulder flexion 1 5 [x]                                            []                                            []                                            []                                               Shoulder abduction 1      5 [x]                                            []                                            []                                            []                                               Scapular elevation 1 5 [x]                                            [] []                                            []                                               Scapular retraction 1 5 [x]                                            []                                            []                                            []                                               Trunk rotation 1 5 [x]                                            []                                            []                                            []                                               Trunk sidebending 1 5 [x]                                            []                                            []                                            []                                                  []                                            []                                            []                                            []                                                   Pain Rating:  Intermittent sternal incisional pain. None at this time. Activity Tolerance:   Good and SpO2 stable on RA    After treatment patient left in no apparent distress:   Sitting in chair, Call bell within reach and Caregiver / family present    COMMUNICATION/COLLABORATION:   The patients plan of care was discussed with: Occupational therapist, Registered nurse and Case management.      George Noel PT   Time Calculation: 33 mins

## 2022-05-06 NOTE — PROGRESS NOTES
Problem: Falls - Risk of  Goal: *Absence of Falls  Description: Document [place] Fall Risk and appropriate interventions in the flowsheet. Outcome: Progressing Towards Goal  Note: Fall Risk Interventions:    Problem: Pressure Injury - Risk of  Goal: *Prevention of pressure injury  Description: Document Efrem Scale and appropriate interventions in the flowsheet.   Outcome: Progressing Towards Goal  Note: Pressure Injury Interventions:  Sensory Interventions: Assess changes in LOC,Assess need for specialty bed,Avoid rigorous massage over bony prominences,Keep linens dry and wrinkle-free,Maintain/enhance activity level,Minimize linen layers         Activity Interventions: Assess need for specialty bed,PT/OT evaluation,Increase time out of bed    Mobility Interventions: Assess need for specialty bed,HOB 30 degrees or less,PT/OT evaluation    Nutrition Interventions: Document food/fluid/supplement intake,Offer support with meals,snacks and hydration,Discuss nutritional consult with provider    Friction and Shear Interventions: HOB 30 degrees or less,Lift sheet,Minimize layers                  Medication Interventions: Bed/chair exit alarm,Patient to call before getting OOB,Teach patient to arise slowly    Elimination Interventions: Bed/chair exit alarm,Call light in reach,Patient to call for help with toileting needs,Stay With Me (per policy),Toilet paper/wipes in reach,Toileting schedule/hourly rounds

## 2022-05-06 NOTE — CARDIO/PULMONARY
Cardiac Rehab Note: chart review/referral      Consult has been acknowledged      AVR 5/3/22     EF 55-60  on 2/17/22 per echo     Smoking history assessed. Patient is a former smoker. Smoking Cessation Program link has not been added to the AVS.       Valve educational folder with \"Cardiac Surgery Post-Op Instructions\" book, heart healthy eating, warning signs, heart facts, heart aware, resources, and Valve Surgery booklet to Melony Abraham from [facility]    Educated using teach back method. Reviewed the use of bear for sternal support, daily weight and temperature monitoring, showering restrictions, signs and symptoms of infection at surgery sites, daily walking and arm exercises, and use of incentive spirometer. Reviewed risk factors for CAD to include the following: family history, elevated BMI, hyperlipidemia, hypertension, diabetes, stress, and smoking. Discussed Heart Healthy/Low Sodium (less than 2000 mg.) diet. Emphasized the value of cardiac rehab. Discussed Cardiac Rehab Program format, benefits, and encouraged enrollment to assist with risk modification and management. Initial Cardiac Rehab Program intake appointment scheduled for 5/23/22 and appointment information is on the AVS. Patient provided orientation packet, instructed to complete packet a couple days prior to appointment, wear gym appropriate clothing and shoes, and bring current list of medications. Patient is to call if unable to keep appointment, need to reschedule or additional questions. Melony Abraham verbalized understanding with questions answered. [facility] will continue to follow for educational needs.

## 2022-05-06 NOTE — PROGRESS NOTES
Called patient and informed her of advice per MD below.  She verbalized understanding and thanked caller.    Pharmacist Daily Dosing of Warfarin    Indication & Goal INR:   AVR with mechanical valve on 5/3. INR Goal 2-3  and to reduce the goal range to 1.5 to 2 by August 1st 2022    PTA Warfarin Dose: new start    Notable concurrent conditions and medications: None    Labs:  Recent Labs     05/06/22  0409 05/06/22  0408 05/05/22  0444 05/05/22  0444 05/04/22  0433 05/04/22  0433 05/03/22  1755 05/03/22  1235 05/03/22  1235   INR 1.3*  --   --  1.1  --   --   --   --  1.2*   HGB  --  9.2*   < > 9.9*   < > 10.2* 10.8*   < > 11.8*   PLT  --  159  --  165  --  171  --    < > 145*   TBILI  --   --   --   --   --  0.6 0.6  --  0.7   ALB  --   --   --   --   --  3.9 4.1   < > 3.9    < > = values in this interval not displayed. Impression/Plan:   Warfarin average daily dose 5/6 last 2 days = 3.25 with 0.2 INR rise from 5/5  3.5mg on 5/6  Continue Enoxaparin 80 mg BID bridge regimen  Daily INR ordered  CBC w/o differential every other day has been ordered     Pharmacy will follow daily and adjust the dose as appropriate.     Thank you,  Wil Bolden, [facility]

## 2022-05-06 NOTE — DISCHARGE INSTRUCTIONS
[facility]      [address]  [address]                                                           [address]                                       [place], [address] [facility]  Office- [phone]  Fax- [phone]        Office- [phone]  Fax- [phone]  _________________________________________________________________  Dr. Althea Jasmine, NP          Nisha De La Garza, [address]  Dr. Niurka Westfall, NP                   Mani Scott, [address]     Dr. Ananya Heath, NP            Neel Huff, [address]  Dr. Mary Jane Allison, NP              SHERITA Freitas                                                                                                                                              Name:Graeme Manuel     Surgery & Date: Procedure(s):  AORTIC VALVE REPLACEMENT ( ONX-23 ) WITH EXTRA CORPOREAL CIRCULATION. SPRING AND EPIAORTIC ULTRASOUND DONE BY DR BRODERICK [facility]    Discharge Date: 5/7/22    MEDICATIONS:  Please refer to your After Visit Summary for your medication list. If you do not have a prescription for a new medication, you may purchase the medication over the counter. DO NOT TAKE ANY MEDICATIONS THAT ARE NOT ON THIS LIST    INR [employer]-  2 - 3 for 3 months, then 1.5 - 2.5 thereafter  INR LEVEL to be drawn- The day after discharge  INR management per Dr. Mary Jane Jeong, [phone]    INSTRUCTIONS:  1. NO SMOKING OR TOBACCO PRODUCTS  2. Follow all the instructions in your discharge book  3. Shower daily. Wash all incisions twice daily with antibacterial soap and water. After each wash with soap and water, wash incisions with Dynahex 4 solution and rinse. Do this for 14 days. No lotions, ointments or powder. 4. Call the office immediately for any redness, swelling, or drainage from your incision.    5. Take your temperature daily and call for a temperature of 101 degrees or higher or for any symptoms that make you think you have and infection. 6. Weigh yourself each morning. Call if you gain more than 5 pounds in 48 hours. 7. Use the incentive spirometer 6-8 times a day-10 breaths each time. Use a pillow or your bear to splint your breastbone when coughing or sneezing. 8. If you feel your breast bone clicking or popping, notify the office immediately. 9. Walk several hundred feet several times daily. DIET  Eat an American Heart Association diet. If you are having trouble with your appetite, eat what you can. Try eating small, frequent meals throughout the day. Diabetic patients should follow an ADA diet. Check your blood sugars  And keep a log of your results. ACTIVITY  1. NO DRIVING--you will be evaluated to drive at your follow up visit. 2. Increase your activity by walking several times a day. Stay out of bed most of the day. 3. When sitting, keep your legs elevated. 4. You may ride in a car, but you must get out every hour and walk around. If you ride in a car with an airbag that can not be switched off, put the seat ALL the way back or ride in the back seat. 5. Any load bearing activity can be performed as long as it can be performed \"in the tube\". You can reach \"out of the tube\" when performing activities that don't require heavy lifting. Let pain be your guide, your pain level will keep you from doing anything extreme. Normal Problems After Discharge:   Some fatigue and difficulty sleeping   Poor appetite initially, with temporary change in taste   Temperature variability; feeling hot and cold   Chest wall soreness and paresthesia (numbness)   Some musculoskeletal pain along joint lines in chest and back.     Tylenol or NSAIDs will help unless contraindicated    Patients with EVH (Endoscopic Vein [place]) will have mild swelling in that leg due to temporary venous insufficiency   Elevation & compression stockings will help to reduce the swelling        FOLLOW UP  1. Your first follow up appointment will be on 5/17/22 at 10:30am. Our office is located in [address] [place] [address], [address]. Your second follow up appointment will be in four weeks, on 5/31/22 at 1:45pm. Please call our office at [phone] if you are unable to make either one of these appointments. 2. Your intake appointment for Cardiac Rehab will be on 5/23/22 at 8:00am. They are located in the [address] [place] [address], [address]. Their phone number is [phone].   3. Your appointment with Dr. Javi Mullins will be on 6/28/22 at 1:20pm. Office phone [phone].  4. Consult you primary care physician regarding your influenza & pneumovax vaccines. 5. Please bring all medications (or a complete list) with you to your appointment. 6. Do not hesitate to contact our office 24/7 with any questions or concerns. Call the number on your red bracelet ([phone])      Signature:___________________________________________________      Merrilyn Seaman Dressing Instructions:    Zoraida Dallas is a lightweight mesh thats applied over your incision, then coated with a skin adhesive to create a strong, flexible seal that protects against water and bacteria. Your healthcare team chose to use Dermabond Prineo system on your sternal incision. Please share the instruction sheet in your discharge packet with your home health nurse. They will assist with dressing removal 10-14 days after surgery.  Prineo stays on for 10-14 days. If you notice the edge of the dressing peeling up, trim the edge but do not remove the dressing.  Dont scratch, rub or pick at it.  Do not apply topical lotions, ointments, or liquids   You may shower and let soap and water water run on the dressing, but do not scrub it. Be sure to lightly pat it dry when you exit the shower.     \

## 2022-05-06 NOTE — PROGRESS NOTES
Cardiac Surgery Care Coordinator-  Met with Mitch Avila and spouse, reviewed plan of care and discharge instructions. Reinforced move in the tube, sternal precautions and continued use of the incentive spirometer. Mitch Avila is able to pull 1000cc. Reviewed the importance of daily temp and weight monitoring, discussed incisional care and reviewed signs and symptoms of infection. Red reminder bracelet on right wrist, reviewed purpose of the bracelet and when to call the MD.  Reminded pt of appts and encouraged participation in the Cardiac Wellness and rehab program after discharge. Encouraged Mitch Avila to verbalize and emotional support given. Mitch Avila is without questions or concerns at this time. Will follow up with a phone call after discharge.  Elizabeth Cunningham RN

## 2022-05-06 NOTE — PROGRESS NOTES
[facility]  [address] [place] [address], [address] [facility]  [phone]  WWW. [place]    Cardiology Progress Note      5/6/2022 926AM    Admit Date: 5/3/2022    Admit Diagnosis:   Aortic stenosis [I35.0]  AS (aortic stenosis) [I35.0]    Interval History/Subjective:     Dale Pires is a 51 y.o. male s/p surgical aortic valve replacement    Patient feeling upper chest wall tenderness this AM, improving with pain medications. No additional over night issues.      Visit Vitals  /88   Pulse 80   Temp 98.4 °F (36.9 °C)   Resp 18   Ht 6' (1.829 m)   Wt 174 lb 9.7 oz (79.2 kg)   SpO2 97%   BMI 23.68 kg/m²       Current Facility-Administered Medications   Medication Dose Route Frequency    enoxaparin (LOVENOX) injection 80 mg  80 mg SubCUTAneous Q12H    acetaminophen (TYLENOL) tablet 1,000 mg  1,000 mg Oral Q6H    pantoprazole (PROTONIX) tablet 40 mg  40 mg Oral ACB    sodium chloride (NS) flush 5-40 mL  5-40 mL IntraVENous Q8H    sodium chloride (NS) flush 5-40 mL  5-40 mL IntraVENous PRN    hydrALAZINE (APRESOLINE) 20 mg/mL injection 20 mg  20 mg IntraVENous Q6H PRN    WARFARIN INFORMATION NOTE (COUMADIN)   Other QPM    oxyCODONE IR (ROXICODONE) tablet 5 mg  5 mg Oral Q4H PRN    oxyCODONE IR (ROXICODONE) tablet 10 mg  10 mg Oral Q4H PRN    morphine injection 2-4 mg  2-4 mg IntraVENous Q2H PRN    naloxone (NARCAN) injection 0.4 mg  0.4 mg IntraVENous PRN    mupirocin (BACTROBAN) 2 % ointment   Both Nostrils BID    ondansetron (ZOFRAN) injection 4 mg  4 mg IntraVENous Q4H PRN    albuterol (PROVENTIL VENTOLIN) nebulizer solution 2.5 mg  2.5 mg Nebulization Q4H PRN    aspirin chewable tablet 81 mg  81 mg Oral DAILY    magnesium oxide (MAG-OX) tablet 400 mg  400 mg Oral BID    bisacodyL (DULCOLAX) suppository 10 mg  10 mg Rectal DAILY PRN    senna-docusate (PERICOLACE) 8.6-50 mg per tablet 1 Tablet  1 Tablet Oral BID    polyethylene glycol (MIRALAX) packet 17 g  17 g Oral DAILY glucose chewable tablet 16 g  4 Tablet Oral PRN    glucagon (GLUCAGEN) injection 1 mg  1 mg IntraMUSCular PRN    insulin lispro (HUMALOG) injection   SubCUTAneous AC&HS    melatonin tablet 3-6 mg  3-6 mg Oral QHS PRN    diphenhydrAMINE (BENADRYL) capsule 25 mg  25 mg Oral QHS PRN    chlorhexidine (PERIDEX) 0.12 % mouthwash 15 mL  15 mL Oral Q12H       Objective:      Physical Exam:  General Appearance:  pleasant, adult  male resting in bed in NAD  Chest:   Clear/dim; Shallow breaths  Cardiovascular:  Regular rate and rhythm, no murmur. Crisp valve click   Abdomen:   Soft, non-tender, bowel sounds are active. Extremities: palpable distal pulses; no edema, ecchymosis left radial site  Skin:  Warm and dry. large amount of ecchymosis left radial site. Midline sternotomy well approximated, no drainage noted on dressings     Data Review:   Recent Labs     05/06/22  0408 05/05/22 0444 05/04/22  0433   WBC 6.0 8.3 11.6*   HGB 9.2* 9.9* 10.2*   HCT 28.3* 29.7* 30.6*    165 171     Recent Labs     05/06/22  0409 05/06/22  0408 05/05/22  0444 05/04/22  0433 05/03/22  1755 05/03/22  1755 05/03/22  1235 05/03/22  1235   NA  --  136 138 141   < > 144   < > 145   K  --  4.0 3.7 4.1   < > 4.3   < > 4.3   CL  --  104 107 113*   < > 114*   < > 118*   CO2  --  28 29 26   < > 23   < > 26   GLU  --  127* 89 120*   < > 151*   < > 54*   BUN  --  20 19 17   < > 18   < > 15   CREA  --  0.99 1.01 1.08   < > 1.31*   < > 1.28   CA  --  8.7 8.6 8.4*   < > 8.3*   < > 8.5   MG  --  2.3 2.5* 2.5*   < > 2.6*   < > 3.3*   ALB  --   --   --  3.9  --  4.1  --  3.9   TBILI  --   --   --  0.6  --  0.6  --  0.7   ALT  --   --   --  22  --  25  --  24   INR 1.3*  --  1.1  --   --   --   --  1.2*    < > = values in this interval not displayed. No results for input(s): TROIQ, CPK, CKMB in the last 72 hours.       Intake/Output Summary (Last 24 hours) at 5/6/2022 0919  Last data filed at 5/6/2022 0449  Gross per 24 hour   Intake 730.9 ml   Output 1520 ml   Net -789.1 ml        Telemetry: SR;   EKG: SB; RBBB  Cxray: \"Persistent low lung volumes with mild pulmonary edema. \"    Assessment:     Active Problems: Aortic stenosis (5/3/2022)      S/P AVR (5/3/2022)      Overview: AORTIC VALVE REPLACEMENT ( ONX-23 ) with Mechanical valve            AS (aortic stenosis) (5/3/2022)        Plan:     Bicuspid AV with severe AS s/p SAVR 5/3/22:  Recovering well. Started Coumadin with goal INR 2-3 for 3 months and then 1.5-2.5 thereafter. Getting bridged with lovenox   Annual CTAs planned for ascending aortic enlargement that wasn't significant enough to address during this surgery  Encouraged incentive spirometer and activity as tolerated. Stable from Cardiology Standpoint, patient indicated he may opt to follow [facility] vs Follow up with Dr Preston Juan. Cardiology Signing off    Thi Kim NP     Patient seen and examined by me with nurse practitioner. I personally performed all components of the history, physical, and medical decision making and agree with the assessment and plan with minor modifications as noted. Doing well. Lovenox and coumadin until inr 2-3. Ok for [employer] from cardiac standpoint.  F/ollow up with dr Bernice Norman MD, Tatiana Church

## 2022-05-06 NOTE — DISCHARGE SUMMARY
[facility] Discharge Summary     Patient ID:  Dejuan Rincon  [id number]  27 y.o.  1975    Admit date: 5/3/2022    Discharge date: 5/7/2022     Admitting Physician: Weldon Dance, MD     Referring Cardiologist:  Tiffany Gutierrez    PCP:  Adelaida Gonzalez MD    Admitting Diagnoses: AS    Discharge Diagnoses:     Hospital Problems  Date Reviewed: 5/3/2022          Codes Class Noted POA    Aortic stenosis ICD-10-CM: I35.0  ICD-9-CM: 424.1  5/3/2022 Unknown        S/P AVR ICD-10-CM: Z95.2  ICD-9-CM: V43.3  5/3/2022 Unknown    Overview Signed 5/3/2022 12:09 PM by SHERITA Kat     AORTIC VALVE REPLACEMENT ( ONX-23 ) with Mechanical valve               AS (aortic stenosis) ICD-10-CM: I35.0  ICD-9-CM: 424.1  5/3/2022 Unknown              Discharged Condition: good    Disposition: home, see patient instructions for treatment and plan    Procedures for this admission:  Procedure(s):  AORTIC VALVE REPLACEMENT ( ONX-23 ) WITH EXTRA CORPOREAL CIRCULATION. SPRING AND EPIAORTIC ULTRASOUND DONE BY DR BRODERICK [facility]    Discharge Medications:      My Medications      START taking these medications      Instructions Each Dose to Equal Morning Noon Evening Bedtime   aspirin 81 mg chewable tablet    Your last dose was: Your next dose is: Take 1 Tablet by mouth daily. 81 mg                 enoxaparin 80 mg/0.8 mL injection  Commonly known as: Lovenox    Your last dose was: Your next dose is:         80 mg by SubCUTAneous route every twelve (12) hours for 5 days. 80 mg                 metoprolol tartrate 25 mg tablet  Commonly known as: LOPRESSOR    Your last dose was: Your next dose is: Take 0.5 Tablets by mouth every twelve (12) hours. 12.5 mg                 oxyCODONE IR 5 mg immediate release tablet  Commonly known as: ROXICODONE    Your last dose was: Your next dose is: Take 1 Tablet by mouth every four (4) hours as needed for Pain for up to 5 days.  Max Daily Amount: 30 mg.   5 mg polyethylene glycol 17 gram packet  Commonly known as: MIRALAX    Your last dose was: Your next dose is: Take 1 Packet by mouth daily. 17 g                 senna-docusate 8.6-50 mg per tablet  Commonly known as: PERICOLACE    Your last dose was: Your next dose is: Take 1 Tablet by mouth two (2) times a day. 1 Tablet                 warfarin 2 mg tablet  Commonly known as: COUMADIN    Your last dose was: Your next dose is: Take 1 Tablet by mouth daily. 2 mg                    CONTINUE taking these medications      Instructions Each Dose to Equal Morning Noon Evening Bedtime   Omeprazole delayed release 20 mg tablet  Commonly known as: PRILOSEC D/R    Your last dose was: Your next dose is: Take 20 mg by mouth daily. 20 mg                    STOP taking these medications    chlorhexidine 0.12 % solution  Commonly known as: PERIDEX        FISH OIL PO        mupirocin 2 % ointment  Commonly known as: BACTROBAN              Where to Get Your Medications      These [id number]  DR SUSI Stephenson [address] [place] [address]    Phone: [phone]   · aspirin 81 mg chewable tablet  · enoxaparin 80 mg/0.8 mL injection  · metoprolol tartrate 25 mg tablet  · oxyCODONE IR 5 mg immediate release tablet  · polyethylene glycol 17 gram packet  · senna-docusate 8.6-50 mg per tablet  · warfarin 2 mg tablet         HPI: Salena Lares is a 51 y.o. male who was referred for cardiac evaluation for severe AS from [facility]. He has a medical history of bicuspid AV, AS, hiatal hernia. BP elevated at doctors office but normal at home -likely white coat syndrome. He has SOB with activity -walking up hill. He is able to run slowly on treadmill -no SOB with that on flat surface. He denies dizziness. No falls.  Chest tightness occasionally happens -at rest and activity.      He is a former smoker -smoked 20 years. He drinks alcohol occasionally. He is , his  accompanied him to his appointment. He has a family history of heart disease. Covid vaccine moderna x3. He works as the  at [employer] [id number]     Cardiac catheterization 3/22/22: Diagnostic  Dominance: Right     Left Main   The vessel is angiographically normal.   Left Anterior Descending   First Diagonal Branch   The vessel is angiographically normal.   Ramus Intermedius   The vessel is angiographically normal.   Left Circumflex   The vessel is angiographically normal.   First Obtuse Marginal Branch   The vessel is angiographically normal.   Second Obtuse Marginal Branch   The vessel is angiographically normal.   Right Coronary Artery   The vessel is angiographically normal.   Right Posterior Descending Artery   The vessel is angiographically normal.   Right Posterior Atrioventricular Artery   The vessel is angiographically normal.   First Right Posterolateral Branch   The vessel is small. The vessel is angiographically normal.   Second Right Posterolateral Branch   The vessel is small. The vessel is angiographically normal.      Intervention        No interventions have been documented.     Right Heart     Right Heart Cath PA pressure = 26/7 mmHg. PA mean = 17 mmHg. PA Sat = 77.2 %. Mid RA mean = 7 mmHg. RV pressure = 26/7 mmHg. Wedge pressure = 12 mmHg. AO Sat = 97 %. Trang CO = 5.72 L/min. Trang CI=2.93         ECHO 2/17/22:  Left Ventricle Left ventricle size is normal. Mildly increased wall thickness. Normal wall motion. Normal left ventricular systolic function with a visually estimated EF of 55 - 60%. Grade I diastolic dysfunction with normal LAP. There is a false chord. Left Atrium Left atrium size is normal.   Right Ventricle Right ventricle size is normal. Normal systolic function. Right Atrium Right atrium size is normal.   Aortic Valve Bicuspid valve.  Moderately calcified cusp. Moderate annular calcification. Mild transvalvular regurgitation. Severe stenosis of the aortic valve. Mitral Valve Valve structure is normal. Mild transvalvular regurgitation. No stenosis noted. Tricuspid Valve Valve structure is normal. Trace transvalvular regurgitation. No stenosis noted. Normal RVSP. Pulmonic Valve Valve structure is normal. Mild transvalvular regurgitation. No stenosis noted. Aorta Normal sized aortic root and ascending aorta. Pericardium No pericardial effusion.         Procedure Staff     Technologist/Clinician: Araceli Avila  Supporting Staff: None  Performing Physician/Midlevel: None     Exam Completion Date/Time: 2/17/22 10:50 AM      Volumes and Function (Range)       ESV ESV Index   LA Biplane 55 mL (18 - 58)       28 ml/m2 (16 - 34)         LA Area-Length 56 mL       28 mL/m2 (16 - 34)         LA A4C 46 mL (18 - 58)       23 mL/m2 (16 - 34)         LA A2C 51 mL (18 - 58)       26 mL/m2 (16 - 34)            Left Ventricle Measurements         Dimensions   Fractional Shortening 2D 41 % (Range: 28 - 44)      LVIDd 4.9 cm (Range: 4.2 - 5. 9)      LVIDd Index 2.46 cm/m2      LVIDs 2.9 cm      LVIDs Index 1.46 cm/m2      IVSd 1.2 cm (Range: 0.6 - 1.0) Important       LVPWd 1.2 cm (Range: 0.6 - 1.0) Important       LV RWT Ratio 0.49       LV Mass 2D 226.4 g (Range: 88 - 224) Important       LV Mass 2D Index 113.8 g/m2 (Range: 49 - 115)                Right Ventricle Measurements         Dimensions   TAPSE 1.7 cm (Range: 1.5 - 2.0)           Function   RV Free Wall Peak S' 12 cm/s            Atrial Measurements         Left Atrium   LA Diameter 3.6 cm      LA Size Index 1.81 cm/m2      LA/AO Root Ratio 1.09                   Tricuspid Valve Measurements         Regurgitation   Est. RA Pressure 3 mmHg      TR Max Velocity 1.98 m/s      RVSP 19 mmHg      TR Peak Gradient 16 mmHg                Aortic Valve Measurements         Stenosis   AV Mean Gradient 71 mmHg AV Peak Gradient 134 mmHg      AV Peak Velocity 5.8 m/s      AV Velocity Ratio 0.1       AV .9 cm      LVOT:AV VTI Index 0.13       AV Mean Velocity 4 m/s      AV Area by VTI 0.8 cm2      MUNIRA/BSA VTI 0.4 cm2/m2      AV Area by Peak Velocity 0.6 cm2      MUNIRA/BSA Peak Velocity 0.3 cm2/m2           LVOT   LVOT Diameter 2.9 cm      LVOT Area 6.6 cm2      LVOT Mean Gradient 1 mmHg      LVOT Peak Gradient 1 mmHg      LVOT VTI 15.7 cm      LVOT Peak Velocity 0.6 m/s                Aorta Measurements         Dimensions   Measurement Value (Range)   Aortic Root 3.3 cm      Ao Root Index 1.66 cm/m2      Ascending Aorta 3.7 cm      Ascending Aorta Index 1.86 cm/m2              Diastolic Filling/Shunts         Diastolic Filling   MV E Velocity 0.58 m/s      MV A Velocity 0.7 m/s      MV E/A 0.83       E/E' Ratio (Averaged) 7.04       LV E' Lateral Velocity 10 cm/s      E/E' Lateral 5.8       LV E' Septal Velocity 7 cm/s      E/E' Septal 8.29       MV E Wave Deceleration Time 223 ms      MV \"A\" Wave Duration 144.6 msec      LV IVRT 78 ms           Shunt   LVOT Stroke Volume Index 52.1 mL/m2      LVOT .6 ml               CT Chest 3/22/22: FINDINGS:     THYROID: No nodule. MEDIASTINUM: No mass or lymphadenopathy. VALE: No mass or lymphadenopathy. THORACIC AORTA: There is calcification in the region of the aortic valve this is  greater than expected for patient this age. There continues to be mild  dilatation of ascending aorta. .     When measured at the level of the top of the right pulmonary artery. The  transverse diameter is 4.1 cm in AP diameter is 3.9 cm. Previously the  measurements at this level were 3.8 cm in transverse diameter by 3.7 cm in AP  diameter.     When measured on axial image 30 at the level of the main pulmonary artery the  diameter is 4.1 cm. Previously the diameter was 3.9 cm.     When measured at the level of the aortic sinus the diameter is 3.9 cm.   MAIN PULMONARY ARTERY: Normal in caliber. TRACHEA/BRONCHI: Patent. ESOPHAGUS: No wall thickening or dilatation. HEART: Normal in size. There is moderate coronary artery calcification. PLEURA: No effusion or pneumothorax. LUNGS: The lungs demonstrate stable 3 mm nodule in the lingula on image 75. No  new nodules are noted. There are mild changes of centrilobular and paraseptal  emphysema. There are subpleural curvilinear opacities dependently at each lung  base this is new in the interval. Most likely this is due to dependent  atelectasis. Interstitial lung abnormality could have similar appearance.     The central airways are patent. .  INCIDENTALLY IMAGED UPPER ABDOMEN: There is contrast material in the collecting  system of both kidneys and proximal ureters this is from recent intravenous  contrast administration. Clinical correlation is suggested. No adrenal lesions. Jaime Awkward BONES: No destructive bone lesion.     IMPRESSION     1. The dense calcification in the aortic valve is noted. 2. There is dilatation of ascending thoracic aorta measuring 4.1 x 3.9 cm. Previously this measured 3.8 x 3.7 cm.  2. New dependent opacities in the subpleural location of both lower lobes. Most  likely this is dependent atelectasis. Hospital Course: The patient underwent a AVR (on-x valve) on 5/3/22 by Dr. Minal Walsh -see op note for details. He was transferred to the ICU in stable condition on amiodarone, precedex, insulin, and leodan gtts. He was hemodynamically stable and transferred to PCU on POD1. After medical optimization he is being discharged home with the following plan:     POD#4:     1. Bicuspid AV stenosis s/p AVR with #23mm On-X mechanical valve: Cont coumadin, lovenox bridge. INR goal 2-3 x 3 months and then 1.5-2.5 thereafter. Start BB for HR 90s per Raul. INR 1.8 this am, will likely only need 1 more day of lovenox bridge, pt updated. Give 2mg coumadin tonight to avoid overshooting. Needs to be seen by [facility] for INR check tomorrow morning. 2. Ascending aortic enlargement: Recommend Annual CTA's for monitoring as outpatient. 3. Acute respiratory insufficiency: Resolved, on RA  4. Pain management: Scheduled tylenol. Completed toradol. Oxy PRN  5. Hiatal hernia: On protonix  6. RBBB: avoid amio, monitor rhythm     Dispo: PT/OT. Case management following to aid in d/c planning home with [facility] services today. Pt need [facility] nurse to check INR tomorrow morning and call CSS with result.        Referral to outpatient cardiac rehab made. Discharge Vital Signs:   Visit Vitals  /81 (BP 1 Location: Right upper arm, BP Patient Position: At rest)   Pulse 86   Temp 98.4 °F (36.9 °C)   Resp 18   Ht 6' (1.829 m)   Wt 171 lb 4.8 oz (77.7 kg)   SpO2 95%   BMI 23.23 kg/m²       Labs:   Recent Labs     05/07/22  0729 05/07/22  0618 05/06/22  2040 05/06/22  0409 05/06/22  0408   WBC  --   --   --   --  6.0   HGB  --   --   --   --  9.2*   HCT  --   --   --   --  28.3*   PLT  --   --   --   --  159   NA  --   --   --   --  136   K  --   --   --   --  4.0   BUN  --   --   --   --  20   CREA  --   --   --   --  0.99   GLU  --   --   --   --  127*   GLUCPOC 113  --    < >   < >  --    INR  --  1.8*  --    < >  --     < > = values in this interval not displayed. Diagnostics:   CXR Results  (Last 48 hours)               05/06/22 0541  XR CHEST PORT Final result    Impression:  No acute infiltrate. Minimal vascular prominence has improved. Narrative:  Clinical indication: Postop heart. Portable AP semiupright view of the chest obtained, comparison May 5, 2022. The   inspiration is shallow. There is no focal infiltrate. The heart size is stable. Patient Instructions/Follow Up Care:  Discharge instructions were reviewed with the patient and family present. Questions were also answered at this time. Prescriptions and medications were reviewed.  The patient has a follow up appointment with the Nurse Practitioner or Physician's Assistant on 5/17/22 and with Dr. Minal Walsh on 5/31/22. The patient was also instructed to follow up with his primary care physician as needed. The patient and family were encouraged to call with any questions or concerns.        Signed:  Ericka Mitchell NP  5/7/2022  4:20 PM

## 2022-05-06 NOTE — PROGRESS NOTES
Problem: Self Care Deficits Care Plan (Adult)  Goal: *Acute Goals and Plan of Care (Insert Text)  Description: FUNCTIONAL STATUS PRIOR TO ADMISSION: Patient was independent and active without use of DME. Reports he was driving and working full time as manager at [employer]. HOME SUPPORT: The patient lived with spouse. Spouse at bedside, reports he works from home and is able to provide assistance at home PRN. Occupational Therapy Goals  Initiated 5/4/2022  1. Patient will perform ADLs standing 5 mins without fatigue or LOB with supervision/set-up within 7 day(s). 2.  Patient will perform lower body ADLs with supervision/set-up within 7 day(s). 3.  Patient will perform gathering ADL items high and low 2/2 with supervision/set-up within 7 day(s). 4.  Patient will perform toilet transfers with supervision/set-up within 7 day(s). 5.  Patient will perform all aspects of toileting with supervision/set-up within 7 day(s). 6.  Patient will participate in cardiac/sternal upper extremity therapeutic exercise/activities to increase independence with ADLs with supervision/set-up for 5 minutes within 7 day(s). Outcome: Progressing Towards Goal    OCCUPATIONAL THERAPY TREATMENT  Patient: Melony Abraham (30 y.o. male)  Date: 5/6/2022  Diagnosis: Aortic stenosis [I35.0]  AS (aortic stenosis) [I35.0] <principal problem not specified>  Procedure(s) (LRB):  AORTIC VALVE REPLACEMENT ( ONX-23 ) WITH EXTRA CORPOREAL CIRCULATION. SPRING AND EPIAORTIC ULTRASOUND DONE BY DR BRODERICK [facility] (N/A) 3 Days Post-Op  Precautions:    Chart, occupational therapy assessment, plan of care, and goals were reviewed. ASSESSMENT  Patient continues with skilled OT services and is progressing towards goals. Patient agreeable to therapeutic shower on this date and cleared for shower by RN. Patient completed majority of shower while standing, demonstrated excellent pacing and adherence to \"moving within the tube\" when completing bathing. Patient able to safely bathe LB in standing position with SBA. During shower, pt educated and completed 2-step sternal incision hygiene using dial soap and antiseptic wash. Pt demonstrated good understanding, able to complete sternal incision hygiene with SPV. Following shower, pt performed shower transfer with CGA and then transitioned to seated position and provided with rest break. Pt doffed non slip socks with SPV and completed CHG bath. At conclusion of session pt completed UB and LB dressing with SPV and verbal cues to adhere to moving within the tube. Pt returned to sitting in recliner chair, provided with call bell, reconnected to telemetry, and RN notified of session. Throughout session pts spouse present and very receptive to all education. Recommend [facility] at [facility]. Patient is verbalizing understanding of mindful-based movements (\"move in the tube\") principles of keeping UEs proximal to ribcage to prevent lateral pull on the sternum during load-bearing activities with verbal cues required for compliance. Current Level of Function Impacting Discharge (ADLs): SPV - INDP    Other factors to consider for discharge: CABG         PLAN :  Patient continues to benefit from skilled intervention to address the above impairments. Continue treatment per established plan of care to address goals. Recommend with staff: up to chair daily, SPV for mobility (okay with SPV from spouse)      Recommendation for discharge: (in order for the patient to meet his/her long term goals)  Occupational therapy at least 2 days/week in the home AND ensure assist and/or supervision for safety with bathing and IADLs    This discharge recommendation:  Has been made in collaboration with the attending provider and/or case management    IF patient discharges home will need the following DME: patient owns DME required for discharge       SUBJECTIVE:   Patient stated im glad we did that.  pt referring to shower     OBJECTIVE DATA SUMMARY:   Cognitive/Behavioral Status:  Neurologic State: Alert  Orientation Level: Oriented X4                Functional Mobility and Transfers for ADLs:  Bed Mobility:  Supine to Sit: Supervision    Transfers:  Sit to Stand: Supervision     Bed to Chair: Supervision      ADL Intervention:       Lower Body Bathing  Bathing Assistance: Supervision  Perineal  : Supervision  Position Performed: Standing    Upper Body Dressing Assistance  Dressing Assistance: Supervision  Hospital Gown: Supervision    Lower Body Dressing Assistance  Dressing Assistance: Supervision  Underpants: Supervision  Socks: Supervision  Position Performed: Seated in chair;Standing              Patient instructed and educated on mindful movement principles based on Move in The Tube concept to include maintaining bilateral elbows close to rib cage when performing any load-bearing activity such as getting in/out of bed, pushing up from a chair, opening a door, or lifting a box. Patient was given a handout with diagrams of each correct/incorrect method of performing each of the above tasks. Patient instructed on the ability to utilize upper extremities outside the tube when doing any non-load bearing activity such as washing hair/body, brushing teeth, retrieving clothing items, or scratching your back. Patient encouraged to also perform upper extremity exercises \"outside of the tube\" to prevent scar tissue formation around sternal incision site. Patient instructed in detail about activities to heed with caution, allowing pain to be the guide. These activities include but are not limited to: mowing the lawn, riding a bike, walking a dog, lifting a child, workshop hobbies, golfing, sexual activity, vacuuming, fishing, scrubbing the floors, and moving furniture. Patient was given the [address] [place] the [place] handout to describe each of these activities in detail.    Patient instructed no asymmetrical reaching over head to ensure B UEs when shoulders >90* i.e. reaching in cabinets and dressing. Instruction on upper body dressing techniques of over head, then arms through to decrease pain and unilateral shoulder flexion >90*. Instruction on the benefits of utilizing B UEs during functional tasks i.e. opening the fridge, stepping into the tub. Instruction if continued pain at home with shoulder IR for BM hygiene can use wet wipes and toilet tongs PRN. Avoid valsalva maneuvers. May have to adjust home setup to increase ease with items closer to waist height to prevent deep bending and the automatic  of asymmetrical UE WB/pushing for stabilization during bending. Benefit to don clothing tailor sitting and don all clothing while sitting prior to standing. Patient demonstrated lower body dressing with Supervision. Instruction and indicated understanding on the benefits of loose clothing throughout to accommodate for post surgical swelling, decreased ROM and increased pain. Instruction and indicated understanding the technique of pull over shirt versus front open clothing. Increase activity tolerance for home, work, and sexual intercourse by pacing self with increasing the arm exercises, sitting duration, frequency OOB, walking, standing, and ADLs. Instructed and indicated understanding of s/s of too much activity, how to respond to s/s safely. Pain:  Pt endorsing pain at a line removal site. Notified cardiac NP and RN. Activity Tolerance:   Good and requires rest breaks    After treatment patient left in no apparent distress:   Sitting in chair, Call bell within reach, and Caregiver / family present    COMMUNICATION/COLLABORATION:   The patients plan of care was discussed with: Physical therapist, Occupational therapist, and Registered nurse.      Leda Machado OT  Time Calculation: 44 mins

## 2022-05-06 NOTE — PROGRESS NOTES
Problem: Falls - Risk of  Goal: *Absence of Falls  Description: Document Marily Ferguson Fall Risk and appropriate interventions in the flowsheet.   Outcome: Progressing Towards Goal  Note: Fall Risk Interventions:            Medication Interventions: Bed/chair exit alarm,Patient to call before getting OOB,Teach patient to arise slowly    Elimination Interventions: Bed/chair exit alarm,Call light in reach,Patient to call for help with toileting needs,Stay With Me (per policy),Toilet paper/wipes in reach,Toileting schedule/hourly rounds

## 2022-05-07 VITALS
DIASTOLIC BLOOD PRESSURE: 68 MMHG | SYSTOLIC BLOOD PRESSURE: 108 MMHG | HEIGHT: 72 IN | BODY MASS INDEX: 23.2 KG/M2 | RESPIRATION RATE: 16 BRPM | WEIGHT: 171.3 LBS | TEMPERATURE: 98.4 F | HEART RATE: 74 BPM | OXYGEN SATURATION: 95 %

## 2022-05-07 LAB
GLUCOSE BLD STRIP.AUTO-MCNC: 113 MG/DL (ref 65–117)
GLUCOSE BLD STRIP.AUTO-MCNC: 114 MG/DL (ref 65–117)
INR PPP: 1.8 (ref 0.9–1.1)
MAGNESIUM SERPL-MCNC: 2.4 MG/DL (ref 1.6–2.4)
PROTHROMBIN TIME: 18.6 SEC (ref 9–11.1)
SERVICE CMNT-IMP: NORMAL
SERVICE CMNT-IMP: NORMAL

## 2022-05-07 PROCEDURE — 74011250637 HC RX REV CODE- 250/637: Performed by: PHYSICIAN ASSISTANT

## 2022-05-07 PROCEDURE — 85610 PROTHROMBIN TIME: CPT

## 2022-05-07 PROCEDURE — 74011250637 HC RX REV CODE- 250/637: Performed by: NURSE PRACTITIONER

## 2022-05-07 PROCEDURE — 82962 GLUCOSE BLOOD TEST: CPT

## 2022-05-07 PROCEDURE — 74011250636 HC RX REV CODE- 250/636: Performed by: THORACIC SURGERY (CARDIOTHORACIC VASCULAR SURGERY)

## 2022-05-07 PROCEDURE — 36415 COLL VENOUS BLD VENIPUNCTURE: CPT

## 2022-05-07 PROCEDURE — 74011000250 HC RX REV CODE- 250: Performed by: PHYSICIAN ASSISTANT

## 2022-05-07 PROCEDURE — 83735 ASSAY OF MAGNESIUM: CPT

## 2022-05-07 RX ORDER — WARFARIN 1 MG/1
2 TABLET ORAL ONCE
Status: DISCONTINUED | OUTPATIENT
Start: 2022-05-07 | End: 2022-05-07 | Stop reason: HOSPADM

## 2022-05-07 RX ORDER — WARFARIN 2 MG/1
2 TABLET ORAL DAILY
Qty: 30 TABLET | Refills: 2 | Status: SHIPPED | OUTPATIENT
Start: 2022-05-07 | End: 2022-06-02

## 2022-05-07 RX ADMIN — CHLORHEXIDINE GLUCONATE 15 ML: 1.2 RINSE ORAL at 08:37

## 2022-05-07 RX ADMIN — OXYCODONE 10 MG: 5 TABLET ORAL at 10:49

## 2022-05-07 RX ADMIN — ENOXAPARIN SODIUM 80 MG: 100 INJECTION SUBCUTANEOUS at 08:37

## 2022-05-07 RX ADMIN — MUPIROCIN: 20 OINTMENT TOPICAL at 08:40

## 2022-05-07 RX ADMIN — ACETAMINOPHEN 1000 MG: 500 TABLET ORAL at 05:54

## 2022-05-07 RX ADMIN — POLYETHYLENE GLYCOL 3350 17 G: 17 POWDER, FOR SOLUTION ORAL at 08:37

## 2022-05-07 RX ADMIN — PANTOPRAZOLE SODIUM 40 MG: 40 TABLET, DELAYED RELEASE ORAL at 08:37

## 2022-05-07 RX ADMIN — METOPROLOL TARTRATE 12.5 MG: 25 TABLET, FILM COATED ORAL at 08:37

## 2022-05-07 RX ADMIN — SENNOSIDES AND DOCUSATE SODIUM 1 TABLET: 50; 8.6 TABLET ORAL at 08:36

## 2022-05-07 RX ADMIN — SODIUM CHLORIDE, PRESERVATIVE FREE 10 ML: 5 INJECTION INTRAVENOUS at 05:54

## 2022-05-07 RX ADMIN — OXYCODONE 10 MG: 5 TABLET ORAL at 02:29

## 2022-05-07 RX ADMIN — OXYCODONE 5 MG: 5 TABLET ORAL at 06:28

## 2022-05-07 RX ADMIN — ASPIRIN 81 MG: 81 TABLET, CHEWABLE ORAL at 08:36

## 2022-05-07 RX ADMIN — Medication 400 MG: at 08:37

## 2022-05-07 NOTE — PROGRESS NOTES
Problem: Falls - Risk of  Goal: *Absence of Falls  Description: Document Sharon Stewart Fall Risk and appropriate interventions in the flowsheet. Outcome: Progressing Towards Goal  Note: Fall Risk Interventions:  Problem: Pressure Injury - Risk of  Goal: *Prevention of pressure injury  Description: Document Efrem Scale and appropriate interventions in the flowsheet.   Outcome: Progressing Towards Goal  Note: Pressure Injury Interventions:  Sensory Interventions: Assess changes in LOC,Assess need for specialty bed    Activity Interventions: Assess need for specialty bed    Mobility Interventions: Assess need for specialty bed    Nutrition Interventions: Document food/fluid/supplement intake    Friction and Shear Interventions: Apply protective barrier, creams and emollients    Medication Interventions: Bed/chair exit alarm,Assess postural VS orthostatic hypotension,Evaluate medications/consider consulting pharmacy,Patient to call before getting OOB    Elimination Interventions: Bed/chair exit alarm,Call light in reach,Patient to call for help with toileting needs

## 2022-05-07 NOTE — PROGRESS NOTES
Outbound call to on call liaison Lauren ([facility]) @ [phone]. Informed of discharge for today. Further informed discharging NP is requesting INR be checked on May 8th, 2022. On call liaison acknowledged understanding; and voiced will forward message to on call staff for tomorrow.         ARETHA Varghese

## 2022-05-07 NOTE — PROGRESS NOTES
CSS Progress Note    Admit Date: 5/3/2022  POD:  4 Day Post-Op    Procedure:  Procedure(s):  AORTIC VALVE REPLACEMENT ( ONX-23 ) WITH EXTRA CORPOREAL CIRCULATION. SPRING AND EPIAORTIC ULTRASOUND DONE BY DR BRODERICK [facility] [place]        Subjective:   Pt seen with Dr. Dot Mejia. Up in chair. Feeling well, denies complaints. Ready to go home. On RA. Afebrile      Objective:   Vitals:  Blood pressure 124/81, pulse 86, temperature 98.4 °F (36.9 °C), resp. rate 18, height 6' (1.829 m), weight 171 lb 4.8 oz (77.7 kg), SpO2 95 %. Temp (24hrs), Av.5 °F (36.9 °C), Min:98 °F (36.7 °C), Max:99.3 °F (37.4 °C)    EKG/Rhythm: NSR    Oxygen Therapy:  Oxygen Therapy  O2 Sat (%): 95 % (22 0713)  Pulse via Oximetry: 87 beats per minute (22 0713)  O2 Device: None (Room air) (22 0244)  O2 Flow Rate (L/min): 1 l/min (22 1653)  FIO2 (%): 50 % (22 1426)    CXR:   CXR Results  (Last 48 hours)               22 0541  XR CHEST PORT Final result    Impression:  No acute infiltrate. Minimal vascular prominence has improved. Narrative:  Clinical indication: Postop heart. Portable AP semiupright view of the chest obtained, comparison May 5, 2022. The   inspiration is shallow. There is no focal infiltrate. The heart size is stable. Admission Weight: Last Weight   Weight: 168 lb 6.9 oz (76.4 kg) (WT FROM  TO ENTER CABG ORDERS ) Weight: 171 lb 4.8 oz (77.7 kg)     Intake / Output / Drain:  Current Shift: No intake/output data recorded. Last 24 hrs.:     Intake/Output Summary (Last 24 hours) at 2022 1023  Last data filed at 2022 4786  Gross per 24 hour   Intake 1160 ml   Output 2090 ml   Net -930 ml       EXAM:  General:  No acute distress                   Lungs:   Clear to auscultation bilaterally. Incision:  Prineo intact   Heart:  Regular rate and rhythm, S1, S2 normal, no murmur, click, rub or gallop. Abdomen:   Soft, non-tender. Bowel sounds hypoactive.  No masses,  No organomegaly. Extremities:  No edema. PPP. Neurologic:  Gross motor and sensory apparatus intact. Labs:   Recent Labs     22  0729 22  0618 22  0409 22  0408   WBC  --   --   --   --  6.0   HGB  --   --   --   --  9.2*   HCT  --   --   --   --  28.3*   PLT  --   --   --   --  159   NA  --   --   --   --  136   K  --   --   --   --  4.0   BUN  --   --   --   --  20   CREA  --   --   --   --  0.99   GLU  --   --   --   --  127*   GLUCPOC 113  --    < >   < >  --    INR  --  1.8*  --    < >  --     < > = values in this interval not displayed. Assessment:     Active Problems: Aortic stenosis (5/3/2022)      S/P AVR (5/3/2022)      Overview: AORTIC VALVE REPLACEMENT ( ONX ) with Mechanical valve            AS (aortic stenosis) (5/3/2022)         Plan/Recommendations/Medical Decision Makin. Bicuspid AV stenosis s/p AVR with #23mm On-X mechanical valve: Cont coumadin, lovenox bridge. INR goal 2-3 x 3 months and then 1.5-2.5 thereafter. Start BB for HR 90s per Raul. INR 1.8 this am, will likely only need 1 more day of lovenox bridge, pt updated. Give 2mg coumadin tonight to avoid overshooting. Needs to be seen by [facility] for [facility] tomorrow morning. 2. Ascending aortic enlargement: Recommend Annual CTA's for monitoring as outpatient. 3. Acute respiratory insufficiency: Resolved, on RA  4. Pain management: Scheduled tylenol. Completed toradol. Oxy PRN  5. Hiatal hernia: On protonix  6. RBBB: avoid amio, monitor rhythm    Dispo: PT/OT. Case management following to aid in d/c planning home with [facility] services today. Pt need [facility] nurse to check INR tomorrow morning and call CSS with result.      Signed By: Maikol Kaba NP

## 2022-05-07 NOTE — PROGRESS NOTES
Pharmacist Daily Dosing of Warfarin    Indication & Goal INR:   AVR with Harry mechanical valve on 5/3. INR Goal 2-3  and to reduce the goal range to 1.5 to 2 by August 1st 2022    PTA Warfarin Dose: new start    Notable concurrent conditions and medications: None    Labs:  Recent Labs     05/07/22  0618 05/06/22  0409 05/06/22  0408 05/05/22  0444 05/05/22  0444   INR 1.8* 1.3*  --   --  1.1   HGB  --   --  9.2*   < > 9.9*   PLT  --   --  159  --  165    < > = values in this interval not displayed. Impression/Plan:   Warfarin average daily dose 5/7 last 3 days = 3.33mg  INR 0.5 rise from yesterday due to 4mg booster dose on 5/5  Warfarin 2mg on 5/7 @18:00    On 5/8 the average daily Warfarin dose will be 3mg (12mg/4days = 3mg)  On 5/8 if INR 2-2.5, recommend 80% of average daily dose (2.5mg)  On 5.8 if INR 2.6-3, recommend 60% of average daily dose (2mg)    Continue Enoxaparin 80 mg BID bridge regimen  Daily INR ordered  CBC w/o differential every other day has been ordered     Pharmacy will follow daily and adjust the dose as appropriate.     Thank you,  Nestor Coronado, [facility]

## 2022-05-08 ENCOUNTER — HOME CARE VISIT (OUTPATIENT)
Dept: SCHEDULING | Facility: HOME HEALTH | Age: 47
End: 2022-05-08
Payer: COMMERCIAL

## 2022-05-08 ENCOUNTER — TELEPHONE ANTICOAG (OUTPATIENT)
Dept: CARDIOTHORACIC SURGERY | Age: 47
End: 2022-05-08

## 2022-05-08 LAB — INR, EXTERNAL: 2.3

## 2022-05-08 PROCEDURE — 400013 HH SOC

## 2022-05-08 PROCEDURE — G0299 HHS/HOSPICE OF RN EA 15 MIN: HCPCS

## 2022-05-09 ENCOUNTER — DOCUMENTATION ONLY (OUTPATIENT)
Dept: CASE MANAGEMENT | Age: 47
End: 2022-05-09

## 2022-05-09 VITALS
DIASTOLIC BLOOD PRESSURE: 74 MMHG | SYSTOLIC BLOOD PRESSURE: 124 MMHG | RESPIRATION RATE: 16 BRPM | OXYGEN SATURATION: 99 % | TEMPERATURE: 98.9 F | HEART RATE: 83 BPM

## 2022-05-09 NOTE — PROGRESS NOTES
Cardiac Surgery Discharge - Follow up call placed to Apryl Sosa. Reviewed plan of care after discharge and encouraged Apryl Sosa to verbalize. Discussed precautions. Home health nurse saw pt over the weekend and reviewed medications. Patient without questions regarding medications. Encouraged continued use of the incentive spirometer, daily weights and temp. He is showering and cleaning incisions as instructed. Confirmed follow up appts and reinforced importance of wearing red reminder bracelet. Apryl Sosa is without questions or concerns.  Samir Arellano RN

## 2022-05-11 ENCOUNTER — HOME CARE VISIT (OUTPATIENT)
Dept: SCHEDULING | Facility: HOME HEALTH | Age: 47
End: 2022-05-11
Payer: COMMERCIAL

## 2022-05-11 ENCOUNTER — TELEPHONE ANTICOAG (OUTPATIENT)
Dept: CARDIOTHORACIC SURGERY | Age: 47
End: 2022-05-11

## 2022-05-11 ENCOUNTER — TELEPHONE (OUTPATIENT)
Dept: CARDIOTHORACIC SURGERY | Age: 47
End: 2022-05-11

## 2022-05-11 VITALS
DIASTOLIC BLOOD PRESSURE: 73 MMHG | HEART RATE: 75 BPM | SYSTOLIC BLOOD PRESSURE: 126 MMHG | RESPIRATION RATE: 20 BRPM | OXYGEN SATURATION: 100 % | TEMPERATURE: 97.6 F

## 2022-05-11 LAB — INR, EXTERNAL: 1.7

## 2022-05-11 PROCEDURE — G0300 HHS/HOSPICE OF LPN EA 15 MIN: HCPCS

## 2022-05-13 ENCOUNTER — TELEPHONE (OUTPATIENT)
Dept: CARDIOTHORACIC SURGERY | Age: 47
End: 2022-05-13

## 2022-05-13 ENCOUNTER — HOME CARE VISIT (OUTPATIENT)
Dept: SCHEDULING | Facility: HOME HEALTH | Age: 47
End: 2022-05-13
Payer: COMMERCIAL

## 2022-05-13 ENCOUNTER — TELEPHONE ANTICOAG (OUTPATIENT)
Dept: CARDIOTHORACIC SURGERY | Age: 47
End: 2022-05-13

## 2022-05-13 LAB — INR, EXTERNAL: 1.9

## 2022-05-13 PROCEDURE — G0300 HHS/HOSPICE OF LPN EA 15 MIN: HCPCS

## 2022-05-15 VITALS
OXYGEN SATURATION: 95 % | TEMPERATURE: 99.2 F | DIASTOLIC BLOOD PRESSURE: 74 MMHG | RESPIRATION RATE: 18 BRPM | SYSTOLIC BLOOD PRESSURE: 122 MMHG | HEART RATE: 60 BPM

## 2022-05-16 ENCOUNTER — TELEPHONE ANTICOAG (OUTPATIENT)
Dept: CARDIOTHORACIC SURGERY | Age: 47
End: 2022-05-16

## 2022-05-16 ENCOUNTER — HOME CARE VISIT (OUTPATIENT)
Dept: SCHEDULING | Facility: HOME HEALTH | Age: 47
End: 2022-05-16
Payer: COMMERCIAL

## 2022-05-16 VITALS
DIASTOLIC BLOOD PRESSURE: 64 MMHG | RESPIRATION RATE: 18 BRPM | SYSTOLIC BLOOD PRESSURE: 136 MMHG | WEIGHT: 165 LBS | TEMPERATURE: 98.5 F | OXYGEN SATURATION: 97 % | BODY MASS INDEX: 22.38 KG/M2 | HEART RATE: 66 BPM

## 2022-05-16 LAB — INR, EXTERNAL: 2.2

## 2022-05-16 PROCEDURE — G0299 HHS/HOSPICE OF RN EA 15 MIN: HCPCS

## 2022-05-17 ENCOUNTER — OFFICE VISIT (OUTPATIENT)
Dept: CARDIOTHORACIC SURGERY | Age: 47
End: 2022-05-17
Payer: COMMERCIAL

## 2022-05-17 VITALS
OXYGEN SATURATION: 96 % | SYSTOLIC BLOOD PRESSURE: 118 MMHG | TEMPERATURE: 98.1 F | HEIGHT: 72 IN | BODY MASS INDEX: 22.37 KG/M2 | HEART RATE: 76 BPM | DIASTOLIC BLOOD PRESSURE: 66 MMHG | WEIGHT: 165.2 LBS | RESPIRATION RATE: 18 BRPM

## 2022-05-17 DIAGNOSIS — I35.0 SEVERE AORTIC STENOSIS: ICD-10-CM

## 2022-05-17 DIAGNOSIS — Z95.2 S/P AVR: Primary | ICD-10-CM

## 2022-05-17 DIAGNOSIS — K44.9 HIATAL HERNIA: ICD-10-CM

## 2022-05-17 PROCEDURE — 99024 POSTOP FOLLOW-UP VISIT: CPT | Performed by: THORACIC SURGERY (CARDIOTHORACIC VASCULAR SURGERY)

## 2022-05-17 NOTE — PROGRESS NOTES
Chief Complaint   Patient presents with    Post OP Follow Up     1. Have you been to the ER, urgent care clinic since your last visit? Hospitalized since your last visit? No    2. Have you seen or consulted any other health care providers outside of the [address] [place] [address] since your last visit? Include any pap smears or colon screening.  No

## 2022-05-17 NOTE — PROGRESS NOTES
Patient: Robert Lamb   Age: 51 y.o. Patient Care Team:  Andrea Blair MD as PCP - General (Internal Medicine Physician)  Andrea Blair MD as PCP - [address] [place] [address] Provider  Nathaly Bee MD as Physician (Cardiovascular Disease Physician)  Ruth Marcum MD (Cardiothoracic Surgery)    Diagnosis: The primary encounter diagnosis was S/P AVR. Diagnoses of Severe aortic stenosis and Hiatal hernia were also pertinent to this visit. Problem List:   Patient Active Problem List   Diagnosis Code    Hiatal hernia K44.9    Severe aortic stenosis I35.0    Bicuspid aortic valve Q23.1    Aortic stenosis I35.0    S/P AVR Z95.2    AS (aortic stenosis) I35.0      Date of Surgery: 5/3/22    Surgery: AORTIC VALVE REPLACEMENT ( ONX-23 )     HPI:  Pt is here for post op follow up. Pain is doing well, taking tylenol during the day and oxycodone at night when needed. Appetite is doing well, he is having ok BMs with stool softener and miralax. He is getting about 6hrs of sleep a night. He is still sleeping upright at this time. He has been following sternal precautions and wound care orders. He is active in the house and walking outdoors twice a day. His spouse reports he is improving everyday and is doing more on his own. Current Medications:   Current Outpatient Medications   Medication Sig Dispense Refill    melatonin 10 mg tab Take 1 Tablet by mouth daily as needed (sleep).  acetaminophen (TYLENOL) 325 mg tablet Take 650 mg by mouth every four (4) hours as needed for Pain (mild).  warfarin (COUMADIN) 2 mg tablet Take 1 Tablet by mouth daily. (Patient taking differently: Take 2 Tablets by mouth daily.) 30 Tablet 2    aspirin 81 mg chewable tablet Take 1 Tablet by mouth daily. 30 Tablet 1    metoprolol tartrate (LOPRESSOR) 25 mg tablet Take 0.5 Tablets by mouth every twelve (12) hours. 30 Tablet 1    polyethylene glycol (MIRALAX) 17 gram packet Take 1 Packet by mouth daily.  15 Packet 0    senna-docusate (PERICOLACE) 8.6-50 mg per tablet Take 1 Tablet by mouth two (2) times a day. (Patient taking differently: Take 1 Tablet by mouth two (2) times a day. 4 mg QD) 60 Tablet 0    Omeprazole delayed release (PRILOSEC D/R) 20 mg tablet Take 20 mg by mouth daily. Vitals: Blood pressure 118/66, pulse 76, temperature 98.1 °F (36.7 °C), temperature source Oral, resp. rate 18, height 6' (1.829 m), weight 165 lb 3.2 oz (74.9 kg), SpO2 96 %. Allergies: is allergic to soap. Physical Exam:   Wounds: clean, dry, no drainage, healing -- prineo removed    Lungs: clear to auscultation bilaterally    Heart: regular rate and rhythm, S1, S2 normal, no murmur, rub or gallop. + valve click - audible without stethoscope. Extremities: no edema    Assessment/Plan:   1. Bicuspid AV stenosis s/p AVR with #23mm On-X mechanical valve: Cont coumadin, lovenox bridge. INR goal 2-3 x 3 months and then 1.5-2.5 thereafter. Cont 12.5mg Metoprolol   2. Ascending aortic enlargement: Recommend Annual CTA's for monitoring as outpatient. 3. Hiatal hernia: On protonix  4. RBBB: avoid amio, monitor rhythm    Pt is ready to start cardiac rehab.      Rehab - starts on Monday  Walking: yes  Antibiotic card for valves: new one given

## 2022-05-19 ENCOUNTER — HOME CARE VISIT (OUTPATIENT)
Dept: SCHEDULING | Facility: HOME HEALTH | Age: 47
End: 2022-05-19
Payer: COMMERCIAL

## 2022-05-19 ENCOUNTER — TELEPHONE ANTICOAG (OUTPATIENT)
Dept: CARDIOTHORACIC SURGERY | Age: 47
End: 2022-05-19

## 2022-05-19 LAB — INR, EXTERNAL: 3.1

## 2022-05-19 PROCEDURE — G0300 HHS/HOSPICE OF LPN EA 15 MIN: HCPCS

## 2022-05-20 ENCOUNTER — HOME CARE VISIT (OUTPATIENT)
Dept: SCHEDULING | Facility: HOME HEALTH | Age: 47
End: 2022-05-20
Payer: COMMERCIAL

## 2022-05-20 VITALS
WEIGHT: 162 LBS | DIASTOLIC BLOOD PRESSURE: 64 MMHG | TEMPERATURE: 98.4 F | SYSTOLIC BLOOD PRESSURE: 122 MMHG | OXYGEN SATURATION: 98 % | RESPIRATION RATE: 18 BRPM | HEART RATE: 72 BPM | BODY MASS INDEX: 21.97 KG/M2

## 2022-05-20 PROCEDURE — G0299 HHS/HOSPICE OF RN EA 15 MIN: HCPCS

## 2022-05-21 VITALS
BODY MASS INDEX: 22.03 KG/M2 | DIASTOLIC BLOOD PRESSURE: 66 MMHG | RESPIRATION RATE: 18 BRPM | TEMPERATURE: 98.2 F | WEIGHT: 162.4 LBS | HEART RATE: 80 BPM | SYSTOLIC BLOOD PRESSURE: 110 MMHG | OXYGEN SATURATION: 97 %

## 2022-05-23 ENCOUNTER — TELEPHONE ANTICOAG (OUTPATIENT)
Dept: CARDIOTHORACIC SURGERY | Age: 47
End: 2022-05-23

## 2022-05-23 ENCOUNTER — HOSPITAL ENCOUNTER (OUTPATIENT)
Dept: CARDIAC REHAB | Age: 47
Discharge: HOME | End: 2022-05-23
Payer: COMMERCIAL

## 2022-05-23 VITALS — WEIGHT: 169 LBS | BODY MASS INDEX: 22.89 KG/M2 | HEIGHT: 72 IN

## 2022-05-23 LAB — INR, EXTERNAL: 2

## 2022-05-23 PROCEDURE — 93798 PHYS/QHP OP CAR RHAB W/ECG: CPT

## 2022-05-23 NOTE — PROGRESS NOTES
Mr. Joann Rivera came in for INR check today, INR had dropped from 3.1 to 2.0 on alternating 2mg and 4mg dosage. Plan to change his dosing to 2mg on Sunday, Wednesday, and Friday and 4mg Monday, Tuesday, Thursday, Saturday. Recheck on Friday 5/27/22.      Gil Michelle NP

## 2022-05-23 NOTE — CARDIO/PULMONARY
[facility]    Cardiac Rehabilitation Program    Intake Appointment  2022           NAME: Sanjay Bowers : 1975 AGE: 51 y.o. GENDER: male    CARDIAC REHAB ADMITTING DIAGNOSIS: Presence of prosthetic heart valve [Z95.2]    REFERRING PHYSICIAN: Evaristo Carter, *    MEDICAL HX:  Past Medical History:   Diagnosis Date    Aortic stenosis     Bicuspid aortic valve     GERD (gastroesophageal reflux disease)     Hiatal hernia     Severe aortic stenosis 3/22/2022       LABS:     Lab Results   Component Value Date/Time    Hemoglobin A1c 5.5 2022 01:18 PM     Lab Results   Component Value Date/Time    Cholesterol, total 204 (H) 2020 09:54 AM    HDL Cholesterol 50 2020 09:54 AM    LDL, calculated 131 (H) 2020 09:54 AM    VLDL, calculated 23 2020 09:54 AM    Triglyceride 114 2020 09:54 AM         MEDICATIONS/SUPPLEMENTS:     Prior to Admission medications    Medication Sig Start Date End Date Taking? Authorizing Provider   melatonin 10 mg tab Take 1 Tablet by mouth daily as needed (sleep). Yes Provider, Historical   acetaminophen (TYLENOL) 325 mg tablet Take 650 mg by mouth every four (4) hours as needed for Pain (mild). Yes Provider, Historical   warfarin (COUMADIN) 2 mg tablet Take 1 Tablet by mouth daily. Patient taking differently: Take 2 Tablets by mouth daily. 22  Yes Carolina Francis NP   aspirin 81 mg chewable tablet Take 1 Tablet by mouth daily. 22  Yes Adamaris Curry NP   metoprolol tartrate (LOPRESSOR) 25 mg tablet Take 0.5 Tablets by mouth every twelve (12) hours. 22  Yes Adamaris Curry NP   Omeprazole delayed release (PRILOSEC D/R) 20 mg tablet Take 20 mg by mouth daily. Yes Provider, Historical   polyethylene glycol (MIRALAX) 17 gram packet Take 1 Packet by mouth daily.   Patient not taking: Reported on 2022   Adamaris Curry NP   senna-docusate (PERICOLACE) 8.6-50 mg per tablet Take 1 Tablet by mouth two (2) times a day. Patient taking differently: Take 1 Tablet by mouth two (2) times a day. 4 mg QD 5/6/22   Lorelei Dakin, NP       ANTHROPOMETRICS:      Ht Readings from Last 1 Encounters:   05/23/22 6' (1.829 m)      Wt Readings from Last 1 Encounters:   05/23/22 76.7 kg (169 lb)        WAIST: 37.5    SCREENING SCORES:                       [facility]: 20.7  [facility]: 26  Rate Your Plate: 51  Cardiac Knowledge: 10  PHQ-9: 9       VISIT SUMMARY:    Beata Lord 51 y.o. presented to [address] [place] [address] Cardiac Rehab for program orientation and 6 minute walk test today with a primary diagnosis of Presence of prosthetic heart valve [Z95.2]. Beata Lord has a history that includes: GERD. Cardiac risk factors include smoking/ tobacco exposure. EF is unavailable. Beata Lord is  and lives with his spouse,Jm. social hx. PHQ9, depression score, is  9 and this is considered moderate. The result was discussed with patient who affirms score to be accurate. Patient denied chest pain or SOB during 6 minute walk test and was in sr/st/bbb. Patient walked 380 meters meters at a speed of 2.9 mph and grade of 0% for a final MET level of 3.3. Exercise prescription developed around patient stated goals, to be supplemented with home exercise recommendations. Education manual given to patient and reviewed. Patient will attend cardiac rehab 3 times/week.       Dat Acharya RN

## 2022-05-25 ENCOUNTER — HOSPITAL ENCOUNTER (OUTPATIENT)
Dept: CARDIAC REHAB | Age: 47
Discharge: HOME | End: 2022-05-25
Payer: COMMERCIAL

## 2022-05-25 VITALS — BODY MASS INDEX: 22.92 KG/M2 | WEIGHT: 169 LBS

## 2022-05-25 PROCEDURE — 93798 PHYS/QHP OP CAR RHAB W/ECG: CPT

## 2022-05-27 ENCOUNTER — TELEPHONE ANTICOAG (OUTPATIENT)
Dept: CARDIOTHORACIC SURGERY | Age: 47
End: 2022-05-27

## 2022-05-27 ENCOUNTER — HOSPITAL ENCOUNTER (OUTPATIENT)
Dept: CARDIAC REHAB | Age: 47
Discharge: HOME | End: 2022-05-27
Payer: COMMERCIAL

## 2022-05-27 VITALS — BODY MASS INDEX: 22.78 KG/M2 | WEIGHT: 168 LBS

## 2022-05-27 LAB — INR, EXTERNAL: 2.2

## 2022-05-27 PROCEDURE — 93798 PHYS/QHP OP CAR RHAB W/ECG: CPT

## 2022-05-27 RX ORDER — WARFARIN 4 MG/1
4 TABLET ORAL DAILY
Qty: 30 TABLET | Refills: 2 | Status: SHIPPED | OUTPATIENT
Start: 2022-05-27 | End: 2022-09-02 | Stop reason: SDUPTHER

## 2022-05-31 ENCOUNTER — TELEPHONE ANTICOAG (OUTPATIENT)
Dept: CARDIOTHORACIC SURGERY | Age: 47
End: 2022-05-31

## 2022-05-31 ENCOUNTER — HOSPITAL ENCOUNTER (OUTPATIENT)
Dept: CARDIAC REHAB | Age: 47
Discharge: HOME | End: 2022-05-31
Payer: COMMERCIAL

## 2022-05-31 ENCOUNTER — OFFICE VISIT (OUTPATIENT)
Dept: CARDIOTHORACIC SURGERY | Age: 47
End: 2022-05-31
Payer: COMMERCIAL

## 2022-05-31 VITALS
RESPIRATION RATE: 16 BRPM | OXYGEN SATURATION: 95 % | BODY MASS INDEX: 22.62 KG/M2 | HEART RATE: 77 BPM | TEMPERATURE: 98.3 F | DIASTOLIC BLOOD PRESSURE: 72 MMHG | WEIGHT: 167 LBS | SYSTOLIC BLOOD PRESSURE: 128 MMHG | HEIGHT: 72 IN

## 2022-05-31 VITALS — BODY MASS INDEX: 22.92 KG/M2 | WEIGHT: 169 LBS

## 2022-05-31 DIAGNOSIS — Z95.2 S/P AVR: Primary | ICD-10-CM

## 2022-05-31 DIAGNOSIS — I35.0 SEVERE AORTIC STENOSIS: ICD-10-CM

## 2022-05-31 LAB — INR, EXTERNAL: 1.8

## 2022-05-31 PROCEDURE — 93798 PHYS/QHP OP CAR RHAB W/ECG: CPT

## 2022-05-31 PROCEDURE — 99024 POSTOP FOLLOW-UP VISIT: CPT | Performed by: THORACIC SURGERY (CARDIOTHORACIC VASCULAR SURGERY)

## 2022-05-31 NOTE — PROGRESS NOTES
INR 1.8    PT 21.2    NP Bonnie Francis notified    Chief Complaint   Patient presents with    Post OP Follow Up       1. Have you been to the ER, urgent care clinic since your last visit? Hospitalized since your last visit? No    2. Have you seen or consulted any other health care providers outside of the [address] [place] [address] since your last visit? Include any pap smears or colon screening.  No

## 2022-05-31 NOTE — PROGRESS NOTES
Patient: Beata Lord   Age: 51 y.o. Patient Care Team:  Rico Koenig MD as PCP - General (Internal Medicine Physician)  Rico Koenig MD as PCP - [facility] Provider  Dariel Arias MD as Physician (Cardiovascular Disease Physician)  Angelica Briceño MD (Cardiothoracic Surgery)    Diagnosis: Aortic Stenosis    Problem List:   Patient Active Problem List   Diagnosis Code    Hiatal hernia K44.9    Severe aortic stenosis I35.0    Bicuspid aortic valve Q23.1    Aortic stenosis I35.0    S/P AVR Z95.2    AS (aortic stenosis) I35.0      Date of Surgery: 5/3/22    Surgery: AORTIC VALVE REPLACEMENT ( ON X-23 )     HPI:  Pt is here for his 1 month post op follow up, pt seen with Dr. Bulmaro Head. Pt is feeling very well. Getting around without issue. He is only needing tylenol at night before bed. He denies any CP or SOB. He is eating and having regular BMs. Sternal incision healed. Sternum stable. Denies popping, clicking, rubbing. Current Medications:   Current Outpatient Medications   Medication Sig Dispense Refill    warfarin (COUMADIN) 4 mg tablet Take 1 Tablet by mouth daily. 30 Tablet 2    melatonin 10 mg tab Take 1 Tablet by mouth daily as needed (sleep).  acetaminophen (TYLENOL) 325 mg tablet Take 650 mg by mouth every four (4) hours as needed for Pain (mild).  warfarin (COUMADIN) 2 mg tablet Take 1 Tablet by mouth daily. (Patient taking differently: Take 2 Tablets by mouth daily.) 30 Tablet 2    aspirin 81 mg chewable tablet Take 1 Tablet by mouth daily. 30 Tablet 1    metoprolol tartrate (LOPRESSOR) 25 mg tablet Take 0.5 Tablets by mouth every twelve (12) hours. 30 Tablet 1    Omeprazole delayed release (PRILOSEC D/R) 20 mg tablet Take 20 mg by mouth daily. Vitals: Blood pressure 128/72, pulse 77, temperature 98.3 °F (36.8 °C), temperature source Oral, resp. rate 16, height 6' (1.829 m), SpO2 95 %. Allergies: is allergic to soap.     Physical Exam:   Wounds: Healed. Lungs: clear to auscultation bilaterally    Heart: regular rate and rhythm, S1, S2 normal, no murmur, rub or gallop. + valve click - audible without stethoscope. Extremities: no edema    Assessment/Plan:   1. Bicuspid AV stenosis s/p AVR with #23mm On-X mechanical valve: Cont coumadin. INR goal 2-3 x 3 months and then 1.5-2.5 thereafter. Cont 12.5mg Metoprolol   2. Ascending aortic enlargement: Recommend Annual CTA's for monitoring as outpatient. 3. Hiatal hernia: On protonix  4. RBBB: avoid amio, monitor rhythm    Pt is ready to start cardiac rehab.  FU with PCP and Cardiologist.     Rehab -   Walking: yes  Antibiotic card for valves: new one given

## 2022-06-02 ENCOUNTER — HOSPITAL ENCOUNTER (OUTPATIENT)
Dept: CARDIAC REHAB | Age: 47
Discharge: HOME | End: 2022-06-02
Payer: COMMERCIAL

## 2022-06-02 VITALS — BODY MASS INDEX: 22.65 KG/M2 | WEIGHT: 167 LBS

## 2022-06-02 PROCEDURE — 93798 PHYS/QHP OP CAR RHAB W/ECG: CPT

## 2022-06-02 RX ORDER — WARFARIN 2 MG/1
TABLET ORAL
Qty: 30 TABLET | Refills: 2 | Status: ON HOLD | OUTPATIENT
Start: 2022-06-02 | End: 2022-08-29

## 2022-06-02 RX ORDER — METOPROLOL TARTRATE 25 MG/1
12.5 TABLET, FILM COATED ORAL 2 TIMES DAILY
Qty: 90 TABLET | Refills: 0 | Status: SHIPPED | OUTPATIENT
Start: 2022-06-02 | End: 2022-06-15

## 2022-06-03 ENCOUNTER — TELEPHONE ANTICOAG (OUTPATIENT)
Dept: CARDIOTHORACIC SURGERY | Age: 47
End: 2022-06-03

## 2022-06-03 LAB — INR, EXTERNAL: 2

## 2022-06-06 ENCOUNTER — TELEPHONE ANTICOAG (OUTPATIENT)
Dept: CARDIOTHORACIC SURGERY | Age: 47
End: 2022-06-06

## 2022-06-06 ENCOUNTER — OFFICE VISIT (OUTPATIENT)
Dept: CARDIOTHORACIC SURGERY | Age: 47
End: 2022-06-06

## 2022-06-06 ENCOUNTER — HOSPITAL ENCOUNTER (OUTPATIENT)
Dept: CARDIAC REHAB | Age: 47
Discharge: HOME | End: 2022-06-06
Payer: COMMERCIAL

## 2022-06-06 VITALS — WEIGHT: 168 LBS | BODY MASS INDEX: 22.78 KG/M2

## 2022-06-06 LAB — INR, EXTERNAL: 2.3

## 2022-06-06 PROCEDURE — 93798 PHYS/QHP OP CAR RHAB W/ECG: CPT

## 2022-06-06 NOTE — PROGRESS NOTES
INR 2.3  PT 27.1  Performed with coag-Sense in office 6/6/2022 at 09:52    Results reported to DERIK Miramontes Redo 6/6/22 @ 10:17

## 2022-06-08 ENCOUNTER — HOSPITAL ENCOUNTER (OUTPATIENT)
Dept: CARDIAC REHAB | Age: 47
Discharge: HOME | End: 2022-06-08
Payer: COMMERCIAL

## 2022-06-08 VITALS — WEIGHT: 168 LBS | BODY MASS INDEX: 22.78 KG/M2

## 2022-06-08 PROCEDURE — 93798 PHYS/QHP OP CAR RHAB W/ECG: CPT

## 2022-06-09 ENCOUNTER — TELEPHONE (OUTPATIENT)
Dept: CARDIOLOGY CLINIC | Age: 47
End: 2022-06-09

## 2022-06-09 NOTE — TELEPHONE ENCOUNTER
This nurse called [facility] and gave nurse the information for of Coumadin Clinic at [address] [place] [address] for Medication management.

## 2022-06-09 NOTE — TELEPHONE ENCOUNTER
Major Ni from [facility] calling - wanting to speak with someone about patient becoming therapeutic before starting long term monitoring for coumadin. Please advise.  Can contact the NP Perla Pinzon @ [phone]

## 2022-06-10 ENCOUNTER — TELEPHONE ANTICOAG (OUTPATIENT)
Dept: CARDIOTHORACIC SURGERY | Age: 47
End: 2022-06-10

## 2022-06-10 ENCOUNTER — HOSPITAL ENCOUNTER (OUTPATIENT)
Dept: CARDIAC REHAB | Age: 47
Discharge: HOME | End: 2022-06-10
Payer: COMMERCIAL

## 2022-06-10 VITALS — WEIGHT: 167 LBS | BODY MASS INDEX: 22.65 KG/M2

## 2022-06-10 LAB — INR, EXTERNAL: 2.2

## 2022-06-10 PROCEDURE — 93798 PHYS/QHP OP CAR RHAB W/ECG: CPT

## 2022-06-10 NOTE — PROGRESS NOTES
Pharmacy Progress Note - Anticoagulation Management    S/O:  Mr. Topher Carrero  is a 51 y.o. male seen today for anticoagulation management for the diagnosis of Aortic Valve Replacement. HPI: At last visit (6/10) INR was 2.2 and therapeutic per the review flowsheet faxed from the [facility] at [phone]. Interim History:    · Warfarin start date: 5/4/22  · INR Goal:  2.0-3.0 for 2-3 months then 1.5-2.5 thereafter, (On-X)  · Current warfarin regimen: 4 mg daily                    · Warfarin tablet strength:   2 mg and 4 mg  · Duration of therapy: Indefinite    Today's pertinent positives includes:  No significant changes since last visit    Results for orders placed or performed in visit on 06/15/22   AMB POC PT/INR   Result Value Ref Range    VALID INTERNAL CONTROL POC Yes     Prothrombin time (POC) 32.0 seconds    INR POC 2.7        · Adherence:   · Able to recall regimen? YES  · Miss/extra dose? NO  · Need refill? NO    Upcoming procedure(s):  N/A      Past Medical History:   Diagnosis Date    Aortic stenosis     Bicuspid aortic valve     GERD (gastroesophageal reflux disease)     Hiatal hernia     Severe aortic stenosis 3/22/2022     Allergies   Allergen Reactions    Soap Rash     Dial Soap     Current Outpatient Medications   Medication Sig    warfarin (COUMADIN) 2 mg tablet TAKE 1 TABLET BY MOUTH EVERY DAY    metoprolol tartrate (LOPRESSOR) 25 mg tablet Take 0.5 Tablets by mouth two (2) times a day for 92 days.  warfarin (COUMADIN) 4 mg tablet Take 1 Tablet by mouth daily.  melatonin 10 mg tab Take 1 Tablet by mouth daily as needed (sleep).  acetaminophen (TYLENOL) 325 mg tablet Take 650 mg by mouth every four (4) hours as needed for Pain (mild).  aspirin 81 mg chewable tablet Take 1 Tablet by mouth daily.  metoprolol tartrate (LOPRESSOR) 25 mg tablet Take 0.5 Tablets by mouth every twelve (12) hours.     Omeprazole delayed release (PRILOSEC D/R) 20 mg tablet Take 20 mg by mouth daily. No current facility-administered medications for this visit. Wt Readings from Last 3 Encounters:   06/10/22 167 lb (75.8 kg)   06/08/22 168 lb (76.2 kg)   06/06/22 168 lb (76.2 kg)     BP Readings from Last 3 Encounters:   05/31/22 128/72   05/20/22 122/64   05/19/22 110/66     Pulse Readings from Last 3 Encounters:   05/31/22 77   05/20/22 72   05/19/22 80     Lab Results   Component Value Date/Time    WBC 6.0 05/06/2022 04:08 AM    HGB 9.2 (L) 05/06/2022 04:08 AM    HCT 28.3 (L) 05/06/2022 04:08 AM    PLATELET 755 20/13/2195 04:08 AM    .4 (H) 05/06/2022 04:08 AM     Lab Results   Component Value Date/Time    Sodium 136 05/06/2022 04:08 AM    Potassium 4.0 05/06/2022 04:08 AM    Chloride 104 05/06/2022 04:08 AM    CO2 28 05/06/2022 04:08 AM    Anion gap 4 (L) 05/06/2022 04:08 AM    Glucose 127 (H) 05/06/2022 04:08 AM    BUN 20 05/06/2022 04:08 AM    Creatinine 0.99 05/06/2022 04:08 AM    BUN/Creatinine ratio 20 05/06/2022 04:08 AM    GFR est AA >60 05/06/2022 04:08 AM    GFR est non-AA >60 05/06/2022 04:08 AM    Calcium 8.7 05/06/2022 04:08 AM    Bilirubin, total 0.6 05/04/2022 04:33 AM    Alk. phosphatase 32 (L) 05/04/2022 04:33 AM    Protein, total 6.2 (L) 05/04/2022 04:33 AM    Albumin 3.9 05/04/2022 04:33 AM    Globulin 2.3 05/04/2022 04:33 AM    A-G Ratio 1.7 05/04/2022 04:33 AM    ALT (SGPT) 22 05/04/2022 04:33 AM     Estimated Creatinine Clearance: 98.9 mL/min (by C-G formula based on SCr of 0.99 mg/dL). INR History:   (normal INR range 0.8-1.2)     Date   INR   PT   Dose/Comments  06/15/22 2.7  32.0 4 mg daily  06/10/22 2.2   4 mg daily      · Medications that can increase  INR: omeprazole  · Medications that can decrease INR: N/A    A/P:       Anticoagulation:  Considering Mr. Elaine Jones past history, todays findings, and per Anticoagulation Collaborative Practice Agreement/Protocol:    1. Fingerstick POC INR (2.7) is Therapeutic for INR goal today.   2.  Continue warfarin 4 mg daily   3. INR is 2.7 and therapeutic. Patient denies changes to his medications and reports consistent intake of vitamin K foods. Patient will continue current regimen and recheck INR in 2 weeks. Patient was instructed to schedule an appointment in 2 week(s) prior to leaving the clinic. Medication reconciliation was completed during the visit. There are no discontinued medications. A full discussion of the nature of anticoagulants has been carried out. A full discussion of the need for frequent and regular monitoring, precise dosage adjustment and compliance was stressed. Side effects of potential bleeding were discussed and Mr. Suzy Gomez was instructed to call [phone] if there are any signs of abnormal bleeding. Mr. Suzy Gomez was instructed to avoid any OTC items containing aspirin or ibuprofen and prior to starting any new OTC products to consult with his physician or pharmacist to ensure no drug interactions are present. Mr. Suzy Gomez was instructed to avoid any major changes in his general diet and to avoid alcohol consumption. Mr. Suzy Gomez was provided information in the AVS that includes topics on understanding coumadin therapy, drug interaction considerations, vitamin K and coumadin use, interactions with foods and supplements containing vitamin K, and the use of herbal products. Mr. Suzy Gomez verbalized his understanding of all instructions and will call the office with any questions, concerns, or signs of abnormal bleeding or blood clot. Notifications of recommendations will be sent to Dr. Juan Hughes MD for review.     Thank you,  Darlene Reed, [phone] Tracking Only     Intervention Detail: Adherence Monitorin and Lab(s) Ordered   Total # of Interventions Recommended: 2   Total # of Interventions Accepted: 2   Time Spent (min): 20

## 2022-06-10 NOTE — PATIENT INSTRUCTIONS
Today your INR was 2.7 . Your goal INR is  2.0-3.0 . You have a 2 mg and 4 mg tablet of Coumadin (warfarin). Take Coumadin (warfarin) as follows: Take 4 mg daily    Come back in 2 week(s) for your next finger stick/INR blood test.        Avoid any over the counter items containing aspirin or ibuprofen, and avoid great swings in general diet. Avoid alcohol consumption. Please notify the INR nurse if you are started on any new medication including over the counter or herbal supplements. Also, please notify your INR nurse if any of your other prescription or over the counter medications have been discontinued. Call [facility] at [phone] if you have any signs of abnormal bleeding/blood clot.  ------------------------------------------------------------------------------------------------------------------  Taking Warfarin Safely: Care Instructions    Your Care Instructions  Warfarin is a medicine that you take to prevent blood clots. It is often called a blood thinner. Doctors give warfarin (such as Coumadin) to reduce the risk of blood clots. You may be at risk for blood clots if you have atrial fibrillation or deep vein thrombosis. Some other health problems may also put you at risk. Warfarin slows the amount of time it takes for your blood to clot. It can cause bleeding problems. Even if you've been taking warfarin for a while, it's important to know how to take it safely. Foods and other medicines can affect the way warfarin works. Some can make warfarin work too well. This can cause bleeding problems. And some can make it work poorly, so that it does not prevent blood clots very well. You will need regular blood tests to check how long it takes for your blood to form a clot. This test is called a PT or prothrombin time test. The result of the test is called an INR level.  Depending on the test results, your doctor or anticoagulation clinic may adjust your dose of warfarin. Follow-up care is a key part of your treatment and safety. Be sure to make and go to all appointments, and call your doctor if you are having problems. It's also a good idea to know your test results and keep a list of the medicines you take. How can you care for yourself at home? Take warfarin safely  · Take your warfarin at the same time each day. · If you miss a dose of warfarin, don't take an extra dose to make up for it. Your doctor can tell you exactly what to do so you don't take too much or too little. · Wear medical alert jewelry that lets others know that you take warfarin. You can buy this at most drugstores. · Don't take warfarin if you are pregnant or planning to get pregnant. Talk to your doctor about how you can prevent getting pregnant while you are taking it. · Don't change your dose or stop taking warfarin unless your doctor tells you to. Effects of medicines and food on warfarin  · Don't start or stop taking any medicines, vitamins, or natural remedies unless you first talk to your doctor. Many medicines can affect how warfarin works. These include aspirin and other pain relievers, over-the-counter medicines, multivitamins, dietary supplements, and herbal products. · Tell all of your doctors and pharmacists that you take warfarin. Some prescription medicines can affect how warfarin works. · Keep the amount of vitamin K in your diet about the same from day to day. Do not suddenly eat a lot more or a lot less food that is rich in vitamin K than you usually do. Vitamin K affects how warfarin works and how your blood clots. Talk with your doctor before making big changes in your diet. Vitamin K is in many foods, such as:  ¨ Leafy greens, such as kale, cabbage, spinach, Swiss chard, and lettuce. ¨ Canola and soybean oils. ¨ Green vegetables, such as asparagus, broccoli, and Logan sprouts. ¨ Vegetable drinks, green tea leaves, and some dietary supplement drinks.   · Avoid cranberry juice and other cranberry products. They can increase the effects of warfarin. · Limit your use of alcohol. Avoid bleeding by preventing falls and injuries  · Wear slippers or shoes with nonskid soles. · Remove throw rugs and clutter. · Rearrange furniture and electrical cords to keep them out of walking paths. · Keep stairways, porches, and outside walkways well lit. Use night-lights in hallways and bathrooms. · Be extra careful when you work with sharp tools or knives. When should you call for help? Call 911 anytime you think you may need emergency care. For example, call if:  · You have a sudden, severe headache that is different from past headaches. Call your doctor now or seek immediate medical care if:  · You have any abnormal bleeding, such as:  ¨ Nosebleeds. ¨ Vaginal bleeding that is different (heavier, more frequent, at a different time of the month) than what you are used to. ¨ Bloody or black stools, or rectal bleeding. ¨ Bloody or pink urine. Watch closely for changes in your health, and be sure to contact your doctor if you have any problems. Where can you learn more? Go to http://www.gray.com/. Enter L225 in the search box to learn more about \"Taking Warfarin Safely: Care Instructions. \"  Current as of: January 27, 2016  Content Version: 11.1  © 2819-6687 [employer]. Care instructions adapted under license by [employer] (which disclaims liability or warranty for this information). If you have questions about a medical condition or this instruction, always ask your healthcare professional. [place] [address] any warranty or liability for your use of this information.

## 2022-06-13 ENCOUNTER — HOSPITAL ENCOUNTER (OUTPATIENT)
Dept: CARDIAC REHAB | Age: 47
Discharge: HOME | End: 2022-06-13
Payer: COMMERCIAL

## 2022-06-13 PROCEDURE — 93798 PHYS/QHP OP CAR RHAB W/ECG: CPT

## 2022-06-15 ENCOUNTER — HOSPITAL ENCOUNTER (OUTPATIENT)
Dept: CARDIAC REHAB | Age: 47
Discharge: HOME | End: 2022-06-15
Payer: COMMERCIAL

## 2022-06-15 ENCOUNTER — ANTI-COAG VISIT (OUTPATIENT)
Dept: PHARMACY | Age: 47
End: 2022-06-15
Payer: COMMERCIAL

## 2022-06-15 VITALS — WEIGHT: 165 LBS | BODY MASS INDEX: 22.38 KG/M2

## 2022-06-15 DIAGNOSIS — Z95.2 S/P AVR: Primary | ICD-10-CM

## 2022-06-15 LAB
INR BLD: 2.7
INR BLD: 2.7
PT POC: 32 SECONDS
PT POC: 32 SECONDS
VALID INTERNAL CONTROL?: YES
VALID INTERNAL CONTROL?: YES

## 2022-06-15 PROCEDURE — 99211 OFF/OP EST MAY X REQ PHY/QHP: CPT

## 2022-06-15 PROCEDURE — 85610 PROTHROMBIN TIME: CPT

## 2022-06-15 PROCEDURE — 93798 PHYS/QHP OP CAR RHAB W/ECG: CPT

## 2022-06-17 ENCOUNTER — HOSPITAL ENCOUNTER (OUTPATIENT)
Dept: CARDIAC REHAB | Age: 47
Discharge: HOME | End: 2022-06-17
Payer: COMMERCIAL

## 2022-06-17 VITALS — WEIGHT: 165 LBS | BODY MASS INDEX: 22.38 KG/M2

## 2022-06-17 PROCEDURE — 93798 PHYS/QHP OP CAR RHAB W/ECG: CPT

## 2022-06-20 ENCOUNTER — HOSPITAL ENCOUNTER (OUTPATIENT)
Dept: CARDIAC REHAB | Age: 47
Discharge: HOME | End: 2022-06-20
Payer: COMMERCIAL

## 2022-06-20 VITALS — BODY MASS INDEX: 22.51 KG/M2 | WEIGHT: 166 LBS

## 2022-06-20 PROCEDURE — 93798 PHYS/QHP OP CAR RHAB W/ECG: CPT

## 2022-06-22 ENCOUNTER — HOSPITAL ENCOUNTER (OUTPATIENT)
Dept: CARDIAC REHAB | Age: 47
Discharge: HOME | End: 2022-06-22
Payer: COMMERCIAL

## 2022-06-22 VITALS — BODY MASS INDEX: 22.24 KG/M2 | WEIGHT: 164 LBS

## 2022-06-22 PROCEDURE — 93798 PHYS/QHP OP CAR RHAB W/ECG: CPT

## 2022-06-27 ENCOUNTER — HOSPITAL ENCOUNTER (OUTPATIENT)
Dept: CARDIAC REHAB | Age: 47
Discharge: HOME | End: 2022-06-27
Payer: COMMERCIAL

## 2022-06-27 VITALS — WEIGHT: 163 LBS | BODY MASS INDEX: 22.11 KG/M2

## 2022-06-27 PROCEDURE — 93798 PHYS/QHP OP CAR RHAB W/ECG: CPT

## 2022-06-28 ENCOUNTER — OFFICE VISIT (OUTPATIENT)
Dept: CARDIOLOGY CLINIC | Age: 47
End: 2022-06-28
Payer: COMMERCIAL

## 2022-06-28 VITALS
RESPIRATION RATE: 15 BRPM | DIASTOLIC BLOOD PRESSURE: 80 MMHG | BODY MASS INDEX: 22.13 KG/M2 | OXYGEN SATURATION: 99 % | HEART RATE: 70 BPM | SYSTOLIC BLOOD PRESSURE: 110 MMHG | WEIGHT: 163.4 LBS | HEIGHT: 72 IN

## 2022-06-28 DIAGNOSIS — Z09 HOSPITAL DISCHARGE FOLLOW-UP: ICD-10-CM

## 2022-06-28 DIAGNOSIS — E78.2 MIXED HYPERLIPIDEMIA: ICD-10-CM

## 2022-06-28 DIAGNOSIS — I77.810 ASCENDING AORTA DILATATION (HCC): ICD-10-CM

## 2022-06-28 DIAGNOSIS — Q23.1 BICUSPID AORTIC VALVE: ICD-10-CM

## 2022-06-28 DIAGNOSIS — I35.0 SEVERE AORTIC STENOSIS: Primary | ICD-10-CM

## 2022-06-28 DIAGNOSIS — Z95.2 S/P TAVR (TRANSCATHETER AORTIC VALVE REPLACEMENT): ICD-10-CM

## 2022-06-28 PROCEDURE — 99214 OFFICE O/P EST MOD 30 MIN: CPT | Performed by: INTERNAL MEDICINE

## 2022-06-28 PROCEDURE — 1111F DSCHRG MED/CURRENT MED MERGE: CPT | Performed by: INTERNAL MEDICINE

## 2022-06-28 NOTE — PATIENT INSTRUCTIONS
Your provider has ordered an Echocardiogram to be done in the near future. Come back and see us in 6 months.

## 2022-06-28 NOTE — LETTER
6/28/2022    Patient: Dejuan Rincon   YOB: 1975   Date of Visit: 6/28/2022     Emerald Rios MD  [address]  [address] [facility]  [address]  [facility]  [address] [facility] [address]    Dear Emerald Rios MD,      Thank you for referring Mr. Urban Cuba to [address]  for evaluation. My notes for this consultation are attached. If you have questions, please do not hesitate to call me. I look forward to following your patient along with you.       Sincerely,    Dotty Whitney MD

## 2022-06-28 NOTE — PROGRESS NOTES
[address], [address]  [phone]     Subjective:      Carolee Wing is a 51 y.o. male is here for hospital follow up. He is s/p Aortic valve replacement with #23 [employer] mechanical valve on 5/3/2022. Today,    Feeling well over all. Has been attending cardiac rehab 2-3 times a week. In the last 2 weeks he reports, elevated BP readings   Average 140/90 before exercise and after exercise 130/90. He is only taking low dose metoprolol. Bp today 110/80     the patient denies chest pain/ shortness of breath, orthopnea, PND, LE edema, palpitations, syncope, or presyncope.        Patient Active Problem List    Diagnosis Date Noted    Aortic stenosis 05/03/2022    S/P AVR 05/03/2022    AS (aortic stenosis) 05/03/2022    Severe aortic stenosis 03/22/2022    Bicuspid aortic valve 03/22/2022    Hiatal hernia       Sim Hutchinson MD  Past Medical History:   Diagnosis Date    Aortic stenosis     Bicuspid aortic valve     GERD (gastroesophageal reflux disease)     Hiatal hernia     Severe aortic stenosis 3/22/2022      Past Surgical History:   Procedure Laterality Date    HX ADENOIDECTOMY      HX COLONOSCOPY      HX ENDOSCOPY      HX HEART CATHETERIZATION  03/2022     Allergies   Allergen Reactions    Soap Rash     Dial Soap      Family History   Problem Relation Age of Onset    No Known Problems Father     Lung Cancer Maternal Grandmother     Coronary Art Dis Maternal Grandfather     Heart Attack Maternal Grandfather     Hypertension Paternal Grandfather     Hypertension Mother     No Known Problems Sister     Stroke Paternal Grandmother       Social History     Socioeconomic History    Marital status:      Spouse name: Peace Murphy Number of children: Not on file    Years of education: Not on file    Highest education level: Not on file   Occupational History    Not on file   Tobacco Use    Smoking status: Former Smoker     Packs/day: 0.75     Years: 25.00     Pack years: 18.75     Types: Cigarettes     Quit date: 2017     Years since quittin.9    Smokeless tobacco: Never Used   Vaping Use    Vaping Use: Never used   Substance and Sexual Activity    Alcohol use: Yes     Alcohol/week: 4.0 standard drinks     Types: 2 Glasses of wine, 1 Cans of beer, 1 Shots of liquor per week     Comment: 3-5/week     Drug use: Not Currently    Sexual activity: Not on file   Other Topics Concern     Service No    Blood Transfusions No    Caffeine Concern No    Occupational Exposure No    Hobby Hazards No    Sleep Concern No    Stress Concern No    Weight Concern No    Special Diet No    Back Care No    Exercise Yes     Comment: daily walks, 30 mins    Bike Helmet No    Seat Belt Yes    Self-Exams No   Social History Narrative    Not on file     Social Determinants of Health     Financial Resource Strain:     Difficulty of Paying Living Expenses: Not on file   Food Insecurity:     Worried About Running Out of Food in the Last Year: Not on file    Jackelyn of Food in the Last Year: Not on file   Transportation Needs:     Lack of Transportation (Medical): Not on file    Lack of Transportation (Non-Medical):  Not on file   Physical Activity:     Days of Exercise per Week: Not on file    Minutes of Exercise per Session: Not on file   Stress:     Feeling of Stress : Not on file   Social Connections:     Frequency of Communication with Friends and Family: Not on file    Frequency of Social Gatherings with Friends and Family: Not on file    Attends Bahai Services: Not on file    Active Member of Clubs or Organizations: Not on file    Attends Club or Organization Meetings: Not on file    Marital Status: Not on file   Intimate Partner Violence:     Fear of Current or Ex-Partner: Not on file    Emotionally Abused: Not on file    Physically Abused: Not on file    Sexually Abused: Not on file   Housing Stability:     Unable to Pay for Housing in the Last Year: Not on file    Number of Places Lived in the Last Year: Not on file    Unstable Housing in the Last Year: Not on file      Current Outpatient Medications   Medication Sig    warfarin (COUMADIN) 2 mg tablet TAKE 1 TABLET BY MOUTH EVERY DAY    warfarin (COUMADIN) 4 mg tablet Take 1 Tablet by mouth daily.  melatonin 10 mg tab Take 1 Tablet by mouth daily as needed (sleep).  acetaminophen (TYLENOL) 325 mg tablet Take 650 mg by mouth every four (4) hours as needed for Pain (mild).  aspirin 81 mg chewable tablet Take 1 Tablet by mouth daily.  metoprolol tartrate (LOPRESSOR) 25 mg tablet Take 0.5 Tablets by mouth every twelve (12) hours.  Omeprazole delayed release (PRILOSEC D/R) 20 mg tablet Take 20 mg by mouth daily. No current facility-administered medications for this visit. Review of Symptoms:  11 systems reviewed, negative other than as stated in the HPI    Physical ExamPhysical Exam:    Vitals:    06/28/22 1402   BP: 110/80   Pulse: 70   Resp: 15   SpO2: 99%   Weight: 163 lb 6.4 oz (74.1 kg)   Height: 6' (1.829 m)     Body mass index is 22.16 kg/m². General PE  Gen:  NAD  Mental Status - Alert. General Appearance - Not in acute distress. HEENT:  PERRL, no carotid bruits or JVD  Chest and Lung Exam   Inspection: Accessory muscles - No use of accessory muscles in breathing. Auscultation:   Breath sounds: - Normal.   Cardiovascular   Inspection: Jugular vein - Bilateral - Inspection Normal.   Palpation/Percussion:   Apical Impulse: - Normal.   Auscultation: Rhythm - Regular. Heart Sounds - S1 WNL and S2 WNL. No S3 or S4. Murmurs & Other Heart Sounds: Auscultation of the heart reveals -+ valve click  Peripheral Vascular   Upper Extremity: Inspection - Bilateral - No Cyanotic nailbeds or Digital clubbing. Lower Extremity:   Palpation: Edema - Bilateral - No edema. Abdomen:   Soft, non-tender, bowel sounds are active.   Neuro: A&O times 3, CN and motor grossly WNL    Labs:   Lab Results   Component Value Date/Time    Cholesterol, total 204 (H) 06/09/2020 09:54 AM    HDL Cholesterol 50 06/09/2020 09:54 AM    LDL, calculated 131 (H) 06/09/2020 09:54 AM    Triglyceride 114 06/09/2020 09:54 AM     No results found for: CPK, CPKX, CPX  Lab Results   Component Value Date/Time    Sodium 136 05/06/2022 04:08 AM    Potassium 4.0 05/06/2022 04:08 AM    Chloride 104 05/06/2022 04:08 AM    CO2 28 05/06/2022 04:08 AM    Anion gap 4 (L) 05/06/2022 04:08 AM    Glucose 127 (H) 05/06/2022 04:08 AM    BUN 20 05/06/2022 04:08 AM    Creatinine 0.99 05/06/2022 04:08 AM    BUN/Creatinine ratio 20 05/06/2022 04:08 AM    GFR est AA >60 05/06/2022 04:08 AM    GFR est non-AA >60 05/06/2022 04:08 AM    Calcium 8.7 05/06/2022 04:08 AM    Bilirubin, total 0.6 05/04/2022 04:33 AM    Alk. phosphatase 32 (L) 05/04/2022 04:33 AM    Protein, total 6.2 (L) 05/04/2022 04:33 AM    Albumin 3.9 05/04/2022 04:33 AM    Globulin 2.3 05/04/2022 04:33 AM    A-G Ratio 1.7 05/04/2022 04:33 AM    ALT (SGPT) 22 05/04/2022 04:33 AM       EKG:       Assessment:          ICD-10-CM ICD-9-CM    1. Severe aortic stenosis  I35.0 424.1    2. Bicuspid aortic valve  Q23.1 746.4    3. S/P TAVR (transcatheter aortic valve replacement)  Z95.2 V43.3    4. Mixed hyperlipidemia  E78.2 272.2    5. Ascending aorta dilatation (HCC)  I77.810 447.71        No orders of the defined types were placed in this encounter. Echo 2/2022  Left Ventricle: Left ventricle size is normal. Mildly increased wall thickness. Normal wall motion. Normal left ventricular systolic function with a visually estimated EF of 55 - 60%. Grade I diastolic dysfunction with normal LAP.   Aortic Valve: Bicuspid valve. Moderately calcified cusp. Moderate annular calcification. Mild transvalvular regurgitation. Severe stenosis of the aortic valve.   Mitral Valve: Mild transvalvular regurgitation.     Pulmonic Valve: Mild transvalvular regurgitation.            Plan:     Bicuspid AV stenosis s/p AVR with #23mm On-X mechanical valve  on 5/3/2022. Echo 2/2022 with LVEF 55-60% biscuspid AV with severe stenosis. Mean PG 71 mmHg, MUNIRA 0.8 cm2  Echo done 1/2021 with preserved LVEF 55-60% with severe aortic stenosis with bicuspid aortic valve. Mean PG 39.91 mmHg,  MUNIRA 0.91 cm^2  Abd/Chest CTA done 2/2021 with ectatic ascending aorta with mild aortic atherosclerosis  Last seen by valve clinic 5/31/2022  Cont coumadin. INR goal 2-3 x 3 months and then 1.5-2.5 thereafter. INR followed by Coumadin Clinic  Cont 12.5mg Metoprolol   Continue Cardiac rehab 2-3 times a week    Continue aspirin for now as per cardiac surgery discharge recommendations  Check echo now    Normal coronary arteries per [facility] in 3/2022    Bp well controlled. The patient will monitor BP at home and call if generally >140/80. Ascending aortic enlargement  Chest CTA 3/2022: There is dilatation of ascending thoracic aorta measuring 4.1 x 3.9 cm. Previously this measured 3.8 x 3.7 cm. Recommend Annual CTA surveillance      RBBB: avoid amio, monitor rhythm    HLD  11/2020  Labs and lipids per PCP    Continue current care and f/u in 6 mo. Army Stas NP    Patient seen and examined by me with the above nurse practitioner. I personally performed all components of the history, physical, and medical decision making and agree with the assessment and plan with minor modifications as noted. Today the patient presents feeling well status post mechanical aortic valve replacement for bicuspid valve. General PE  Gen:  NAD  Mental Status - Alert. General Appearance - Not in acute distress. HEENT:  PERRL, no carotid bruits or JVD  Chest and Lung Exam   Inspection: Accessory muscles - No use of accessory muscles in breathing.    Auscultation:   Breath sounds: - Normal.   Cardiovascular   Inspection: Jugular vein - Bilateral - Inspection Normal.   Palpation/Percussion: Apical Impulse: - Normal.   Auscultation: Rhythm - Regular. Heart Sounds - S1 WNL and S2 WNL. Crisp mechanical clicks. No S3 or S4. Murmurs & Other Heart Sounds: Auscultation of the heart reveals - No Murmurs. Peripheral Vascular   Upper Extremity: Inspection - Bilateral - No Cyanotic nailbeds or Digital clubbing. Lower Extremity:   Palpation: Edema - Bilateral - No edema. Abdomen:   Soft, non-tender, bowel sounds are active. Neuro: A&O times 3, CN and motor grossly WNL    Continue current cardiac care. Encouraged cardiac rehab. Starting in the [facility] Coumadin clinic tomorrow. Check postop echo now. Follow up in 6 months, sooner as needed.

## 2022-06-29 ENCOUNTER — HOSPITAL ENCOUNTER (OUTPATIENT)
Dept: CARDIAC REHAB | Age: 47
Discharge: HOME | End: 2022-06-29
Payer: COMMERCIAL

## 2022-06-29 ENCOUNTER — ANTI-COAG VISIT (OUTPATIENT)
Dept: PHARMACY | Age: 47
End: 2022-06-29
Payer: COMMERCIAL

## 2022-06-29 VITALS — BODY MASS INDEX: 22.24 KG/M2 | WEIGHT: 164 LBS

## 2022-06-29 DIAGNOSIS — Z95.2 S/P AVR: Primary | ICD-10-CM

## 2022-06-29 LAB
INR BLD: 2.5
PT POC: 30.6 SECONDS
VALID INTERNAL CONTROL?: YES

## 2022-06-29 PROCEDURE — 85610 PROTHROMBIN TIME: CPT

## 2022-06-29 PROCEDURE — 99211 OFF/OP EST MAY X REQ PHY/QHP: CPT

## 2022-06-29 PROCEDURE — 93798 PHYS/QHP OP CAR RHAB W/ECG: CPT

## 2022-06-29 NOTE — PROGRESS NOTES
Pharmacy Progress Note - Anticoagulation Management    S/O:  Mr. Christi Wallace  is a 51 y.o. male seen today for anticoagulation management for the diagnosis of Aortic Valve Replacement. HPI: At last visit (6/15), INR was 2.7 and therapeutic. Patient denied changes to his medications and reported consistent intake of vitamin K foods. Patient will continue current regimen and recheck INR in 2 weeks. Interim History:    · Warfarin start date: 5/4/22  · INR Goal:  2.0-3.0 for 2-3 months then 1.5-2.5 thereafter, (On-X)  · Current warfarin regimen: 4 mg daily                    · Warfarin tablet strength:   2 mg and 4 mg  · Duration of therapy: Indefinite    Today's pertinent positives includes:  No significant changes since last visit    Results for orders placed or performed in visit on 06/29/22   POC PROTHROMBIN W/INR   Result Value Ref Range    VALID INTERNAL CONTROL POC Yes     Prothrombin time (POC) 30.6 seconds    INR POC 2.5        · Adherence:   · Able to recall regimen? YES  · Miss/extra dose? NO  · Need refill? NO    Upcoming procedure(s):  N/A      Past Medical History:   Diagnosis Date    Aortic stenosis     Bicuspid aortic valve     GERD (gastroesophageal reflux disease)     Hiatal hernia     Severe aortic stenosis 3/22/2022     Allergies   Allergen Reactions    Soap Rash     Dial Soap     Current Outpatient Medications   Medication Sig    warfarin (COUMADIN) 2 mg tablet TAKE 1 TABLET BY MOUTH EVERY DAY    warfarin (COUMADIN) 4 mg tablet Take 1 Tablet by mouth daily.  melatonin 10 mg tab Take 1 Tablet by mouth daily as needed (sleep).  acetaminophen (TYLENOL) 325 mg tablet Take 650 mg by mouth every four (4) hours as needed for Pain (mild).  aspirin 81 mg chewable tablet Take 1 Tablet by mouth daily.  metoprolol tartrate (LOPRESSOR) 25 mg tablet Take 0.5 Tablets by mouth every twelve (12) hours.  Omeprazole delayed release (PRILOSEC D/R) 20 mg tablet Take 20 mg by mouth daily. No current facility-administered medications for this visit. Wt Readings from Last 3 Encounters:   06/28/22 163 lb 6.4 oz (74.1 kg)   06/27/22 163 lb (73.9 kg)   06/22/22 164 lb (74.4 kg)     BP Readings from Last 3 Encounters:   06/28/22 110/80   05/31/22 128/72   05/20/22 122/64     Pulse Readings from Last 3 Encounters:   06/28/22 70   05/31/22 77   05/20/22 72     Lab Results   Component Value Date/Time    WBC 6.0 05/06/2022 04:08 AM    HGB 9.2 (L) 05/06/2022 04:08 AM    HCT 28.3 (L) 05/06/2022 04:08 AM    PLATELET 295 11/35/8670 04:08 AM    .4 (H) 05/06/2022 04:08 AM     Lab Results   Component Value Date/Time    Sodium 136 05/06/2022 04:08 AM    Potassium 4.0 05/06/2022 04:08 AM    Chloride 104 05/06/2022 04:08 AM    CO2 28 05/06/2022 04:08 AM    Anion gap 4 (L) 05/06/2022 04:08 AM    Glucose 127 (H) 05/06/2022 04:08 AM    BUN 20 05/06/2022 04:08 AM    Creatinine 0.99 05/06/2022 04:08 AM    BUN/Creatinine ratio 20 05/06/2022 04:08 AM    GFR est AA >60 05/06/2022 04:08 AM    GFR est non-AA >60 05/06/2022 04:08 AM    Calcium 8.7 05/06/2022 04:08 AM    Bilirubin, total 0.6 05/04/2022 04:33 AM    Alk. phosphatase 32 (L) 05/04/2022 04:33 AM    Protein, total 6.2 (L) 05/04/2022 04:33 AM    Albumin 3.9 05/04/2022 04:33 AM    Globulin 2.3 05/04/2022 04:33 AM    A-G Ratio 1.7 05/04/2022 04:33 AM    ALT (SGPT) 22 05/04/2022 04:33 AM     Estimated Creatinine Clearance: 96.7 mL/min (by C-G formula based on SCr of 0.99 mg/dL). INR History:   (normal INR range 0.8-1.2)     Date   INR   PT   Dose/Comments  06/29/22 2.5  30.6 4 mg daily  06/15/22 2.7  32.0 4 mg daily  06/10/22 2.2   4 mg daily      · Medications that can increase  INR: omeprazole  · Medications that can decrease INR: N/A    A/P:       Anticoagulation:  Considering Mr. Barreraalemerin Haas past history, todays findings, and per Anticoagulation Collaborative Practice Agreement/Protocol:    1.  Fingerstick POC INR (2.5) is Therapeutic for INR goal today.  2.  Continue warfarin 4 mg daily   3. INR is 2.5 and therapeutic. Patient denies changes to his medications and reports consistent intake of vitamin K foods. Patient will continue current regimen and recheck INR in 2 weeks. Patient was instructed to schedule an appointment in 2 week(s) prior to leaving the clinic. Medication reconciliation was completed during the visit. There are no discontinued medications. A full discussion of the nature of anticoagulants has been carried out. A full discussion of the need for frequent and regular monitoring, precise dosage adjustment and compliance was stressed. Side effects of potential bleeding were discussed and Mr. Margarita Garcia was instructed to call [phone] if there are any signs of abnormal bleeding. Mr. Margarita Garcia was instructed to avoid any OTC items containing aspirin or ibuprofen and prior to starting any new OTC products to consult with his physician or pharmacist to ensure no drug interactions are present. Mr. Margarita Garcia was instructed to avoid any major changes in his general diet and to avoid alcohol consumption. Mr. Margarita Garcia was provided information in the AVS that includes topics on understanding coumadin therapy, drug interaction considerations, vitamin K and coumadin use, interactions with foods and supplements containing vitamin K, and the use of herbal products. Mr. Margarita Garcia verbalized his understanding of all instructions and will call the office with any questions, concerns, or signs of abnormal bleeding or blood clot. Notifications of recommendations will be sent to Dr. Gabriella Tan MD for review.     Thank you,  Maryanne Shankar, 1979 [facility] Tracking Only     Intervention Detail: Adherence Monitorin and Lab(s) Ordered   Total # of Interventions Recommended: 2   Total # of Interventions Accepted: 2   Time Spent (min): 20

## 2022-06-29 NOTE — PATIENT INSTRUCTIONS
Today your INR was 2.5 . Your goal INR is  2.0-3.0 . You have a 2 mg and 4 mg tablet of Coumadin (warfarin). Take Coumadin (warfarin) as follows: Take 4 mg daily    Come back in 2 week(s) for your next finger stick/INR blood test.        Avoid any over the counter items containing aspirin or ibuprofen, and avoid great swings in general diet. Avoid alcohol consumption. Please notify the INR nurse if you are started on any new medication including over the counter or herbal supplements. Also, please notify your INR nurse if any of your other prescription or over the counter medications have been discontinued. Call [facility] at [phone] if you have any signs of abnormal bleeding/blood clot.  ------------------------------------------------------------------------------------------------------------------  Taking Warfarin Safely: Care Instructions    Your Care Instructions  Warfarin is a medicine that you take to prevent blood clots. It is often called a blood thinner. Doctors give warfarin (such as Coumadin) to reduce the risk of blood clots. You may be at risk for blood clots if you have atrial fibrillation or deep vein thrombosis. Some other health problems may also put you at risk. Warfarin slows the amount of time it takes for your blood to clot. It can cause bleeding problems. Even if you've been taking warfarin for a while, it's important to know how to take it safely. Foods and other medicines can affect the way warfarin works. Some can make warfarin work too well. This can cause bleeding problems. And some can make it work poorly, so that it does not prevent blood clots very well. You will need regular blood tests to check how long it takes for your blood to form a clot. This test is called a PT or prothrombin time test. The result of the test is called an INR level.  Depending on the test results, your doctor or anticoagulation clinic may adjust your dose of warfarin. Follow-up care is a key part of your treatment and safety. Be sure to make and go to all appointments, and call your doctor if you are having problems. It's also a good idea to know your test results and keep a list of the medicines you take. How can you care for yourself at home? Take warfarin safely  · Take your warfarin at the same time each day. · If you miss a dose of warfarin, don't take an extra dose to make up for it. Your doctor can tell you exactly what to do so you don't take too much or too little. · Wear medical alert jewelry that lets others know that you take warfarin. You can buy this at most drugstores. · Don't take warfarin if you are pregnant or planning to get pregnant. Talk to your doctor about how you can prevent getting pregnant while you are taking it. · Don't change your dose or stop taking warfarin unless your doctor tells you to. Effects of medicines and food on warfarin  · Don't start or stop taking any medicines, vitamins, or natural remedies unless you first talk to your doctor. Many medicines can affect how warfarin works. These include aspirin and other pain relievers, over-the-counter medicines, multivitamins, dietary supplements, and herbal products. · Tell all of your doctors and pharmacists that you take warfarin. Some prescription medicines can affect how warfarin works. · Keep the amount of vitamin K in your diet about the same from day to day. Do not suddenly eat a lot more or a lot less food that is rich in vitamin K than you usually do. Vitamin K affects how warfarin works and how your blood clots. Talk with your doctor before making big changes in your diet. Vitamin K is in many foods, such as:  ¨ Leafy greens, such as kale, cabbage, spinach, Swiss chard, and lettuce. ¨ Canola and soybean oils. ¨ Green vegetables, such as asparagus, broccoli, and [place] sprouts. ¨ Vegetable drinks, green tea leaves, and some dietary supplement drinks.   · Avoid cranberry juice and other cranberry products. They can increase the effects of warfarin. · Limit your use of alcohol. Avoid bleeding by preventing falls and injuries  · Wear slippers or shoes with nonskid soles. · Remove throw rugs and clutter. · Rearrange furniture and electrical cords to keep them out of walking paths. · Keep stairways, porches, and outside walkways well lit. Use night-lights in hallways and bathrooms. · Be extra careful when you work with sharp tools or knives. When should you call for help? Call 911 anytime you think you may need emergency care. For example, call if:  · You have a sudden, severe headache that is different from past headaches. Call your doctor now or seek immediate medical care if:  · You have any abnormal bleeding, such as:  ¨ Nosebleeds. ¨ Vaginal bleeding that is different (heavier, more frequent, at a different time of the month) than what you are used to. ¨ Bloody or black stools, or rectal bleeding. ¨ Bloody or pink urine. Watch closely for changes in your health, and be sure to contact your doctor if you have any problems. Where can you learn more? Go to http://www.gray.com/. Enter H312 in the search box to learn more about \"Taking Warfarin Safely: Care Instructions. \"  Current as of: January 27, 2016  Content Version: 11.1  © 8694-9742 [employer]. Care instructions adapted under license by [employer] (which disclaims liability or warranty for this information). If you have questions about a medical condition or this instruction, always ask your healthcare professional. [place] [address] any warranty or liability for your use of this information.

## 2022-07-06 ENCOUNTER — APPOINTMENT (OUTPATIENT)
Dept: CARDIAC REHAB | Age: 47
End: 2022-07-06
Payer: COMMERCIAL

## 2022-07-06 ENCOUNTER — HOSPITAL ENCOUNTER (OUTPATIENT)
Dept: CARDIAC REHAB | Age: 47
Discharge: HOME | End: 2022-07-06
Payer: COMMERCIAL

## 2022-07-06 VITALS — BODY MASS INDEX: 22.11 KG/M2 | WEIGHT: 163 LBS

## 2022-07-06 PROCEDURE — 93798 PHYS/QHP OP CAR RHAB W/ECG: CPT

## 2022-07-11 ENCOUNTER — HOSPITAL ENCOUNTER (OUTPATIENT)
Dept: CARDIAC REHAB | Age: 47
Discharge: HOME | End: 2022-07-11
Payer: COMMERCIAL

## 2022-07-11 VITALS — WEIGHT: 164 LBS | BODY MASS INDEX: 22.24 KG/M2

## 2022-07-11 PROCEDURE — 93797 PHYS/QHP OP CAR RHAB WO ECG: CPT

## 2022-07-11 PROCEDURE — 93798 PHYS/QHP OP CAR RHAB W/ECG: CPT

## 2022-07-11 NOTE — CARDIO/PULMONARY
Cardiac Rehab Nutrition Assessment - 1:1 Evaluation         NAME: Brayden Fernandez : 1975 AGE: 51 y.o. GENDER: male  CARDIAC REHAB ADMITTING DIAGNOSIS: s/p AVR    PROBLEM LIST:  Patient Active Problem List   Diagnosis Code    Hiatal hernia K44.9    Severe aortic stenosis I35.0    Bicuspid aortic valve Q23.1    Aortic stenosis I35.0    S/P AVR Z95.2    AS (aortic stenosis) I35.0         LABS:   Lab Results   Component Value Date/Time    Hemoglobin A1c 5.5 2022 01:18 PM     Lab Results   Component Value Date/Time    Cholesterol, total 204 (H) 2020 09:54 AM    HDL Cholesterol 50 2020 09:54 AM    LDL, calculated 131 (H) 2020 09:54 AM    VLDL, calculated 23 2020 09:54 AM    Triglyceride 114 2020 09:54 AM         MEDICATIONS/SUPPLEMENTS:   Prior to Admission medications    Medication Sig Start Date End Date Taking? Authorizing Provider   warfarin (COUMADIN) 2 mg tablet TAKE 1 TABLET BY MOUTH EVERY DAY 22   Yanira Francis NP   warfarin (COUMADIN) 4 mg tablet Take 1 Tablet by mouth daily. 22   Alessia Francis NP   melatonin 10 mg tab Take 1 Tablet by mouth daily as needed (sleep). Provider, Historical   acetaminophen (TYLENOL) 325 mg tablet Take 650 mg by mouth every four (4) hours as needed for Pain (mild). Provider, Historical   aspirin 81 mg chewable tablet Take 1 Tablet by mouth daily. 22   Kaela Betancourt NP   metoprolol tartrate (LOPRESSOR) 25 mg tablet Take 0.5 Tablets by mouth every twelve (12) hours. 22   Kaela Betancourt NP   Omeprazole delayed release (PRILOSEC D/R) 20 mg tablet Take 20 mg by mouth daily.     Provider, Historical         ANTHROPOMETRICS:    Ht Readings from Last 1 Encounters:   22 6' (1.829 m)      Wt Readings from Last 10 Encounters:   22 73.9 kg (163 lb)   22 74.4 kg (164 lb)   22 74.1 kg (163 lb 6.4 oz)   22 73.9 kg (163 lb)   22 74.4 kg (164 lb)   22 75.3 kg (166 lb) 06/17/22 74.8 kg (165 lb)   06/15/22 74.8 kg (165 lb)   06/10/22 75.8 kg (167 lb)   06/08/22 76.2 kg (168 lb)      BMI: 22.11 kg/M2 Category: normal  Waist:  inches    Reported Wt Hx: Gained 5-6# since surgery, starting weight was 169# now down to 163#    Reported Diet Hx: Used to drink dr pepper all day long, now sticks to water. Does not eat much red meat; will have ground beef occasionally. Dines out 2x/week. Cooks with olive oil mostly. Rate Your Plate Score: 51  (Score 58-72: Making many healthy choices; 41-57: Some choices need improving 24-40: many choices need improving)    24 HOUR DIET RECALL  Breakfast Activia yogurt; nutrigrain peanut butter bar - \"shot\" of 2% milk/small glass   Lunch Radha Scrape, pizza or leftovers if bringing from home; eats at work   [employer], [employer], [employer] \"meal\" - generally meat, starch, vegetable   Snacks Not generally - peanut butter bars   Beverages Water      [facility]     Environmental/Social: Works full time as  at [employer].  typically grocery shops and cooks. Walks a lot at job; has a treadmill at home and will walk his dog. Plans to get back into running once cleared by MD.       NUTRITION INTERVENTION:  Nutrition 60 minute one-on-one education & goal setting with [facility]    Reviewed with [facility] relevant labs compared to ideals. Reviewed weight history and patient's verbalized weight goal as well as any real or perceived barriers to obtaining the goal. Collaborated with patient to set a specific short and long term weight goal.     Reviewed Rate Your Plate and conducted a verbal diet recall.   Assessed for environmental, financial, psychosocial, physical and comorbidities that may impact the food and eating patterns / behaviors of [id number] [place] with patient to set specific nutrient goals as well as specific food / behavior changes that will help patient meet the overall goal of following a heart healthy eating pattern (using guidelines as set forth by the American Heart Association and modeled after healthful eating patterns as recognized by the USDA Dietary Guidelines such as DASH, Mediterranean or plant-based). Briefly reviewed with Emily Schaefer the nutrition information in the Cardiac Rehab patient education book and encouraged Emily Schaefer to read thoroughly, ask questions as needed, and use for future reference for heart healthy nutrition information. Emily Schaefer is not scheduled to participate in Cardiac Rehab group nutrition classes. PATIENT GOALS:    Weight Goals: Maintenance    Nutrition Goals:  Daily Recommendations:  Calories: 4244-2473 /day  (using [place] with AF 1.2)    Saturated Fat: no more than 20 g/day  Trans Fat: 0 g/day  Sodium: no more than 2300 mg/day  Fruit: 2 cups / day  Vegetables: 2-3 cups/day    Other:  - read and compare food labels for saturated fat and sodium and limit  - bring lunch to work 3-4x/week  - add fruit or vegetable with each meal      Keeping a food diary was recommended. Questions addressed. Follow-up plans discussed. Emily Schaefer verbalized understanding.             Shae Gonzalez RD Eye Protection Verbiage: Before proceeding with the stage, a plastic scleral shield was inserted. The globe was anesthetized with a few drops of 1% lidocaine with 1:100,000 epinephrine. Then, an appropriate sized scleral shield was chosen and coated with lacrilube ointment. The shield was gently inserted and left in place for the duration of each stage. After the stage was completed, the shield was gently removed.

## 2022-07-13 ENCOUNTER — ANTI-COAG VISIT (OUTPATIENT)
Dept: PHARMACY | Age: 47
End: 2022-07-13
Payer: COMMERCIAL

## 2022-07-13 ENCOUNTER — HOSPITAL ENCOUNTER (OUTPATIENT)
Dept: CARDIAC REHAB | Age: 47
Discharge: HOME | End: 2022-07-13
Payer: COMMERCIAL

## 2022-07-13 VITALS — BODY MASS INDEX: 22.24 KG/M2 | WEIGHT: 164 LBS

## 2022-07-13 VITALS — WEIGHT: 164 LBS | BODY MASS INDEX: 22.24 KG/M2

## 2022-07-13 DIAGNOSIS — Z95.2 S/P AVR: Primary | ICD-10-CM

## 2022-07-13 LAB
INR BLD: 2.3
PT POC: 27.6 SECONDS
VALID INTERNAL CONTROL?: YES

## 2022-07-13 PROCEDURE — 85610 PROTHROMBIN TIME: CPT

## 2022-07-13 PROCEDURE — 99211 OFF/OP EST MAY X REQ PHY/QHP: CPT

## 2022-07-13 PROCEDURE — 93798 PHYS/QHP OP CAR RHAB W/ECG: CPT

## 2022-07-13 NOTE — PATIENT INSTRUCTIONS
Today your INR was 2.3 . Your goal INR is  2.0-3.0 . You have a 2 mg and 4 mg tablet of Coumadin (warfarin). Take Coumadin (warfarin) as follows: Take 4 mg daily    Come back in 4 week(s) for your next finger stick/INR blood test.        Avoid any over the counter items containing aspirin or ibuprofen, and avoid great swings in general diet. Avoid alcohol consumption. Please notify the INR nurse if you are started on any new medication including over the counter or herbal supplements. Also, please notify your INR nurse if any of your other prescription or over the counter medications have been discontinued. Call [facility] at [phone] if you have any signs of abnormal bleeding/blood clot.  ------------------------------------------------------------------------------------------------------------------  Taking Warfarin Safely: Care Instructions    Your Care Instructions  Warfarin is a medicine that you take to prevent blood clots. It is often called a blood thinner. Doctors give warfarin (such as Coumadin) to reduce the risk of blood clots. You may be at risk for blood clots if you have atrial fibrillation or deep vein thrombosis. Some other health problems may also put you at risk. Warfarin slows the amount of time it takes for your blood to clot. It can cause bleeding problems. Even if you've been taking warfarin for a while, it's important to know how to take it safely. Foods and other medicines can affect the way warfarin works. Some can make warfarin work too well. This can cause bleeding problems. And some can make it work poorly, so that it does not prevent blood clots very well. You will need regular blood tests to check how long it takes for your blood to form a clot. This test is called a PT or prothrombin time test. The result of the test is called an INR level.  Depending on the test results, your doctor or anticoagulation clinic may adjust your dose of warfarin. Follow-up care is a key part of your treatment and safety. Be sure to make and go to all appointments, and call your doctor if you are having problems. It's also a good idea to know your test results and keep a list of the medicines you take. How can you care for yourself at home? Take warfarin safely  · Take your warfarin at the same time each day. · If you miss a dose of warfarin, don't take an extra dose to make up for it. Your doctor can tell you exactly what to do so you don't take too much or too little. · Wear medical alert jewelry that lets others know that you take warfarin. You can buy this at most drugstores. · Don't take warfarin if you are pregnant or planning to get pregnant. Talk to your doctor about how you can prevent getting pregnant while you are taking it. · Don't change your dose or stop taking warfarin unless your doctor tells you to. Effects of medicines and food on warfarin  · Don't start or stop taking any medicines, vitamins, or natural remedies unless you first talk to your doctor. Many medicines can affect how warfarin works. These include aspirin and other pain relievers, over-the-counter medicines, multivitamins, dietary supplements, and herbal products. · Tell all of your doctors and pharmacists that you take warfarin. Some prescription medicines can affect how warfarin works. · Keep the amount of vitamin K in your diet about the same from day to day. Do not suddenly eat a lot more or a lot less food that is rich in vitamin K than you usually do. Vitamin K affects how warfarin works and how your blood clots. Talk with your doctor before making big changes in your diet. Vitamin K is in many foods, such as:  ¨ Leafy greens, such as kale, cabbage, spinach, Swiss chard, and lettuce. ¨ Canola and soybean oils. ¨ Green vegetables, such as asparagus, broccoli, and [place] sprouts. ¨ Vegetable drinks, green tea leaves, and some dietary supplement drinks.   · Avoid cranberry juice and other cranberry products. They can increase the effects of warfarin. · Limit your use of alcohol. Avoid bleeding by preventing falls and injuries  · Wear slippers or shoes with nonskid soles. · Remove throw rugs and clutter. · Rearrange furniture and electrical cords to keep them out of walking paths. · Keep stairways, porches, and outside walkways well lit. Use night-lights in hallways and bathrooms. · Be extra careful when you work with sharp tools or knives. When should you call for help? Call 911 anytime you think you may need emergency care. For example, call if:  · You have a sudden, severe headache that is different from past headaches. Call your doctor now or seek immediate medical care if:  · You have any abnormal bleeding, such as:  ¨ Nosebleeds. ¨ Vaginal bleeding that is different (heavier, more frequent, at a different time of the month) than what you are used to. ¨ Bloody or black stools, or rectal bleeding. ¨ Bloody or pink urine. Watch closely for changes in your health, and be sure to contact your doctor if you have any problems. Where can you learn more? Go to http://www.gray.com/. Enter D012 in the search box to learn more about \"Taking Warfarin Safely: Care Instructions. \"  Current as of: January 27, 2016  Content Version: 11.1  © 6740-9940 [employer]. Care instructions adapted under license by [employer] (which disclaims liability or warranty for this information). If you have questions about a medical condition or this instruction, always ask your healthcare professional. [place] [address] any warranty or liability for your use of this information.

## 2022-07-13 NOTE — PROGRESS NOTES
Pharmacy Progress Note - Anticoagulation Management    S/O:  Mr. Avery Light  is a 51 y.o. male seen today for anticoagulation management for the diagnosis of Aortic Valve Replacement. HPI: At last visit (6/29), INR was 2.5 and therapeutic. Patient denied changes to his medications and reported consistent intake of vitamin K foods. Patient will continue current regimen and recheck INR in 2 weeks. Interim History:    · Warfarin start date: 5/4/22  · INR Goal:  2.0-3.0 for 2-3 months then 1.5-2.5 thereafter, (On-X)  · Current warfarin regimen: 4 mg daily                    · Warfarin tablet strength:   2 mg and 4 mg  · Duration of therapy: Indefinite    Today's pertinent positives includes:  No significant changes since last visit    Results for orders placed or performed in visit on 07/13/22   POC PROTHROMBIN W/INR   Result Value Ref Range    VALID INTERNAL CONTROL POC Yes     Prothrombin time (POC) 27.6 seconds    INR POC 2.3        · Adherence:   · Able to recall regimen? YES  · Miss/extra dose? NO  · Need refill? NO    Upcoming procedure(s):  N/A      Past Medical History:   Diagnosis Date    Aortic stenosis     Bicuspid aortic valve     GERD (gastroesophageal reflux disease)     Hiatal hernia     Severe aortic stenosis 3/22/2022     Allergies   Allergen Reactions    Soap Rash     Dial Soap     Current Outpatient Medications   Medication Sig    warfarin (COUMADIN) 2 mg tablet TAKE 1 TABLET BY MOUTH EVERY DAY    warfarin (COUMADIN) 4 mg tablet Take 1 Tablet by mouth daily.  melatonin 10 mg tab Take 1 Tablet by mouth daily as needed (sleep).  acetaminophen (TYLENOL) 325 mg tablet Take 650 mg by mouth every four (4) hours as needed for Pain (mild).  aspirin 81 mg chewable tablet Take 1 Tablet by mouth daily.  metoprolol tartrate (LOPRESSOR) 25 mg tablet Take 0.5 Tablets by mouth every twelve (12) hours.  Omeprazole delayed release (PRILOSEC D/R) 20 mg tablet Take 20 mg by mouth daily. No current facility-administered medications for this visit. Wt Readings from Last 3 Encounters:   07/11/22 164 lb (74.4 kg)   07/06/22 163 lb (73.9 kg)   06/29/22 164 lb (74.4 kg)     BP Readings from Last 3 Encounters:   06/28/22 110/80   05/31/22 128/72   05/20/22 122/64     Pulse Readings from Last 3 Encounters:   06/28/22 70   05/31/22 77   05/20/22 72     Lab Results   Component Value Date/Time    WBC 6.0 05/06/2022 04:08 AM    HGB 9.2 (L) 05/06/2022 04:08 AM    HCT 28.3 (L) 05/06/2022 04:08 AM    PLATELET 741 69/96/9888 04:08 AM    .4 (H) 05/06/2022 04:08 AM     Lab Results   Component Value Date/Time    Sodium 136 05/06/2022 04:08 AM    Potassium 4.0 05/06/2022 04:08 AM    Chloride 104 05/06/2022 04:08 AM    CO2 28 05/06/2022 04:08 AM    Anion gap 4 (L) 05/06/2022 04:08 AM    Glucose 127 (H) 05/06/2022 04:08 AM    BUN 20 05/06/2022 04:08 AM    Creatinine 0.99 05/06/2022 04:08 AM    BUN/Creatinine ratio 20 05/06/2022 04:08 AM    GFR est AA >60 05/06/2022 04:08 AM    GFR est non-AA >60 05/06/2022 04:08 AM    Calcium 8.7 05/06/2022 04:08 AM    Bilirubin, total 0.6 05/04/2022 04:33 AM    Alk. phosphatase 32 (L) 05/04/2022 04:33 AM    Protein, total 6.2 (L) 05/04/2022 04:33 AM    Albumin 3.9 05/04/2022 04:33 AM    Globulin 2.3 05/04/2022 04:33 AM    A-G Ratio 1.7 05/04/2022 04:33 AM    ALT (SGPT) 22 05/04/2022 04:33 AM     Estimated Creatinine Clearance: 97.1 mL/min (by C-G formula based on SCr of 0.99 mg/dL). INR History:   (normal INR range 0.8-1.2)     Date   INR   PT   Dose/Comments  07/13/22 2.3  27.6 4 mg daily  06/29/22 2.5  30.6 4 mg daily  06/15/22 2.7  32.0 4 mg daily  06/10/22 2.2   4 mg daily      · Medications that can increase  INR: omeprazole  · Medications that can decrease INR: N/A    A/P:       Anticoagulation:  Considering Mr. Felipe Chi past history, todays findings, and per Anticoagulation Collaborative Practice Agreement/Protocol:    1.  Fingerstick POC INR (2.3) is Therapeutic for INR goal today. 2.  Continue warfarin 4 mg daily   3. INR is 2.3 and therapeutic. Patient denies changes to his medications and reports consistent intake of vitamin K foods. Patient will continue current regimen and recheck INR in 4 weeks. Patient was instructed to schedule an appointment in 4 week(s) prior to leaving the clinic. Medication reconciliation was completed during the visit. There are no discontinued medications. A full discussion of the nature of anticoagulants has been carried out. A full discussion of the need for frequent and regular monitoring, precise dosage adjustment and compliance was stressed. Side effects of potential bleeding were discussed and Mr. Steve Degroot was instructed to call [phone] if there are any signs of abnormal bleeding. Mr. Steve Degroot was instructed to avoid any OTC items containing aspirin or ibuprofen and prior to starting any new OTC products to consult with his physician or pharmacist to ensure no drug interactions are present. Mr. Steve Degroot was instructed to avoid any major changes in his general diet and to avoid alcohol consumption. Mr. Steve Degroot was provided information in the AVS that includes topics on understanding coumadin therapy, drug interaction considerations, vitamin K and coumadin use, interactions with foods and supplements containing vitamin K, and the use of herbal products. Mr. Steve Degroot verbalized his understanding of all instructions and will call the office with any questions, concerns, or signs of abnormal bleeding or blood clot. Notifications of recommendations will be sent to Dr. Jeannie Olmstead MD for review.     Thank you,  Epifanio Steele, [id number] [facility] Tracking Only     Intervention Detail: Adherence Monitorin and Lab(s) Ordered   Total # of Interventions Recommended: 2   Total # of Interventions Accepted: 2   Time Spent (min): 20

## 2022-07-15 ENCOUNTER — HOSPITAL ENCOUNTER (OUTPATIENT)
Dept: CARDIAC REHAB | Age: 47
Discharge: HOME | End: 2022-07-15
Payer: COMMERCIAL

## 2022-07-15 VITALS — WEIGHT: 163 LBS | BODY MASS INDEX: 22.11 KG/M2

## 2022-07-15 PROCEDURE — 93798 PHYS/QHP OP CAR RHAB W/ECG: CPT

## 2022-07-25 ENCOUNTER — HOSPITAL ENCOUNTER (OUTPATIENT)
Dept: CARDIAC REHAB | Age: 47
Discharge: HOME | End: 2022-07-25
Payer: COMMERCIAL

## 2022-07-25 VITALS — WEIGHT: 165 LBS | BODY MASS INDEX: 22.38 KG/M2

## 2022-07-25 PROCEDURE — 93798 PHYS/QHP OP CAR RHAB W/ECG: CPT

## 2022-07-27 ENCOUNTER — HOSPITAL ENCOUNTER (OUTPATIENT)
Dept: CARDIAC REHAB | Age: 47
Discharge: HOME | End: 2022-07-27
Payer: COMMERCIAL

## 2022-07-27 VITALS — WEIGHT: 165 LBS | BODY MASS INDEX: 22.38 KG/M2

## 2022-07-27 PROCEDURE — 93798 PHYS/QHP OP CAR RHAB W/ECG: CPT

## 2022-07-29 ENCOUNTER — HOSPITAL ENCOUNTER (OUTPATIENT)
Dept: CARDIAC REHAB | Age: 47
Discharge: HOME | End: 2022-07-29
Payer: COMMERCIAL

## 2022-07-29 VITALS — BODY MASS INDEX: 22.38 KG/M2 | WEIGHT: 165 LBS

## 2022-07-29 PROCEDURE — 93798 PHYS/QHP OP CAR RHAB W/ECG: CPT

## 2022-08-01 ENCOUNTER — HOSPITAL ENCOUNTER (OUTPATIENT)
Dept: CARDIAC REHAB | Age: 47
Discharge: HOME | End: 2022-08-01
Payer: COMMERCIAL

## 2022-08-01 VITALS — BODY MASS INDEX: 22.24 KG/M2 | WEIGHT: 164 LBS

## 2022-08-01 PROCEDURE — 93798 PHYS/QHP OP CAR RHAB W/ECG: CPT

## 2022-08-03 ENCOUNTER — HOSPITAL ENCOUNTER (OUTPATIENT)
Dept: CARDIAC REHAB | Age: 47
Discharge: HOME | End: 2022-08-03
Payer: COMMERCIAL

## 2022-08-03 ENCOUNTER — ANTI-COAG VISIT (OUTPATIENT)
Dept: PHARMACY | Age: 47
End: 2022-08-03
Payer: COMMERCIAL

## 2022-08-03 ENCOUNTER — ANCILLARY PROCEDURE (OUTPATIENT)
Dept: CARDIOLOGY CLINIC | Age: 47
End: 2022-08-03
Payer: COMMERCIAL

## 2022-08-03 VITALS
BODY MASS INDEX: 22.21 KG/M2 | WEIGHT: 164 LBS | SYSTOLIC BLOOD PRESSURE: 110 MMHG | HEIGHT: 72 IN | DIASTOLIC BLOOD PRESSURE: 80 MMHG

## 2022-08-03 VITALS — BODY MASS INDEX: 22.24 KG/M2 | WEIGHT: 164 LBS

## 2022-08-03 DIAGNOSIS — E78.2 MIXED HYPERLIPIDEMIA: ICD-10-CM

## 2022-08-03 DIAGNOSIS — Q23.1 BICUSPID AORTIC VALVE: ICD-10-CM

## 2022-08-03 DIAGNOSIS — I77.810 ASCENDING AORTA DILATATION (HCC): ICD-10-CM

## 2022-08-03 DIAGNOSIS — Z95.2 S/P TAVR (TRANSCATHETER AORTIC VALVE REPLACEMENT): ICD-10-CM

## 2022-08-03 DIAGNOSIS — I35.0 SEVERE AORTIC STENOSIS: ICD-10-CM

## 2022-08-03 DIAGNOSIS — Z95.2 S/P AVR: Primary | ICD-10-CM

## 2022-08-03 LAB
ECHO AO ASC DIAM: 4 CM
ECHO AO ASCENDING AORTA INDEX: 2.04 CM/M2
ECHO AO ROOT DIAM: 3.2 CM
ECHO AO ROOT INDEX: 1.63 CM/M2
ECHO AR MAX VEL PISA: 1.8 M/S
ECHO AV AREA PEAK VELOCITY: 1.2 CM2
ECHO AV AREA VTI: 1.2 CM2
ECHO AV AREA/BSA PEAK VELOCITY: 0.6 CM2/M2
ECHO AV AREA/BSA VTI: 0.6 CM2/M2
ECHO AV MEAN GRADIENT: 9 MMHG
ECHO AV MEAN VELOCITY: 1.4 M/S
ECHO AV PEAK GRADIENT: 16 MMHG
ECHO AV PEAK VELOCITY: 2 M/S
ECHO AV REGURGITANT PHT: 556.1 MILLISECOND
ECHO AV VELOCITY RATIO: 0.35
ECHO AV VTI: 41.3 CM
ECHO EST RA PRESSURE: 3 MMHG
ECHO LA DIAMETER INDEX: 1.63 CM/M2
ECHO LA DIAMETER: 3.2 CM
ECHO LA TO AORTIC ROOT RATIO: 1
ECHO LA VOL 2C: 42 ML (ref 18–58)
ECHO LA VOL 4C: 45 ML (ref 18–58)
ECHO LA VOL BP: 48 ML (ref 18–58)
ECHO LA VOL/BSA BIPLANE: 24 ML/M2 (ref 16–34)
ECHO LA VOLUME AREA LENGTH: 50 ML
ECHO LA VOLUME INDEX A2C: 21 ML/M2 (ref 16–34)
ECHO LA VOLUME INDEX A4C: 23 ML/M2 (ref 16–34)
ECHO LA VOLUME INDEX AREA LENGTH: 26 ML/M2 (ref 16–34)
ECHO LV E' LATERAL VELOCITY: 12 CM/S
ECHO LV E' SEPTAL VELOCITY: 7 CM/S
ECHO LV FRACTIONAL SHORTENING: 35 % (ref 28–44)
ECHO LV INTERNAL DIMENSION DIASTOLE INDEX: 2.65 CM/M2
ECHO LV INTERNAL DIMENSION DIASTOLIC: 5.2 CM (ref 4.2–5.9)
ECHO LV INTERNAL DIMENSION SYSTOLIC INDEX: 1.73 CM/M2
ECHO LV INTERNAL DIMENSION SYSTOLIC: 3.4 CM
ECHO LV ISOVOLUMETRIC RELAXATION TIME (IVRT): 102.8 MS
ECHO LV IVSD: 1 CM (ref 0.6–1)
ECHO LV MASS 2D: 194.2 G (ref 88–224)
ECHO LV MASS INDEX 2D: 99.1 G/M2 (ref 49–115)
ECHO LV POSTERIOR WALL DIASTOLIC: 1 CM (ref 0.6–1)
ECHO LV RELATIVE WALL THICKNESS RATIO: 0.38
ECHO LVOT AREA: 3.1 CM2
ECHO LVOT AV VTI INDEX: 0.38
ECHO LVOT DIAM: 2 CM
ECHO LVOT MEAN GRADIENT: 1 MMHG
ECHO LVOT PEAK GRADIENT: 2 MMHG
ECHO LVOT PEAK VELOCITY: 0.7 M/S
ECHO LVOT STROKE VOLUME INDEX: 24.8 ML/M2
ECHO LVOT SV: 48.7 ML
ECHO LVOT VTI: 15.5 CM
ECHO MV "A" WAVE DURATION: 173.2 MSEC
ECHO MV A VELOCITY: 0.63 M/S
ECHO MV E DECELERATION TIME (DT): 261 MS
ECHO MV E VELOCITY: 0.77 M/S
ECHO MV E/A RATIO: 1.22
ECHO MV E/E' LATERAL: 6.42
ECHO MV E/E' RATIO (AVERAGED): 8.71
ECHO MV E/E' SEPTAL: 11
ECHO PVEIN A DURATION: 135.1 MS
ECHO PVEIN A VELOCITY: 0.2 M/S
ECHO RIGHT VENTRICULAR SYSTOLIC PRESSURE (RVSP): 13 MMHG
ECHO RV FREE WALL PEAK S': 8 CM/S
ECHO RV TAPSE: 1.1 CM (ref 1.7–?)
ECHO TV REGURGITANT MAX VELOCITY: 1.62 M/S
ECHO TV REGURGITANT PEAK GRADIENT: 11 MMHG
INR BLD: 1.9
PT POC: 22.9 SECONDS
VALID INTERNAL CONTROL?: YES

## 2022-08-03 PROCEDURE — 93798 PHYS/QHP OP CAR RHAB W/ECG: CPT

## 2022-08-03 PROCEDURE — 85610 PROTHROMBIN TIME: CPT

## 2022-08-03 PROCEDURE — 93306 TTE W/DOPPLER COMPLETE: CPT | Performed by: SPECIALIST

## 2022-08-03 PROCEDURE — 99211 OFF/OP EST MAY X REQ PHY/QHP: CPT

## 2022-08-03 NOTE — PATIENT INSTRUCTIONS
Today your INR was 1.9 . Your goal INR is  1.5-2.5 . You have a 2 mg and 4 mg tablet of Coumadin (warfarin). Take Coumadin (warfarin) as follows: Take 4 mg daily    Come back in 3 week(s) for your next finger stick/INR blood test.        Avoid any over the counter items containing aspirin or ibuprofen, and avoid great swings in general diet. Avoid alcohol consumption. Please notify the INR nurse if you are started on any new medication including over the counter or herbal supplements. Also, please notify your INR nurse if any of your other prescription or over the counter medications have been discontinued. Call [facility] at [phone] if you have any signs of abnormal bleeding/blood clot.  ------------------------------------------------------------------------------------------------------------------  Taking Warfarin Safely: Care Instructions    Your Care Instructions  Warfarin is a medicine that you take to prevent blood clots. It is often called a blood thinner. Doctors give warfarin (such as Coumadin) to reduce the risk of blood clots. You may be at risk for blood clots if you have atrial fibrillation or deep vein thrombosis. Some other health problems may also put you at risk. Warfarin slows the amount of time it takes for your blood to clot. It can cause bleeding problems. Even if you've been taking warfarin for a while, it's important to know how to take it safely. Foods and other medicines can affect the way warfarin works. Some can make warfarin work too well. This can cause bleeding problems. And some can make it work poorly, so that it does not prevent blood clots very well. You will need regular blood tests to check how long it takes for your blood to form a clot. This test is called a PT or prothrombin time test. The result of the test is called an INR level.  Depending on the test results, your doctor or anticoagulation clinic may adjust your dose of warfarin. Follow-up care is a key part of your treatment and safety. Be sure to make and go to all appointments, and call your doctor if you are having problems. It's also a good idea to know your test results and keep a list of the medicines you take. How can you care for yourself at home? Take warfarin safely  Take your warfarin at the same time each day. If you miss a dose of warfarin, don't take an extra dose to make up for it. Your doctor can tell you exactly what to do so you don't take too much or too little. Wear medical alert jewelry that lets others know that you take warfarin. You can buy this at most drugstores. Don't take warfarin if you are pregnant or planning to get pregnant. Talk to your doctor about how you can prevent getting pregnant while you are taking it. Don't change your dose or stop taking warfarin unless your doctor tells you to. Effects of medicines and food on warfarin  Don't start or stop taking any medicines, vitamins, or natural remedies unless you first talk to your doctor. Many medicines can affect how warfarin works. These include aspirin and other pain relievers, over-the-counter medicines, multivitamins, dietary supplements, and herbal products. Tell all of your doctors and pharmacists that you take warfarin. Some prescription medicines can affect how warfarin works. Keep the amount of vitamin K in your diet about the same from day to day. Do not suddenly eat a lot more or a lot less food that is rich in vitamin K than you usually do. Vitamin K affects how warfarin works and how your blood clots. Talk with your doctor before making big changes in your diet. Vitamin K is in many foods, such as:  Leafy greens, such as kale, cabbage, spinach, Swiss chard, and lettuce. Canola and soybean oils. Green vegetables, such as asparagus, broccoli, and [place] sprouts. Vegetable drinks, green tea leaves, and some dietary supplement drinks.   Avoid cranberry juice and other cranberry products. They can increase the effects of warfarin. Limit your use of alcohol. Avoid bleeding by preventing falls and injuries  Wear slippers or shoes with nonskid soles. Remove throw rugs and clutter. Rearrange furniture and electrical cords to keep them out of walking paths. Keep stairways, porches, and outside walkways well lit. Use night-lights in hallways and bathrooms. Be extra careful when you work with sharp tools or knives. When should you call for help? Call 911 anytime you think you may need emergency care. For example, call if:  You have a sudden, severe headache that is different from past headaches. Call your doctor now or seek immediate medical care if:  You have any abnormal bleeding, such as:  Nosebleeds. Vaginal bleeding that is different (heavier, more frequent, at a different time of the month) than what you are used to. Bloody or black stools, or rectal bleeding. Bloody or pink urine. Watch closely for changes in your health, and be sure to contact your doctor if you have any problems. Where can you learn more? Go to http://www.gray.com/. Enter E118 in the search box to learn more about \"Taking Warfarin Safely: Care Instructions. \"  Current as of: January 27, 2016  Content Version: 11.1  © 0151-4323 [employer]. Care instructions adapted under license by [employer] (which disclaims liability or warranty for this information). If you have questions about a medical condition or this instruction, always ask your healthcare professional. [place] [address] any warranty or liability for your use of this information.

## 2022-08-03 NOTE — PROGRESS NOTES
Echo on [place] pt. Heart muscle is strong. New valve functioning properly, some mild leaking around the valve which is not a problem for now but will need to be watched. Couple other mildly leaky valves, again not a problem.  Will probably want to repeat echo in 6 to 12 months

## 2022-08-03 NOTE — PROGRESS NOTES
Pharmacy Progress Note - Anticoagulation Management    S/O:  Mr. Gemran Barrios  is a 51 y.o. male seen today for anticoagulation management for the diagnosis of Aortic Valve Replacement. HPI: At last visit (7/13), INR was 2.3 and therapeutic. Patient denied changes to his medications and reported consistent intake of vitamin K foods. Patient will continue current regimen and recheck INR in 4 weeks. Interim History:    Warfarin start date: 5/4/22  INR Goal: 1.5-2.5 (On-X); goal adjustment verified with Dr. Fredo Kinsey  Current warfarin regimen: 4 mg daily                    Warfarin tablet strength:   2 mg and 4 mg  Duration of therapy: Indefinite    Today's pertinent positives includes:  No significant changes since last visit    Results for orders placed or performed in visit on 08/03/22   POC PROTHROMBIN W/INR   Result Value Ref Range    VALID INTERNAL CONTROL POC Yes     Prothrombin time (POC) 22.9 seconds    INR POC 1.9        Adherence:   Able to recall regimen? YES  Miss/extra dose? NO  Need refill? NO    Upcoming procedure(s):  N/A      Past Medical History:   Diagnosis Date    Aortic stenosis     Bicuspid aortic valve     GERD (gastroesophageal reflux disease)     Hiatal hernia     Severe aortic stenosis 3/22/2022     Allergies   Allergen Reactions    Soap Rash     Dial Soap     Current Outpatient Medications   Medication Sig    warfarin (COUMADIN) 2 mg tablet TAKE 1 TABLET BY MOUTH EVERY DAY    warfarin (COUMADIN) 4 mg tablet Take 1 Tablet by mouth daily. melatonin 10 mg tab Take 1 Tablet by mouth daily as needed (sleep). acetaminophen (TYLENOL) 325 mg tablet Take 650 mg by mouth every four (4) hours as needed for Pain (mild). aspirin 81 mg chewable tablet Take 1 Tablet by mouth daily. metoprolol tartrate (LOPRESSOR) 25 mg tablet Take 0.5 Tablets by mouth every twelve (12) hours. Omeprazole delayed release (PRILOSEC D/R) 20 mg tablet Take 20 mg by mouth daily.      No current facility-administered medications for this visit. Wt Readings from Last 3 Encounters:   08/01/22 164 lb (74.4 kg)   07/29/22 165 lb (74.8 kg)   07/27/22 165 lb (74.8 kg)     BP Readings from Last 3 Encounters:   06/28/22 110/80   05/31/22 128/72   05/20/22 122/64     Pulse Readings from Last 3 Encounters:   06/28/22 70   05/31/22 77   05/20/22 72     Lab Results   Component Value Date/Time    WBC 6.0 05/06/2022 04:08 AM    HGB 9.2 (L) 05/06/2022 04:08 AM    HCT 28.3 (L) 05/06/2022 04:08 AM    PLATELET 878 94/23/8184 04:08 AM    .4 (H) 05/06/2022 04:08 AM     Lab Results   Component Value Date/Time    Sodium 136 05/06/2022 04:08 AM    Potassium 4.0 05/06/2022 04:08 AM    Chloride 104 05/06/2022 04:08 AM    CO2 28 05/06/2022 04:08 AM    Anion gap 4 (L) 05/06/2022 04:08 AM    Glucose 127 (H) 05/06/2022 04:08 AM    BUN 20 05/06/2022 04:08 AM    Creatinine 0.99 05/06/2022 04:08 AM    BUN/Creatinine ratio 20 05/06/2022 04:08 AM    GFR est AA >60 05/06/2022 04:08 AM    GFR est non-AA >60 05/06/2022 04:08 AM    Calcium 8.7 05/06/2022 04:08 AM    Bilirubin, total 0.6 05/04/2022 04:33 AM    Alk. phosphatase 32 (L) 05/04/2022 04:33 AM    Protein, total 6.2 (L) 05/04/2022 04:33 AM    Albumin 3.9 05/04/2022 04:33 AM    Globulin 2.3 05/04/2022 04:33 AM    A-G Ratio 1.7 05/04/2022 04:33 AM    ALT (SGPT) 22 05/04/2022 04:33 AM     Estimated Creatinine Clearance: 97.1 mL/min (by C-G formula based on SCr of 0.99 mg/dL).       INR History:   (normal INR range 0.8-1.2)     Date   INR   PT   Dose/Comments  08/03/22 1.9  22.9 4 mg daily  07/13/22 2.3  27.6 4 mg daily  06/29/22 2.5  30.6 4 mg daily  06/15/22 2.7  32.0 4 mg daily  06/10/22 2.2   4 mg daily      Medications that can increase  INR: omeprazole  Medications that can decrease INR: N/A    A/P:       Anticoagulation:  Considering Mr. Mark Ruiz past history, todays findings, and per Anticoagulation Collaborative Practice Agreement/Protocol:    Fingerstick POC INR (1.9) is Therapeutic for INR goal today. Continue warfarin 4 mg daily   INR is 1.9 and therapeutic. Patient denies changes to his medications and reports consistent intake of vitamin K foods. Patient will continue current regimen and recheck INR in 3 weeks. Patient was instructed to schedule an appointment in 3 week(s) prior to leaving the clinic. Medication reconciliation was completed during the visit. There are no discontinued medications. A full discussion of the nature of anticoagulants has been carried out. A full discussion of the need for frequent and regular monitoring, precise dosage adjustment and compliance was stressed. Side effects of potential bleeding were discussed and Mr. Dileep Souza was instructed to call [phone] if there are any signs of abnormal bleeding. Mr. Dileep Souza was instructed to avoid any OTC items containing aspirin or ibuprofen and prior to starting any new OTC products to consult with his physician or pharmacist to ensure no drug interactions are present. Mr. Dileep Souza was instructed to avoid any major changes in his general diet and to avoid alcohol consumption. Mr. Dileep Souza was provided information in the AVS that includes topics on understanding coumadin therapy, drug interaction considerations, vitamin K and coumadin use, interactions with foods and supplements containing vitamin K, and the use of herbal products. Mr. Dileep Souza verbalized his understanding of all instructions and will call the office with any questions, concerns, or signs of abnormal bleeding or blood clot. Notifications of recommendations will be sent to Dr. Jimmie Gaxiola MD for review.     Thank you,  JULIOCESAR Lozoya/ Liam George 77 Tracking Only    Intervention Detail: Adherence Monitorin and Lab(s) Ordered  Total # of Interventions Recommended: 2  Total # of Interventions Accepted: 2  Time Spent (min): 20

## 2022-08-05 ENCOUNTER — APPOINTMENT (OUTPATIENT)
Dept: CARDIAC REHAB | Age: 47
End: 2022-08-05
Payer: COMMERCIAL

## 2022-08-05 ENCOUNTER — TELEPHONE (OUTPATIENT)
Dept: CARDIOLOGY CLINIC | Age: 47
End: 2022-08-05

## 2022-08-05 NOTE — TELEPHONE ENCOUNTER
Per Daryle Flow, MD 2 days agoCN  Echo on [place] pt. Heart muscle is strong. New valve functioning properly, some mild leaking around the valve which is not a problem for now but will need to be watched.  Couple other mildly leaky valves, again not a problem

## 2022-08-05 NOTE — TELEPHONE ENCOUNTER
----- Message from Gretta Ashton NP sent at 8/3/2022  1:20 PM EDT -----  Echo on [place] pt. Heart muscle is strong. New valve functioning properly, some mild leaking around the valve which is not a problem for now but will need to be watched. Couple other mildly leaky valves, again not a problem.  Will probably want to repeat echo in 6 to 12 months

## 2022-08-08 ENCOUNTER — APPOINTMENT (OUTPATIENT)
Dept: CARDIAC REHAB | Age: 47
End: 2022-08-08
Payer: COMMERCIAL

## 2022-08-10 ENCOUNTER — APPOINTMENT (OUTPATIENT)
Dept: CARDIAC REHAB | Age: 47
End: 2022-08-10
Payer: COMMERCIAL

## 2022-08-12 ENCOUNTER — APPOINTMENT (OUTPATIENT)
Dept: CARDIAC REHAB | Age: 47
End: 2022-08-12
Payer: COMMERCIAL

## 2022-08-22 ENCOUNTER — HOSPITAL ENCOUNTER (OUTPATIENT)
Dept: CARDIAC REHAB | Age: 47
Discharge: HOME | End: 2022-08-22
Payer: COMMERCIAL

## 2022-08-22 VITALS — BODY MASS INDEX: 22.38 KG/M2 | WEIGHT: 165 LBS

## 2022-08-22 PROCEDURE — 93798 PHYS/QHP OP CAR RHAB W/ECG: CPT

## 2022-08-24 ENCOUNTER — TELEPHONE (OUTPATIENT)
Dept: INTERNAL MEDICINE CLINIC | Age: 47
End: 2022-08-24

## 2022-08-24 ENCOUNTER — APPOINTMENT (OUTPATIENT)
Dept: CARDIAC REHAB | Age: 47
End: 2022-08-24
Payer: COMMERCIAL

## 2022-08-24 ENCOUNTER — HOSPITAL ENCOUNTER (INPATIENT)
Age: 47
LOS: 6 days | Discharge: HOME | DRG: 392 | End: 2022-08-30
Attending: STUDENT IN AN ORGANIZED HEALTH CARE EDUCATION/TRAINING PROGRAM | Admitting: SURGERY
Payer: COMMERCIAL

## 2022-08-24 ENCOUNTER — APPOINTMENT (OUTPATIENT)
Dept: CT IMAGING | Age: 47
DRG: 392 | End: 2022-08-24
Attending: STUDENT IN AN ORGANIZED HEALTH CARE EDUCATION/TRAINING PROGRAM
Payer: COMMERCIAL

## 2022-08-24 DIAGNOSIS — K57.20 DIVERTICULITIS OF COLON WITH PERFORATION: Primary | ICD-10-CM

## 2022-08-24 LAB
ALBUMIN SERPL-MCNC: 3.9 G/DL (ref 3.5–5)
ALBUMIN/GLOB SERPL: 0.8 {RATIO} (ref 1.1–2.2)
ALP SERPL-CCNC: 69 U/L (ref 45–117)
ALT SERPL-CCNC: 17 U/L (ref 12–78)
ANION GAP SERPL CALC-SCNC: 9 MMOL/L (ref 5–15)
APPEARANCE UR: CLEAR
APTT PPP: 33.2 SEC (ref 22.1–31)
AST SERPL-CCNC: 12 U/L (ref 15–37)
ATRIAL RATE: 78 BPM
BACTERIA URNS QL MICRO: NEGATIVE /HPF
BASOPHILS # BLD: 0 K/UL (ref 0–0.1)
BASOPHILS # BLD: 0.1 K/UL (ref 0–0.1)
BASOPHILS NFR BLD: 0 % (ref 0–1)
BASOPHILS NFR BLD: 0 % (ref 0–1)
BILIRUB SERPL-MCNC: 1.2 MG/DL (ref 0.2–1)
BILIRUB UR QL: NEGATIVE
BUN SERPL-MCNC: 18 MG/DL (ref 6–20)
BUN/CREAT SERPL: 12 (ref 12–20)
CALCIUM SERPL-MCNC: 10.1 MG/DL (ref 8.5–10.1)
CALCULATED P AXIS, ECG09: 53 DEGREES
CALCULATED R AXIS, ECG10: 73 DEGREES
CALCULATED T AXIS, ECG11: 42 DEGREES
CHLORIDE SERPL-SCNC: 103 MMOL/L (ref 97–108)
CO2 SERPL-SCNC: 23 MMOL/L (ref 21–32)
COLOR UR: ABNORMAL
COMMENT, HOLDF: NORMAL
CREAT SERPL-MCNC: 1.53 MG/DL (ref 0.7–1.3)
DIAGNOSIS, 93000: NORMAL
DIFFERENTIAL METHOD BLD: ABNORMAL
DIFFERENTIAL METHOD BLD: ABNORMAL
EOSINOPHIL # BLD: 0 K/UL (ref 0–0.4)
EOSINOPHIL # BLD: 0 K/UL (ref 0–0.4)
EOSINOPHIL NFR BLD: 0 % (ref 0–7)
EOSINOPHIL NFR BLD: 0 % (ref 0–7)
EPITH CASTS URNS QL MICRO: ABNORMAL /LPF
ERYTHROCYTE [DISTWIDTH] IN BLOOD BY AUTOMATED COUNT: 15.8 % (ref 11.5–14.5)
ERYTHROCYTE [DISTWIDTH] IN BLOOD BY AUTOMATED COUNT: 15.8 % (ref 11.5–14.5)
GLOBULIN SER CALC-MCNC: 4.9 G/DL (ref 2–4)
GLUCOSE SERPL-MCNC: 136 MG/DL (ref 65–100)
GLUCOSE UR STRIP.AUTO-MCNC: NEGATIVE MG/DL
HCT VFR BLD AUTO: 43.7 % (ref 36.6–50.3)
HCT VFR BLD AUTO: 44.5 % (ref 36.6–50.3)
HGB BLD-MCNC: 14.2 G/DL (ref 12.1–17)
HGB BLD-MCNC: 14.7 G/DL (ref 12.1–17)
HGB UR QL STRIP: NEGATIVE
IMM GRANULOCYTES # BLD AUTO: 0 K/UL (ref 0–0.04)
IMM GRANULOCYTES # BLD AUTO: 0.1 K/UL (ref 0–0.04)
IMM GRANULOCYTES NFR BLD AUTO: 0 % (ref 0–0.5)
IMM GRANULOCYTES NFR BLD AUTO: 0 % (ref 0–0.5)
INR PPP: 2.5 (ref 0.9–1.1)
KETONES UR QL STRIP.AUTO: 15 MG/DL
LEUKOCYTE ESTERASE UR QL STRIP.AUTO: NEGATIVE
LIPASE SERPL-CCNC: 53 U/L (ref 73–393)
LYMPHOCYTES # BLD: 0.7 K/UL (ref 0.8–3.5)
LYMPHOCYTES # BLD: 1.7 K/UL (ref 0.8–3.5)
LYMPHOCYTES NFR BLD: 11 % (ref 12–49)
LYMPHOCYTES NFR BLD: 4 % (ref 12–49)
MCH RBC QN AUTO: 30.9 PG (ref 26–34)
MCH RBC QN AUTO: 31 PG (ref 26–34)
MCHC RBC AUTO-ENTMCNC: 32.5 G/DL (ref 30–36.5)
MCHC RBC AUTO-ENTMCNC: 33 G/DL (ref 30–36.5)
MCV RBC AUTO: 93.5 FL (ref 80–99)
MCV RBC AUTO: 95.4 FL (ref 80–99)
MONOCYTES # BLD: 1.5 K/UL (ref 0–1)
MONOCYTES # BLD: 1.6 K/UL (ref 0–1)
MONOCYTES NFR BLD: 10 % (ref 5–13)
MONOCYTES NFR BLD: 9 % (ref 5–13)
NEUTS BAND NFR BLD MANUAL: 2 %
NEUTS SEG # BLD: 12.6 K/UL (ref 1.8–8)
NEUTS SEG # BLD: 14.6 K/UL (ref 1.8–8)
NEUTS SEG NFR BLD: 79 % (ref 32–75)
NEUTS SEG NFR BLD: 85 % (ref 32–75)
NITRITE UR QL STRIP.AUTO: NEGATIVE
NRBC # BLD: 0 K/UL (ref 0–0.01)
NRBC # BLD: 0 K/UL (ref 0–0.01)
NRBC BLD-RTO: 0 PER 100 WBC
NRBC BLD-RTO: 0 PER 100 WBC
P-R INTERVAL, ECG05: 148 MS
PH UR STRIP: 5.5 [PH] (ref 5–8)
PLATELET # BLD AUTO: 303 K/UL (ref 150–400)
PLATELET # BLD AUTO: 350 K/UL (ref 150–400)
PMV BLD AUTO: 9.8 FL (ref 8.9–12.9)
PMV BLD AUTO: 9.8 FL (ref 8.9–12.9)
POTASSIUM SERPL-SCNC: 4.1 MMOL/L (ref 3.5–5.1)
PROT SERPL-MCNC: 8.8 G/DL (ref 6.4–8.2)
PROT UR STRIP-MCNC: ABNORMAL MG/DL
PROTHROMBIN TIME: 25.1 SEC (ref 9–11.1)
Q-T INTERVAL, ECG07: 400 MS
QRS DURATION, ECG06: 148 MS
QTC CALCULATION (BEZET), ECG08: 456 MS
RBC # BLD AUTO: 4.58 M/UL (ref 4.1–5.7)
RBC # BLD AUTO: 4.76 M/UL (ref 4.1–5.7)
RBC #/AREA URNS HPF: ABNORMAL /HPF (ref 0–5)
RBC MORPH BLD: ABNORMAL
SAMPLES BEING HELD,HOLD: NORMAL
SODIUM SERPL-SCNC: 135 MMOL/L (ref 136–145)
SP GR UR REFRACTOMETRY: 1.01 (ref 1–1.03)
THERAPEUTIC RANGE,PTTT: ABNORMAL SECS (ref 58–77)
UA: UC IF INDICATED,UAUC: ABNORMAL
UROBILINOGEN UR QL STRIP.AUTO: 0.2 EU/DL (ref 0.2–1)
VENTRICULAR RATE, ECG03: 78 BPM
WBC # BLD AUTO: 16 K/UL (ref 4.1–11.1)
WBC # BLD AUTO: 16.8 K/UL (ref 4.1–11.1)
WBC URNS QL MICRO: ABNORMAL /HPF (ref 0–4)

## 2022-08-24 PROCEDURE — 96375 TX/PRO/DX INJ NEW DRUG ADDON: CPT

## 2022-08-24 PROCEDURE — 65270000029 HC RM PRIVATE

## 2022-08-24 PROCEDURE — 85025 COMPLETE CBC W/AUTO DIFF WBC: CPT

## 2022-08-24 PROCEDURE — 85730 THROMBOPLASTIN TIME PARTIAL: CPT

## 2022-08-24 PROCEDURE — 99285 EMERGENCY DEPT VISIT HI MDM: CPT

## 2022-08-24 PROCEDURE — 80053 COMPREHEN METABOLIC PANEL: CPT

## 2022-08-24 PROCEDURE — 74011250636 HC RX REV CODE- 250/636: Performed by: STUDENT IN AN ORGANIZED HEALTH CARE EDUCATION/TRAINING PROGRAM

## 2022-08-24 PROCEDURE — 83690 ASSAY OF LIPASE: CPT

## 2022-08-24 PROCEDURE — 74011000258 HC RX REV CODE- 258: Performed by: STUDENT IN AN ORGANIZED HEALTH CARE EDUCATION/TRAINING PROGRAM

## 2022-08-24 PROCEDURE — 36415 COLL VENOUS BLD VENIPUNCTURE: CPT

## 2022-08-24 PROCEDURE — 99222 1ST HOSP IP/OBS MODERATE 55: CPT | Performed by: NURSE PRACTITIONER

## 2022-08-24 PROCEDURE — 74177 CT ABD & PELVIS W/CONTRAST: CPT

## 2022-08-24 PROCEDURE — 85610 PROTHROMBIN TIME: CPT

## 2022-08-24 PROCEDURE — 96365 THER/PROPH/DIAG IV INF INIT: CPT

## 2022-08-24 PROCEDURE — 81001 URINALYSIS AUTO W/SCOPE: CPT

## 2022-08-24 PROCEDURE — 74011000636 HC RX REV CODE- 636: Performed by: STUDENT IN AN ORGANIZED HEALTH CARE EDUCATION/TRAINING PROGRAM

## 2022-08-24 PROCEDURE — 74011250636 HC RX REV CODE- 250/636: Performed by: SURGERY

## 2022-08-24 PROCEDURE — 93005 ELECTROCARDIOGRAM TRACING: CPT

## 2022-08-24 RX ORDER — DEXTROSE, SODIUM CHLORIDE, AND POTASSIUM CHLORIDE 5; .45; .15 G/100ML; G/100ML; G/100ML
75 INJECTION INTRAVENOUS CONTINUOUS
Status: DISCONTINUED | OUTPATIENT
Start: 2022-08-24 | End: 2022-08-29

## 2022-08-24 RX ORDER — METRONIDAZOLE 500 MG/100ML
500 INJECTION, SOLUTION INTRAVENOUS EVERY 12 HOURS
Status: DISCONTINUED | OUTPATIENT
Start: 2022-08-24 | End: 2022-08-29

## 2022-08-24 RX ORDER — HEPARIN SODIUM 1000 [USP'U]/ML
4000 INJECTION, SOLUTION INTRAVENOUS; SUBCUTANEOUS AS NEEDED
Status: DISCONTINUED | OUTPATIENT
Start: 2022-08-24 | End: 2022-08-29

## 2022-08-24 RX ORDER — HEPARIN SODIUM 10000 [USP'U]/100ML
12-25 INJECTION, SOLUTION INTRAVENOUS
Status: DISCONTINUED | OUTPATIENT
Start: 2022-08-24 | End: 2022-08-29

## 2022-08-24 RX ORDER — HYDROMORPHONE HYDROCHLORIDE 1 MG/ML
0.5 INJECTION, SOLUTION INTRAMUSCULAR; INTRAVENOUS; SUBCUTANEOUS
Status: DISCONTINUED | OUTPATIENT
Start: 2022-08-24 | End: 2022-08-26

## 2022-08-24 RX ORDER — HEPARIN SODIUM 1000 [USP'U]/ML
2000 INJECTION, SOLUTION INTRAVENOUS; SUBCUTANEOUS AS NEEDED
Status: DISCONTINUED | OUTPATIENT
Start: 2022-08-24 | End: 2022-08-29

## 2022-08-24 RX ORDER — LEVOFLOXACIN 5 MG/ML
750 INJECTION, SOLUTION INTRAVENOUS EVERY 24 HOURS
Status: DISCONTINUED | OUTPATIENT
Start: 2022-08-24 | End: 2022-08-29

## 2022-08-24 RX ORDER — MORPHINE SULFATE 2 MG/ML
4 INJECTION, SOLUTION INTRAMUSCULAR; INTRAVENOUS ONCE
Status: COMPLETED | OUTPATIENT
Start: 2022-08-24 | End: 2022-08-24

## 2022-08-24 RX ORDER — METOPROLOL TARTRATE 25 MG/1
12.5 TABLET, FILM COATED ORAL EVERY 12 HOURS
Status: DISCONTINUED | OUTPATIENT
Start: 2022-08-24 | End: 2022-08-30 | Stop reason: HOSPADM

## 2022-08-24 RX ORDER — LANOLIN ALCOHOL/MO/W.PET/CERES
10 CREAM (GRAM) TOPICAL
Status: DISCONTINUED | OUTPATIENT
Start: 2022-08-24 | End: 2022-08-30 | Stop reason: HOSPADM

## 2022-08-24 RX ORDER — HEPARIN SODIUM 1000 [USP'U]/ML
4000 INJECTION, SOLUTION INTRAVENOUS; SUBCUTANEOUS ONCE
Status: DISCONTINUED | OUTPATIENT
Start: 2022-08-24 | End: 2022-08-24 | Stop reason: ALTCHOICE

## 2022-08-24 RX ORDER — ONDANSETRON 2 MG/ML
4 INJECTION INTRAMUSCULAR; INTRAVENOUS
Status: DISCONTINUED | OUTPATIENT
Start: 2022-08-24 | End: 2022-08-30 | Stop reason: HOSPADM

## 2022-08-24 RX ADMIN — IOPAMIDOL 100 ML: 755 INJECTION, SOLUTION INTRAVENOUS at 10:11

## 2022-08-24 RX ADMIN — HEPARIN SODIUM AND DEXTROSE 12 UNITS/KG/HR: 10000; 5 INJECTION INTRAVENOUS at 18:50

## 2022-08-24 RX ADMIN — POTASSIUM CHLORIDE, DEXTROSE MONOHYDRATE AND SODIUM CHLORIDE 125 ML/HR: 150; 5; 450 INJECTION, SOLUTION INTRAVENOUS at 15:57

## 2022-08-24 RX ADMIN — MORPHINE SULFATE 4 MG: 2 INJECTION, SOLUTION INTRAMUSCULAR; INTRAVENOUS at 10:22

## 2022-08-24 RX ADMIN — LEVOFLOXACIN 750 MG: 5 INJECTION, SOLUTION INTRAVENOUS at 15:58

## 2022-08-24 RX ADMIN — PIPERACILLIN AND TAZOBACTAM 3.38 G: 3; .375 INJECTION, POWDER, LYOPHILIZED, FOR SOLUTION INTRAVENOUS at 10:59

## 2022-08-24 RX ADMIN — METRONIDAZOLE 500 MG: 500 INJECTION, SOLUTION INTRAVENOUS at 15:57

## 2022-08-24 RX ADMIN — SODIUM CHLORIDE 1000 ML: 9 INJECTION, SOLUTION INTRAVENOUS at 10:21

## 2022-08-24 NOTE — TELEPHONE ENCOUNTER
Two pt identifiers confirmed. The patient called, she c/o severe ABD pain  Location, Upper or Lower RLQ  How long has pain been going on  started on Monday evening (08/22/22)  History of trauma or recent surgery  no  Quality of pain aching, constant, and shooting  Severity 1-10 8/10  Is pain continuing to get worse  yes  Fever  101.1-101.3  Vomiting  no  Diarrhea  yes, the patient reported he thinks it is from taking medication to help him go to the bathroom, he thought he maybe constipated. Is patient able to eat a bland diet no  Is patient able to drink and keep fluids down no, he reported extreme nausea. I advised the patient to go to the ED, which he stated he would do. Pt verbalized understanding of information discussed w/ no further questions at this time.   Signed By: Gregory Segundo LPN     August 24, 7812

## 2022-08-24 NOTE — ED PROVIDER NOTES
EMERGENCY DEPARTMENT HISTORY AND PHYSICAL EXAM      Date: 8/24/2022  Patient Name: Guillermo Mishra    History of Presenting Illness     Chief Complaint   Patient presents with    Abdominal Pain     X 2 days with fever of 101 at home, pt felt constipated, pain unrelieved with BM and OTC miralax, history of hiatal hernia         HPI: Guillermo Mishra, 49 y.o. male presents to the ED with cc of abdominal pain. This has been going on for 3 days. He describes it as diffuse lower abdominal pain that he describes as a pressure, worse with walking, better with resting. He took some Ex-Lax, and had a large watery bowel movement, nonbloody. Reports poor appetite without nausea or vomiting. Denies dysuria or hematuria. He takes warfarin for history of prosthetic aortic valve, took this last night. Reports fever of 101 at home. There are no other complaints, changes, or physical findings at this time. PCP: Chaim Cerrato MD    No current facility-administered medications on file prior to encounter. Current Outpatient Medications on File Prior to Encounter   Medication Sig Dispense Refill    warfarin (COUMADIN) 2 mg tablet TAKE 1 TABLET BY MOUTH EVERY DAY 30 Tablet 2    warfarin (COUMADIN) 4 mg tablet Take 1 Tablet by mouth daily. 30 Tablet 2    melatonin 10 mg tab Take 1 Tablet by mouth daily as needed (sleep). acetaminophen (TYLENOL) 325 mg tablet Take 650 mg by mouth every four (4) hours as needed for Pain (mild). aspirin 81 mg chewable tablet Take 1 Tablet by mouth daily. 30 Tablet 1    metoprolol tartrate (LOPRESSOR) 25 mg tablet Take 0.5 Tablets by mouth every twelve (12) hours. 30 Tablet 1    Omeprazole delayed release (PRILOSEC D/R) 20 mg tablet Take 20 mg by mouth daily.          Past History     Past Medical History:  Past Medical History:   Diagnosis Date    Aortic stenosis     Bicuspid aortic valve     GERD (gastroesophageal reflux disease)     Hiatal hernia     Severe aortic stenosis 3/22/2022       Past Surgical History:  Past Surgical History:   Procedure Laterality Date    HX ADENOIDECTOMY      HX COLONOSCOPY      HX ENDOSCOPY      HX HEART CATHETERIZATION  2022       Family History:  Family History   Problem Relation Age of Onset    No Known Problems Father     Lung Cancer Maternal Grandmother     Coronary Art Dis Maternal Grandfather     Heart Attack Maternal Grandfather     Hypertension Paternal Grandfather     Hypertension Mother     No Known Problems Sister     Stroke Paternal Grandmother        Social History:  Social History     Tobacco Use    Smoking status: Former     Packs/day: 0.75     Years: 25.00     Pack years: 18.75     Types: Cigarettes     Quit date: 2017     Years since quittin.1    Smokeless tobacco: Never   Vaping Use    Vaping Use: Never used   Substance Use Topics    Alcohol use: Yes     Alcohol/week: 4.0 standard drinks     Types: 2 Glasses of wine, 1 Cans of beer, 1 Shots of liquor per week     Comment: 3-5/week     Drug use: Not Currently       Allergies: Allergies   Allergen Reactions    Soap Rash     Dial Soap         Review of Systems   Reports fever  No ear pain  No eye pain  no shortness of breath  no chest pain  Reports abdominal pain  no dysuria  no leg pain  No rash  No lymphadenopathy  No weight loss    Physical Exam   Physical Exam  Constitutional:       General: He is not in acute distress. Appearance: He is not toxic-appearing. HENT:      Head: Normocephalic and atraumatic. Eyes:      Extraocular Movements: Extraocular movements intact. Cardiovascular:      Rate and Rhythm: Normal rate and regular rhythm. Pulmonary:      Effort: Pulmonary effort is normal.      Breath sounds: Normal breath sounds. Abdominal:      Palpations: Abdomen is soft. Tenderness: There is abdominal tenderness.       Comments:  diffuse lower abdominal pain worse in the suprapubic region without guarding or rebound tenderness   Musculoskeletal: General: No deformity. Cervical back: Neck supple. Skin:     General: Skin is warm and dry. Neurological:      General: No focal deficit present. Mental Status: He is alert and oriented to person, place, and time. Psychiatric:         Mood and Affect: Mood normal.       Diagnostic Study Results     Labs -     Recent Results (from the past 24 hour(s))   METABOLIC PANEL, COMPREHENSIVE    Collection Time: 08/24/22  9:19 AM   Result Value Ref Range    Sodium 135 (L) 136 - 145 mmol/L    Potassium 4.1 3.5 - 5.1 mmol/L    Chloride 103 97 - 108 mmol/L    CO2 23 21 - 32 mmol/L    Anion gap 9 5 - 15 mmol/L    Glucose 136 (H) 65 - 100 mg/dL    BUN 18 6 - 20 MG/DL    Creatinine 1.53 (H) 0.70 - 1.30 MG/DL    BUN/Creatinine ratio 12 12 - 20      GFR est AA 59 (L) >60 ml/min/1.73m2    GFR est non-AA 49 (L) >60 ml/min/1.73m2    Calcium 10.1 8.5 - 10.1 MG/DL    Bilirubin, total 1.2 (H) 0.2 - 1.0 MG/DL    ALT (SGPT) 17 12 - 78 U/L    AST (SGOT) 12 (L) 15 - 37 U/L    Alk. phosphatase 69 45 - 117 U/L    Protein, total 8.8 (H) 6.4 - 8.2 g/dL    Albumin 3.9 3.5 - 5.0 g/dL    Globulin 4.9 (H) 2.0 - 4.0 g/dL    A-G Ratio 0.8 (L) 1.1 - 2.2     LIPASE    Collection Time: 08/24/22  9:19 AM   Result Value Ref Range    Lipase 53 (L) 73 - 393 U/L   CBC WITH AUTOMATED DIFF    Collection Time: 08/24/22  9:19 AM   Result Value Ref Range    WBC 16.8 (H) 4.1 - 11.1 K/uL    RBC 4.76 4.10 - 5.70 M/uL    HGB 14.7 12.1 - 17.0 g/dL    HCT 44.5 36.6 - 50.3 %    MCV 93.5 80.0 - 99.0 FL    MCH 30.9 26.0 - 34.0 PG    MCHC 33.0 30.0 - 36.5 g/dL    RDW 15.8 (H) 11.5 - 14.5 %    PLATELET 951 898 - 559 K/uL    MPV 9.8 8.9 - 12.9 FL    NRBC 0.0 0  WBC    ABSOLUTE NRBC 0.00 0.00 - 0.01 K/uL    NEUTROPHILS 85 (H) 32 - 75 %    BAND NEUTROPHILS 2 %    LYMPHOCYTES 4 (L) 12 - 49 %    MONOCYTES 9 5 - 13 %    EOSINOPHILS 0 0 - 7 %    BASOPHILS 0 0 - 1 %    IMMATURE GRANULOCYTES 0 0.0 - 0.5 %    ABS. NEUTROPHILS 14.6 (H) 1.8 - 8.0 K/UL    ABS. LYMPHOCYTES 0.7 (L) 0.8 - 3.5 K/UL    ABS. MONOCYTES 1.5 (H) 0.0 - 1.0 K/UL    ABS. EOSINOPHILS 0.0 0.0 - 0.4 K/UL    ABS. BASOPHILS 0.0 0.0 - 0.1 K/UL    ABS. IMM. GRANS. 0.0 0.00 - 0.04 K/UL    DF MANUAL      RBC COMMENTS CHUN CELLS  PRESENT       PROTHROMBIN TIME + INR    Collection Time: 08/24/22  9:19 AM   Result Value Ref Range    INR 2.5 (H) 0.9 - 1.1      Prothrombin time 25.1 (H) 9.0 - 11.1 sec   EKG, 12 LEAD, INITIAL    Collection Time: 08/24/22  9:22 AM   Result Value Ref Range    Ventricular Rate 78 BPM    Atrial Rate 78 BPM    P-R Interval 148 ms    QRS Duration 148 ms    Q-T Interval 400 ms    QTC Calculation (Bezet) 456 ms    Calculated P Axis 53 degrees    Calculated R Axis 73 degrees    Calculated T Axis 42 degrees    Diagnosis       Normal sinus rhythm  Possible Left atrial enlargement  Right bundle branch block  When compared with ECG of 04-MAY-2022 04:40,  T wave inversion now evident in Anterior leads         Radiologic Studies -   CT ABD PELV W CONT   Final Result      1. Inflammation of the sigmoid colon likely due to diverticulitis. Adjacent   contained perforation in the mesenteric fat but no drainable fluid collection        CT Results  (Last 48 hours)                 08/24/22 1011  CT ABD PELV W CONT Final result    Impression:      1. Inflammation of the sigmoid colon likely due to diverticulitis. Adjacent   contained perforation in the mesenteric fat but no drainable fluid collection       Narrative:  EXAM: CT ABD PELV W CONT       INDICATION: lower abd pain       COMPARISON: None        CONTRAST: 100 mL of Isovue-370. ORAL CONTRAST: None       TECHNIQUE:    Following the uneventful intravenous administration of contrast, thin axial   images were obtained through the abdomen and pelvis. Coronal and sagittal   reconstructions were generated.  CT dose reduction was achieved through use of a   standardized protocol tailored for this examination and automatic exposure   control for dose modulation. FINDINGS:    LOWER THORAX: No significant abnormality in the incidentally imaged lower chest.   LIVER: Tiny hypodensity segment 6 2 small to characterize   BILIARY TREE: Gallbladder is within normal limits. CBD is not dilated. SPLEEN: within normal limits. PANCREAS: No mass or ductal dilatation. ADRENALS: Unremarkable. KIDNEYS: No mass, calculus, or hydronephrosis. STOMACH: Unremarkable. SMALL BOWEL: No dilatation or wall thickening. COLON: Focal inflammation of the mid sigmoid colon. Long segment area of wall   thickening. Multiple small diverticula. Adjacent small contained perforation   with gas in the adjacent mesenteric fat. There is however no drainable fluid   collection   APPENDIX: Normal   PERITONEUM: Contained perforation as above. No ascites   RETROPERITONEUM: There are vascular calcifications without aneurysm. No   adenopathy   REPRODUCTIVE ORGANS: Not enlarged   URINARY BLADDER: No mass or calculus. BONES: No destructive bone lesion. ABDOMINAL WALL: Small umbilical hernia containing fat   ADDITIONAL COMMENTS: N/A                 CXR Results  (Last 48 hours)      None              Medical Decision Making   I am the first provider for this patient. I reviewed the vital signs, available nursing notes, past medical history, past surgical history, family history and social history. Vital Signs-Reviewed the patient's vital signs. Patient Vitals for the past 24 hrs:   Temp Pulse Resp BP SpO2   08/24/22 1118 98.3 °F (36.8 °C) 70 16 112/78 99 %   08/24/22 0912 98.2 °F (36.8 °C) 78 16 106/67 98 %         Provider Notes (Medical Decision Making):   70-year-old male presenting with lower abdominal pain. Differential includes diverticulitis, appendicitis, cystitis, constipation, ileus. He is afebrile and nontoxic-appearing without peritoneal signs on abdominal exam.    ED Course:     Initial assessment performed.  The patients presenting problems have been discussed, and they are in agreement with the care plan formulated and outlined with them. I have encouraged them to ask questions as they arise throughout their visit. EKG is performed at 9: 22, shows sinus rhythm at a rate of 78, , , QTc 456, axis upright, right bundle branch block, no ST segment elevation or depression concerning for ACS, this is interpreted as sinus rhythm with right bundle branch block. CBC with leukocytosis 16.8, negative for anemia, UA with elevated creatinine of 1.53, otherwise no worrisome electrolyte abnormalities, CT abdomen pelvis shows inflammation of the sigmoid colon with adjacent contained perforation. On reevaluation, patient is resting comfortably, spoke with Pricila Shields of general surgery with plan to admit to general surgery service. IV antibiotics are ordered. Critical Care Time:         Disposition:  Admit    PLAN:  1. Current Discharge Medication List        2.    Follow-up Information    None       Return to ED if worse     Diagnosis     Clinical Impression: Acute perforated diverticulitis

## 2022-08-24 NOTE — ED NOTES
Since Monday pressure pain lower abdomen across. Pt look laxities thought he was backed up, he did have bm but still pressure pain.  Pt has been running fevers last night 101.3  Today 101 pt took tylenol

## 2022-08-24 NOTE — H&P
Surgery History and Physcial    Subjective:      German Barrios is a 49 y.o. male who presents for evaluation of abdominal pain. The pain is located in the LLQ, RLQ, suprapubic without radiation. Pain is described as aching and cramping and measures 4/10 in intensity presently. Onset of pain was 2 days ago. Aggravating factors include movement, pressure, and eating. Alleviating factors include none. Associated symptoms include anorexia, chills, fever, nausea, and sweats. Patient states he has not had much PO intake since Monday. He has never had diverticulitis before. His last colonoscopy was two years ago. He had a valve replacement earlier this year and takes coumadin. Patient Active Problem List    Diagnosis Date Noted    Diverticulitis of colon with perforation 2022    Aortic stenosis 2022    S/P AVR 2022    AS (aortic stenosis) 2022    Severe aortic stenosis 2022    Bicuspid aortic valve 2022    Hiatal hernia      Past Medical History:   Diagnosis Date    Aortic stenosis     Bicuspid aortic valve     GERD (gastroesophageal reflux disease)     Hiatal hernia     Severe aortic stenosis 3/22/2022      Past Surgical History:   Procedure Laterality Date    HX ADENOIDECTOMY      HX COLONOSCOPY      HX ENDOSCOPY      HX HEART CATHETERIZATION  2022      Social History     Tobacco Use    Smoking status: Former     Packs/day: 0.75     Years: 25.00     Pack years: 18.75     Types: Cigarettes     Quit date: 2017     Years since quittin.1    Smokeless tobacco: Never   Substance Use Topics    Alcohol use:  Yes     Alcohol/week: 4.0 standard drinks     Types: 2 Glasses of wine, 1 Cans of beer, 1 Shots of liquor per week     Comment: 3-5/week       Family History   Problem Relation Age of Onset    No Known Problems Father     Lung Cancer Maternal Grandmother     Coronary Art Dis Maternal Grandfather     Heart Attack Maternal Grandfather     Hypertension Paternal Grandfather Hypertension Mother     No Known Problems Sister     Stroke Paternal Grandmother       Prior to Admission medications    Medication Sig Start Date End Date Taking? Authorizing Provider   warfarin (COUMADIN) 2 mg tablet TAKE 1 TABLET BY MOUTH EVERY DAY 6/2/22   El Francis NP   warfarin (COUMADIN) 4 mg tablet Take 1 Tablet by mouth daily. 5/27/22   Cesar Francis NP   melatonin 10 mg tab Take 1 Tablet by mouth daily as needed (sleep). Provider, Historical   acetaminophen (TYLENOL) 325 mg tablet Take 650 mg by mouth every four (4) hours as needed for Pain (mild). Provider, Historical   aspirin 81 mg chewable tablet Take 1 Tablet by mouth daily. 5/7/22   Aurora Burns NP   metoprolol tartrate (LOPRESSOR) 25 mg tablet Take 0.5 Tablets by mouth every twelve (12) hours. 5/6/22   Aurora Burns NP   Omeprazole delayed release (PRILOSEC D/R) 20 mg tablet Take 20 mg by mouth daily. Provider, Historical     Allergies   Allergen Reactions    Soap Rash     Dial Soap         Review of Systems   Constitutional:  Positive for appetite change, chills, diaphoresis and fever. Negative for activity change. Gastrointestinal:  Positive for abdominal pain, diarrhea and nausea. Negative for abdominal distention, constipation and vomiting. Objective:     Visit Vitals  /67 (BP 1 Location: Left upper arm)   Pulse 78   Temp 98.2 °F (36.8 °C)   Resp 16   Ht 6' (1.829 m)   Wt 73.6 kg (162 lb 4.1 oz)   SpO2 98%   BMI 22.01 kg/m²       Physical Exam  Constitutional:       Appearance: Normal appearance. He is not ill-appearing. Cardiovascular:      Rate and Rhythm: Normal rate and regular rhythm. Heart sounds: Normal heart sounds. Pulmonary:      Effort: Pulmonary effort is normal.      Breath sounds: Normal breath sounds. Abdominal:      General: Abdomen is flat. Bowel sounds are normal. There is distension. Palpations: Abdomen is soft. Tenderness:  There is abdominal tenderness in the right lower quadrant, suprapubic area and left lower quadrant. There is no guarding or rebound. Skin:     General: Skin is warm and dry. Neurological:      Mental Status: He is alert. Imaging:  images and reports reviewed    Lab Review:    Recent Results (from the past 24 hour(s))   METABOLIC PANEL, COMPREHENSIVE    Collection Time: 08/24/22  9:19 AM   Result Value Ref Range    Sodium 135 (L) 136 - 145 mmol/L    Potassium 4.1 3.5 - 5.1 mmol/L    Chloride 103 97 - 108 mmol/L    CO2 23 21 - 32 mmol/L    Anion gap 9 5 - 15 mmol/L    Glucose 136 (H) 65 - 100 mg/dL    BUN 18 6 - 20 MG/DL    Creatinine 1.53 (H) 0.70 - 1.30 MG/DL    BUN/Creatinine ratio 12 12 - 20      GFR est AA 59 (L) >60 ml/min/1.73m2    GFR est non-AA 49 (L) >60 ml/min/1.73m2    Calcium 10.1 8.5 - 10.1 MG/DL    Bilirubin, total 1.2 (H) 0.2 - 1.0 MG/DL    ALT (SGPT) 17 12 - 78 U/L    AST (SGOT) 12 (L) 15 - 37 U/L    Alk. phosphatase 69 45 - 117 U/L    Protein, total 8.8 (H) 6.4 - 8.2 g/dL    Albumin 3.9 3.5 - 5.0 g/dL    Globulin 4.9 (H) 2.0 - 4.0 g/dL    A-G Ratio 0.8 (L) 1.1 - 2.2     LIPASE    Collection Time: 08/24/22  9:19 AM   Result Value Ref Range    Lipase 53 (L) 73 - 393 U/L   CBC WITH AUTOMATED DIFF    Collection Time: 08/24/22  9:19 AM   Result Value Ref Range    WBC 16.8 (H) 4.1 - 11.1 K/uL    RBC 4.76 4.10 - 5.70 M/uL    HGB 14.7 12.1 - 17.0 g/dL    HCT 44.5 36.6 - 50.3 %    MCV 93.5 80.0 - 99.0 FL    MCH 30.9 26.0 - 34.0 PG    MCHC 33.0 30.0 - 36.5 g/dL    RDW 15.8 (H) 11.5 - 14.5 %    PLATELET 041 857 - 641 K/uL    MPV 9.8 8.9 - 12.9 FL    NRBC 0.0 0  WBC    ABSOLUTE NRBC 0.00 0.00 - 0.01 K/uL    NEUTROPHILS 85 (H) 32 - 75 %    BAND NEUTROPHILS 2 %    LYMPHOCYTES 4 (L) 12 - 49 %    MONOCYTES 9 5 - 13 %    EOSINOPHILS 0 0 - 7 %    BASOPHILS 0 0 - 1 %    IMMATURE GRANULOCYTES 0 0.0 - 0.5 %    ABS. NEUTROPHILS 14.6 (H) 1.8 - 8.0 K/UL    ABS. LYMPHOCYTES 0.7 (L) 0.8 - 3.5 K/UL    ABS.  MONOCYTES 1.5 (H) 0.0 - 1.0 K/UL ABS. EOSINOPHILS 0.0 0.0 - 0.4 K/UL    ABS. BASOPHILS 0.0 0.0 - 0.1 K/UL    ABS. IMM. GRANS. 0.0 0.00 - 0.04 K/UL    DF MANUAL      RBC COMMENTS CHUN CELLS  PRESENT       PROTHROMBIN TIME + INR    Collection Time: 08/24/22  9:19 AM   Result Value Ref Range    INR 2.5 (H) 0.9 - 1.1      Prothrombin time 25.1 (H) 9.0 - 11.1 sec   EKG, 12 LEAD, INITIAL    Collection Time: 08/24/22  9:22 AM   Result Value Ref Range    Ventricular Rate 78 BPM    Atrial Rate 78 BPM    P-R Interval 148 ms    QRS Duration 148 ms    Q-T Interval 400 ms    QTC Calculation (Bezet) 456 ms    Calculated P Axis 53 degrees    Calculated R Axis 73 degrees    Calculated T Axis 42 degrees    Diagnosis       Normal sinus rhythm  Possible Left atrial enlargement  Right bundle branch block  When compared with ECG of 04-MAY-2022 04:40,  T wave inversion now evident in Anterior leads           Assessment:     Abdominal pain, suspect diverticulitis with contained perforation    Plan:     1. I recommend proceeding with Conservative therapy:  Observation, Intravenous antibiotics, and Bowel rest.     2. Admit to surgery floor. Bella Feng NP     Surgeon Addendum    I have personally performed a face to face diagnostic evaluation on this patient. I have reviewed and agree with the plan as outlined above. Patient presents with complaints of pain located in the diffuse lower abdomen, worst in the LLQ. Associated symptoms include anorexia, chills, fever, and nausea. images and reports reviewed. I recommend proceeding with Conservative therapy:  Observation, Intravenous antibiotics, Bowel rest, and interval scan in several days. No prior episodes. May not require surgical intervention if course is uneventful this first episode. Recommend nutritional medicine dietary teaching prior to discharge .      Caitlin Gillespie MD   08/24/22   11:47 AM

## 2022-08-25 LAB
ANION GAP SERPL CALC-SCNC: 9 MMOL/L (ref 5–15)
APTT PPP: 56.7 SEC (ref 22.1–31)
APTT PPP: 60.5 SEC (ref 22.1–31)
APTT PPP: 67 SEC (ref 22.1–31)
APTT PPP: 77.9 SEC (ref 22.1–31)
BASOPHILS # BLD: 0 K/UL (ref 0–0.1)
BASOPHILS NFR BLD: 0 % (ref 0–1)
BUN SERPL-MCNC: 12 MG/DL (ref 6–20)
BUN/CREAT SERPL: 12 (ref 12–20)
CALCIUM SERPL-MCNC: 9 MG/DL (ref 8.5–10.1)
CHLORIDE SERPL-SCNC: 104 MMOL/L (ref 97–108)
CO2 SERPL-SCNC: 21 MMOL/L (ref 21–32)
CREAT SERPL-MCNC: 1.01 MG/DL (ref 0.7–1.3)
DIFFERENTIAL METHOD BLD: ABNORMAL
EOSINOPHIL # BLD: 0 K/UL (ref 0–0.4)
EOSINOPHIL NFR BLD: 0 % (ref 0–7)
ERYTHROCYTE [DISTWIDTH] IN BLOOD BY AUTOMATED COUNT: 15.8 % (ref 11.5–14.5)
GLUCOSE SERPL-MCNC: 132 MG/DL (ref 65–100)
HCT VFR BLD AUTO: 39.6 % (ref 36.6–50.3)
HGB BLD-MCNC: 13.1 G/DL (ref 12.1–17)
IMM GRANULOCYTES # BLD AUTO: 0.1 K/UL (ref 0–0.04)
IMM GRANULOCYTES NFR BLD AUTO: 1 % (ref 0–0.5)
LYMPHOCYTES # BLD: 0.9 K/UL (ref 0.8–3.5)
LYMPHOCYTES NFR BLD: 6 % (ref 12–49)
MCH RBC QN AUTO: 31.3 PG (ref 26–34)
MCHC RBC AUTO-ENTMCNC: 33.1 G/DL (ref 30–36.5)
MCV RBC AUTO: 94.5 FL (ref 80–99)
MONOCYTES # BLD: 1.2 K/UL (ref 0–1)
MONOCYTES NFR BLD: 9 % (ref 5–13)
NEUTS SEG # BLD: 12.2 K/UL (ref 1.8–8)
NEUTS SEG NFR BLD: 84 % (ref 32–75)
NRBC # BLD: 0 K/UL (ref 0–0.01)
NRBC BLD-RTO: 0 PER 100 WBC
PLATELET # BLD AUTO: 291 K/UL (ref 150–400)
PMV BLD AUTO: 10.1 FL (ref 8.9–12.9)
POTASSIUM SERPL-SCNC: 4.2 MMOL/L (ref 3.5–5.1)
RBC # BLD AUTO: 4.19 M/UL (ref 4.1–5.7)
SODIUM SERPL-SCNC: 134 MMOL/L (ref 136–145)
THERAPEUTIC RANGE,PTTT: ABNORMAL SECS (ref 58–77)
WBC # BLD AUTO: 14.4 K/UL (ref 4.1–11.1)

## 2022-08-25 PROCEDURE — 36415 COLL VENOUS BLD VENIPUNCTURE: CPT

## 2022-08-25 PROCEDURE — 74011250637 HC RX REV CODE- 250/637: Performed by: SURGERY

## 2022-08-25 PROCEDURE — 74011250636 HC RX REV CODE- 250/636: Performed by: SURGERY

## 2022-08-25 PROCEDURE — 85025 COMPLETE CBC W/AUTO DIFF WBC: CPT

## 2022-08-25 PROCEDURE — 65270000029 HC RM PRIVATE

## 2022-08-25 PROCEDURE — 80048 BASIC METABOLIC PNL TOTAL CA: CPT

## 2022-08-25 PROCEDURE — 99232 SBSQ HOSP IP/OBS MODERATE 35: CPT | Performed by: SURGERY

## 2022-08-25 PROCEDURE — 85730 THROMBOPLASTIN TIME PARTIAL: CPT

## 2022-08-25 RX ADMIN — METRONIDAZOLE 500 MG: 500 INJECTION, SOLUTION INTRAVENOUS at 12:29

## 2022-08-25 RX ADMIN — HYDROMORPHONE HYDROCHLORIDE 0.5 MG: 1 INJECTION, SOLUTION INTRAMUSCULAR; INTRAVENOUS; SUBCUTANEOUS at 08:26

## 2022-08-25 RX ADMIN — METOPROLOL TARTRATE 12.5 MG: 25 TABLET, FILM COATED ORAL at 22:30

## 2022-08-25 RX ADMIN — HEPARIN SODIUM AND DEXTROSE 13 UNITS/KG/HR: 10000; 5 INJECTION INTRAVENOUS at 09:47

## 2022-08-25 RX ADMIN — HYDROMORPHONE HYDROCHLORIDE 0.5 MG: 1 INJECTION, SOLUTION INTRAMUSCULAR; INTRAVENOUS; SUBCUTANEOUS at 14:04

## 2022-08-25 RX ADMIN — HEPARIN SODIUM AND DEXTROSE 13 UNITS/KG/HR: 10000; 5 INJECTION INTRAVENOUS at 19:59

## 2022-08-25 RX ADMIN — LEVOFLOXACIN 750 MG: 5 INJECTION, SOLUTION INTRAVENOUS at 13:49

## 2022-08-25 RX ADMIN — HYDROMORPHONE HYDROCHLORIDE 0.5 MG: 1 INJECTION, SOLUTION INTRAMUSCULAR; INTRAVENOUS; SUBCUTANEOUS at 20:08

## 2022-08-25 RX ADMIN — POTASSIUM CHLORIDE, DEXTROSE MONOHYDRATE AND SODIUM CHLORIDE 125 ML/HR: 150; 5; 450 INJECTION, SOLUTION INTRAVENOUS at 20:03

## 2022-08-25 RX ADMIN — METRONIDAZOLE 500 MG: 500 INJECTION, SOLUTION INTRAVENOUS at 23:41

## 2022-08-25 RX ADMIN — HEPARIN SODIUM AND DEXTROSE 12 UNITS/KG/HR: 10000; 5 INJECTION INTRAVENOUS at 08:02

## 2022-08-25 RX ADMIN — METRONIDAZOLE 500 MG: 500 INJECTION, SOLUTION INTRAVENOUS at 02:54

## 2022-08-25 RX ADMIN — METOPROLOL TARTRATE 12.5 MG: 25 TABLET, FILM COATED ORAL at 08:25

## 2022-08-25 RX ADMIN — METOPROLOL TARTRATE 12.5 MG: 25 TABLET, FILM COATED ORAL at 02:55

## 2022-08-25 RX ADMIN — POTASSIUM CHLORIDE, DEXTROSE MONOHYDRATE AND SODIUM CHLORIDE 125 ML/HR: 150; 5; 450 INJECTION, SOLUTION INTRAVENOUS at 04:22

## 2022-08-25 RX ADMIN — POTASSIUM CHLORIDE, DEXTROSE MONOHYDRATE AND SODIUM CHLORIDE 125 ML/HR: 150; 5; 450 INJECTION, SOLUTION INTRAVENOUS at 12:29

## 2022-08-25 RX ADMIN — HYDROMORPHONE HYDROCHLORIDE 0.5 MG: 1 INJECTION, SOLUTION INTRAMUSCULAR; INTRAVENOUS; SUBCUTANEOUS at 22:34

## 2022-08-25 RX ADMIN — HEPARIN SODIUM AND DEXTROSE 13 UNITS/KG/HR: 10000; 5 INJECTION INTRAVENOUS at 22:27

## 2022-08-25 NOTE — PROGRESS NOTES
Admit Date: 2022      POD * No surgery found *  * No surgery found *      Procedure:  * No surgery found *        HOSPITAL DAY:     ANTIBIOTICS: Levaquin and Flagyl    HPI:  Patient feels somewhat better, passing some flatus, no vomiting, not much appetite, patient overall feels he is improving. Diverticulitis with controlled perforation on CT scan. First episode. Patient actually had a colonoscopy several years ago may be due for follow-up. Temp:  [98.2 °F (36.8 °C)-99.8 °F (37.7 °C)]   Pulse (Heart Rate):  []   BP: (106-134)/(67-78)   Resp Rate:  [16-26]   O2 Sat (%):  [95 %-99 %]   Weight:  [73.6 kg (162 lb 4.1 oz)]       Intake and Output:  Current Shift: No intake/output data recorded. Last three shifts: 1901 -  0700  In: 1100 [I.V.:1100]  Out: 500 [Urine:500]     Blood pressure 134/72, pulse 89, temperature 98.2 °F (36.8 °C), resp. rate 19, height 6' (1.829 m), weight 73.6 kg (162 lb 4.1 oz), SpO2 97 %. Temp (24hrs), Av.7 °F (37.1 °C), Min:98.2 °F (36.8 °C), Max:99.8 °F (37.7 °C)        Review of Systems   Gastrointestinal:         Improving abdominal pain   Genitourinary: Negative. Musculoskeletal: Negative. All other systems reviewed and are negative. Physical Exam  Vitals and nursing note reviewed. Exam conducted with a chaperone present (DERIK Tadeo). Constitutional:       Appearance: Normal appearance. He is not ill-appearing. HENT:      Head: Normocephalic and atraumatic. Mouth/Throat:      Pharynx: Oropharynx is clear. Eyes:      Conjunctiva/sclera: Conjunctivae normal.   Cardiovascular:      Rate and Rhythm: Normal rate. Pulmonary:      Effort: Pulmonary effort is normal.   Abdominal:      General: Abdomen is flat. Palpations: Abdomen is soft.       Comments: Mild tenderness left lower abdomen and suprapubic area but no other peritoneal signs or guarding rest of the abdomen is soft and minimally tender   Musculoskeletal:         General: Normal range of motion. Skin:     General: Skin is warm and dry. Neurological:      General: No focal deficit present. Mental Status: He is alert and oriented to person, place, and time. Psychiatric:         Mood and Affect: Mood normal.         Behavior: Behavior normal.         Thought Content: Thought content normal.         Judgment: Judgment normal.       Recent Results (from the past 48 hour(s))   METABOLIC PANEL, COMPREHENSIVE    Collection Time: 08/24/22  9:19 AM   Result Value Ref Range    Sodium 135 (L) 136 - 145 mmol/L    Potassium 4.1 3.5 - 5.1 mmol/L    Chloride 103 97 - 108 mmol/L    CO2 23 21 - 32 mmol/L    Anion gap 9 5 - 15 mmol/L    Glucose 136 (H) 65 - 100 mg/dL    BUN 18 6 - 20 MG/DL    Creatinine 1.53 (H) 0.70 - 1.30 MG/DL    BUN/Creatinine ratio 12 12 - 20      GFR est AA 59 (L) >60 ml/min/1.73m2    GFR est non-AA 49 (L) >60 ml/min/1.73m2    Calcium 10.1 8.5 - 10.1 MG/DL    Bilirubin, total 1.2 (H) 0.2 - 1.0 MG/DL    ALT (SGPT) 17 12 - 78 U/L    AST (SGOT) 12 (L) 15 - 37 U/L    Alk. phosphatase 69 45 - 117 U/L    Protein, total 8.8 (H) 6.4 - 8.2 g/dL    Albumin 3.9 3.5 - 5.0 g/dL    Globulin 4.9 (H) 2.0 - 4.0 g/dL    A-G Ratio 0.8 (L) 1.1 - 2.2     LIPASE    Collection Time: 08/24/22  9:19 AM   Result Value Ref Range    Lipase 53 (L) 73 - 393 U/L   CBC WITH AUTOMATED DIFF    Collection Time: 08/24/22  9:19 AM   Result Value Ref Range    WBC 16.8 (H) 4.1 - 11.1 K/uL    RBC 4.76 4.10 - 5.70 M/uL    HGB 14.7 12.1 - 17.0 g/dL    HCT 44.5 36.6 - 50.3 %    MCV 93.5 80.0 - 99.0 FL    MCH 30.9 26.0 - 34.0 PG    MCHC 33.0 30.0 - 36.5 g/dL    RDW 15.8 (H) 11.5 - 14.5 %    PLATELET 213 850 - 817 K/uL    MPV 9.8 8.9 - 12.9 FL    NRBC 0.0 0  WBC    ABSOLUTE NRBC 0.00 0.00 - 0.01 K/uL    NEUTROPHILS 85 (H) 32 - 75 %    BAND NEUTROPHILS 2 %    LYMPHOCYTES 4 (L) 12 - 49 %    MONOCYTES 9 5 - 13 %    EOSINOPHILS 0 0 - 7 %    BASOPHILS 0 0 - 1 %    IMMATURE GRANULOCYTES 0 0.0 - 0.5 %    ABS. NEUTROPHILS 14.6 (H) 1.8 - 8.0 K/UL    ABS. LYMPHOCYTES 0.7 (L) 0.8 - 3.5 K/UL    ABS. MONOCYTES 1.5 (H) 0.0 - 1.0 K/UL    ABS. EOSINOPHILS 0.0 0.0 - 0.4 K/UL    ABS. BASOPHILS 0.0 0.0 - 0.1 K/UL    ABS. IMM.  GRANS. 0.0 0.00 - 0.04 K/UL    DF MANUAL      RBC COMMENTS CHUN CELLS  PRESENT       PROTHROMBIN TIME + INR    Collection Time: 08/24/22  9:19 AM   Result Value Ref Range    INR 2.5 (H) 0.9 - 1.1      Prothrombin time 25.1 (H) 9.0 - 11.1 sec   EKG, 12 LEAD, INITIAL    Collection Time: 08/24/22  9:22 AM   Result Value Ref Range    Ventricular Rate 78 BPM    Atrial Rate 78 BPM    P-R Interval 148 ms    QRS Duration 148 ms    Q-T Interval 400 ms    QTC Calculation (Bezet) 456 ms    Calculated P Axis 53 degrees    Calculated R Axis 73 degrees    Calculated T Axis 42 degrees    Diagnosis       Normal sinus rhythm  Possible Left atrial enlargement  Right bundle branch block  When compared with ECG of 04-MAY-2022 04:40,  No significant change  Confirmed by Adam Leggett ([id number]) on 8/24/2022 12:39:54 PM     URINALYSIS W/ REFLEX CULTURE    Collection Time: 08/24/22 11:45 AM    Specimen: Urine   Result Value Ref Range    Color YELLOW/STRAW      Appearance CLEAR CLEAR      Specific gravity 1.010 1.003 - 1.030      pH (UA) 5.5 5.0 - 8.0      Protein TRACE (A) NEG mg/dL    Glucose Negative NEG mg/dL    Ketone 15 (A) NEG mg/dL    Bilirubin Negative NEG      Blood Negative NEG      Urobilinogen 0.2 0.2 - 1.0 EU/dL    Nitrites Negative NEG      Leukocyte Esterase Negative NEG      WBC 5-10 0 - 4 /hpf    RBC 0-5 0 - 5 /hpf    Epithelial cells MODERATE (A) FEW /lpf    Bacteria Negative NEG /hpf    UA:UC IF INDICATED CULTURE NOT INDICATED BY UA RESULT CNI     CBC WITH AUTOMATED DIFF    Collection Time: 08/24/22  2:55 PM   Result Value Ref Range    WBC 16.0 (H) 4.1 - 11.1 K/uL    RBC 4.58 4.10 - 5.70 M/uL    HGB 14.2 12.1 - 17.0 g/dL    HCT 43.7 36.6 - 50.3 %    MCV 95.4 80.0 - 99.0 FL    MCH 31.0 26.0 - 34.0 PG    MCHC 32.5 30.0 - 36.5 g/dL    RDW 15.8 (H) 11.5 - 14.5 %    PLATELET 745 797 - 959 K/uL    MPV 9.8 8.9 - 12.9 FL    NRBC 0.0 0  WBC    ABSOLUTE NRBC 0.00 0.00 - 0.01 K/uL    NEUTROPHILS 79 (H) 32 - 75 %    LYMPHOCYTES 11 (L) 12 - 49 %    MONOCYTES 10 5 - 13 %    EOSINOPHILS 0 0 - 7 %    BASOPHILS 0 0 - 1 %    IMMATURE GRANULOCYTES 0 0.0 - 0.5 %    ABS. NEUTROPHILS 12.6 (H) 1.8 - 8.0 K/UL    ABS. LYMPHOCYTES 1.7 0.8 - 3.5 K/UL    ABS. MONOCYTES 1.6 (H) 0.0 - 1.0 K/UL    ABS. EOSINOPHILS 0.0 0.0 - 0.4 K/UL    ABS. BASOPHILS 0.1 0.0 - 0.1 K/UL    ABS. IMM. GRANS. 0.1 (H) 0.00 - 0.04 K/UL    DF AUTOMATED     PTT    Collection Time: 08/24/22  3:05 PM   Result Value Ref Range    aPTT 33.2 (H) 22.1 - 31.0 sec    aPTT, therapeutic range     58.0 - 77.0 SECS   SAMPLES BEING HELD    Collection Time: 08/24/22  3:05 PM   Result Value Ref Range    SAMPLES BEING HELD LAV     COMMENT        Add-on orders for these samples will be processed based on acceptable specimen integrity and analyte stability, which may vary by analyte. METABOLIC PANEL, BASIC    Collection Time: 08/25/22  1:01 AM   Result Value Ref Range    Sodium 134 (L) 136 - 145 mmol/L    Potassium 4.2 3.5 - 5.1 mmol/L    Chloride 104 97 - 108 mmol/L    CO2 21 21 - 32 mmol/L    Anion gap 9 5 - 15 mmol/L    Glucose 132 (H) 65 - 100 mg/dL    BUN 12 6 - 20 MG/DL    Creatinine 1.01 0.70 - 1.30 MG/DL    BUN/Creatinine ratio 12 12 - 20      GFR est AA >60 >60 ml/min/1.73m2    GFR est non-AA >60 >60 ml/min/1.73m2    Calcium 9.0 8.5 - 10.1 MG/DL   PTT    Collection Time: 08/25/22  1:01 AM   Result Value Ref Range    aPTT 77.9 (H) 22.1 - 31.0 sec    aPTT, therapeutic range     58.0 - 77.0 SECS   CBC WITH AUTOMATED DIFF    Collection Time: 08/25/22  5:46 AM   Result Value Ref Range    WBC 14.4 (H) 4.1 - 11.1 K/uL    RBC 4.19 4. 10 - 5.70 M/uL    HGB 13.1 12.1 - 17.0 g/dL    HCT 39.6 36.6 - 50.3 %    MCV 94.5 80.0 - 99.0 FL    MCH 31.3 26.0 - 34.0 PG    MCHC 33.1 30.0 - 36.5 g/dL RDW 15.8 (H) 11.5 - 14.5 %    PLATELET 527 960 - 585 K/uL    MPV 10.1 8.9 - 12.9 FL    NRBC 0.0 0  WBC    ABSOLUTE NRBC 0.00 0.00 - 0.01 K/uL    NEUTROPHILS 84 (H) 32 - 75 %    LYMPHOCYTES 6 (L) 12 - 49 %    MONOCYTES 9 5 - 13 %    EOSINOPHILS 0 0 - 7 %    BASOPHILS 0 0 - 1 %    IMMATURE GRANULOCYTES 1 (H) 0.0 - 0.5 %    ABS. NEUTROPHILS 12.2 (H) 1.8 - 8.0 K/UL    ABS. LYMPHOCYTES 0.9 0.8 - 3.5 K/UL    ABS. MONOCYTES 1.2 (H) 0.0 - 1.0 K/UL    ABS. EOSINOPHILS 0.0 0.0 - 0.4 K/UL    ABS. BASOPHILS 0.0 0.0 - 0.1 K/UL    ABS. IMM. GRANS. 0.1 (H) 0.00 - 0.04 K/UL    DF AUTOMATED     PTT    Collection Time: 08/25/22  7:11 AM   Result Value Ref Range    aPTT 56.7 (H) 22.1 - 31.0 sec    aPTT, therapeutic range     58.0 - 77.0 SECS         XR Results (maximum last 3): Results from Hospital Encounter encounter on 05/03/22    XR CHEST PORT    Impression  No acute infiltrate. Minimal vascular prominence has improved. CT Results (maximum last 3): Results from [place] encounter on 08/24/22    CT ABD PELV W CONT    Impression  1. Inflammation of the sigmoid colon likely due to diverticulitis. Adjacent  contained perforation in the mesenteric fat but no drainable fluid collection      MRI Results (maximum last 3): No results found for this or any previous visit. Nuclear Medicine Results (maximum last 3): No results found for this or any previous visit. US Results (maximum last 3): No results found for this or any previous visit. Principal Problem:    Diverticulitis of colon with perforation (8/24/2022)    Active Problems:    S/P AVR (5/3/2022)      Overview: AORTIC VALVE REPLACEMENT ( ONX-23 ) with Mechanical valve              ASSESSMENT/PLAN    Overall patient is improving , Continue antibiotics n.p.o. , follow-up CT scan imagingIn a day or 2, consider surgical intervention if worsening more urgently, colectomy possible colostomy patient is aware.     Continue IV heparin for aortic valve replacement history mechanical      FACE TO FACE time including review of any indicated imaging, discussion with patient, and other providers, exam and discussion with patient:   22          minutes    END:

## 2022-08-25 NOTE — PROGRESS NOTES
Reason for Admission:  Diverticulitis of colon with perforation                     RUR Score:  11%                   Plan for utilizing home health:   Declines       PCP: First and Last name:  Shashi Plunkett MD     Name of Practice:    Are you a current patient: Yes/No:    Approximate date of last visit: Unsure of last visit   Can you participate in a virtual visit with your PCP:                     Current Advanced Directive/Advance Care Plan: Full Code  CM confirmed with patient that he is a Full Code    Healthcare Decision Maker:   Click here to complete [address] including selection of the Healthcare Decision Maker Relationship (ie \"Primary\")           , Kamlesh Coats, [phone]                  Transition of Care Plan:                    CM spoke with patient over the phone. Patient lives at home with his . There are 5 steps to enter the home. Patient uses no DME and is independent in care. Patient is still currently in cardiac rehab. Patient's  will be his ride home at discharge and patient can transport himself to follow up appointments. Patient uses the [facility] on [address], [place], [address] [facility]   Current Dispo: Home/self

## 2022-08-25 NOTE — PROGRESS NOTES
Comprehensive Nutrition Assessment    Type and Reason for Visit: Initial, Positive nutrition screen    Nutrition Recommendations/Plan:   Continue NPO, resume PO per surgeon     Malnutrition Assessment:  Malnutrition Status:  No malnutrition (08/25/22 1535)        Nutrition Assessment:     Chart reviewed for MST, turned out to be a false trigger as pt denies loss of appetite or unintentional weight loss PTA. Pt admitted for diverticulitis with controlled perforation. He is NPO for now, to be advanced by surgery team. He has been attending the cardiac rehab program outpatient. No questions or concerns expressed by pt or partner at bedside today. We can discuss diet recommendations for diverticulitis recovery and maintenance as he feels better and diet is resumed/advanced. Will continue monitoring. Wt Readings from Last 5 Encounters:   08/24/22 73.6 kg (162 lb 4.1 oz)   08/22/22 74.8 kg (165 lb)   08/03/22 74.4 kg (164 lb)   08/03/22 74.4 kg (164 lb)   08/01/22 74.4 kg (164 lb)   ]    Nutrition Related Findings:    Labs: Na 134. Meds: levaquin, flagl, D5 1/2NS, dilaudid. BM 8/24. Wound Type: None    Current Nutrition Intake & Therapies:  Average Meal Intake: NPO     DIET NPO    Anthropometric Measures:  Height: 6' (182.9 cm)  Ideal Body Weight (IBW): 178 lbs (81 kg)     Current Body Wt:  73.6 kg (162 lb 4.1 oz), 91.2 % IBW. Standing scale  Current BMI (kg/m2): 22        Weight Adjustment: No adjustment                 BMI Category: Normal weight (BMI 22.0-24.9) age over 72    Estimated Daily Nutrient Needs:  Energy Requirements Based On: Formula  Weight Used for Energy Requirements: Current  Energy (kcal/day): 2140kcals (BMR x 1. 3AF)  Weight Used for Protein Requirements: Current  Protein (g/day): 59-74g (0.8-1.0g/kg)  Method Used for Fluid Requirements: 1 ml/kcal  Fluid (ml/day): 2100mL    Nutrition Diagnosis:   Inadequate protein-energy intake related to altered GI function, altered GI structure (diverticulitis with controlled colon perforation) as evidenced by NPO or clear liquid status due to medical condition    Nutrition Interventions:   Food and/or Nutrient Delivery: Continue NPO  Nutrition Education/Counseling: No recommendations at this time  Coordination of Nutrition Care: Continue to monitor while inpatient       Goals:     Goals: Initiate PO diet, by next RD assessment       Nutrition Monitoring and Evaluation:   Behavioral-Environmental Outcomes: None identified  Food/Nutrient Intake Outcomes: Diet advancement/tolerance  Physical Signs/Symptoms Outcomes: Biochemical data, GI status, Weight    Discharge Planning:     Too soon to determine    Staci Akhtar, [address]  Contact: [phone]

## 2022-08-26 ENCOUNTER — APPOINTMENT (OUTPATIENT)
Dept: CARDIAC REHAB | Age: 47
End: 2022-08-26
Payer: COMMERCIAL

## 2022-08-26 LAB
APTT PPP: 64.5 SEC (ref 22.1–31)
ERYTHROCYTE [DISTWIDTH] IN BLOOD BY AUTOMATED COUNT: 15.9 % (ref 11.5–14.5)
HCT VFR BLD AUTO: 37.6 % (ref 36.6–50.3)
HGB BLD-MCNC: 12.5 G/DL (ref 12.1–17)
MCH RBC QN AUTO: 30.8 PG (ref 26–34)
MCHC RBC AUTO-ENTMCNC: 33.2 G/DL (ref 30–36.5)
MCV RBC AUTO: 92.6 FL (ref 80–99)
NRBC # BLD: 0 K/UL (ref 0–0.01)
NRBC BLD-RTO: 0 PER 100 WBC
PLATELET # BLD AUTO: 286 K/UL (ref 150–400)
PMV BLD AUTO: 9.9 FL (ref 8.9–12.9)
RBC # BLD AUTO: 4.06 M/UL (ref 4.1–5.7)
THERAPEUTIC RANGE,PTTT: ABNORMAL SECS (ref 58–77)
WBC # BLD AUTO: 10.8 K/UL (ref 4.1–11.1)

## 2022-08-26 PROCEDURE — 36415 COLL VENOUS BLD VENIPUNCTURE: CPT

## 2022-08-26 PROCEDURE — 74011250636 HC RX REV CODE- 250/636: Performed by: SURGERY

## 2022-08-26 PROCEDURE — 99232 SBSQ HOSP IP/OBS MODERATE 35: CPT | Performed by: SURGERY

## 2022-08-26 PROCEDURE — 85027 COMPLETE CBC AUTOMATED: CPT

## 2022-08-26 PROCEDURE — 74011250637 HC RX REV CODE- 250/637: Performed by: SURGERY

## 2022-08-26 PROCEDURE — 65270000029 HC RM PRIVATE

## 2022-08-26 PROCEDURE — 85730 THROMBOPLASTIN TIME PARTIAL: CPT

## 2022-08-26 PROCEDURE — 74011250636 HC RX REV CODE- 250/636: Performed by: NURSE PRACTITIONER

## 2022-08-26 RX ORDER — HYDROMORPHONE HYDROCHLORIDE 1 MG/ML
.5-1 INJECTION, SOLUTION INTRAMUSCULAR; INTRAVENOUS; SUBCUTANEOUS
Status: DISCONTINUED | OUTPATIENT
Start: 2022-08-26 | End: 2022-08-30 | Stop reason: HOSPADM

## 2022-08-26 RX ADMIN — METOPROLOL TARTRATE 12.5 MG: 25 TABLET, FILM COATED ORAL at 09:02

## 2022-08-26 RX ADMIN — METOPROLOL TARTRATE 12.5 MG: 25 TABLET, FILM COATED ORAL at 20:48

## 2022-08-26 RX ADMIN — POTASSIUM CHLORIDE, DEXTROSE MONOHYDRATE AND SODIUM CHLORIDE 125 ML/HR: 150; 5; 450 INJECTION, SOLUTION INTRAVENOUS at 04:04

## 2022-08-26 RX ADMIN — HYDROMORPHONE HYDROCHLORIDE 1 MG: 1 INJECTION, SOLUTION INTRAMUSCULAR; INTRAVENOUS; SUBCUTANEOUS at 12:27

## 2022-08-26 RX ADMIN — LEVOFLOXACIN 750 MG: 5 INJECTION, SOLUTION INTRAVENOUS at 12:33

## 2022-08-26 RX ADMIN — METRONIDAZOLE 500 MG: 500 INJECTION, SOLUTION INTRAVENOUS at 12:26

## 2022-08-26 RX ADMIN — HYDROMORPHONE HYDROCHLORIDE 0.5 MG: 1 INJECTION, SOLUTION INTRAMUSCULAR; INTRAVENOUS; SUBCUTANEOUS at 07:46

## 2022-08-26 RX ADMIN — HYDROMORPHONE HYDROCHLORIDE 0.5 MG: 1 INJECTION, SOLUTION INTRAMUSCULAR; INTRAVENOUS; SUBCUTANEOUS at 03:54

## 2022-08-26 RX ADMIN — HYDROMORPHONE HYDROCHLORIDE 1 MG: 1 INJECTION, SOLUTION INTRAMUSCULAR; INTRAVENOUS; SUBCUTANEOUS at 18:02

## 2022-08-26 RX ADMIN — HYDROMORPHONE HYDROCHLORIDE 1 MG: 1 INJECTION, SOLUTION INTRAMUSCULAR; INTRAVENOUS; SUBCUTANEOUS at 23:05

## 2022-08-26 RX ADMIN — POTASSIUM CHLORIDE, DEXTROSE MONOHYDRATE AND SODIUM CHLORIDE 125 ML/HR: 150; 5; 450 INJECTION, SOLUTION INTRAVENOUS at 12:25

## 2022-08-26 NOTE — PROGRESS NOTES
End of Shift Note    Bedside shift change report given to Shira Jones RN (oncoming nurse) by Arvind Goss RN (offgoing nurse). Report included the following information SBAR, Intake/Output, MAR, and Recent Results    Shift worked: 2097-4150     Shift summary and any significant changes:    Pt A&Ox4. On room air- no s/s of resp distress. Complained of pain during the shift and dilaudid was given per orders. Denied nausea/vomiting during the shift.  RN obtained q6h aPTT for heparin infusion during the shift- therapeutic will need redraw 8/26/22 at 2300 per pharmacist.     Concerns for physician to address:      Zone phone for oncoming shift:           Length of Stay:  Expected LOS: 2d 14h  Actual LOS: 2      Arvind Goss RN

## 2022-08-26 NOTE — PROGRESS NOTES
End of Shift Note    Bedside shift change report given to Azalea MCDONOUGH (oncoming nurse) by Boyd Medina RN (offgoing nurse). Report included the following information SBAR and Kardex    Shift worked:  Day     Shift summary and any significant changes:     PRN pain medications given  No significant events during shift     Concerns for physician to address:  NA     Zone phone for oncoming shift:          Activity:  Activity Level: Up with Assistance  Number times ambulated in hallways past shift: 1  Number of times OOB to chair past shift: 0    Cardiac:   Cardiac Monitoring: Yes      Cardiac Rhythm: Sinus Rhythm    Access:  Current line(s): PIV     Genitourinary:   Urinary status: voiding    Respiratory:   O2 Device: None (Room air)  Chronic home O2 use?: NO  Incentive spirometer at bedside: NO       GI:  Last Bowel Movement Date: 08/24/22  Current diet:  DIET NPO  Passing flatus: YES  Tolerating current diet: YES       Pain Management:   Patient states pain is manageable on current regimen: YES    Skin:  Efrem Score: 21  Interventions: increase time out of bed    Patient Safety:  Fall Score:  Total Score: 1  Interventions: bed/chair alarm, assistive device (walker, cane, etc), and pt to call before getting OOB       Length of Stay:  Expected LOS: 2d 14h  Actual LOS: 2      Boyd Medina RN

## 2022-08-26 NOTE — PROGRESS NOTES
End of Shift Note    Bedside shift change report given to [facility]  (oncoming nurse) by Pinky Brown RN (offgoing nurse). Report included the following information SBAR, Kardex, Intake/Output, and MAR    Shift worked:  7am-7pm     Shift summary and any significant changes:     Pt ambulated to the bathroom during the shift multiple times during the shift and tolerated the activity well. Pt is tolerating the current diet of NPO. Pt complained of pain during the shift and dilaudid was given twice during the shift. This seemed to only help with the pain slightly. Pt did not complain of nausea during the shift. RN obtained q6h aPTT for heparin infusion during the shift. Concerns for physician to address:  Pain management? Zone phone for oncoming shift:   [phone]       Activity:  Activity Level: Up ad lorenza, Bath Room Privileges  Number times ambulated in hallways past shift: 0  Number of times OOB to chair past shift: 1    Cardiac:   Cardiac Monitoring: No      Cardiac Rhythm: Sinus Rhythm    Access:  Current line(s): PIV     Genitourinary:   Urinary status: voiding    Respiratory:   O2 Device: None (Room air)  Chronic home O2 use?: NO  Incentive spirometer at bedside: N/A       GI:  Last Bowel Movement Date: 08/24/22  Current diet:  DIET NPO  Passing flatus: YES  Tolerating current diet: NPO       Pain Management:   Patient states pain is manageable on current regimen: YES    Skin:  Efrem Score: 22  Interventions: float heels and increase time out of bed    Patient Safety:  Fall Score:  Total Score: 1  Interventions: gripper socks       Length of Stay:  Expected LOS: 2d 14h  Actual LOS: SHARONDA Wake[place]

## 2022-08-26 NOTE — PROGRESS NOTES
Admit Date: 2022      POD * No surgery found *  * No surgery found *      Procedure:  * No surgery found *        HOSPITAL DAY:     ANTIBIOTICS: Levaquin and Flagyl    HPI:  Patient says he feels about the same still with left-sided abdominal pain and some migration superiorly on the left no nausea or vomiting, minimal flatus, white blood cell count improved to 10,000.,  Urine output okay, he is not very ambulatory. He is also on IV heparin given his aortic valve replacement. Temp:  [97.9 °F (36.6 °C)-100 °F (37.8 °C)]   Pulse (Heart Rate):  []   BP: (106-134)/(67-89)   Resp Rate:  [16-26]   O2 Sat (%):  [95 %-99 %]   Weight:  [73.6 kg (162 lb 4.1 oz)]       Intake and Output:  Current Shift: No intake/output data recorded. Last three shifts:  1901 -  0700  In: -   Out: 650 [Urine:650]     Blood pressure 130/80, pulse 70, temperature 97.9 °F (36.6 °C), resp. rate 18, height 6' (1.829 m), weight 73.6 kg (162 lb 4.1 oz), SpO2 96 %. Temp (24hrs), Av.9 °F (37.2 °C), Min:97.9 °F (36.6 °C), Max:100 °F (37.8 °C)        Review of Systems   Constitutional:  Positive for fatigue. Respiratory: Negative. Cardiovascular: Negative. Gastrointestinal:  Positive for abdominal pain. All other systems reviewed and are negative. Physical Exam  Vitals and nursing note reviewed. Exam conducted with a chaperone present (DERIK Yuen). Constitutional:       General: He is not in acute distress. Appearance: He is not ill-appearing. Comments: Somewhat tired appearing   HENT:      Head: Normocephalic and atraumatic. Mouth/Throat:      Pharynx: Oropharynx is clear. Eyes:      Conjunctiva/sclera: Conjunctivae normal.   Cardiovascular:      Rate and Rhythm: Normal rate.    Pulmonary:      Effort: Pulmonary effort is normal.   Abdominal:      Comments: Mild distention, soft, mild to moderate tenderness left lower abdomen localized, minimally tender throughout the rest of the abdomen no peritoneal signs   Musculoskeletal:         General: Normal range of motion. Skin:     General: Skin is warm and dry. Neurological:      General: No focal deficit present. Mental Status: He is alert and oriented to person, place, and time. Psychiatric:         Mood and Affect: Mood normal.         Behavior: Behavior normal.       Recent Results (from the past 48 hour(s))   URINALYSIS W/ REFLEX CULTURE    Collection Time: 08/24/22 11:45 AM    Specimen: Urine   Result Value Ref Range    Color YELLOW/STRAW      Appearance CLEAR CLEAR      Specific gravity 1.010 1.003 - 1.030      pH (UA) 5.5 5.0 - 8.0      Protein TRACE (A) NEG mg/dL    Glucose Negative NEG mg/dL    Ketone 15 (A) NEG mg/dL    Bilirubin Negative NEG      Blood Negative NEG      Urobilinogen 0.2 0.2 - 1.0 EU/dL    Nitrites Negative NEG      Leukocyte Esterase Negative NEG      WBC 5-10 0 - 4 /hpf    RBC 0-5 0 - 5 /hpf    Epithelial cells MODERATE (A) FEW /lpf    Bacteria Negative NEG /hpf    UA:UC IF INDICATED CULTURE NOT INDICATED BY UA RESULT CNI     CBC WITH AUTOMATED DIFF    Collection Time: 08/24/22  2:55 PM   Result Value Ref Range    WBC 16.0 (H) 4.1 - 11.1 K/uL    RBC 4.58 4.10 - 5.70 M/uL    HGB 14.2 12.1 - 17.0 g/dL    HCT 43.7 36.6 - 50.3 %    MCV 95.4 80.0 - 99.0 FL    MCH 31.0 26.0 - 34.0 PG    MCHC 32.5 30.0 - 36.5 g/dL    RDW 15.8 (H) 11.5 - 14.5 %    PLATELET 470 427 - 065 K/uL    MPV 9.8 8.9 - 12.9 FL    NRBC 0.0 0  WBC    ABSOLUTE NRBC 0.00 0.00 - 0.01 K/uL    NEUTROPHILS 79 (H) 32 - 75 %    LYMPHOCYTES 11 (L) 12 - 49 %    MONOCYTES 10 5 - 13 %    EOSINOPHILS 0 0 - 7 %    BASOPHILS 0 0 - 1 %    IMMATURE GRANULOCYTES 0 0.0 - 0.5 %    ABS. NEUTROPHILS 12.6 (H) 1.8 - 8.0 K/UL    ABS. LYMPHOCYTES 1.7 0.8 - 3.5 K/UL    ABS. MONOCYTES 1.6 (H) 0.0 - 1.0 K/UL    ABS. EOSINOPHILS 0.0 0.0 - 0.4 K/UL    ABS. BASOPHILS 0.1 0.0 - 0.1 K/UL    ABS. IMM.  GRANS. 0.1 (H) 0.00 - 0.04 K/UL    DF AUTOMATED     PTT    Collection Time: 08/24/22  3:05 PM   Result Value Ref Range    aPTT 33.2 (H) 22.1 - 31.0 sec    aPTT, therapeutic range     58.0 - 77.0 SECS   SAMPLES BEING HELD    Collection Time: 08/24/22  3:05 PM   Result Value Ref Range    SAMPLES BEING HELD LAV     COMMENT        Add-on orders for these samples will be processed based on acceptable specimen integrity and analyte stability, which may vary by analyte. METABOLIC PANEL, BASIC    Collection Time: 08/25/22  1:01 AM   Result Value Ref Range    Sodium 134 (L) 136 - 145 mmol/L    Potassium 4.2 3.5 - 5.1 mmol/L    Chloride 104 97 - 108 mmol/L    CO2 21 21 - 32 mmol/L    Anion gap 9 5 - 15 mmol/L    Glucose 132 (H) 65 - 100 mg/dL    BUN 12 6 - 20 MG/DL    Creatinine 1.01 0.70 - 1.30 MG/DL    BUN/Creatinine ratio 12 12 - 20      GFR est AA >60 >60 ml/min/1.73m2    GFR est non-AA >60 >60 ml/min/1.73m2    Calcium 9.0 8.5 - 10.1 MG/DL   PTT    Collection Time: 08/25/22  1:01 AM   Result Value Ref Range    aPTT 77.9 (H) 22.1 - 31.0 sec    aPTT, therapeutic range     58.0 - 77.0 SECS   CBC WITH AUTOMATED DIFF    Collection Time: 08/25/22  5:46 AM   Result Value Ref Range    WBC 14.4 (H) 4.1 - 11.1 K/uL    RBC 4.19 4. 10 - 5.70 M/uL    HGB 13.1 12.1 - 17.0 g/dL    HCT 39.6 36.6 - 50.3 %    MCV 94.5 80.0 - 99.0 FL    MCH 31.3 26.0 - 34.0 PG    MCHC 33.1 30.0 - 36.5 g/dL    RDW 15.8 (H) 11.5 - 14.5 %    PLATELET 692 370 - 931 K/uL    MPV 10.1 8.9 - 12.9 FL    NRBC 0.0 0  WBC    ABSOLUTE NRBC 0.00 0.00 - 0.01 K/uL    NEUTROPHILS 84 (H) 32 - 75 %    LYMPHOCYTES 6 (L) 12 - 49 %    MONOCYTES 9 5 - 13 %    EOSINOPHILS 0 0 - 7 %    BASOPHILS 0 0 - 1 %    IMMATURE GRANULOCYTES 1 (H) 0.0 - 0.5 %    ABS. NEUTROPHILS 12.2 (H) 1.8 - 8.0 K/UL    ABS. LYMPHOCYTES 0.9 0.8 - 3.5 K/UL    ABS. MONOCYTES 1.2 (H) 0.0 - 1.0 K/UL    ABS. EOSINOPHILS 0.0 0.0 - 0.4 K/UL    ABS. BASOPHILS 0.0 0.0 - 0.1 K/UL    ABS. IMM.  GRANS. 0.1 (H) 0.00 - 0.04 K/UL    DF AUTOMATED     PTT    Collection Time: 08/25/22  7:11 AM Result Value Ref Range    aPTT 56.7 (H) 22.1 - 31.0 sec    aPTT, therapeutic range     58.0 - 77.0 SECS   PTT    Collection Time: 08/25/22  3:40 PM   Result Value Ref Range    aPTT 60.5 (H) 22.1 - 31.0 sec    aPTT, therapeutic range     58.0 - 77.0 SECS   PTT    Collection Time: 08/25/22 11:10 PM   Result Value Ref Range    aPTT 67.0 (H) 22.1 - 31.0 sec    aPTT, therapeutic range     58.0 - 77.0 SECS   CBC W/O DIFF    Collection Time: 08/26/22  4:03 AM   Result Value Ref Range    WBC 10.8 4.1 - 11.1 K/uL    RBC 4.06 (L) 4.10 - 5.70 M/uL    HGB 12.5 12.1 - 17.0 g/dL    HCT 37.6 36.6 - 50.3 %    MCV 92.6 80.0 - 99.0 FL    MCH 30.8 26.0 - 34.0 PG    MCHC 33.2 30.0 - 36.5 g/dL    RDW 15.9 (H) 11.5 - 14.5 %    PLATELET 090 496 - 833 K/uL    MPV 9.9 8.9 - 12.9 FL    NRBC 0.0 0  WBC    ABSOLUTE NRBC 0.00 0.00 - 0.01 K/uL         XR Results (maximum last 3): Results from Hospital Encounter encounter on 05/03/22    XR CHEST PORT    Impression  No acute infiltrate. Minimal vascular prominence has improved. CT Results (maximum last 3): Results from [place] encounter on 08/24/22    CT ABD PELV W CONT    Impression  1. Inflammation of the sigmoid colon likely due to diverticulitis. Adjacent  contained perforation in the mesenteric fat but no drainable fluid collection      MRI Results (maximum last 3): No results found for this or any previous visit. Nuclear Medicine Results (maximum last 3): No results found for this or any previous visit. US Results (maximum last 3): No results found for this or any previous visit. Principal Problem:    Diverticulitis of colon with perforation (8/24/2022)    Active Problems:    S/P AVR (5/3/2022)      Overview: AORTIC VALVE REPLACEMENT ( ONX-23 ) with Mechanical valve                ASSESSMENT/PLAN  Mixed picture, white blood cell count improving, patient certainly not worsening but not rapidly improving either.   Plan follow-up CT scan abdomen and pelvis tomorrow to assess neck steps whether percutaneous drainage of abscess, more conclusive surgical indications or conversion to  oral antibiotics and diet. Above reviewed with patient potential for surgery urgently versus electively.       FACE TO FACE time including review of any indicated imaging, discussion with patient, and other providers, exam and discussion with patient:   23          minutes    END:

## 2022-08-26 NOTE — PROGRESS NOTES
Problem: Falls - Risk of  Goal: *Absence of Falls  Description: Document Ciera Downs Fall Risk and appropriate interventions in the flowsheet.   Outcome: Progressing Towards Goal  Note: Fall Risk Interventions:            Medication Interventions: Bed/chair exit alarm, Patient to call before getting OOB    Elimination Interventions: Call light in reach              Problem: Patient Education: Go to Patient Education Activity  Goal: Patient/Family Education  Outcome: Progressing Towards Goal

## 2022-08-27 ENCOUNTER — APPOINTMENT (OUTPATIENT)
Dept: CT IMAGING | Age: 47
DRG: 392 | End: 2022-08-27
Attending: NURSE PRACTITIONER
Payer: COMMERCIAL

## 2022-08-27 PROCEDURE — 74011250636 HC RX REV CODE- 250/636: Performed by: SURGERY

## 2022-08-27 PROCEDURE — 36415 COLL VENOUS BLD VENIPUNCTURE: CPT

## 2022-08-27 PROCEDURE — 65270000029 HC RM PRIVATE

## 2022-08-27 PROCEDURE — 99231 SBSQ HOSP IP/OBS SF/LOW 25: CPT | Performed by: SURGERY

## 2022-08-27 PROCEDURE — 85730 THROMBOPLASTIN TIME PARTIAL: CPT

## 2022-08-27 PROCEDURE — 74177 CT ABD & PELVIS W/CONTRAST: CPT

## 2022-08-27 PROCEDURE — 74011250637 HC RX REV CODE- 250/637: Performed by: SURGERY

## 2022-08-27 PROCEDURE — 74011250636 HC RX REV CODE- 250/636: Performed by: NURSE PRACTITIONER

## 2022-08-27 PROCEDURE — 74011000636 HC RX REV CODE- 636: Performed by: SURGERY

## 2022-08-27 RX ADMIN — METOPROLOL TARTRATE 12.5 MG: 25 TABLET, FILM COATED ORAL at 20:16

## 2022-08-27 RX ADMIN — POTASSIUM CHLORIDE, DEXTROSE MONOHYDRATE AND SODIUM CHLORIDE 125 ML/HR: 150; 5; 450 INJECTION, SOLUTION INTRAVENOUS at 04:36

## 2022-08-27 RX ADMIN — IOPAMIDOL 100 ML: 755 INJECTION, SOLUTION INTRAVENOUS at 10:00

## 2022-08-27 RX ADMIN — LEVOFLOXACIN 750 MG: 5 INJECTION, SOLUTION INTRAVENOUS at 13:15

## 2022-08-27 RX ADMIN — ONDANSETRON 4 MG: 2 INJECTION INTRAMUSCULAR; INTRAVENOUS at 20:15

## 2022-08-27 RX ADMIN — METOPROLOL TARTRATE 12.5 MG: 25 TABLET, FILM COATED ORAL at 09:06

## 2022-08-27 RX ADMIN — HEPARIN SODIUM AND DEXTROSE 13 UNITS/KG/HR: 10000; 5 INJECTION INTRAVENOUS at 22:56

## 2022-08-27 RX ADMIN — MELATONIN 10.5 MG: at 23:04

## 2022-08-27 RX ADMIN — HYDROMORPHONE HYDROCHLORIDE 0.5 MG: 1 INJECTION, SOLUTION INTRAMUSCULAR; INTRAVENOUS; SUBCUTANEOUS at 11:55

## 2022-08-27 RX ADMIN — METRONIDAZOLE 500 MG: 500 INJECTION, SOLUTION INTRAVENOUS at 23:58

## 2022-08-27 RX ADMIN — POTASSIUM CHLORIDE, DEXTROSE MONOHYDRATE AND SODIUM CHLORIDE 75 ML/HR: 150; 5; 450 INJECTION, SOLUTION INTRAVENOUS at 11:20

## 2022-08-27 RX ADMIN — METRONIDAZOLE 500 MG: 500 INJECTION, SOLUTION INTRAVENOUS at 11:52

## 2022-08-27 RX ADMIN — IOHEXOL 50 ML: 240 INJECTION, SOLUTION INTRATHECAL; INTRAVASCULAR; INTRAVENOUS; ORAL at 07:47

## 2022-08-27 RX ADMIN — HEPARIN SODIUM AND DEXTROSE 13 UNITS/KG/HR: 10000; 5 INJECTION INTRAVENOUS at 01:27

## 2022-08-27 RX ADMIN — METRONIDAZOLE 500 MG: 500 INJECTION, SOLUTION INTRAVENOUS at 01:27

## 2022-08-27 NOTE — PROGRESS NOTES
SURGERY PROGRESS NOTE      Admit Date: 2022    POD * No surgery found *    Procedure: * No surgery found *      Subjective:     Patient feels better. Pain is improved. Passing flatus. Objective:     Visit Vitals  BP (!) 143/98   Pulse 82   Temp 98.3 °F (36.8 °C)   Resp 18   Ht 6' (1.829 m)   Wt 73.6 kg (162 lb 4.1 oz)   SpO2 99%   BMI 22.01 kg/m²        Temp (24hrs), Av.2 °F (36.8 °C), Min:97.6 °F (36.4 °C), Max:98.7 °F (37.1 °C)       07 -  1900  In: 8422 [P.O.:830; I.V.:1006]  Out: 600 [Urine:600]  1901 -  0700  In: 9173.9 [I.V.:9173.9]  Out: 2325 [Urine:2325]    Physical Exam:    General:  alert, cooperative, no distress, appears stated age   Abdomen: soft, bowel sounds active, non-tender           Lab Results   Component Value Date/Time    WBC 10.8 2022 04:03 AM    HGB 12.5 2022 04:03 AM    HCT 37.6 2022 04:03 AM    PLATELET 126  04:03 AM    MCV 92.6 2022 04:03 AM     Lab Results   Component Value Date/Time    GFR est non-AA >60 2022 01:01 AM    GFR est AA >60 2022 01:01 AM    Creatinine 1.01 2022 01:01 AM    BUN 12 2022 01:01 AM    Sodium 134 (L) 2022 01:01 AM    Potassium 4.2 2022 01:01 AM    Chloride 104 2022 01:01 AM    CO2 21 2022 01:01 AM    Magnesium 2.4 2022 02:30 AM     CT -  looked unchanged to me. The ready is worsening inflammation and fluid tracking towards loop of small bowel. Assessment:     Principal Problem:    Diverticulitis of colon with perforation (2022)    Active Problems:    S/P AVR (5/3/2022)      Overview: AORTIC VALVE REPLACEMENT ( ONX-23 ) with Mechanical valve          Symptomatically improving.   Will start PO liquids and see how he does     Plan:        IV antibiotics   Liquids

## 2022-08-27 NOTE — PROGRESS NOTES
Problem: Falls - Risk of  Goal: *Absence of Falls  Description: Document Arnulfo Cease Fall Risk and appropriate interventions in the flowsheet.   Outcome: Progressing Towards Goal  Note: Fall Risk Interventions:            Medication Interventions: Teach patient to arise slowly    Elimination Interventions: Call light in reach

## 2022-08-27 NOTE — PROGRESS NOTES
Problem: Falls - Risk of  Goal: *Absence of Falls  Description: Document Pradip Ames Fall Risk and appropriate interventions in the flowsheet.   Outcome: Progressing Towards Goal  Note: Fall Risk Interventions:            Medication Interventions: Teach patient to arise slowly    Elimination Interventions: Call light in reach

## 2022-08-27 NOTE — PROGRESS NOTES
End of Shift Note    Bedside shift change report given to Cecilio SANTIAGO CAYDEN Matute (oncoming nurse) by Annette Angel RN (offgoing nurse). Report included the following information SBAR, Kardex, Intake/Output, and MAR    Shift worked:  7am-7pm     Shift summary and any significant changes:     CT scan done today, diet advanced to clear liquids, tolerating fair. Telemetry D/Cd' today. Concerns for physician to address:       Zone phone for oncoming shift:   [phone]       Activity:  Activity Level: Up ad lorenza  Number times ambulated in hallways past shift: 0  Number of times OOB to chair past shift: 1    Cardiac:   Cardiac Monitoring: No      Cardiac Rhythm: Sinus Rhythm    Access:  Current line(s): PIV     Genitourinary:   Urinary status: voiding    Respiratory:   O2 Device: None (Room air)  Chronic home O2 use?: NO  Incentive spirometer at bedside: N/A       GI:  Last Bowel Movement Date: 08/24/22  Current diet:  ADULT DIET Clear Liquid  Passing flatus: YES  Tolerating current diet: NPO       Pain Management:   Patient states pain is manageable on current regimen: YES    Skin:  Efrem Score: 20  Interventions: float heels and increase time out of bed    Patient Safety:  Fall Score:  Total Score: 1  Interventions: gripper socks       Length of Stay:  Expected LOS: 2d 14h  Actual LOS: [address], RN

## 2022-08-27 NOTE — PROGRESS NOTES
End of Shift Note    Bedside shift change report given to Rhianna Hidalgo RN (oncoming nurse) by Bin Knox RN (offgoing nurse). Report included the following information SBAR, Kardex, Intake/Output, and MAR    Shift worked:  7p-7a     Shift summary and any significant changes:     aPTT within therapeutic range, no change made to rate. CT scan at 0900 AM; instructed to consume contrast at 0730 AM. Slept well throughout the night. Concerns for physician to address:  none     Zone phone for oncoming shift:   none       Activity:  Activity Level: Up with Assistance  Number times ambulated in hallways past shift: 0  Number of times OOB to chair past shift: 0    Cardiac:   Cardiac Monitoring: No      Cardiac Rhythm: Sinus Rhythm    Access:  Current line(s): PIV     Genitourinary:   Urinary status: voiding    Respiratory:   O2 Device: None (Room air)  Chronic home O2 use?: NO  Incentive spirometer at bedside: NO       GI:  Last Bowel Movement Date: 08/22/22  Current diet:  DIET NPO  Passing flatus: YES  Tolerating current diet: YES       Pain Management:   Patient states pain is manageable on current regimen: YES    Skin:  Efrem Score: 20  Interventions: float heels    Patient Safety:  Fall Score:  Total Score: 1  Interventions: gripper socks       Length of Stay:  Expected LOS: 2d 14h  Actual LOS: 3      Bin Knox RN

## 2022-08-28 LAB
APTT PPP: 50.7 SEC (ref 22.1–31)
APTT PPP: 59.1 SEC (ref 22.1–31)
APTT PPP: 64.7 SEC (ref 22.1–31)
THERAPEUTIC RANGE,PTTT: ABNORMAL SECS (ref 58–77)

## 2022-08-28 PROCEDURE — 65270000029 HC RM PRIVATE

## 2022-08-28 PROCEDURE — 85730 THROMBOPLASTIN TIME PARTIAL: CPT

## 2022-08-28 PROCEDURE — 36415 COLL VENOUS BLD VENIPUNCTURE: CPT

## 2022-08-28 PROCEDURE — 74011250637 HC RX REV CODE- 250/637: Performed by: SURGERY

## 2022-08-28 PROCEDURE — 74011250636 HC RX REV CODE- 250/636: Performed by: SURGERY

## 2022-08-28 PROCEDURE — 99231 SBSQ HOSP IP/OBS SF/LOW 25: CPT | Performed by: SURGERY

## 2022-08-28 RX ADMIN — HEPARIN SODIUM AND DEXTROSE 14 UNITS/KG/HR: 10000; 5 INJECTION INTRAVENOUS at 17:08

## 2022-08-28 RX ADMIN — POTASSIUM CHLORIDE, DEXTROSE MONOHYDRATE AND SODIUM CHLORIDE 75 ML/HR: 150; 5; 450 INJECTION, SOLUTION INTRAVENOUS at 01:48

## 2022-08-28 RX ADMIN — METRONIDAZOLE 500 MG: 500 INJECTION, SOLUTION INTRAVENOUS at 12:45

## 2022-08-28 RX ADMIN — LEVOFLOXACIN 750 MG: 5 INJECTION, SOLUTION INTRAVENOUS at 14:18

## 2022-08-28 RX ADMIN — METOPROLOL TARTRATE 12.5 MG: 25 TABLET, FILM COATED ORAL at 09:03

## 2022-08-28 RX ADMIN — METOPROLOL TARTRATE 12.5 MG: 25 TABLET, FILM COATED ORAL at 21:19

## 2022-08-28 RX ADMIN — POTASSIUM CHLORIDE, DEXTROSE MONOHYDRATE AND SODIUM CHLORIDE 75 ML/HR: 150; 5; 450 INJECTION, SOLUTION INTRAVENOUS at 12:49

## 2022-08-28 RX ADMIN — MELATONIN 10.5 MG: at 21:19

## 2022-08-28 NOTE — PROGRESS NOTES
SURGERY PROGRESS NOTE      Admit Date: 2022    Subjective:     Patient states pain is resolved but, he has bloating and early satiety this morning. No real nausea. Passing some flatus     Objective:     Visit Vitals  BP (!) 150/88   Pulse 75   Temp 98.1 °F (36.7 °C)   Resp 18   Ht 6' (1.829 m)   Wt 73.6 kg (162 lb 4.1 oz)   SpO2 97%   BMI 22.01 kg/m²        Temp (24hrs), Av.6 °F (37 °C), Min:98.1 °F (36.7 °C), Max:99 °F (37.2 °C)      No intake/output data recorded.  1901 -  0700  In: 4501.5 [P.O.:830; I.V.:3671.5]  Out: 2300 [Urine:2300]    Physical Exam:    General:  alert, cooperative, no distress, appears stated age   Abdomen: soft, distended, tympanic,  bowel sounds hypoactive, non-tender             Assessment/plan:     Principal Problem:    Diverticulitis of colon with perforation (2022)    Active Problems:    S/P AVR (5/3/2022)      Overview: AORTIC VALVE REPLACEMENT ( ONX-23 ) with Mechanical valve          Has signs of an ileus. Will hold off advancing diet. Keep heparin gtt. Don't  start warfarin today. Will continue IV antibiotics with plans for repeat CT on Tuesday unless he declines.

## 2022-08-28 NOTE — PROGRESS NOTES
Problem: Falls - Risk of  Goal: *Absence of Falls  Description: Document Juma Nash Fall Risk and appropriate interventions in the flowsheet.   Outcome: Progressing Towards Goal  Note: Fall Risk Interventions:            Medication Interventions: Teach patient to arise slowly    Elimination Interventions: Call light in reach

## 2022-08-28 NOTE — PROGRESS NOTES
End of Shift Note    Bedside shift change report given to Liz Pace RN (oncoming nurse) by Landy Sandoval LPN (offgoing nurse). Report included the following information SBAR    Shift worked:  7p-7a     Shift summary and any significant changes:    No complaints of pain,  pt did complain of nausea and was given IV Zofran. PTT done at 2300, result 50.7, Heparin rate adjusted to 14mg/kg. Concerns for physician to address: None     Zone phone for oncoming shift:  [phone]       Activity:  Activity Level: Up ad lorenza  Number times ambulated in hallways past shift: 0  Number of times OOB to chair past shift: 0    Cardiac:   Cardiac Monitoring: No      Cardiac Rhythm: Sinus Rhythm    Access:  Current line(s): PIV     Genitourinary:   Urinary status: voiding    Respiratory:   O2 Device: None (Room air)  Chronic home O2 use?: NO  Incentive spirometer at bedside: NO       GI:  Last Bowel Movement Date: 08/24/22  Current diet:  ADULT DIET Clear Liquid  Passing flatus: YES  Tolerating current diet: YES       Pain Management:   Patient states pain is manageable on current regimen: YES    Skin:  Efrem Score: 21  Interventions: increase time out of bed    Patient Safety:  Fall Score:  Total Score: 1  Interventions: gripper socks and pt to call before getting OOB       Length of Stay:  Expected LOS: 2d 14h  Actual LOS: [address], LPN

## 2022-08-28 NOTE — PROGRESS NOTES
Problem: Falls - Risk of  Goal: *Absence of Falls  Description: Document Shine Domingo Fall Risk and appropriate interventions in the flowsheet.   Outcome: Progressing Towards Goal  Note: Fall Risk Interventions:            Medication Interventions: Teach patient to arise slowly    Elimination Interventions: Call light in reach              Problem: Patient Education: Go to Patient Education Activity  Goal: Patient/Family Education  Outcome: Progressing Towards Goal

## 2022-08-28 NOTE — PROGRESS NOTES
End of Shift Note    Bedside shift change report given to Alexia Cooney (oncoming nurse) by Ravi Trinidad RN (offgoing nurse). Report included the following information SBAR, Kardex, Intake/Output, and MAR    Shift worked:  7am-7pm     Shift summary and any significant changes:     Diet remains at clear liquids, feels like he is tolerating diet better today, still has some bloating. Heparin drip therapeutic x 2 labs today, so next PTT due tomorrow at 1500     Concerns for physician to address:       Zone phone for oncoming shift:   [phone]       Activity:  Activity Level: Up ad lorenza  Number times ambulated in hallways past shift: up ad lorenza, ambulated in hallway a few times. Number of times OOB to chair past shift: up ad lorenza    Cardiac:   Cardiac Monitoring: No      Cardiac Rhythm: Sinus Rhythm    Access:  Current line(s): PIV     Genitourinary:   Urinary status: voiding    Respiratory:   O2 Device: None (Room air)  Chronic home O2 use?: NO  Incentive spirometer at bedside: N/A       GI:  Last Bowel Movement Date: 08/24/22  Current diet:  ADULT DIET Clear Liquid  Passing flatus: YES  Tolerating current diet: NPO       Pain Management:   Patient states pain is manageable on current regimen: YES    Skin:  Efrem Score: 21  Interventions: float heels and increase time out of bed    Patient Safety:  Fall Score:  Total Score: 1  Interventions: gripper socks       Length of Stay:  Expected LOS: 2d 14h  Actual LOS: [address], RN

## 2022-08-29 ENCOUNTER — APPOINTMENT (OUTPATIENT)
Dept: CARDIAC REHAB | Age: 47
End: 2022-08-29
Payer: COMMERCIAL

## 2022-08-29 LAB
APTT PPP: 65.5 SEC (ref 22.1–31)
INR PPP: 2.1 (ref 0.9–1.1)
PROTHROMBIN TIME: 20.7 SEC (ref 9–11.1)
THERAPEUTIC RANGE,PTTT: ABNORMAL SECS (ref 58–77)

## 2022-08-29 PROCEDURE — 36415 COLL VENOUS BLD VENIPUNCTURE: CPT

## 2022-08-29 PROCEDURE — 85730 THROMBOPLASTIN TIME PARTIAL: CPT

## 2022-08-29 PROCEDURE — 74011250637 HC RX REV CODE- 250/637: Performed by: NURSE PRACTITIONER

## 2022-08-29 PROCEDURE — 99231 SBSQ HOSP IP/OBS SF/LOW 25: CPT | Performed by: SURGERY

## 2022-08-29 PROCEDURE — 74011250637 HC RX REV CODE- 250/637: Performed by: SURGERY

## 2022-08-29 PROCEDURE — 65270000029 HC RM PRIVATE

## 2022-08-29 PROCEDURE — 85610 PROTHROMBIN TIME: CPT

## 2022-08-29 PROCEDURE — 74011250636 HC RX REV CODE- 250/636: Performed by: SURGERY

## 2022-08-29 RX ORDER — LEVOFLOXACIN 750 MG/1
750 TABLET ORAL EVERY 24 HOURS
Status: DISCONTINUED | OUTPATIENT
Start: 2022-08-30 | End: 2022-08-30 | Stop reason: HOSPADM

## 2022-08-29 RX ORDER — METRONIDAZOLE 250 MG/1
500 TABLET ORAL EVERY 12 HOURS
Status: DISCONTINUED | OUTPATIENT
Start: 2022-08-30 | End: 2022-08-30 | Stop reason: HOSPADM

## 2022-08-29 RX ORDER — WARFARIN 2 MG/1
2 TABLET ORAL ONCE
Status: COMPLETED | OUTPATIENT
Start: 2022-08-29 | End: 2022-08-29

## 2022-08-29 RX ADMIN — METOPROLOL TARTRATE 12.5 MG: 25 TABLET, FILM COATED ORAL at 09:17

## 2022-08-29 RX ADMIN — POTASSIUM CHLORIDE, DEXTROSE MONOHYDRATE AND SODIUM CHLORIDE 75 ML/HR: 150; 5; 450 INJECTION, SOLUTION INTRAVENOUS at 01:00

## 2022-08-29 RX ADMIN — WARFARIN SODIUM 2 MG: 2 TABLET ORAL at 18:25

## 2022-08-29 RX ADMIN — METRONIDAZOLE 500 MG: 500 INJECTION, SOLUTION INTRAVENOUS at 12:08

## 2022-08-29 RX ADMIN — METOPROLOL TARTRATE 12.5 MG: 25 TABLET, FILM COATED ORAL at 20:40

## 2022-08-29 RX ADMIN — LEVOFLOXACIN 750 MG: 5 INJECTION, SOLUTION INTRAVENOUS at 12:08

## 2022-08-29 RX ADMIN — METRONIDAZOLE 500 MG: 500 INJECTION, SOLUTION INTRAVENOUS at 00:50

## 2022-08-29 RX ADMIN — MELATONIN 10.5 MG: at 20:40

## 2022-08-29 NOTE — PROGRESS NOTES
End of Shift Note    Bedside shift change report given to Belle MCDONOUGH (oncoming nurse) by Carson Lomeli (offgoing nurse). Report included the following information SBAR, Kardex, Intake/Output, and MAR    Shift worked:  7am-7pm     Shift summary and any significant changes:     clear liquids, tolerating well. Up ab lorenza   Heprin drip and fluids Dc'd today   Family present   Room air   IV in right [facility] removed      Concerns for physician to address: none     Zone phone for oncoming shift:  None        Activity:  Activity Level: Up ad lorenza  Number times ambulated in hallways past shift: 0  Number of times OOB to chair past shift: 1    Cardiac:   Cardiac Monitoring: No      Cardiac Rhythm: Sinus Rhythm    Access:  Current line(s): PIV     Genitourinary:   Urinary status: voiding    Respiratory:   O2 Device: None (Room air)  Chronic home O2 use?: NO  Incentive spirometer at bedside: N/A       GI:  Last Bowel Movement Date: 08/29/22  Current diet:  ADULT DIET Clear Liquid  Passing flatus: YES  Tolerating current diet: NPO       Pain Management:   Patient states pain is manageable on current regimen: YES    Skin:  Efrem Score: 21  Interventions: float heels and increase time out of bed    Patient Safety:  Fall Score:  Total Score: 1  Interventions: gripper socks       Length of Stay:  Expected LOS: 2d 14h  Actual LOS: [address]

## 2022-08-29 NOTE — PROGRESS NOTES
Clinical Pharmacy Note: Metronidazole Dosing    Please note that the metronidazole dose for Pieter Sapp has been changed to 500 mg po q12h per [facility]-approved protocol. Please contact the pharmacy with any questions.     MIGDALIA MeekD

## 2022-08-29 NOTE — PROGRESS NOTES
Provider Daily Dosing of Warfarin    Indication: On-X AVR  Goal INR: 1.5-2.5    PTA Warfarin Dose: 4 mg daily    Notable concurrent conditions and medications: Fluoroquinolone and Metronidazole    Labs:  Recent Labs     08/29/22  0049   INR 2.1*     Impression/Plan:   Will order warfarin  2 mg PO x 1 dose. Daily INR has been ordered  CBC w/o differential every other day has been ordered     Pharmacy will follow daily and adjust the dose as appropriate.     Thank you,  Rosaline Osler, PHARMD

## 2022-08-29 NOTE — PROGRESS NOTES
SURGERY PROGRESS NOTE      Admit Date: 2022    Subjective:     Patient feels better overall, passing gas, no bowel movement. Objective:     Visit Vitals  /75   Pulse 75   Temp 97.6 °F (36.4 °C)   Resp 17   Ht 6' (1.829 m)   Wt 73.6 kg (162 lb 4.1 oz)   SpO2 96%   BMI 22.01 kg/m²        Temp (24hrs), Av.7 °F (36.5 °C), Min:97.1 °F (36.2 °C), Max:98.1 °F (36.7 °C)      701 -  1900  In: 240 [P.O.:240]  Out: -    190 -  0700  In: 4523.3 [P.O.:240; I.V.:4283.3]  Out: 3150 [Urine:3150]    Physical Exam:    General:  alert, cooperative, no distress, appears stated age   Abdomen: soft, distended, tympanic,  bowel sounds hypoactive, mildly tender in suprapubic region         INR 2.1     Assessment/plan:     Principal Problem:    Diverticulitis of colon with perforation (2022)    Active Problems:    S/P AVR (5/3/2022)      Overview: AORTIC VALVE REPLACEMENT ( ONX-23 ) with Mechanical valve          Better today.  Appears bowels are working    Will advance to full liquids  Stop heparin gtt and start warfarin  CT in the am, if stable or better will plan discharge   Change to PO antibiotics

## 2022-08-29 NOTE — PROGRESS NOTES
Transition of Care Plan:    RUR:11%  Disposition:Home  Follow up appointments:  DME needed:n/a  Transportation at [facility] or means to access home:        IM Medicare Letter:n/a  Is patient a [employer] and connected with the [place]? If yes, was [employer] transfer form completed and [facility] notified? Caregiver Contact:spouse-Jm Mullins [phone]  Discharge Caregiver contacted prior to discharge? Care Conference needed?:          Patient to have a repeat CT tomorrow. CM will continue to follow.     Charlie Membreno  Ext [phone]

## 2022-08-29 NOTE — PROGRESS NOTES
Problem: Falls - Risk of  Goal: *Absence of Falls  Description: Document Timothy Johnson Fall Risk and appropriate interventions in the flowsheet.   Outcome: Progressing Towards Goal  Note: Fall Risk Interventions:            Medication Interventions: Patient to call before getting OOB, Teach patient to arise slowly    Elimination Interventions: Call light in reach              Problem: Patient Education: Go to Patient Education Activity  Goal: Patient/Family Education  Outcome: Progressing Towards Goal

## 2022-08-29 NOTE — PROGRESS NOTES
End of Shift Note    Bedside shift change report given to Tim Marie (oncoming nurse) by Axel Portillo RN (offgoing nurse). Report included the following information SBAR, Kardex, Intake/Output, MAR, Recent Results, and Quality Measures    Shift worked:  7pm-7am     Shift summary and any significant changes:     Patient requesting Prilosec to ensure no gastric aggravation. No complaints of pain. Heparin drip therapeutic.       Concerns for physician to address:  Prilosec order     Zone phone for oncoming shift:   [phone]       Axel Portillo, RN

## 2022-08-30 ENCOUNTER — APPOINTMENT (OUTPATIENT)
Dept: CT IMAGING | Age: 47
DRG: 392 | End: 2022-08-30
Attending: NURSE PRACTITIONER
Payer: COMMERCIAL

## 2022-08-30 ENCOUNTER — TELEPHONE (OUTPATIENT)
Dept: INTERNAL MEDICINE CLINIC | Age: 47
End: 2022-08-30

## 2022-08-30 VITALS
RESPIRATION RATE: 16 BRPM | HEIGHT: 72 IN | DIASTOLIC BLOOD PRESSURE: 71 MMHG | HEART RATE: 84 BPM | WEIGHT: 162.26 LBS | OXYGEN SATURATION: 98 % | TEMPERATURE: 98.6 F | BODY MASS INDEX: 21.98 KG/M2 | SYSTOLIC BLOOD PRESSURE: 127 MMHG

## 2022-08-30 LAB
ANION GAP SERPL CALC-SCNC: 7 MMOL/L (ref 5–15)
BUN SERPL-MCNC: 9 MG/DL (ref 6–20)
BUN/CREAT SERPL: 9 (ref 12–20)
CALCIUM SERPL-MCNC: 9.6 MG/DL (ref 8.5–10.1)
CHLORIDE SERPL-SCNC: 104 MMOL/L (ref 97–108)
CO2 SERPL-SCNC: 25 MMOL/L (ref 21–32)
CREAT SERPL-MCNC: 0.97 MG/DL (ref 0.7–1.3)
ERYTHROCYTE [DISTWIDTH] IN BLOOD BY AUTOMATED COUNT: 15.6 % (ref 11.5–14.5)
GLUCOSE SERPL-MCNC: 103 MG/DL (ref 65–100)
HCT VFR BLD AUTO: 36.2 % (ref 36.6–50.3)
HGB BLD-MCNC: 12.1 G/DL (ref 12.1–17)
INR PPP: 1.6 (ref 0.9–1.1)
MCH RBC QN AUTO: 30.6 PG (ref 26–34)
MCHC RBC AUTO-ENTMCNC: 33.4 G/DL (ref 30–36.5)
MCV RBC AUTO: 91.4 FL (ref 80–99)
NRBC # BLD: 0 K/UL (ref 0–0.01)
NRBC BLD-RTO: 0 PER 100 WBC
PLATELET # BLD AUTO: 413 K/UL (ref 150–400)
PMV BLD AUTO: 9.6 FL (ref 8.9–12.9)
POTASSIUM SERPL-SCNC: 4.2 MMOL/L (ref 3.5–5.1)
PROTHROMBIN TIME: 16.3 SEC (ref 9–11.1)
RBC # BLD AUTO: 3.96 M/UL (ref 4.1–5.7)
SODIUM SERPL-SCNC: 136 MMOL/L (ref 136–145)
WBC # BLD AUTO: 8.3 K/UL (ref 4.1–11.1)

## 2022-08-30 PROCEDURE — 80048 BASIC METABOLIC PNL TOTAL CA: CPT

## 2022-08-30 PROCEDURE — 74011000636 HC RX REV CODE- 636: Performed by: SURGERY

## 2022-08-30 PROCEDURE — 74177 CT ABD & PELVIS W/CONTRAST: CPT

## 2022-08-30 PROCEDURE — 85610 PROTHROMBIN TIME: CPT

## 2022-08-30 PROCEDURE — 85027 COMPLETE CBC AUTOMATED: CPT

## 2022-08-30 PROCEDURE — 74011250637 HC RX REV CODE- 250/637: Performed by: SURGERY

## 2022-08-30 PROCEDURE — 36415 COLL VENOUS BLD VENIPUNCTURE: CPT

## 2022-08-30 PROCEDURE — 74011250637 HC RX REV CODE- 250/637: Performed by: NURSE PRACTITIONER

## 2022-08-30 RX ORDER — CIPROFLOXACIN 250 MG/1
500 TABLET, FILM COATED ORAL EVERY 12 HOURS
Qty: 56 TABLET | Refills: 0 | Status: SHIPPED | OUTPATIENT
Start: 2022-08-30 | End: 2022-09-13 | Stop reason: ALTCHOICE

## 2022-08-30 RX ORDER — WARFARIN 2 MG/1
4 TABLET ORAL ONCE
Status: DISCONTINUED | OUTPATIENT
Start: 2022-08-30 | End: 2022-08-30 | Stop reason: HOSPADM

## 2022-08-30 RX ORDER — METRONIDAZOLE 500 MG/1
500 TABLET ORAL 3 TIMES DAILY
Qty: 42 TABLET | Refills: 0 | Status: SHIPPED | OUTPATIENT
Start: 2022-08-30 | End: 2022-09-13 | Stop reason: ALTCHOICE

## 2022-08-30 RX ADMIN — IOHEXOL 50 ML: 300 INJECTION, SOLUTION INTRAVENOUS at 05:30

## 2022-08-30 RX ADMIN — LEVOFLOXACIN 750 MG: 750 TABLET, FILM COATED ORAL at 12:20

## 2022-08-30 RX ADMIN — METRONIDAZOLE 500 MG: 250 TABLET ORAL at 00:10

## 2022-08-30 RX ADMIN — IOPAMIDOL 100 ML: 755 INJECTION, SOLUTION INTRAVENOUS at 07:45

## 2022-08-30 RX ADMIN — METOPROLOL TARTRATE 12.5 MG: 25 TABLET, FILM COATED ORAL at 09:43

## 2022-08-30 RX ADMIN — METRONIDAZOLE 500 MG: 250 TABLET ORAL at 12:20

## 2022-08-30 NOTE — PROGRESS NOTES
[id number] [facility] follow-up PCP transitional care appointment has been scheduled with Dr. Markell Haley on 9/2/22 at 0800. Pending patient discharge. Laurent Landrum, Care Management Assistant     Attempted to schedule hospital follow up PCP appointment. Sent a message to PCP office to find patient an appointment. Awaiting callback from PCP office.  Latha Beauchamp

## 2022-08-30 NOTE — DISCHARGE INSTRUCTIONS
Please call [phone] to make an appointment with Dr Padmini Mar MD of [facility].   Address: [facility], [address] , [place], [address] [place] [address]      Dr Cathryn Whalen MD  [facility] of [address], [address] [place] [address]  [phone]  Fax [phone]

## 2022-08-30 NOTE — PROGRESS NOTES
I have reviewed discharge instructions with the patient. The patient verbalized understanding. Medications reviewed. All questions answered.

## 2022-08-30 NOTE — DISCHARGE SUMMARY
Discharge Summary    Patient ID:  Betty Moore  [id number]  male  49 y.o.  1975    Admit date: 8/24/2022    Discharge date: 8/30/2022    Admitting Physician: Eden Browning MD     Consulting Physician(s):   Treatment Team: Consulting Provider: Iesha Blake MD; Utilization Review: Sophia Morris; Care Manager: Malgorzata Mary; Staff Nurse: Zaheer Cota; Utilization Review: Erasmo Salguero RN                         HPI:  Pt is a 49 y.o. male who presents at this time with perforated diverticulitis requiring acute care monitoring and/or treatment. Problem List:   Problem List as of 8/30/2022 Date Reviewed: 8/24/2022            Codes Class Noted - Resolved    * (Principal) Diverticulitis of colon with perforation ICD-10-CM: K57.20  ICD-9-CM: 562.11  8/24/2022 - Present        Aortic stenosis ICD-10-CM: I35.0  ICD-9-CM: 424.1  5/3/2022 - Present        S/P AVR ICD-10-CM: Z95.2  ICD-9-CM: V43.3  5/3/2022 - Present    Overview Signed 5/3/2022 12:09 PM by SHERITA Gates     AORTIC VALVE REPLACEMENT ( ONX-23 ) with Mechanical valve               AS (aortic stenosis) ICD-10-CM: I35.0  ICD-9-CM: 424.1  5/3/2022 - Present        Severe aortic stenosis ICD-10-CM: I35.0  ICD-9-CM: 424.1  3/22/2022 - Present        Bicuspid aortic valve ICD-10-CM: Q23.1  ICD-9-CM: 746.4  3/22/2022 - Present        Hiatal hernia ICD-10-CM: K44.9  ICD-9-CM: 553.3  Unknown - Present        RESOLVED: S/P cardiac cath ICD-10-CM: Z98.890  ICD-9-CM: V45.89  3/22/2022 - 5/3/2022    Overview Signed 3/22/2022 12:30 PM by Yolanda Islas NP     3/22/22: no CAD. Severe AS. Hospital Course:  Patient was managed conservatively with bowel rest and antibiotics. He clinically improved and had serial CT scans. On the day of discharge, patient was able to tolerate a diet. His CT scan at discharge did show concern for fistula process. This was discussed with colorectal surgery and plan made for outpatient follow-up. Activity: Patient mobilized with nursing and was found to be safe and steady with ambulation. Discharged to: Home    Condition on Discharge: Stable     Discharge instructions:    - Take medications as prescribed  - Diet Low Fiber  - Discharge activity:    - Activity as tolerated    - Ambulate several times a day   - Do not drive if taking opioid pain medications    Allergies: Allergies   Allergen Reactions    Soap Rash     Dial Soap              -DISCHARGE MEDICATION LIST     Discharge Medication List as of 8/30/2022 12:12 PM        START taking these medications    Details   ciprofloxacin HCl (CIPRO) 250 mg tablet Take 2 Tablets by mouth every twelve (12) hours for 14 days. , Normal, Disp-56 Tablet, R-0      metroNIDAZOLE (FLAGYL) 500 mg tablet Take 1 Tablet by mouth three (3) times daily for 14 days. , Normal, Disp-42 Tablet, R-0           CONTINUE these medications which have NOT CHANGED    Details   warfarin (COUMADIN) 4 mg tablet Take 1 Tablet by mouth daily. , Normal, Disp-30 Tablet, R-2      melatonin 10 mg tab Take 1 Tablet by mouth daily as needed (sleep). , Historical Med      acetaminophen (TYLENOL) 325 mg tablet Take 650 mg by mouth every four (4) hours as needed for Pain (mild). , Historical Med      aspirin 81 mg chewable tablet Take 1 Tablet by mouth daily. , Normal, Disp-30 Tablet, R-1      metoprolol tartrate (LOPRESSOR) 25 mg tablet Take 0.5 Tablets by mouth every twelve (12) hours. , Normal, Disp-30 Tablet, R-1      Omeprazole delayed release (PRILOSEC D/R) 20 mg tablet Take 20 mg by mouth daily. , Historical Med          per medical continuation form      -Follow up in office with Dr. Frank Romero to discuss fistula management. Signed:  Marlyn Akhtar.  Karl Teresa  MSN, APRN, FNP-C, [facility]  Surgical Nurse Practitioner    8/30/2022  1:37 PM

## 2022-08-30 NOTE — PROGRESS NOTES
Nutrition Education    Educated on Low Fiber Diet x 4 weeks (diverticulitis) and then transition to High Fiber for on-going maintenance of diverticulosis. RD discussed education at the bedside, provided handouts and sample meal plan. Learners: Patient and Significant Other  Readiness: Acceptance  Method: Explanation and Handout  Response: Verbalizes Understanding  Contact name and number provided.     Juancho Anderson RD  Contact Number:

## 2022-08-30 NOTE — PROGRESS NOTES
End of Shift Note    Bedside shift change report given to America Anna RN (oncoming nurse) by Axel Portillo RN (offgoing nurse). Report included the following information SBAR, Kardex, Intake/Output, MAR, Recent Results, and Quality Measures    Shift worked:  7pm-7am     Shift summary and any significant changes:     No complaints of pain or nausea. Patient going to CT. Contrast given. CT notified.       Concerns for physician to address:  none     Zone phone for oncoming shift:   [phone]       Axel Portillo RN

## 2022-08-31 ENCOUNTER — TELEPHONE (OUTPATIENT)
Dept: SURGERY | Age: 47
End: 2022-08-31

## 2022-08-31 ENCOUNTER — APPOINTMENT (OUTPATIENT)
Dept: CARDIAC REHAB | Age: 47
End: 2022-08-31
Payer: COMMERCIAL

## 2022-08-31 NOTE — TELEPHONE ENCOUNTER
Patient called and was prescribed Ciprofloxacin, patient stated he is suppose to take 2 tablets a day for 14 days, but was only given 6 pills, please call     [phone]

## 2022-09-02 ENCOUNTER — OFFICE VISIT (OUTPATIENT)
Dept: INTERNAL MEDICINE CLINIC | Age: 47
End: 2022-09-02
Payer: COMMERCIAL

## 2022-09-02 ENCOUNTER — APPOINTMENT (OUTPATIENT)
Dept: CARDIAC REHAB | Age: 47
End: 2022-09-02
Payer: COMMERCIAL

## 2022-09-02 VITALS
RESPIRATION RATE: 18 BRPM | HEART RATE: 100 BPM | SYSTOLIC BLOOD PRESSURE: 101 MMHG | DIASTOLIC BLOOD PRESSURE: 67 MMHG | BODY MASS INDEX: 21.73 KG/M2 | WEIGHT: 160.2 LBS | OXYGEN SATURATION: 99 %

## 2022-09-02 DIAGNOSIS — Z95.2 S/P AVR: ICD-10-CM

## 2022-09-02 DIAGNOSIS — Z79.01 ANTICOAGULATED ON COUMADIN: ICD-10-CM

## 2022-09-02 DIAGNOSIS — Z09 HOSPITAL DISCHARGE FOLLOW-UP: Primary | ICD-10-CM

## 2022-09-02 DIAGNOSIS — K57.92 DIVERTICULITIS: ICD-10-CM

## 2022-09-02 PROCEDURE — 99215 OFFICE O/P EST HI 40 MIN: CPT | Performed by: INTERNAL MEDICINE

## 2022-09-02 RX ORDER — METOPROLOL TARTRATE 25 MG/1
12.5 TABLET, FILM COATED ORAL EVERY 12 HOURS
Qty: 30 TABLET | Refills: 1 | Status: SHIPPED | OUTPATIENT
Start: 2022-09-02 | End: 2022-09-26

## 2022-09-02 RX ORDER — WARFARIN 4 MG/1
4 TABLET ORAL DAILY
Qty: 30 TABLET | Refills: 0 | Status: SHIPPED | OUTPATIENT
Start: 2022-09-02 | End: 2022-09-20 | Stop reason: SDUPTHER

## 2022-09-02 NOTE — PROGRESS NOTES
Mr. Jeffrey Zelaya is presenting to follow up     CC:  Hospital Follow Up       HPI:    50 yo male with a hx of aortic stenosis s/p  replacement presenting for hospital follow up on diverticulitis. Admit date: 8/24/2022     Discharge date: 8/30/2022    Reviewed hospital course   Patient was managed conservatively with bowel rest and antibiotics. He clinically improved and had serial CT scans. On the day of discharge, patient was able to tolerate a diet. His CT scan at discharge did show concern for fistula process. This was discussed with colorectal surgery and plan made for outpatient follow-up. Tolerating bland diet ( chicken noodle soup), no fevers, having unformed BMs    Sleeping  ok     Has gone to work for a few hours already ( saving PTO)     Tylenol is managing pain     Last CT 08/30/2022    . Ongoing sigmoid diverticulitis. Previously noted extraluminal air collection  is slightly increased in size. There is increased thickening of the adjacent  small bowel loop with concern for possible enterocolic fistula formation. He is on cipro and flagyl  He is on coumadin and I offered to do INR he wants to wait till next week to see cards     Review of systems:  Constitutional: negative for fever, chills, weight loss, night sweats   10 systems reviewed       Past Medical History:   Diagnosis Date    Aortic stenosis     Bicuspid aortic valve     GERD (gastroesophageal reflux disease)     Hiatal hernia     Severe aortic stenosis 3/22/2022        Past Surgical History:   Procedure Laterality Date    HX ADENOIDECTOMY      HX COLONOSCOPY      HX ENDOSCOPY      HX HEART CATHETERIZATION  03/2022       Allergies   Allergen Reactions    Soap Rash     Dial Soap       Current Outpatient Medications on File Prior to Visit   Medication Sig Dispense Refill    ciprofloxacin HCl (CIPRO) 250 mg tablet Take 2 Tablets by mouth every twelve (12) hours for 14 days.  56 Tablet 0    metroNIDAZOLE (FLAGYL) 500 mg tablet Take 1 Tablet by mouth three (3) times daily for 14 days. 42 Tablet 0    warfarin (COUMADIN) 4 mg tablet Take 1 Tablet by mouth daily. 30 Tablet 2    melatonin 10 mg tab Take 1 Tablet by mouth daily as needed (sleep). acetaminophen (TYLENOL) 325 mg tablet Take 650 mg by mouth every four (4) hours as needed for Pain (mild). aspirin 81 mg chewable tablet Take 1 Tablet by mouth daily. 30 Tablet 1    metoprolol tartrate (LOPRESSOR) 25 mg tablet Take 0.5 Tablets by mouth every twelve (12) hours. 30 Tablet 1    Omeprazole delayed release (PRILOSEC D/R) 20 mg tablet Take 20 mg by mouth daily. No current facility-administered medications on file prior to visit. family history includes Coronary Art Dis in his maternal grandfather; Heart Attack in his maternal grandfather; Hypertension in his mother and paternal grandfather; Courtney Liter in his maternal grandmother; No Known Problems in his father and sister; Stroke in his paternal grandmother. Social History     Socioeconomic History    Marital status:      Spouse name: Jose Aguirre     Number of children: Not on file    Years of education: Not on file    Highest education level: Not on file   Occupational History    Not on file   Tobacco Use    Smoking status: Former     Packs/day: 0.75     Years: 25.00     Pack years: 18.75     Types: Cigarettes     Quit date: 2017     Years since quittin.1    Smokeless tobacco: Never   Vaping Use    Vaping Use: Never used   Substance and Sexual Activity    Alcohol use:  Yes     Alcohol/week: 4.0 standard drinks     Types: 2 Glasses of wine, 1 Cans of beer, 1 Shots of liquor per week     Comment: 3-5/week     Drug use: Not Currently    Sexual activity: Not on file   Other Topics Concern     Service No    Blood Transfusions No    Caffeine Concern No    Occupational Exposure No    Hobby Hazards No    Sleep Concern No    Stress Concern No    Weight Concern No    Special Diet No    Back Care No Exercise Yes     Comment: daily walks, 30 mins    Bike Helmet No    Seat Belt Yes    Self-Exams No   Social History Narrative    Not on file     Social Determinants of Health     Financial Resource Strain: Not on file   Food Insecurity: Not on file   Transportation Needs: Not on file   Physical Activity: Not on file   Stress: Not on file   Social Connections: Not on file   Intimate Partner Violence: Not on file   Housing Stability: Not on file       Visit Vitals  /67   Pulse 100   Resp 18   Wt 160 lb 3.2 oz (72.7 kg)   SpO2 99%   BMI 21.73 kg/m²     General:  Well appearing male no acute distress  HEENT:   PERRL,normal conjunctiva. External ear and canals normal, TMs normal.  Hearing normal to voice. Nose without edema or discharge, normal septum. Lips, teeth, gums normal.  Oropharynx: no erythema, no exudates, no lesions, normal tongue. Neck:  Supple. Thyroid normal size, nontender, without nodules. No carotid bruit. No masses or lymphadenopathy  Respiratory: no respiratory distress,  no wheezing, no rhonchi, no rales. No chest wall tenderness. Cardiovascular:  RRR, normal S1S2, no murmur. Gastrointestinal: normal bowel sounds, soft, nontender, without masses. No hepatosplenomegaly. Extremities +2 pulses, no edema, normal sensation   Musculoskeletal:  Normal gait. Normal digits and nails. Normal strength and tone, no atrophy, and no abnormal movement. Skin:  No rash, no lesions, no ulcers. Skin warm, normal turgor, without induration or nodules. Neuro:  A and OX4, fluent speech, cranial nerves normal 2-12. Sensation normal to light touch.   DTR symmetrical  Psych:  Normal affect      Lab Results   Component Value Date/Time    WBC 8.3 08/30/2022 12:05 AM    HGB 12.1 08/30/2022 12:05 AM    HCT 36.2 (L) 08/30/2022 12:05 AM    PLATELET 839 (H) 78/28/1533 12:05 AM    MCV 91.4 08/30/2022 12:05 AM     Lab Results   Component Value Date/Time    Sodium 136 08/30/2022 12:05 AM    Potassium 4.2 08/30/2022 12:05 AM    Chloride 104 08/30/2022 12:05 AM    CO2 25 08/30/2022 12:05 AM    Anion gap 7 08/30/2022 12:05 AM    Glucose 103 (H) 08/30/2022 12:05 AM    BUN 9 08/30/2022 12:05 AM    Creatinine 0.97 08/30/2022 12:05 AM    BUN/Creatinine ratio 9 (L) 08/30/2022 12:05 AM    GFR est AA >60 08/30/2022 12:05 AM    GFR est non-AA >60 08/30/2022 12:05 AM    Calcium 9.6 08/30/2022 12:05 AM     Lab Results   Component Value Date/Time    Cholesterol, total 204 (H) 06/09/2020 09:54 AM    HDL Cholesterol 50 06/09/2020 09:54 AM    LDL, calculated 131 (H) 06/09/2020 09:54 AM    VLDL, calculated 23 06/09/2020 09:54 AM    Triglyceride 114 06/09/2020 09:54 AM     Lab Results   Component Value Date/Time    TSH 0.46 04/20/2022 01:18 PM     Lab Results   Component Value Date/Time    Hemoglobin A1c 5.5 04/20/2022 01:18 PM     No results found for: CARSON Bailey                Assessment and Plan:        Hospital discharge follow-up  Diverticulitis  Reviewed hospital course and improving clinically - he is on antibiotics  Advised to follow up w colorectal surgery given concern for possible fistula    S/P AVR  asymptomatic  History of severe AS underwent replacement in May 2022   Bicuspid AV stenosis s/p AVR with #23mm On-X mechanical valve  - metoprolol tartrate (LOPRESSOR) 25 mg tablet; Take 0.5 Tablets by mouth every twelve (12) hours. Dispense: 30 Tablet; Refill: 1  - warfarin (COUMADIN) 4 mg tablet; Take 1 Tablet by mouth daily. Dispense: 30 Tablet;  Refill: 0    Anticoagulated on Coumadin- offered INR check since on cipro , declined  08/30 was 1.6 and has follow up scheduled with cards next week            Vaughn Adler MD

## 2022-09-06 ENCOUNTER — ANTI-COAG VISIT (OUTPATIENT)
Dept: PHARMACY | Age: 47
End: 2022-09-06
Payer: COMMERCIAL

## 2022-09-06 DIAGNOSIS — Z95.2 S/P AVR: Primary | ICD-10-CM

## 2022-09-06 LAB
INR BLD: 6
PT POC: 72.2 SECONDS
VALID INTERNAL CONTROL?: YES

## 2022-09-06 PROCEDURE — 85610 PROTHROMBIN TIME: CPT

## 2022-09-06 PROCEDURE — 99212 OFFICE O/P EST SF 10 MIN: CPT

## 2022-09-06 NOTE — PATIENT INSTRUCTIONS
Today your INR was 6.0 . Your goal INR is  1.5-2.5 . You have a 2 mg and 4 mg tablet of Coumadin (warfarin). Take Coumadin (warfarin) as follows:    Hold doses today (9/6/22) and tomorrow (9/7/22) and then take 2 mg Thursday, Friday, Saturday; and then 4 mg daily rest of the week. Come back in 1 week(s) for your next finger stick/INR blood test.        Avoid any over the counter items containing aspirin or ibuprofen, and avoid great swings in general diet. Avoid alcohol consumption. Please notify the INR nurse if you are started on any new medication including over the counter or herbal supplements. Also, please notify your INR nurse if any of your other prescription or over the counter medications have been discontinued. Call [facility] at [phone] if you have any signs of abnormal bleeding/blood clot.  ------------------------------------------------------------------------------------------------------------------  Taking Warfarin Safely: Care Instructions    Your Care Instructions  Warfarin is a medicine that you take to prevent blood clots. It is often called a blood thinner. Doctors give warfarin (such as Coumadin) to reduce the risk of blood clots. You may be at risk for blood clots if you have atrial fibrillation or deep vein thrombosis. Some other health problems may also put you at risk. Warfarin slows the amount of time it takes for your blood to clot. It can cause bleeding problems. Even if you've been taking warfarin for a while, it's important to know how to take it safely. Foods and other medicines can affect the way warfarin works. Some can make warfarin work too well. This can cause bleeding problems. And some can make it work poorly, so that it does not prevent blood clots very well. You will need regular blood tests to check how long it takes for your blood to form a clot.  This test is called a PT or prothrombin time test. The result of the test is called an INR level. Depending on the test results, your doctor or anticoagulation clinic may adjust your dose of warfarin. Follow-up care is a key part of your treatment and safety. Be sure to make and go to all appointments, and call your doctor if you are having problems. It's also a good idea to know your test results and keep a list of the medicines you take. How can you care for yourself at home? Take warfarin safely  Take your warfarin at the same time each day. If you miss a dose of warfarin, don't take an extra dose to make up for it. Your doctor can tell you exactly what to do so you don't take too much or too little. Wear medical alert jewelry that lets others know that you take warfarin. You can buy this at most drugstores. Don't take warfarin if you are pregnant or planning to get pregnant. Talk to your doctor about how you can prevent getting pregnant while you are taking it. Don't change your dose or stop taking warfarin unless your doctor tells you to. Effects of medicines and food on warfarin  Don't start or stop taking any medicines, vitamins, or natural remedies unless you first talk to your doctor. Many medicines can affect how warfarin works. These include aspirin and other pain relievers, over-the-counter medicines, multivitamins, dietary supplements, and herbal products. Tell all of your doctors and pharmacists that you take warfarin. Some prescription medicines can affect how warfarin works. Keep the amount of vitamin K in your diet about the same from day to day. Do not suddenly eat a lot more or a lot less food that is rich in vitamin K than you usually do. Vitamin K affects how warfarin works and how your blood clots. Talk with your doctor before making big changes in your diet. Vitamin K is in many foods, such as:  Leafy greens, such as kale, cabbage, spinach, Swiss chard, and lettuce. Canola and soybean oils.   Green vegetables, such as asparagus, broccoli, and [address] sprouts. Vegetable drinks, green tea leaves, and some dietary supplement drinks. Avoid cranberry juice and other cranberry products. They can increase the effects of warfarin. Limit your use of alcohol. Avoid bleeding by preventing falls and injuries  Wear slippers or shoes with nonskid soles. Remove throw rugs and clutter. Rearrange furniture and electrical cords to keep them out of walking paths. Keep stairways, porches, and outside walkways well lit. Use night-lights in hallways and bathrooms. Be extra careful when you work with sharp tools or knives. When should you call for help? Call 911 anytime you think you may need emergency care. For example, call if:  You have a sudden, severe headache that is different from past headaches. Call your doctor now or seek immediate medical care if:  You have any abnormal bleeding, such as:  Nosebleeds. Vaginal bleeding that is different (heavier, more frequent, at a different time of the month) than what you are used to. Bloody or black stools, or rectal bleeding. Bloody or pink urine. Watch closely for changes in your health, and be sure to contact your doctor if you have any problems. Where can you learn more? Go to http://www.gray.com/. Enter L275 in the search box to learn more about \"Taking Warfarin Safely: Care Instructions. \"  Current as of: January 27, 2016  Content Version: 11.1  © 2732-9939 [employer]. Care instructions adapted under license by [employer] (which disclaims liability or warranty for this information). If you have questions about a medical condition or this instruction, always ask your healthcare professional. [place] [address] any warranty or liability for your use of this information.

## 2022-09-06 NOTE — PROGRESS NOTES
Pharmacy Progress Note - Anticoagulation Management    S/O:  Mr. Jonathan Olivares  is a 49 y.o. male seen today for anticoagulation management for the diagnosis of Aortic Valve Replacement. HPI: At last visit (8/3), INR was 1.9 and therapeutic. Patient denied changes to his medications and reported consistent intake of vitamin K foods. Patient will continue current regimen and recheck INR in 3 weeks. Interim History:    Warfarin start date: 5/4/22  INR Goal: 1.5-2.5 (On-X)  Current warfarin regimen: 4 mg daily                    Warfarin tablet strength:   2 mg and 4 mg  Duration of therapy: Indefinite    Today's pertinent positives includes:  Antibiotic use and Hospitalization / ED visits    Patient reports being hospitalized on 8/24 for ~7 days (discharged 8/30). Patient reports starting ciprofloxacin and metronidazole after discharge (on 8/30) and will continue antibiotics for 14 days. Results for orders placed or performed in visit on 09/06/22   POC PROTHROMBIN W/INR   Result Value Ref Range    VALID INTERNAL CONTROL POC Yes     Prothrombin time (POC) 72.2 seconds    INR POC 6.0        Adherence:   Able to recall regimen? YES  Miss/extra dose? YES  Need refill? NO    Patient reports warfarin was held for ~6 days while hospitalized. Upcoming procedure(s):  N/A      Past Medical History:   Diagnosis Date    Aortic stenosis     Bicuspid aortic valve     GERD (gastroesophageal reflux disease)     Hiatal hernia     Severe aortic stenosis 3/22/2022     Allergies   Allergen Reactions    Soap Rash     Dial Soap     Current Outpatient Medications   Medication Sig    metoprolol tartrate (LOPRESSOR) 25 mg tablet Take 0.5 Tablets by mouth every twelve (12) hours. warfarin (COUMADIN) 4 mg tablet Take 1 Tablet by mouth daily. ciprofloxacin HCl (CIPRO) 250 mg tablet Take 2 Tablets by mouth every twelve (12) hours for 14 days.     metroNIDAZOLE (FLAGYL) 500 mg tablet Take 1 Tablet by mouth three (3) times daily for 14 days. melatonin 10 mg tab Take 1 Tablet by mouth daily as needed (sleep). acetaminophen (TYLENOL) 325 mg tablet Take 650 mg by mouth every four (4) hours as needed for Pain (mild). aspirin 81 mg chewable tablet Take 1 Tablet by mouth daily. Omeprazole delayed release (PRILOSEC D/R) 20 mg tablet Take 20 mg by mouth daily. No current facility-administered medications for this visit. Wt Readings from Last 3 Encounters:   09/02/22 160 lb 3.2 oz (72.7 kg)   08/24/22 162 lb 4.1 oz (73.6 kg)   08/22/22 165 lb (74.8 kg)     BP Readings from Last 3 Encounters:   09/02/22 101/67   08/30/22 127/71   08/03/22 110/80     Pulse Readings from Last 3 Encounters:   09/02/22 100   08/30/22 84   06/28/22 70     Lab Results   Component Value Date/Time    WBC 8.3 08/30/2022 12:05 AM    HGB 12.1 08/30/2022 12:05 AM    HCT 36.2 (L) 08/30/2022 12:05 AM    PLATELET 717 (H) 20/97/4712 12:05 AM    MCV 91.4 08/30/2022 12:05 AM     Lab Results   Component Value Date/Time    Sodium 136 08/30/2022 12:05 AM    Potassium 4.2 08/30/2022 12:05 AM    Chloride 104 08/30/2022 12:05 AM    CO2 25 08/30/2022 12:05 AM    Anion gap 7 08/30/2022 12:05 AM    Glucose 103 (H) 08/30/2022 12:05 AM    BUN 9 08/30/2022 12:05 AM    Creatinine 0.97 08/30/2022 12:05 AM    BUN/Creatinine ratio 9 (L) 08/30/2022 12:05 AM    GFR est AA >60 08/30/2022 12:05 AM    GFR est non-AA >60 08/30/2022 12:05 AM    Calcium 9.6 08/30/2022 12:05 AM    Bilirubin, total 1.2 (H) 08/24/2022 09:19 AM    Alk. phosphatase 69 08/24/2022 09:19 AM    Protein, total 8.8 (H) 08/24/2022 09:19 AM    Albumin 3.9 08/24/2022 09:19 AM    Globulin 4.9 (H) 08/24/2022 09:19 AM    A-G Ratio 0.8 (L) 08/24/2022 09:19 AM    ALT (SGPT) 17 08/24/2022 09:19 AM     Estimated Creatinine Clearance: 95.8 mL/min (by C-G formula based on SCr of 0.97 mg/dL).       INR History:   (normal INR range 0.8-1.2)     Date   INR   PT   Dose/Comments  09/06/22 6.0  72.2 4 mg daily  08/03/22 1.9  22.9 4 mg daily  07/13/22 2.3  27.6 4 mg daily  06/29/22 2.5  30.6 4 mg daily  06/15/22 2.7  32.0 4 mg daily  06/10/22 2.2   4 mg daily      Medications that can increase  INR: omeprazole, ciprofloxacin, metronidazole  Medications that can decrease INR: N/A    A/P:       Anticoagulation:  Considering Mr. Jena Ayoub past history, todays findings, and per Anticoagulation Collaborative Practice Agreement/Protocol:    Fingerstick POC INR (6.0) is Supratherapeutic for INR goal today. Change warfarin to hold doses today (9/6) and tomorrow (9/7) and then take 2 mg Thur, Fri, Sat; 4 mg daily ROW  INR is 6.0 and supratherapeutic. Patient denies extra doses of warfarin. Patient reports being hospitalized on 8/24 for ~7 days (discharged 8/30). Patient reports warfarin was held for ~6 days while hospitalized. Patient reports starting ciprofloxacin and metronidazole after discharge (on 8/30) and will continue antibiotics for 14 days. Patient reports consistent intake of vitamin K foods. Will reduce weekly dose from 28 mg to 22 mg (~ 21%) and patient will hold doses today (9/6) and tomorrow (9/7) and then take 2 mg Thur, Fri, Sat; 4 mg daily ROW. Patient to recheck INR in 1 week. Patient was instructed to schedule an appointment in 1 week(s) prior to leaving the clinic. Medication reconciliation was completed during the visit. There are no discontinued medications. A full discussion of the nature of anticoagulants has been carried out. A full discussion of the need for frequent and regular monitoring, precise dosage adjustment and compliance was stressed. Side effects of potential bleeding were discussed and Mr. Molly Blanca was instructed to call [phone] if there are any signs of abnormal bleeding.   Mr. Molly Blanca was instructed to avoid any OTC items containing aspirin or ibuprofen and prior to starting any new OTC products to consult with his physician or pharmacist to ensure no drug interactions are present. Mr. Marissa Andrew was instructed to avoid any major changes in his general diet and to avoid alcohol consumption. Mr. Marissa Andrew was provided information in the AVS that includes topics on understanding coumadin therapy, drug interaction considerations, vitamin K and coumadin use, interactions with foods and supplements containing vitamin K, and the use of herbal products. Mr. Marissa Andrew verbalized his understanding of all instructions and will call the office with any questions, concerns, or signs of abnormal bleeding or blood clot. Notifications of recommendations will be sent to Dr. Kyaw Talley MD for review.     Thank you,  Deisy Davila, [phone] [facility] Tracking Only    Intervention Detail: Adherence Monitorin, Dose Adjustment: 1, reason: Therapy Optimization, and Lab(s) Ordered  Total # of Interventions Recommended: 3  Total # of Interventions Accepted: 3  Time Spent (min): 20

## 2022-09-06 NOTE — TELEPHONE ENCOUNTER
Spoke with patient and his insurance was not going to pay for his medication, but he paid for it out of pocket. Patient received all his medication that he needed.

## 2022-09-13 ENCOUNTER — ANTI-COAG VISIT (OUTPATIENT)
Dept: PHARMACY | Age: 47
End: 2022-09-13
Payer: COMMERCIAL

## 2022-09-13 DIAGNOSIS — Z95.2 S/P AVR: Primary | ICD-10-CM

## 2022-09-13 LAB
INR BLD: 3.6
PT POC: 43 SECONDS
VALID INTERNAL CONTROL?: YES

## 2022-09-13 PROCEDURE — 99212 OFFICE O/P EST SF 10 MIN: CPT

## 2022-09-13 PROCEDURE — 85610 PROTHROMBIN TIME: CPT

## 2022-09-13 NOTE — PROGRESS NOTES
Pharmacy Progress Note - Anticoagulation Management    S/O:  Mr. Guillermo Mishra  is a 49 y.o. male seen today for anticoagulation management for the diagnosis of Aortic Valve Replacement. HPI: At last visit (9/6), INR was 6.0 and supratherapeutic. Patient denied extra doses of warfarin. Patient reported being hospitalized on 8/24 for ~7 days (discharged 8/30). Patient reported warfarin was held for ~6 days while hospitalized. Patient reported starting ciprofloxacin and metronidazole after discharge (on 8/30) and will continue antibiotics for 14 days. Patient reported consistent intake of vitamin K foods. Will reduce weekly dose from 28 mg to 22 mg (~ 21%) and patient will hold doses today (9/6) and tomorrow (9/7) and then take 2 mg Thur, Fri, Sat; 4 mg daily ROW. Patient to recheck INR in 1 week. Interim History:    Warfarin start date: 5/4/22  INR Goal: 1.5-2.5 (On-X)  Current warfarin regimen: hold doses today (9/6) and tomorrow (9/7) and then take 2 mg Thur, Fri, Sat; 4 mg daily ROW                  Warfarin tablet strength:   2 mg and 4 mg  Duration of therapy: Indefinite    Today's pertinent positives includes:  Antibiotic use    Patient reports ciprofloxacin and metronidazole will be completed today. Updated patient's medication list.      Results for orders placed or performed in visit on 09/13/22   POC PROTHROMBIN W/INR   Result Value Ref Range    VALID INTERNAL CONTROL POC Yes     Prothrombin time (POC) 43.0 seconds    INR POC 3.6        Adherence:   Able to recall regimen? YES  Miss/extra dose? NO  Need refill?  NO      Upcoming procedure(s):  N/A      Past Medical History:   Diagnosis Date    Aortic stenosis     Bicuspid aortic valve     GERD (gastroesophageal reflux disease)     Hiatal hernia     Severe aortic stenosis 3/22/2022     Allergies   Allergen Reactions    Soap Rash     Dial Soap     Current Outpatient Medications   Medication Sig    metoprolol tartrate (LOPRESSOR) 25 mg tablet Take 0.5 Tablets by mouth every twelve (12) hours. warfarin (COUMADIN) 4 mg tablet Take 1 Tablet by mouth daily. melatonin 10 mg tab Take 1 Tablet by mouth daily as needed (sleep). acetaminophen (TYLENOL) 325 mg tablet Take 650 mg by mouth every four (4) hours as needed for Pain (mild). aspirin 81 mg chewable tablet Take 1 Tablet by mouth daily. Omeprazole delayed release (PRILOSEC D/R) 20 mg tablet Take 20 mg by mouth daily. No current facility-administered medications for this visit. Wt Readings from Last 3 Encounters:   09/02/22 160 lb 3.2 oz (72.7 kg)   08/24/22 162 lb 4.1 oz (73.6 kg)   08/22/22 165 lb (74.8 kg)     BP Readings from Last 3 Encounters:   09/02/22 101/67   08/30/22 127/71   08/03/22 110/80     Pulse Readings from Last 3 Encounters:   09/02/22 100   08/30/22 84   06/28/22 70     Lab Results   Component Value Date/Time    WBC 8.3 08/30/2022 12:05 AM    HGB 12.1 08/30/2022 12:05 AM    HCT 36.2 (L) 08/30/2022 12:05 AM    PLATELET 014 (H) 53/80/1390 12:05 AM    MCV 91.4 08/30/2022 12:05 AM     Lab Results   Component Value Date/Time    Sodium 136 08/30/2022 12:05 AM    Potassium 4.2 08/30/2022 12:05 AM    Chloride 104 08/30/2022 12:05 AM    CO2 25 08/30/2022 12:05 AM    Anion gap 7 08/30/2022 12:05 AM    Glucose 103 (H) 08/30/2022 12:05 AM    BUN 9 08/30/2022 12:05 AM    Creatinine 0.97 08/30/2022 12:05 AM    BUN/Creatinine ratio 9 (L) 08/30/2022 12:05 AM    GFR est AA >60 08/30/2022 12:05 AM    GFR est non-AA >60 08/30/2022 12:05 AM    Calcium 9.6 08/30/2022 12:05 AM    Bilirubin, total 1.2 (H) 08/24/2022 09:19 AM    Alk. phosphatase 69 08/24/2022 09:19 AM    Protein, total 8.8 (H) 08/24/2022 09:19 AM    Albumin 3.9 08/24/2022 09:19 AM    Globulin 4.9 (H) 08/24/2022 09:19 AM    A-G Ratio 0.8 (L) 08/24/2022 09:19 AM    ALT (SGPT) 17 08/24/2022 09:19 AM     CrCl cannot be calculated (Unknown ideal weight.).       INR History:   (normal INR range 0.8-1.2)     Date   INR   PT Dose/Comments  09/13/22 3.6  43.0 hold doses 9/6, 9/7 then 2 mg Thur, Fri, Sat; 4 mg daily ROW  09/06/22 6.0  72.2 4 mg daily  08/03/22 1.9  22.9 4 mg daily  07/13/22 2.3  27.6 4 mg daily  06/29/22 2.5  30.6 4 mg daily  06/15/22 2.7  32.0 4 mg daily  06/10/22 2.2   4 mg daily      Medications that can increase  INR: omeprazole, ciprofloxacin, metronidazole  Medications that can decrease INR: N/A    A/P:       Anticoagulation:  Considering Mr. Kelsea Gorman past history, todays findings, and per Anticoagulation Collaborative Practice Agreement/Protocol:    Fingerstick POC INR (3.6) is Supratherapeutic for INR goal today. Change warfarin to hold dose today (9/13) and then take 4 mg Mon, Tue; 2 mg daily ROW  INR is 3.6 and supratherapeutic. Patient denies extra doses of warfarin. Patient reports ciprofloxacin and metronidazole will be completed today. Patient reports consistent intake of vitamin K foods. Will reduce weekly dose from 22 mg to 18 mg (~ 18%) and patient will hold dose today (9/13) and then take 4 mg Mon, Tue; 2 mg daily ROW. Patient to recheck INR in 1 week. Patient was instructed to schedule an appointment in 1 week(s) prior to leaving the clinic. Medication reconciliation was completed during the visit. Medications Discontinued During This Encounter   Medication Reason    ciprofloxacin HCl (CIPRO) 250 mg tablet Therapy Completed    metroNIDAZOLE (FLAGYL) 500 mg tablet Therapy Completed       A full discussion of the nature of anticoagulants has been carried out. A full discussion of the need for frequent and regular monitoring, precise dosage adjustment and compliance was stressed. Side effects of potential bleeding were discussed and Mr. Sneha Viveros was instructed to call [phone] if there are any signs of abnormal bleeding.   Mr. Sneha Viveros was instructed to avoid any OTC items containing aspirin or ibuprofen and prior to starting any new OTC products to consult with his physician or pharmacist to ensure no drug interactions are present. Mr. Chela Elizondo was instructed to avoid any major changes in his general diet and to avoid alcohol consumption. Mr. Chela Elizondo was provided information in the AVS that includes topics on understanding coumadin therapy, drug interaction considerations, vitamin K and coumadin use, interactions with foods and supplements containing vitamin K, and the use of herbal products. Mr. Chela Elizondo verbalized his understanding of all instructions and will call the office with any questions, concerns, or signs of abnormal bleeding or blood clot. Notifications of recommendations will be sent to Dr. Isela Perez MD for review.     Thank you,  Lamar Morales, [address] [facility] Tracking Only    Intervention Detail: Adherence Monitorin, Dose Adjustment: 1, reason: Therapy Optimization, and Lab(s) Ordered  Total # of Interventions Recommended: 3  Total # of Interventions Accepted: 3  Time Spent (min): 20

## 2022-09-13 NOTE — PATIENT INSTRUCTIONS
Today your INR was 3.6 . Your goal INR is  1.5-2.5 . You have a 2 mg and 4 mg tablet of Coumadin (warfarin). Take Coumadin (warfarin) as follows:    Hold doses today (9/13/22) and then take 4 mg every Monday, Tuesday; and 2 mg daily rest of the week. Come back in 1 week(s) for your next finger stick/INR blood test.        Avoid any over the counter items containing aspirin or ibuprofen, and avoid great swings in general diet. Avoid alcohol consumption. Please notify the INR nurse if you are started on any new medication including over the counter or herbal supplements. Also, please notify your INR nurse if any of your other prescription or over the counter medications have been discontinued. Call [facility] at [phone] if you have any signs of abnormal bleeding/blood clot.  ------------------------------------------------------------------------------------------------------------------  Taking Warfarin Safely: Care Instructions    Your Care Instructions  Warfarin is a medicine that you take to prevent blood clots. It is often called a blood thinner. Doctors give warfarin (such as Coumadin) to reduce the risk of blood clots. You may be at risk for blood clots if you have atrial fibrillation or deep vein thrombosis. Some other health problems may also put you at risk. Warfarin slows the amount of time it takes for your blood to clot. It can cause bleeding problems. Even if you've been taking warfarin for a while, it's important to know how to take it safely. Foods and other medicines can affect the way warfarin works. Some can make warfarin work too well. This can cause bleeding problems. And some can make it work poorly, so that it does not prevent blood clots very well. You will need regular blood tests to check how long it takes for your blood to form a clot. This test is called a PT or prothrombin time test. The result of the test is called an INR level.  Depending on the test results, your doctor or anticoagulation clinic may adjust your dose of warfarin. Follow-up care is a key part of your treatment and safety. Be sure to make and go to all appointments, and call your doctor if you are having problems. It's also a good idea to know your test results and keep a list of the medicines you take. How can you care for yourself at home? Take warfarin safely  Take your warfarin at the same time each day. If you miss a dose of warfarin, don't take an extra dose to make up for it. Your doctor can tell you exactly what to do so you don't take too much or too little. Wear medical alert jewelry that lets others know that you take warfarin. You can buy this at most drugstores. Don't take warfarin if you are pregnant or planning to get pregnant. Talk to your doctor about how you can prevent getting pregnant while you are taking it. Don't change your dose or stop taking warfarin unless your doctor tells you to. Effects of medicines and food on warfarin  Don't start or stop taking any medicines, vitamins, or natural remedies unless you first talk to your doctor. Many medicines can affect how warfarin works. These include aspirin and other pain relievers, over-the-counter medicines, multivitamins, dietary supplements, and herbal products. Tell all of your doctors and pharmacists that you take warfarin. Some prescription medicines can affect how warfarin works. Keep the amount of vitamin K in your diet about the same from day to day. Do not suddenly eat a lot more or a lot less food that is rich in vitamin K than you usually do. Vitamin K affects how warfarin works and how your blood clots. Talk with your doctor before making big changes in your diet. Vitamin K is in many foods, such as:  Leafy greens, such as kale, cabbage, spinach, Swiss chard, and lettuce. Canola and soybean oils. Green vegetables, such as asparagus, broccoli, and [place] sprouts.   Vegetable drinks, green tea leaves, and some dietary supplement drinks. Avoid cranberry juice and other cranberry products. They can increase the effects of warfarin. Limit your use of alcohol. Avoid bleeding by preventing falls and injuries  Wear slippers or shoes with nonskid soles. Remove throw rugs and clutter. Rearrange furniture and electrical cords to keep them out of walking paths. Keep stairways, porches, and outside walkways well lit. Use night-lights in hallways and bathrooms. Be extra careful when you work with sharp tools or knives. When should you call for help? Call 911 anytime you think you may need emergency care. For example, call if:  You have a sudden, severe headache that is different from past headaches. Call your doctor now or seek immediate medical care if:  You have any abnormal bleeding, such as:  Nosebleeds. Vaginal bleeding that is different (heavier, more frequent, at a different time of the month) than what you are used to. Bloody or black stools, or rectal bleeding. Bloody or pink urine. Watch closely for changes in your health, and be sure to contact your doctor if you have any problems. Where can you learn more? Go to http://www.gray.com/. Enter M833 in the search box to learn more about \"Taking Warfarin Safely: Care Instructions. \"  Current as of: January 27, 2016  Content Version: 11.1  © 2283-3833 [employer]. Care instructions adapted under license by [employer] (which disclaims liability or warranty for this information). If you have questions about a medical condition or this instruction, always ask your healthcare professional. [place] [address] any warranty or liability for your use of this information.

## 2022-09-20 ENCOUNTER — ANTI-COAG VISIT (OUTPATIENT)
Dept: PHARMACY | Age: 47
End: 2022-09-20
Payer: COMMERCIAL

## 2022-09-20 DIAGNOSIS — Z95.2 S/P AVR: Primary | ICD-10-CM

## 2022-09-20 LAB
INR BLD: 1.5
PT POC: 18.6 SECONDS
VALID INTERNAL CONTROL?: YES

## 2022-09-20 PROCEDURE — 85610 PROTHROMBIN TIME: CPT

## 2022-09-20 PROCEDURE — 99213 OFFICE O/P EST LOW 20 MIN: CPT

## 2022-09-20 RX ORDER — WARFARIN 2 MG/1
TABLET ORAL
Qty: 50 TABLET | Refills: 0 | Status: SHIPPED | OUTPATIENT
Start: 2022-09-20

## 2022-09-20 RX ORDER — WARFARIN 4 MG/1
TABLET ORAL
Qty: 60 TABLET | Refills: 0 | Status: SHIPPED | OUTPATIENT
Start: 2022-09-20

## 2022-09-20 NOTE — PROGRESS NOTES
Pharmacy Progress Note - Anticoagulation Management    S/O:  Mr. Shanelle Phipps  is a 49 y.o. male seen today for anticoagulation management for the diagnosis of Aortic Valve Replacement. HPI: At last visit (9/13), INR was 3.6 and supratherapeutic. Patient denied extra doses of warfarin. Patient reported ciprofloxacin and metronidazole will be completed today. Patient reported consistent intake of vitamin K foods. Will reduce weekly dose from 22 mg to 18 mg (~ 18%) and patient will hold dose today (9/13) and then take 4 mg Mon, Tue; 2 mg daily ROW. Patient to recheck INR in 1 week. Interim History:    Warfarin start date: 5/4/22  INR Goal: 1.5-2.5 (On-X)  Current warfarin regimen: hold dose today (9/13) and then take 4 mg Mon, Tue; 2 mg daily ROW                 Warfarin tablet strength:   2 mg and 4 mg  Duration of therapy: Indefinite    Today's pertinent positives includes:  No significant changes since last visit      Results for orders placed or performed in visit on 09/20/22   POC PROTHROMBIN W/INR   Result Value Ref Range    VALID INTERNAL CONTROL POC Yes     Prothrombin time (POC) 18.6 seconds    INR POC 1.5        Adherence:   Able to recall regimen? YES  Miss/extra dose? NO  Need refill? YES      Upcoming procedure(s):  N/A      Past Medical History:   Diagnosis Date    Aortic stenosis     Bicuspid aortic valve     GERD (gastroesophageal reflux disease)     Hiatal hernia     Severe aortic stenosis 3/22/2022     Allergies   Allergen Reactions    Soap Rash     Dial Soap     Current Outpatient Medications   Medication Sig    warfarin (COUMADIN) 4 mg tablet Take 1 tablet (4 mg) by mouth every Tuesday, Thursday, Saturday and Sunday or take as directed by the clinic.    warfarin (COUMADIN) 2 mg tablet Take 1 tablet (2 mg) by mouth every Monday, Wednesday and Friday or take as directed by the clinic.    metoprolol tartrate (LOPRESSOR) 25 mg tablet Take 0.5 Tablets by mouth every twelve (12) hours. melatonin 10 mg tab Take 1 Tablet by mouth daily as needed (sleep). acetaminophen (TYLENOL) 325 mg tablet Take 650 mg by mouth every four (4) hours as needed for Pain (mild). aspirin 81 mg chewable tablet Take 1 Tablet by mouth daily. Omeprazole delayed release (PRILOSEC D/R) 20 mg tablet Take 20 mg by mouth daily. No current facility-administered medications for this visit. Wt Readings from Last 3 Encounters:   09/02/22 160 lb 3.2 oz (72.7 kg)   08/24/22 162 lb 4.1 oz (73.6 kg)   08/22/22 165 lb (74.8 kg)     BP Readings from Last 3 Encounters:   09/02/22 101/67   08/30/22 127/71   08/03/22 110/80     Pulse Readings from Last 3 Encounters:   09/02/22 100   08/30/22 84   06/28/22 70     Lab Results   Component Value Date/Time    WBC 8.3 08/30/2022 12:05 AM    HGB 12.1 08/30/2022 12:05 AM    HCT 36.2 (L) 08/30/2022 12:05 AM    PLATELET 085 (H) 55/55/0921 12:05 AM    MCV 91.4 08/30/2022 12:05 AM     Lab Results   Component Value Date/Time    Sodium 136 08/30/2022 12:05 AM    Potassium 4.2 08/30/2022 12:05 AM    Chloride 104 08/30/2022 12:05 AM    CO2 25 08/30/2022 12:05 AM    Anion gap 7 08/30/2022 12:05 AM    Glucose 103 (H) 08/30/2022 12:05 AM    BUN 9 08/30/2022 12:05 AM    Creatinine 0.97 08/30/2022 12:05 AM    BUN/Creatinine ratio 9 (L) 08/30/2022 12:05 AM    GFR est AA >60 08/30/2022 12:05 AM    GFR est non-AA >60 08/30/2022 12:05 AM    Calcium 9.6 08/30/2022 12:05 AM    Bilirubin, total 1.2 (H) 08/24/2022 09:19 AM    Alk. phosphatase 69 08/24/2022 09:19 AM    Protein, total 8.8 (H) 08/24/2022 09:19 AM    Albumin 3.9 08/24/2022 09:19 AM    Globulin 4.9 (H) 08/24/2022 09:19 AM    A-G Ratio 0.8 (L) 08/24/2022 09:19 AM    ALT (SGPT) 17 08/24/2022 09:19 AM     CrCl cannot be calculated (Unknown ideal weight.).       INR History:   (normal INR range 0.8-1.2)     Date   INR   PT   Dose/Comments  09/20/22 1.5  18.6 hold dose 9/13 then take 4 mg Mon, Tue; 2 mg daily ROW  09/13/22 3.6  43.0 hold doses 9/6, 9/7 then 2 mg Thur, Fri, Sat; 4 mg daily ROW  09/06/22 6.0  72.2 4 mg daily  08/03/22 1.9  22.9 4 mg daily  07/13/22 2.3  27.6 4 mg daily  06/29/22 2.5  30.6 4 mg daily  06/15/22 2.7  32.0 4 mg daily  06/10/22 2.2   4 mg daily      Medications that can increase  INR: omeprazole   Medications that can decrease INR: N/A    A/P:       Anticoagulation:  Considering Mr. Josemanuel Salazar past history, todays findings, and per Anticoagulation Collaborative Practice Agreement/Protocol:    Fingerstick POC INR (1.5) is Therapeutic for INR goal today. Change warfarin 2 mg Mon, Wed, Fri; 4 mg daily ROW  INR is 1.5 and therapeutic. Patient denies changes to his medications and reports consistent intake of vitamin K foods. Patient's ciprofloxacin and metronidazole therapies have been completed. Will increase warfarin from 18 mg to 22 mg (~22%) and patient will take 2 mg Mon, Wed, Fri; 4 mg daily ROW. Patient will recheck INR in 1 week(s). Patient was instructed to schedule an appointment in 1 week(s) prior to leaving the clinic. Medication reconciliation was completed during the visit. Medications Discontinued During This Encounter   Medication Reason    warfarin (COUMADIN) 4 mg tablet REORDER         A full discussion of the nature of anticoagulants has been carried out. A full discussion of the need for frequent and regular monitoring, precise dosage adjustment and compliance was stressed. Side effects of potential bleeding were discussed and Mr. Edd Mayer was instructed to call [phone] if there are any signs of abnormal bleeding. Mr. Edd Mayer was instructed to avoid any OTC items containing aspirin or ibuprofen and prior to starting any new OTC products to consult with his physician or pharmacist to ensure no drug interactions are present. Mr. Edd Mayer was instructed to avoid any major changes in his general diet and to avoid alcohol consumption.     Mr. Edd Mayer was provided information in the AVS that includes topics on understanding coumadin therapy, drug interaction considerations, vitamin K and coumadin use, interactions with foods and supplements containing vitamin K, and the use of herbal products. MrYobani Calles verbalized his understanding of all instructions and will call the office with any questions, concerns, or signs of abnormal bleeding or blood clot. Notifications of recommendations will be sent to Dr. Gretta Sears MD for review.     Thank you,  JULIOCESAR Capone/ Liam George 77 Tracking Only    Intervention Detail: Adherence Monitorin, Dose Adjustment: 1, reason: Therapy Optimization, Lab(s) Ordered, and Refill(s) Provided   Total # of Interventions Recommended: 4  Total # of Interventions Accepted: 4  Time Spent (min): 20

## 2022-09-20 NOTE — PATIENT INSTRUCTIONS
Today your INR was 1.5 . Your goal INR is  1.5-2.5 . You have a 2 mg and 4 mg tablet of Coumadin (warfarin). Take Coumadin (warfarin) as follows: Take 2 mg every Monday, Wednesday, Friday; and 4 mg daily rest of the week. Come back in 1 week(s) for your next finger stick/INR blood test.        Avoid any over the counter items containing aspirin or ibuprofen, and avoid great swings in general diet. Avoid alcohol consumption. Please notify the INR nurse if you are started on any new medication including over the counter or herbal supplements. Also, please notify your INR nurse if any of your other prescription or over the counter medications have been discontinued. Call [facility] at [phone] if you have any signs of abnormal bleeding/blood clot.  ------------------------------------------------------------------------------------------------------------------  Taking Warfarin Safely: Care Instructions    Your Care Instructions  Warfarin is a medicine that you take to prevent blood clots. It is often called a blood thinner. Doctors give warfarin (such as Coumadin) to reduce the risk of blood clots. You may be at risk for blood clots if you have atrial fibrillation or deep vein thrombosis. Some other health problems may also put you at risk. Warfarin slows the amount of time it takes for your blood to clot. It can cause bleeding problems. Even if you've been taking warfarin for a while, it's important to know how to take it safely. Foods and other medicines can affect the way warfarin works. Some can make warfarin work too well. This can cause bleeding problems. And some can make it work poorly, so that it does not prevent blood clots very well. You will need regular blood tests to check how long it takes for your blood to form a clot. This test is called a PT or prothrombin time test. The result of the test is called an INR level.  Depending on the test results, your doctor or anticoagulation clinic may adjust your dose of warfarin. Follow-up care is a key part of your treatment and safety. Be sure to make and go to all appointments, and call your doctor if you are having problems. It's also a good idea to know your test results and keep a list of the medicines you take. How can you care for yourself at home? Take warfarin safely  Take your warfarin at the same time each day. If you miss a dose of warfarin, don't take an extra dose to make up for it. Your doctor can tell you exactly what to do so you don't take too much or too little. Wear medical alert jewelry that lets others know that you take warfarin. You can buy this at most drugstores. Don't take warfarin if you are pregnant or planning to get pregnant. Talk to your doctor about how you can prevent getting pregnant while you are taking it. Don't change your dose or stop taking warfarin unless your doctor tells you to. Effects of medicines and food on warfarin  Don't start or stop taking any medicines, vitamins, or natural remedies unless you first talk to your doctor. Many medicines can affect how warfarin works. These include aspirin and other pain relievers, over-the-counter medicines, multivitamins, dietary supplements, and herbal products. Tell all of your doctors and pharmacists that you take warfarin. Some prescription medicines can affect how warfarin works. Keep the amount of vitamin K in your diet about the same from day to day. Do not suddenly eat a lot more or a lot less food that is rich in vitamin K than you usually do. Vitamin K affects how warfarin works and how your blood clots. Talk with your doctor before making big changes in your diet. Vitamin K is in many foods, such as:  Leafy greens, such as kale, cabbage, spinach, Swiss chard, and lettuce. Canola and soybean oils. Green vegetables, such as asparagus, broccoli, and [place] sprouts.   Vegetable drinks, green tea leaves, and some dietary supplement drinks. Avoid cranberry juice and other cranberry products. They can increase the effects of warfarin. Limit your use of alcohol. Avoid bleeding by preventing falls and injuries  Wear slippers or shoes with nonskid soles. Remove throw rugs and clutter. Rearrange furniture and electrical cords to keep them out of walking paths. Keep stairways, porches, and outside walkways well lit. Use night-lights in hallways and bathrooms. Be extra careful when you work with sharp tools or knives. When should you call for help? Call 911 anytime you think you may need emergency care. For example, call if:  You have a sudden, severe headache that is different from past headaches. Call your doctor now or seek immediate medical care if:  You have any abnormal bleeding, such as:  Nosebleeds. Vaginal bleeding that is different (heavier, more frequent, at a different time of the month) than what you are used to. Bloody or black stools, or rectal bleeding. Bloody or pink urine. Watch closely for changes in your health, and be sure to contact your doctor if you have any problems. Where can you learn more? Go to http://www.bolivar.com/. Enter P750 in the search box to learn more about \"Taking Warfarin Safely: Care Instructions. \"  Current as of: January 27, 2016  Content Version: 11.1  © 1041-5606 [employer], [employer]. Care instructions adapted under license by [employer] (which disclaims liability or warranty for this information). If you have questions about a medical condition or this instruction, always ask your healthcare professional. [place] [address] any warranty or liability for your use of this information. Intermediate / Complex Repair - Final Wound Length In Cm: 4

## 2022-09-22 ENCOUNTER — HOSPITAL ENCOUNTER (OUTPATIENT)
Dept: CARDIAC REHAB | Age: 47
Discharge: HOME | End: 2022-09-22
Payer: COMMERCIAL

## 2022-09-22 VITALS — BODY MASS INDEX: 21.29 KG/M2 | WEIGHT: 157 LBS

## 2022-09-22 PROCEDURE — 93798 PHYS/QHP OP CAR RHAB W/ECG: CPT

## 2022-09-26 DIAGNOSIS — Z95.2 S/P AVR: ICD-10-CM

## 2022-09-26 RX ORDER — METOPROLOL TARTRATE 25 MG/1
TABLET, FILM COATED ORAL
Qty: 30 TABLET | Refills: 1 | Status: SHIPPED | OUTPATIENT
Start: 2022-09-26 | End: 2022-10-23

## 2022-09-26 NOTE — PATIENT INSTRUCTIONS
Today your INR was 2.0 . Your goal INR is  1.5-2.5 . You have a 2 mg and 4 mg tablet of Coumadin (warfarin). Take Coumadin (warfarin) as follows: Take 2 mg every Monday, Wednesday, Friday; and 4 mg daily rest of the week. Come back in 3 week(s) for your next finger stick/INR blood test.        Avoid any over the counter items containing aspirin or ibuprofen, and avoid great swings in general diet. Avoid alcohol consumption. Please notify the INR nurse if you are started on any new medication including over the counter or herbal supplements. Also, please notify your INR nurse if any of your other prescription or over the counter medications have been discontinued. Call [facility] at [phone] if you have any signs of abnormal bleeding/blood clot.  ------------------------------------------------------------------------------------------------------------------  Taking Warfarin Safely: Care Instructions    Your Care Instructions  Warfarin is a medicine that you take to prevent blood clots. It is often called a blood thinner. Doctors give warfarin (such as Coumadin) to reduce the risk of blood clots. You may be at risk for blood clots if you have atrial fibrillation or deep vein thrombosis. Some other health problems may also put you at risk. Warfarin slows the amount of time it takes for your blood to clot. It can cause bleeding problems. Even if you've been taking warfarin for a while, it's important to know how to take it safely. Foods and other medicines can affect the way warfarin works. Some can make warfarin work too well. This can cause bleeding problems. And some can make it work poorly, so that it does not prevent blood clots very well. You will need regular blood tests to check how long it takes for your blood to form a clot. This test is called a PT or prothrombin time test. The result of the test is called an INR level.  Depending on the test results, your doctor or anticoagulation clinic may adjust your dose of warfarin. Follow-up care is a key part of your treatment and safety. Be sure to make and go to all appointments, and call your doctor if you are having problems. It's also a good idea to know your test results and keep a list of the medicines you take. How can you care for yourself at home? Take warfarin safely  Take your warfarin at the same time each day. If you miss a dose of warfarin, don't take an extra dose to make up for it. Your doctor can tell you exactly what to do so you don't take too much or too little. Wear medical alert jewelry that lets others know that you take warfarin. You can buy this at most drugstores. Don't take warfarin if you are pregnant or planning to get pregnant. Talk to your doctor about how you can prevent getting pregnant while you are taking it. Don't change your dose or stop taking warfarin unless your doctor tells you to. Effects of medicines and food on warfarin  Don't start or stop taking any medicines, vitamins, or natural remedies unless you first talk to your doctor. Many medicines can affect how warfarin works. These include aspirin and other pain relievers, over-the-counter medicines, multivitamins, dietary supplements, and herbal products. Tell all of your doctors and pharmacists that you take warfarin. Some prescription medicines can affect how warfarin works. Keep the amount of vitamin K in your diet about the same from day to day. Do not suddenly eat a lot more or a lot less food that is rich in vitamin K than you usually do. Vitamin K affects how warfarin works and how your blood clots. Talk with your doctor before making big changes in your diet. Vitamin K is in many foods, such as:  Leafy greens, such as kale, cabbage, spinach, Swiss chard, and lettuce. Canola and soybean oils. Green vegetables, such as asparagus, broccoli, and [place] sprouts.   Vegetable drinks, green tea leaves, and some dietary supplement drinks. Avoid cranberry juice and other cranberry products. They can increase the effects of warfarin. Limit your use of alcohol. Avoid bleeding by preventing falls and injuries  Wear slippers or shoes with nonskid soles. Remove throw rugs and clutter. Rearrange furniture and electrical cords to keep them out of walking paths. Keep stairways, porches, and outside walkways well lit. Use night-lights in hallways and bathrooms. Be extra careful when you work with sharp tools or knives. When should you call for help? Call 911 anytime you think you may need emergency care. For example, call if:  You have a sudden, severe headache that is different from past headaches. Call your doctor now or seek immediate medical care if:  You have any abnormal bleeding, such as:  Nosebleeds. Vaginal bleeding that is different (heavier, more frequent, at a different time of the month) than what you are used to. Bloody or black stools, or rectal bleeding. Bloody or pink urine. Watch closely for changes in your health, and be sure to contact your doctor if you have any problems. Where can you learn more? Go to http://www.gray.com/. Enter B468 in the search box to learn more about \"Taking Warfarin Safely: Care Instructions. \"  Current as of: January 27, 2016  Content Version: 11.1  © 1580-5140 [employer], [employer]. Care instructions adapted under license by [employer] (which disclaims liability or warranty for this information). If you have questions about a medical condition or this instruction, always ask your healthcare professional. [place] [address] any warranty or liability for your use of this information.

## 2022-09-26 NOTE — PROGRESS NOTES
Pharmacy Progress Note - Anticoagulation Management    S/O:  Mr. Lizzette Houston  is a 49 y.o. male seen today for anticoagulation management for the diagnosis of Aortic Valve Replacement. HPI: At last visit (9/20), INR was 1.5 and therapeutic. Patient denied changes to his medications and reported consistent intake of vitamin K foods. Patient's ciprofloxacin and metronidazole therapies have been completed. Will increase warfarin from 18 mg to 22 mg (~22%) and patient will take 2 mg Mon, Wed, Fri; 4 mg daily ROW. Patient will recheck INR in 1 week(s). Interim History:    Warfarin start date: 5/4/22  INR Goal: 1.5-2.5 (On-X)  Current warfarin regimen: 2 mg Mon, Wed, Fri; 4 mg daily ROW                 Warfarin tablet strength:   2 mg and 4 mg  Duration of therapy: Indefinite    Today's pertinent positives includes:  No significant changes since last visit      Results for orders placed or performed in visit on 09/27/22   POC PROTHROMBIN W/INR   Result Value Ref Range    VALID INTERNAL CONTROL POC Yes     Prothrombin time (POC) 23.5 seconds    INR POC 2.0        Adherence:   Able to recall regimen? YES  Miss/extra dose? NO  Need refill? NO      Upcoming procedure(s):  N/A      Past Medical History:   Diagnosis Date    Aortic stenosis     Bicuspid aortic valve     GERD (gastroesophageal reflux disease)     Hiatal hernia     Severe aortic stenosis 3/22/2022     Allergies   Allergen Reactions    Soap Rash     Dial Soap     Current Outpatient Medications   Medication Sig    metoprolol tartrate (LOPRESSOR) 25 mg tablet TAKE 1/2 TABLET BY MOUTH TWICE A DAY    warfarin (COUMADIN) 4 mg tablet Take 1 tablet (4 mg) by mouth every Tuesday, Thursday, Saturday and Sunday or take as directed by the clinic.    warfarin (COUMADIN) 2 mg tablet Take 1 tablet (2 mg) by mouth every Monday, Wednesday and Friday or take as directed by the clinic.    melatonin 10 mg tab Take 1 Tablet by mouth daily as needed (sleep).     acetaminophen (TYLENOL) 325 mg tablet Take 650 mg by mouth every four (4) hours as needed for Pain (mild). aspirin 81 mg chewable tablet Take 1 Tablet by mouth daily. Omeprazole delayed release (PRILOSEC D/R) 20 mg tablet Take 20 mg by mouth daily. No current facility-administered medications for this visit. Wt Readings from Last 3 Encounters:   09/22/22 157 lb (71.2 kg)   09/02/22 160 lb 3.2 oz (72.7 kg)   08/24/22 162 lb 4.1 oz (73.6 kg)     BP Readings from Last 3 Encounters:   09/02/22 101/67   08/30/22 127/71   08/03/22 110/80     Pulse Readings from Last 3 Encounters:   09/02/22 100   08/30/22 84   06/28/22 70     Lab Results   Component Value Date/Time    WBC 8.3 08/30/2022 12:05 AM    HGB 12.1 08/30/2022 12:05 AM    HCT 36.2 (L) 08/30/2022 12:05 AM    PLATELET 749 (H) 37/48/3883 12:05 AM    MCV 91.4 08/30/2022 12:05 AM     Lab Results   Component Value Date/Time    Sodium 136 08/30/2022 12:05 AM    Potassium 4.2 08/30/2022 12:05 AM    Chloride 104 08/30/2022 12:05 AM    CO2 25 08/30/2022 12:05 AM    Anion gap 7 08/30/2022 12:05 AM    Glucose 103 (H) 08/30/2022 12:05 AM    BUN 9 08/30/2022 12:05 AM    Creatinine 0.97 08/30/2022 12:05 AM    BUN/Creatinine ratio 9 (L) 08/30/2022 12:05 AM    GFR est AA >60 08/30/2022 12:05 AM    GFR est non-AA >60 08/30/2022 12:05 AM    Calcium 9.6 08/30/2022 12:05 AM    Bilirubin, total 1.2 (H) 08/24/2022 09:19 AM    Alk. phosphatase 69 08/24/2022 09:19 AM    Protein, total 8.8 (H) 08/24/2022 09:19 AM    Albumin 3.9 08/24/2022 09:19 AM    Globulin 4.9 (H) 08/24/2022 09:19 AM    A-G Ratio 0.8 (L) 08/24/2022 09:19 AM    ALT (SGPT) 17 08/24/2022 09:19 AM     Estimated Creatinine Clearance: 93.8 mL/min (by C-G formula based on SCr of 0.97 mg/dL).       INR History:   (normal INR range 0.8-1.2)     Date   INR   PT   Dose/Comments  09/27/22 2.0  23.5 2 mg Mon, Wed, Fri; 4 mg daily ROW  09/20/22 1.5  18.6 hold dose 9/13 then take 4 mg Mon, Tue; 2 mg daily ROW  09/13/22 3.6  43.0 hold doses 9/6, 9/7 then 2 mg Thur, Fri, Sat; 4 mg daily ROW  09/06/22 6.0  72.2 4 mg daily  08/03/22 1.9  22.9 4 mg daily  07/13/22 2.3  27.6 4 mg daily  06/29/22 2.5  30.6 4 mg daily  06/15/22 2.7  32.0 4 mg daily  06/10/22 2.2   4 mg daily      Medications that can increase  INR: omeprazole   Medications that can decrease INR: N/A    A/P:       Anticoagulation:  Considering Mr. Norma Ward past history, todays findings, and per Anticoagulation Collaborative Practice Agreement/Protocol:    Fingerstick POC INR (2.0) is Therapeutic for INR goal today. Continue warfarin 2 mg Mon, Wed, Fri; 4 mg daily ROW  INR is 2.0 and therapeutic. Patient denies changes to his medications and reports consistent intake of vitamin K foods. Patient will continue current regimen and recheck INR in 3 week(s). Patient was instructed to schedule an appointment in 3 week(s) prior to leaving the clinic. Medication reconciliation was completed during the visit. There are no discontinued medications. A full discussion of the nature of anticoagulants has been carried out. A full discussion of the need for frequent and regular monitoring, precise dosage adjustment and compliance was stressed. Side effects of potential bleeding were discussed and Mr. Jenni Peña was instructed to call [phone] if there are any signs of abnormal bleeding. Mr. Jenni Peña was instructed to avoid any OTC items containing aspirin or ibuprofen and prior to starting any new OTC products to consult with his physician or pharmacist to ensure no drug interactions are present. Mr. Jenni Peña was instructed to avoid any major changes in his general diet and to avoid alcohol consumption. Mr. Jenni Peña was provided information in the AVS that includes topics on understanding coumadin therapy, drug interaction considerations, vitamin K and coumadin use, interactions with foods and supplements containing vitamin K, and the use of herbal products.     Mr. Jenni Peña verbalized his understanding of all instructions and will call the office with any questions, concerns, or signs of abnormal bleeding or blood clot. Notifications of recommendations will be sent to Dr. Nain Guillory MD for review.     Thank you,  Cristobal Ruvalcaba, [address] [facility] Tracking Only    Intervention Detail: Adherence Monitorin and Lab(s) Ordered  Total # of Interventions Recommended: 2  Total # of Interventions Accepted: 2  Time Spent (min): 20

## 2022-09-27 ENCOUNTER — ANTI-COAG VISIT (OUTPATIENT)
Dept: PHARMACY | Age: 47
End: 2022-09-27
Payer: COMMERCIAL

## 2022-09-27 ENCOUNTER — HOSPITAL ENCOUNTER (OUTPATIENT)
Dept: CARDIAC REHAB | Age: 47
Discharge: HOME | End: 2022-09-27
Payer: COMMERCIAL

## 2022-09-27 VITALS — WEIGHT: 160 LBS | BODY MASS INDEX: 21.7 KG/M2

## 2022-09-27 DIAGNOSIS — Z95.2 S/P AVR: Primary | ICD-10-CM

## 2022-09-27 LAB
INR BLD: 2
PT POC: 23.5 SECONDS
VALID INTERNAL CONTROL?: YES

## 2022-09-27 PROCEDURE — 93798 PHYS/QHP OP CAR RHAB W/ECG: CPT

## 2022-09-27 PROCEDURE — 99211 OFF/OP EST MAY X REQ PHY/QHP: CPT

## 2022-09-27 PROCEDURE — 85610 PROTHROMBIN TIME: CPT

## 2022-09-29 ENCOUNTER — HOSPITAL ENCOUNTER (OUTPATIENT)
Dept: CARDIAC REHAB | Age: 47
Discharge: HOME | End: 2022-09-29
Payer: COMMERCIAL

## 2022-09-29 VITALS — WEIGHT: 157 LBS | BODY MASS INDEX: 21.29 KG/M2

## 2022-09-29 PROCEDURE — 93798 PHYS/QHP OP CAR RHAB W/ECG: CPT

## 2022-10-04 ENCOUNTER — HOSPITAL ENCOUNTER (OUTPATIENT)
Dept: CARDIAC REHAB | Age: 47
Discharge: HOME | End: 2022-10-04
Payer: COMMERCIAL

## 2022-10-04 VITALS — BODY MASS INDEX: 21.56 KG/M2 | WEIGHT: 159 LBS

## 2022-10-04 PROCEDURE — 93798 PHYS/QHP OP CAR RHAB W/ECG: CPT

## 2022-10-06 ENCOUNTER — HOSPITAL ENCOUNTER (OUTPATIENT)
Dept: CARDIAC REHAB | Age: 47
Discharge: HOME | End: 2022-10-06
Payer: COMMERCIAL

## 2022-10-06 VITALS — BODY MASS INDEX: 21.43 KG/M2 | WEIGHT: 158 LBS

## 2022-10-06 PROCEDURE — 93798 PHYS/QHP OP CAR RHAB W/ECG: CPT

## 2022-10-17 NOTE — PROGRESS NOTES
Pharmacy Progress Note - Anticoagulation Management    S/O:  Mr. Braulio Villasenor  is a 49 y.o. male seen today for anticoagulation management for the diagnosis of Aortic Valve Replacement. HPI: At last visit (9/27), INR was 2.0 and therapeutic. Patient denied changes to his medications and reported consistent intake of vitamin K foods. Patient will continue current regimen and recheck INR in 3 week(s). Interim History:    Warfarin start date: 5/4/22  INR Goal: 1.5-2.5 (On-X)  Current warfarin regimen: 2 mg Mon, Wed, Fri; 4 mg daily ROW  Warfarin tablet strength:   2 mg and 4 mg  Duration of therapy: Indefinite    Today's pertinent positives includes:  No significant changes since last visit      Results for orders placed or performed in visit on 10/18/22   POC PROTHROMBIN W/INR   Result Value Ref Range    VALID INTERNAL CONTROL POC Yes     Prothrombin time (POC) 26.9 seconds    INR POC 2.2        Adherence:   Able to recall regimen? YES  Miss/extra dose? NO  Need refill? NO      Upcoming procedure(s):  N/A      Past Medical History:   Diagnosis Date    Aortic stenosis     Bicuspid aortic valve     GERD (gastroesophageal reflux disease)     Hiatal hernia     Severe aortic stenosis 3/22/2022     Allergies   Allergen Reactions    Soap Rash     Dial Soap     Current Outpatient Medications   Medication Sig    metoprolol tartrate (LOPRESSOR) 25 mg tablet TAKE 1/2 TABLET BY MOUTH TWICE A DAY    warfarin (COUMADIN) 4 mg tablet Take 1 tablet (4 mg) by mouth every Tuesday, Thursday, Saturday and Sunday or take as directed by the clinic.    warfarin (COUMADIN) 2 mg tablet Take 1 tablet (2 mg) by mouth every Monday, Wednesday and Friday or take as directed by the clinic.    melatonin 10 mg tab Take 1 Tablet by mouth daily as needed (sleep). acetaminophen (TYLENOL) 325 mg tablet Take 650 mg by mouth every four (4) hours as needed for Pain (mild). aspirin 81 mg chewable tablet Take 1 Tablet by mouth daily. Omeprazole delayed release (PRILOSEC D/R) 20 mg tablet Take 20 mg by mouth daily. No current facility-administered medications for this visit. Wt Readings from Last 3 Encounters:   10/06/22 158 lb (71.7 kg)   10/04/22 159 lb (72.1 kg)   09/29/22 157 lb (71.2 kg)     BP Readings from Last 3 Encounters:   09/02/22 101/67   08/30/22 127/71   08/03/22 110/80     Pulse Readings from Last 3 Encounters:   09/02/22 100   08/30/22 84   06/28/22 70     Lab Results   Component Value Date/Time    WBC 8.3 08/30/2022 12:05 AM    HGB 12.1 08/30/2022 12:05 AM    HCT 36.2 (L) 08/30/2022 12:05 AM    PLATELET 503 (H) 60/11/4444 12:05 AM    MCV 91.4 08/30/2022 12:05 AM     Lab Results   Component Value Date/Time    Sodium 136 08/30/2022 12:05 AM    Potassium 4.2 08/30/2022 12:05 AM    Chloride 104 08/30/2022 12:05 AM    CO2 25 08/30/2022 12:05 AM    Anion gap 7 08/30/2022 12:05 AM    Glucose 103 (H) 08/30/2022 12:05 AM    BUN 9 08/30/2022 12:05 AM    Creatinine 0.97 08/30/2022 12:05 AM    BUN/Creatinine ratio 9 (L) 08/30/2022 12:05 AM    GFR est AA >60 08/30/2022 12:05 AM    GFR est non-AA >60 08/30/2022 12:05 AM    Calcium 9.6 08/30/2022 12:05 AM    Bilirubin, total 1.2 (H) 08/24/2022 09:19 AM    Alk. phosphatase 69 08/24/2022 09:19 AM    Protein, total 8.8 (H) 08/24/2022 09:19 AM    Albumin 3.9 08/24/2022 09:19 AM    Globulin 4.9 (H) 08/24/2022 09:19 AM    A-G Ratio 0.8 (L) 08/24/2022 09:19 AM    ALT (SGPT) 17 08/24/2022 09:19 AM     CrCl cannot be calculated (Unknown ideal weight.).       INR History:   (normal INR range 0.8-1.2)     Date   INR   PT   Dose/Comments  10/18/22 2.2  26.9 2 mg Mon, Wed, Fri; 4 mg daily ROW  09/27/22 2.0  23.5 2 mg Mon, Wed, Fri; 4 mg daily ROW  09/20/22 1.5  18.6 hold dose 9/13 then take 4 mg Mon, Tue; 2 mg daily ROW  09/13/22 3.6  43.0 hold doses 9/6, 9/7 then 2 mg Thur, Fri, Sat; 4 mg daily ROW  09/06/22 6.0  72.2 4 mg daily  08/03/22 1.9  22.9 4 mg daily  07/13/22 2.3  27.6 4 mg daily  06/29/22 2.5  30.6 4 mg daily  06/15/22 2.7  32.0 4 mg daily  06/10/22 2.2   4 mg daily      Medications that can increase  INR: omeprazole   Medications that can decrease INR: N/A    A/P:       Anticoagulation:  Considering Mr. Petra Cardona past history, todays findings, and per Anticoagulation Collaborative Practice Agreement/Protocol:    Fingerstick POC INR (2.2) is Therapeutic for INR goal today. Continue warfarin 2 mg Mon, Wed, Fri; 4 mg daily ROW  INR is 2.2 and therapeutic. Patient denies changes to his medications and reports consistent intake of vitamin K foods. Patient will continue current regimen and recheck INR in 3 week(s). Patient was instructed to schedule an appointment in 3 week(s) prior to leaving the clinic. Medication reconciliation was completed during the visit. There are no discontinued medications. A full discussion of the nature of anticoagulants has been carried out. A full discussion of the need for frequent and regular monitoring, precise dosage adjustment and compliance was stressed. Side effects of potential bleeding were discussed and Mr. Reymundo Larose was instructed to call [phone] if there are any signs of abnormal bleeding. Mr. Reymundo Larose was instructed to avoid any OTC items containing aspirin or ibuprofen and prior to starting any new OTC products to consult with his physician or pharmacist to ensure no drug interactions are present. Mr. Reymundo Larose was instructed to avoid any major changes in his general diet and to avoid alcohol consumption. Mr. Reymundo Larose was provided information in the AVS that includes topics on understanding coumadin therapy, drug interaction considerations, vitamin K and coumadin use, interactions with foods and supplements containing vitamin K, and the use of herbal products. Mr. Reymundo Larose verbalized his understanding of all instructions and will call the office with any questions, concerns, or signs of abnormal bleeding or blood clot.   Notifications of recommendations will be sent to Dr. Yokasta Belle MD for review.     Thank you,  Maikol Sheridan, [address] Tracking Only    Intervention Detail: Adherence Monitorin and Lab(s) Ordered  Total # of Interventions Recommended: 2  Total # of Interventions Accepted: 2  Time Spent (min): 20

## 2022-10-17 NOTE — PATIENT INSTRUCTIONS
Today your INR was 2.2 . Your goal INR is  1.5-2.5 . You have a 2 mg and 4 mg tablet of Coumadin (warfarin). Take Coumadin (warfarin) as follows: Take 2 mg every Monday, Wednesday, Friday; and 4 mg daily rest of the week. Come back in 3 week(s) for your next finger stick/INR blood test.        Avoid any over the counter items containing aspirin or ibuprofen, and avoid great swings in general diet. Avoid alcohol consumption. Please notify the INR nurse if you are started on any new medication including over the counter or herbal supplements. Also, please notify your INR nurse if any of your other prescription or over the counter medications have been discontinued. Call [facility] at [phone] if you have any signs of abnormal bleeding/blood clot.  ------------------------------------------------------------------------------------------------------------------  Taking Warfarin Safely: Care Instructions    Your Care Instructions  Warfarin is a medicine that you take to prevent blood clots. It is often called a blood thinner. Doctors give warfarin (such as Coumadin) to reduce the risk of blood clots. You may be at risk for blood clots if you have atrial fibrillation or deep vein thrombosis. Some other health problems may also put you at risk. Warfarin slows the amount of time it takes for your blood to clot. It can cause bleeding problems. Even if you've been taking warfarin for a while, it's important to know how to take it safely. Foods and other medicines can affect the way warfarin works. Some can make warfarin work too well. This can cause bleeding problems. And some can make it work poorly, so that it does not prevent blood clots very well. You will need regular blood tests to check how long it takes for your blood to form a clot. This test is called a PT or prothrombin time test. The result of the test is called an INR level.  Depending on the test results, your doctor or anticoagulation clinic may adjust your dose of warfarin. Follow-up care is a key part of your treatment and safety. Be sure to make and go to all appointments, and call your doctor if you are having problems. It's also a good idea to know your test results and keep a list of the medicines you take. How can you care for yourself at home? Take warfarin safely  Take your warfarin at the same time each day. If you miss a dose of warfarin, don't take an extra dose to make up for it. Your doctor can tell you exactly what to do so you don't take too much or too little. Wear medical alert jewelry that lets others know that you take warfarin. You can buy this at most drugstores. Don't take warfarin if you are pregnant or planning to get pregnant. Talk to your doctor about how you can prevent getting pregnant while you are taking it. Don't change your dose or stop taking warfarin unless your doctor tells you to. Effects of medicines and food on warfarin  Don't start or stop taking any medicines, vitamins, or natural remedies unless you first talk to your doctor. Many medicines can affect how warfarin works. These include aspirin and other pain relievers, over-the-counter medicines, multivitamins, dietary supplements, and herbal products. Tell all of your doctors and pharmacists that you take warfarin. Some prescription medicines can affect how warfarin works. Keep the amount of vitamin K in your diet about the same from day to day. Do not suddenly eat a lot more or a lot less food that is rich in vitamin K than you usually do. Vitamin K affects how warfarin works and how your blood clots. Talk with your doctor before making big changes in your diet. Vitamin K is in many foods, such as:  Leafy greens, such as kale, cabbage, spinach, Swiss chard, and lettuce. Canola and soybean oils. Green vegetables, such as asparagus, broccoli, and [place] sprouts.   Vegetable drinks, green tea leaves, and some dietary supplement drinks. Avoid cranberry juice and other cranberry products. They can increase the effects of warfarin. Limit your use of alcohol. Avoid bleeding by preventing falls and injuries  Wear slippers or shoes with nonskid soles. Remove throw rugs and clutter. Rearrange furniture and electrical cords to keep them out of walking paths. Keep stairways, porches, and outside walkways well lit. Use night-lights in hallways and bathrooms. Be extra careful when you work with sharp tools or knives. When should you call for help? Call 911 anytime you think you may need emergency care. For example, call if:  You have a sudden, severe headache that is different from past headaches. Call your doctor now or seek immediate medical care if:  You have any abnormal bleeding, such as:  Nosebleeds. Vaginal bleeding that is different (heavier, more frequent, at a different time of the month) than what you are used to. Bloody or black stools, or rectal bleeding. Bloody or pink urine. Watch closely for changes in your health, and be sure to contact your doctor if you have any problems. Where can you learn more? Go to http://www.gray.com/. Enter A164 in the search box to learn more about \"Taking Warfarin Safely: Care Instructions. \"  Current as of: January 27, 2016  Content Version: 11.1  © 9114-9857 [employer], [email or web address]. Care instructions adapted under license by [employer] (which disclaims liability or warranty for this information). If you have questions about a medical condition or this instruction, always ask your healthcare professional. [place] [address] any warranty or liability for your use of this information.

## 2022-10-18 ENCOUNTER — ANTI-COAG VISIT (OUTPATIENT)
Dept: PHARMACY | Age: 47
End: 2022-10-18
Payer: COMMERCIAL

## 2022-10-18 DIAGNOSIS — Z95.2 S/P AVR: Primary | ICD-10-CM

## 2022-10-18 LAB
INR BLD: 2.2
PT POC: 26.9 SECONDS
VALID INTERNAL CONTROL?: YES

## 2022-10-18 PROCEDURE — 85610 PROTHROMBIN TIME: CPT

## 2022-10-18 PROCEDURE — 99211 OFF/OP EST MAY X REQ PHY/QHP: CPT

## 2022-10-19 ENCOUNTER — HOSPITAL ENCOUNTER (OUTPATIENT)
Dept: CARDIAC REHAB | Age: 47
Discharge: HOME | End: 2022-10-19
Payer: COMMERCIAL

## 2022-10-19 VITALS — BODY MASS INDEX: 22.11 KG/M2 | WEIGHT: 163 LBS

## 2022-10-19 PROCEDURE — 93798 PHYS/QHP OP CAR RHAB W/ECG: CPT

## 2022-10-21 ENCOUNTER — HOSPITAL ENCOUNTER (OUTPATIENT)
Dept: CARDIAC REHAB | Age: 47
Discharge: HOME | End: 2022-10-21
Payer: COMMERCIAL

## 2022-10-21 VITALS — BODY MASS INDEX: 22.24 KG/M2 | WEIGHT: 164 LBS

## 2022-10-21 PROCEDURE — 93798 PHYS/QHP OP CAR RHAB W/ECG: CPT

## 2022-10-25 ENCOUNTER — HOSPITAL ENCOUNTER (OUTPATIENT)
Dept: CARDIAC REHAB | Age: 47
Discharge: HOME | End: 2022-10-25
Payer: COMMERCIAL

## 2022-10-25 VITALS — WEIGHT: 165 LBS | BODY MASS INDEX: 22.38 KG/M2

## 2022-10-25 PROCEDURE — 93798 PHYS/QHP OP CAR RHAB W/ECG: CPT

## 2022-10-27 ENCOUNTER — HOSPITAL ENCOUNTER (OUTPATIENT)
Dept: CARDIAC REHAB | Age: 47
Discharge: HOME | End: 2022-10-27
Payer: COMMERCIAL

## 2022-10-27 PROCEDURE — 93798 PHYS/QHP OP CAR RHAB W/ECG: CPT

## 2022-10-28 NOTE — CARDIO/PULMONARY
Cardiac Rehabilitation Program Completion Appointment           NAME: Braulio Villasenor : 1975 AGE: 49 y.o. GENDER: male  CARDIAC REHAB ADMITTING DIAGNOSIS: Valve  Referring Physician: Evaristo Carter, *    Relevant Comorbidites: hypertension        LABS:   Lab Results   Component Value Date/Time    Hemoglobin A1c 5.5 2022 01:18 PM     Lab Results   Component Value Date/Time    Cholesterol, total 204 (H) 2020 09:54 AM    HDL Cholesterol 50 2020 09:54 AM    LDL, calculated 131 (H) 2020 09:54 AM    VLDL, calculated 23 2020 09:54 AM    Triglyceride 114 2020 09:54 AM         MEDICATIONS/SUPPLEMENTS:     Prior to Admission medications    Medication Sig Start Date End Date Taking? Authorizing Provider   metoprolol tartrate (LOPRESSOR) 25 mg tablet TAKE 1/2 TABLET BY MOUTH TWICE A DAY 10/23/22   Samantha Timmons MD   warfarin (COUMADIN) 4 mg tablet Take 1 tablet (4 mg) by mouth every Tuesday, Thursday, Saturday and  or take as directed by the clinic. 22   Daphne Irvin MD   warfarin (COUMADIN) 2 mg tablet Take 1 tablet (2 mg) by mouth every Monday, Wednesday and Friday or take as directed by the clinic. 22   Daphne Irvin MD   melatonin 10 mg tab Take 1 Tablet by mouth daily as needed (sleep). Provider, Historical   acetaminophen (TYLENOL) 325 mg tablet Take 650 mg by mouth every four (4) hours as needed for Pain (mild). Provider, Historical   aspirin 81 mg chewable tablet Take 1 Tablet by mouth daily. 22   Amarjit Washburn NP   Omeprazole delayed release (PRILOSEC D/R) 20 mg tablet Take 20 mg by mouth daily.     Provider, Historical           ANTHROPOMETRICS:    Ht Readings from Last 1 Encounters:   22 6' (1.829 m)      Wt Readings from Last 1 Encounters:   10/27/22 (P) 74.4 kg (164 lb)          Waist: 36.5 inches      SCREENING SCORES:            Intake            Exit  Berry 20.7 58.2   DASI QOL 26 12   PHQ-9 0 0   Rate Your Plate 51 62   [facility] [address] [facility]     Program Summary:    Christopher Grant completed 36/36 sessions in the Cardiac Rehab program. He will continue exercising 5x week, is planning to join the [facility], and will focus on diet and nutrition at home. Weight goal includes maintaining current weight and eating healthy to manage GI symptoms. He attended 1 classes and is aware of his cardiac risk factors. Weight loss was 5 lbs. MET level increase from 3.3 to 6.9. 6MWT distance increased from 380 m to 563 m. PATIENT POST PROGRAM GOALS:    Weight Goals:  Maintain current weight/ok to gain muscle weight    Nutrition Goals:  Daily Recommendations:  Calories: 3106-8466 /day  Saturated Fat: <20 g/day  Sodium: <2200 mg/day    Exercise Goals:  Type: Jogging  Min per day: 30-45  Days/week: 3-5      Other:  - read and compare food labels    - pack heart healthy lunch for work including fruit or vegetable     - Join [employer] to supplement home workouts      Questions addressed. Follow-up plans discussed. Christopher Gratn verbalized understanding.             Loree Pena

## 2022-11-08 ENCOUNTER — PATIENT MESSAGE (OUTPATIENT)
Dept: CARDIOLOGY CLINIC | Age: 47
End: 2022-11-08

## 2022-11-09 ENCOUNTER — ANTI-COAG VISIT (OUTPATIENT)
Dept: PHARMACY | Age: 47
End: 2022-11-09
Payer: COMMERCIAL

## 2022-11-09 DIAGNOSIS — Z95.2 S/P AVR: Primary | ICD-10-CM

## 2022-11-09 LAB
INR BLD: 1.3
PT POC: 15.2 SECONDS
VALID INTERNAL CONTROL?: YES

## 2022-11-09 PROCEDURE — 85610 PROTHROMBIN TIME: CPT

## 2022-11-09 PROCEDURE — 99211 OFF/OP EST MAY X REQ PHY/QHP: CPT

## 2022-11-09 NOTE — PROGRESS NOTES
Pharmacy Progress Note - Anticoagulation Management    S/O:  Mr. Christopher Grant  is a 49 y.o. male seen today for anticoagulation management for the diagnosis of Aortic Valve Replacement. HPI: At last visit (10/18), INR was 2.2 and therapeutic. Patient denied changes to his medications and reported consistent intake of vitamin K foods. Patient will continue current regimen and recheck INR in 3 week(s). Interim History:    Warfarin start date: 5/4/22  INR Goal: 1.5-2.5 (On-X)  Current warfarin regimen: 2 mg Mon, Wed, Fri; 4 mg daily ROW  Warfarin tablet strength:   2 mg and 4 mg  Duration of therapy: Indefinite    Today's pertinent positives includes:  Change in diet/appetite     Patient reports eating [employer] for lunch on Monday (11/7). Results for orders placed or performed in visit on 11/09/22   POC PROTHROMBIN W/INR   Result Value Ref Range    VALID INTERNAL CONTROL POC Yes     Prothrombin time (POC) 15.2 seconds    INR POC 1.3        Adherence:   Able to recall regimen? YES  Miss/extra dose? NO  Need refill? NO      Upcoming procedure(s):  N/A      Past Medical History:   Diagnosis Date    Aortic stenosis     Bicuspid aortic valve     GERD (gastroesophageal reflux disease)     Hiatal hernia     Severe aortic stenosis 3/22/2022     Allergies   Allergen Reactions    Soap Rash     Dial Soap     Current Outpatient Medications   Medication Sig    metoprolol tartrate (LOPRESSOR) 25 mg tablet TAKE 1/2 TABLET BY MOUTH TWICE A DAY    warfarin (COUMADIN) 4 mg tablet Take 1 tablet (4 mg) by mouth every Tuesday, Thursday, Saturday and Sunday or take as directed by the clinic.    warfarin (COUMADIN) 2 mg tablet Take 1 tablet (2 mg) by mouth every Monday, Wednesday and Friday or take as directed by the clinic.    melatonin 10 mg tab Take 1 Tablet by mouth daily as needed (sleep). acetaminophen (TYLENOL) 325 mg tablet Take 650 mg by mouth every four (4) hours as needed for Pain (mild).     aspirin 81 mg chewable tablet Take 1 Tablet by mouth daily. Omeprazole delayed release (PRILOSEC D/R) 20 mg tablet Take 20 mg by mouth daily. No current facility-administered medications for this visit. Wt Readings from Last 3 Encounters:   10/27/22 (P) 164 lb (74.4 kg)   10/25/22 165 lb (74.8 kg)   10/21/22 164 lb (74.4 kg)     BP Readings from Last 3 Encounters:   09/02/22 101/67   08/30/22 127/71   08/03/22 110/80     Pulse Readings from Last 3 Encounters:   09/02/22 100   08/30/22 84   06/28/22 70     Lab Results   Component Value Date/Time    WBC 8.3 08/30/2022 12:05 AM    HGB 12.1 08/30/2022 12:05 AM    HCT 36.2 (L) 08/30/2022 12:05 AM    PLATELET 569 (H) 28/62/6198 12:05 AM    MCV 91.4 08/30/2022 12:05 AM     Lab Results   Component Value Date/Time    Sodium 136 08/30/2022 12:05 AM    Potassium 4.2 08/30/2022 12:05 AM    Chloride 104 08/30/2022 12:05 AM    CO2 25 08/30/2022 12:05 AM    Anion gap 7 08/30/2022 12:05 AM    Glucose 103 (H) 08/30/2022 12:05 AM    BUN 9 08/30/2022 12:05 AM    Creatinine 0.97 08/30/2022 12:05 AM    BUN/Creatinine ratio 9 (L) 08/30/2022 12:05 AM    GFR est AA >60 08/30/2022 12:05 AM    GFR est non-AA >60 08/30/2022 12:05 AM    Calcium 9.6 08/30/2022 12:05 AM    Bilirubin, total 1.2 (H) 08/24/2022 09:19 AM    Alk. phosphatase 69 08/24/2022 09:19 AM    Protein, total 8.8 (H) 08/24/2022 09:19 AM    Albumin 3.9 08/24/2022 09:19 AM    Globulin 4.9 (H) 08/24/2022 09:19 AM    A-G Ratio 0.8 (L) 08/24/2022 09:19 AM    ALT (SGPT) 17 08/24/2022 09:19 AM     CrCl cannot be calculated (Unknown ideal weight.).       INR History:   (normal INR range 0.8-1.2)     Date   INR   PT   Dose/Comments  11/09/22 1.3  15.2 2 mg Mon, Wed, Fri; 4 mg daily ROW  10/18/22 2.2  26.9 2 mg Mon, Wed, Fri; 4 mg daily ROW  09/27/22 2.0  23.5 2 mg Mon, Wed, Fri; 4 mg daily ROW  09/20/22 1.5  18.6 hold dose 9/13 then take 4 mg Mon, Tue; 2 mg daily ROW  09/13/22 3.6  43.0 hold doses 9/6, 9/7 then 2 mg Thur, Fri, Sat; 4 mg daily ROW  09/06/22 6.0  72.2 4 mg daily  08/03/22 1.9  22.9 4 mg daily  07/13/22 2.3  27.6 4 mg daily  06/29/22 2.5  30.6 4 mg daily  06/15/22 2.7  32.0 4 mg daily  06/10/22 2.2   4 mg daily      Medications that can increase  INR: omeprazole   Medications that can decrease INR: N/A    A/P:       Anticoagulation:  Considering Mr. Marissa Cazares past history, todays findings, and per Anticoagulation Collaborative Practice Agreement/Protocol:    Fingerstick POC INR (1.3) is Subtherapeutic for INR goal today. Continue warfarin 2 mg Mon, Wed, Fri; 4 mg daily ROW  INR is 1.3 and subtherapeutic. Patient denies missed doses of warfarin or changes to his medications. Patient reports eating [employer] for lunch on Monday (11/7). Patient has been previously therapeutic on current regimen and will continue 2 mg Mon, Wed, Fri; 4 mg daily ROW. Patient will recheck INR in 2 week(s). Patient was instructed to schedule an appointment in 2 week(s) prior to leaving the clinic. Medication reconciliation was completed during the visit. There are no discontinued medications. A full discussion of the nature of anticoagulants has been carried out. A full discussion of the need for frequent and regular monitoring, precise dosage adjustment and compliance was stressed. Side effects of potential bleeding were discussed and Mr. Juan Tao was instructed to call [phone] if there are any signs of abnormal bleeding. Mr. Juan Tao was instructed to avoid any OTC items containing aspirin or ibuprofen and prior to starting any new OTC products to consult with his physician or pharmacist to ensure no drug interactions are present. Mr. Juan Tao was instructed to avoid any major changes in his general diet and to avoid alcohol consumption.     Mr. Juan Tao was provided information in the AVS that includes topics on understanding coumadin therapy, drug interaction considerations, vitamin K and coumadin use, interactions with foods and supplements containing vitamin K, and the use of herbal products. Mr. Kika Ambrose verbalized his understanding of all instructions and will call the office with any questions, concerns, or signs of abnormal bleeding or blood clot. Notifications of recommendations will be sent to Dr. Luis F Shirley MD for review.     Thank you,  Duglas Negro, [address] Tracking Only    Intervention Detail: Adherence Monitorin and Lab(s) Ordered  Total # of Interventions Recommended: 2  Total # of Interventions Accepted: 2  Time Spent (min): 20

## 2022-11-09 NOTE — PATIENT INSTRUCTIONS
Today your INR was 1.3 . Your goal INR is  1.5-2.5 . You have a 2 mg and 4 mg tablet of Coumadin (warfarin). Take Coumadin (warfarin) as follows: Take 2 mg every Monday, Wednesday, Friday; and 4 mg daily rest of the week. Come back in 2 week(s) for your next finger stick/INR blood test.        Avoid any over the counter items containing aspirin or ibuprofen, and avoid great swings in general diet. Avoid alcohol consumption. Please notify the INR nurse if you are started on any new medication including over the counter or herbal supplements. Also, please notify your INR nurse if any of your other prescription or over the counter medications have been discontinued. Call [facility] at [phone] if you have any signs of abnormal bleeding/blood clot.  ------------------------------------------------------------------------------------------------------------------  Taking Warfarin Safely: Care Instructions    Your Care Instructions  Warfarin is a medicine that you take to prevent blood clots. It is often called a blood thinner. Doctors give warfarin (such as Coumadin) to reduce the risk of blood clots. You may be at risk for blood clots if you have atrial fibrillation or deep vein thrombosis. Some other health problems may also put you at risk. Warfarin slows the amount of time it takes for your blood to clot. It can cause bleeding problems. Even if you've been taking warfarin for a while, it's important to know how to take it safely. Foods and other medicines can affect the way warfarin works. Some can make warfarin work too well. This can cause bleeding problems. And some can make it work poorly, so that it does not prevent blood clots very well. You will need regular blood tests to check how long it takes for your blood to form a clot. This test is called a PT or prothrombin time test. The result of the test is called an INR level.  Depending on the test results, your doctor or anticoagulation clinic may adjust your dose of warfarin. Follow-up care is a key part of your treatment and safety. Be sure to make and go to all appointments, and call your doctor if you are having problems. It's also a good idea to know your test results and keep a list of the medicines you take. How can you care for yourself at home? Take warfarin safely  Take your warfarin at the same time each day. If you miss a dose of warfarin, don't take an extra dose to make up for it. Your doctor can tell you exactly what to do so you don't take too much or too little. Wear medical alert jewelry that lets others know that you take warfarin. You can buy this at most drugstores. Don't take warfarin if you are pregnant or planning to get pregnant. Talk to your doctor about how you can prevent getting pregnant while you are taking it. Don't change your dose or stop taking warfarin unless your doctor tells you to. Effects of medicines and food on warfarin  Don't start or stop taking any medicines, vitamins, or natural remedies unless you first talk to your doctor. Many medicines can affect how warfarin works. These include aspirin and other pain relievers, over-the-counter medicines, multivitamins, dietary supplements, and herbal products. Tell all of your doctors and pharmacists that you take warfarin. Some prescription medicines can affect how warfarin works. Keep the amount of vitamin K in your diet about the same from day to day. Do not suddenly eat a lot more or a lot less food that is rich in vitamin K than you usually do. Vitamin K affects how warfarin works and how your blood clots. Talk with your doctor before making big changes in your diet. Vitamin K is in many foods, such as:  Leafy greens, such as kale, cabbage, spinach, Swiss chard, and lettuce. Canola and soybean oils. Green vegetables, such as asparagus, broccoli, and [place] sprouts.   Vegetable drinks, green tea leaves, and some dietary supplement drinks. Avoid cranberry juice and other cranberry products. They can increase the effects of warfarin. Limit your use of alcohol. Avoid bleeding by preventing falls and injuries  Wear slippers or shoes with nonskid soles. Remove throw rugs and clutter. Rearrange furniture and electrical cords to keep them out of walking paths. Keep stairways, porches, and outside walkways well lit. Use night-lights in hallways and bathrooms. Be extra careful when you work with sharp tools or knives. When should you call for help? Call 911 anytime you think you may need emergency care. For example, call if:  You have a sudden, severe headache that is different from past headaches. Call your doctor now or seek immediate medical care if:  You have any abnormal bleeding, such as:  Nosebleeds. Vaginal bleeding that is different (heavier, more frequent, at a different time of the month) than what you are used to. Bloody or black stools, or rectal bleeding. Bloody or pink urine. Watch closely for changes in your health, and be sure to contact your doctor if you have any problems. Where can you learn more? Go to http://www.gray.com/. Enter Z314 in the search box to learn more about \"Taking Warfarin Safely: Care Instructions. \"  Current as of: January 27, 2016  Content Version: 11.1  © 9977-3830 [employer]. Care instructions adapted under license by [employer] (which disclaims liability or warranty for this information). If you have questions about a medical condition or this instruction, always ask your healthcare professional. [place] [address] any warranty or liability for your use of this information.

## 2022-11-10 ENCOUNTER — TELEPHONE (OUTPATIENT)
Dept: CARDIOLOGY CLINIC | Age: 47
End: 2022-11-10

## 2022-11-10 NOTE — TELEPHONE ENCOUNTER
Patient sent a message in [employer], he is having a colonoscopy scheduled for Dec 6. Requesting cardiac clearance. 1. Due to the recent valve replacement, they want to ensure that appropriate antibiotic coverage is arranged. 2. They has asked that I stop taking warfarin 5 days prior to the procedure. Please let me know what steps I need to take related to both of these requests.     Please advise

## 2022-11-14 NOTE — TELEPHONE ENCOUNTER
Pre-Procedure Cardiac Clearance Request:    Facility: [facility],    Procedure Date: Dec 6, 2022    Procedure: Colonoscopy    Surgeon: Dr Gustavo Hernandez     Anesthesia (if applicable)    Request for Interruption of Anticoagulants: Warfarin    May stop anticoagulant how many days prior and when to resume    Is patient cleared from a cardiac standpoint to proceed with upcoming procedure. Please advise. Due to the recent valve replacement, they want to ensure that appropriate antibiotic coverage is arranged.

## 2022-11-14 NOTE — TELEPHONE ENCOUNTER
Recommend Coumadin to Lovenox and back to Coumadin bridging as per the [facility] Coumadin clinic. He does not need antibiotic prior to GI procedure.

## 2022-11-15 NOTE — TELEPHONE ENCOUNTER
Verified Patient with two identifiers  Spoke with patient regarding results and recommendations. Patient voiced understanding. Fax sent to [facility] at [phone].

## 2022-11-22 NOTE — PATIENT INSTRUCTIONS
Today your INR was 1.4 . Your goal INR is  1.5-2.5 . You have a 2 mg and 4 mg tablet of Coumadin (warfarin). Take Coumadin (warfarin) as follows: Take 2 mg every Monday, Wednesday; and 4 mg daily rest of the week. Hold warfarin doses 12/1-12/5/22. Please restart warfarin on 12/6/22 under the guidance of your provider. Come back in 3 week(s) for your next finger stick/INR blood test.        Avoid any over the counter items containing aspirin or ibuprofen, and avoid great swings in general diet. Avoid alcohol consumption. Please notify the INR nurse if you are started on any new medication including over the counter or herbal supplements. Also, please notify your INR nurse if any of your other prescription or over the counter medications have been discontinued. Call [facility] at [phone] if you have any signs of abnormal bleeding/blood clot.  ------------------------------------------------------------------------------------------------------------------  Taking Warfarin Safely: Care Instructions    Your Care Instructions  Warfarin is a medicine that you take to prevent blood clots. It is often called a blood thinner. Doctors give warfarin (such as Coumadin) to reduce the risk of blood clots. You may be at risk for blood clots if you have atrial fibrillation or deep vein thrombosis. Some other health problems may also put you at risk. Warfarin slows the amount of time it takes for your blood to clot. It can cause bleeding problems. Even if you've been taking warfarin for a while, it's important to know how to take it safely. Foods and other medicines can affect the way warfarin works. Some can make warfarin work too well. This can cause bleeding problems. And some can make it work poorly, so that it does not prevent blood clots very well. You will need regular blood tests to check how long it takes for your blood to form a clot.  This test is called a PT or prothrombin time test. The result of the test is called an INR level. Depending on the test results, your doctor or anticoagulation clinic may adjust your dose of warfarin. Follow-up care is a key part of your treatment and safety. Be sure to make and go to all appointments, and call your doctor if you are having problems. It's also a good idea to know your test results and keep a list of the medicines you take. How can you care for yourself at home? Take warfarin safely  Take your warfarin at the same time each day. If you miss a dose of warfarin, don't take an extra dose to make up for it. Your doctor can tell you exactly what to do so you don't take too much or too little. Wear medical alert jewelry that lets others know that you take warfarin. You can buy this at most drugstores. Don't take warfarin if you are pregnant or planning to get pregnant. Talk to your doctor about how you can prevent getting pregnant while you are taking it. Don't change your dose or stop taking warfarin unless your doctor tells you to. Effects of medicines and food on warfarin  Don't start or stop taking any medicines, vitamins, or natural remedies unless you first talk to your doctor. Many medicines can affect how warfarin works. These include aspirin and other pain relievers, over-the-counter medicines, multivitamins, dietary supplements, and herbal products. Tell all of your doctors and pharmacists that you take warfarin. Some prescription medicines can affect how warfarin works. Keep the amount of vitamin K in your diet about the same from day to day. Do not suddenly eat a lot more or a lot less food that is rich in vitamin K than you usually do. Vitamin K affects how warfarin works and how your blood clots. Talk with your doctor before making big changes in your diet. Vitamin K is in many foods, such as:  Leafy greens, such as kale, cabbage, spinach, Swiss chard, and lettuce. Canola and soybean oils.   Green vegetables, such as asparagus, broccoli, and [place] sprouts. Vegetable drinks, green tea leaves, and some dietary supplement drinks. Avoid cranberry juice and other cranberry products. They can increase the effects of warfarin. Limit your use of alcohol. Avoid bleeding by preventing falls and injuries  Wear slippers or shoes with nonskid soles. Remove throw rugs and clutter. Rearrange furniture and electrical cords to keep them out of walking paths. Keep stairways, porches, and outside walkways well lit. Use night-lights in hallways and bathrooms. Be extra careful when you work with sharp tools or knives. When should you call for help? Call 911 anytime you think you may need emergency care. For example, call if:  You have a sudden, severe headache that is different from past headaches. Call your doctor now or seek immediate medical care if:  You have any abnormal bleeding, such as:  Nosebleeds. Vaginal bleeding that is different (heavier, more frequent, at a different time of the month) than what you are used to. Bloody or black stools, or rectal bleeding. Bloody or pink urine. Watch closely for changes in your health, and be sure to contact your doctor if you have any problems. Where can you learn more? Go to http://www.gray.com/. Enter Q088 in the search box to learn more about \"Taking Warfarin Safely: Care Instructions. \"  Current as of: January 27, 2016  Content Version: 11.1  © 0532-7606 [employer]. Care instructions adapted under license by [employer] (which disclaims liability or warranty for this information). If you have questions about a medical condition or this instruction, always ask your healthcare professional. [place] [address] any warranty or liability for your use of this information.

## 2022-11-22 NOTE — PROGRESS NOTES
Pharmacy Progress Note - Anticoagulation Management    S/O:  Mr. Braulio Villasenor  is a 49 y.o. male seen today for anticoagulation management for the diagnosis of Aortic Valve Replacement. HPI: At last visit (11/9), INR was 1.3 and subtherapeutic. Patient denied missed doses of warfarin or changes to his medications. Patient reported eating [employer] for lunch on Monday (11/7). Patient has been previously therapeutic on current regimen and will continue 2 mg Mon, Wed, Fri; 4 mg daily ROW. Patient will recheck INR in 2 week(s). Interim History:    Warfarin start date: 5/4/22  INR Goal: 1.5-2.5 (On-X)  Current warfarin regimen: 2 mg Mon, Wed, Fri; 4 mg daily ROW  Warfarin tablet strength:   2 mg and 4 mg  Duration of therapy: Indefinite    Today's pertinent positives includes:  No significant changes since last visit      Results for orders placed or performed in visit on 11/23/22   POC PROTHROMBIN W/INR   Result Value Ref Range    VALID INTERNAL CONTROL POC Yes     Prothrombin time (POC) 17.2 seconds    INR POC 1.4        Adherence:   Able to recall regimen? YES  Miss/extra dose? NO  Need refill? NO      Upcoming procedure(s):  YES    Patient has an upcoming colonoscopy on 12/6/22. Per Dr. Jazmyne Arnold, patient will require Lovenox therapy for the interruption in warfarin therapy. Also, patient will be bridged with Lovenox and warfarin post procedure. Past Medical History:   Diagnosis Date    Aortic stenosis     Bicuspid aortic valve     GERD (gastroesophageal reflux disease)     Hiatal hernia     Severe aortic stenosis 3/22/2022     Allergies   Allergen Reactions    Soap Rash     Dial Soap     Current Outpatient Medications   Medication Sig    enoxaparin (LOVENOX) 80 mg/0.8 mL injection 70 mg by SubCUTAneous route every twelve (12) hours for 10 days.     metoprolol tartrate (LOPRESSOR) 25 mg tablet TAKE 1/2 TABLET BY MOUTH TWICE A DAY    warfarin (COUMADIN) 4 mg tablet Take 1 tablet (4 mg) by mouth every Tuesday, Thursday, Saturday and Sunday or take as directed by the clinic.    warfarin (COUMADIN) 2 mg tablet Take 1 tablet (2 mg) by mouth every Monday, Wednesday and Friday or take as directed by the clinic.    melatonin 10 mg tab Take 1 Tablet by mouth daily as needed (sleep). acetaminophen (TYLENOL) 325 mg tablet Take 650 mg by mouth every four (4) hours as needed for Pain (mild). aspirin 81 mg chewable tablet Take 1 Tablet by mouth daily. Omeprazole delayed release (PRILOSEC D/R) 20 mg tablet Take 20 mg by mouth daily. No current facility-administered medications for this visit. Wt Readings from Last 3 Encounters:   10/27/22 (P) 164 lb (74.4 kg)   10/25/22 165 lb (74.8 kg)   10/21/22 164 lb (74.4 kg)     BP Readings from Last 3 Encounters:   09/02/22 101/67   08/30/22 127/71   08/03/22 110/80     Pulse Readings from Last 3 Encounters:   09/02/22 100   08/30/22 84   06/28/22 70     Lab Results   Component Value Date/Time    WBC 8.3 08/30/2022 12:05 AM    HGB 12.1 08/30/2022 12:05 AM    HCT 36.2 (L) 08/30/2022 12:05 AM    PLATELET 244 (H) 75/08/3815 12:05 AM    MCV 91.4 08/30/2022 12:05 AM     Lab Results   Component Value Date/Time    Sodium 136 08/30/2022 12:05 AM    Potassium 4.2 08/30/2022 12:05 AM    Chloride 104 08/30/2022 12:05 AM    CO2 25 08/30/2022 12:05 AM    Anion gap 7 08/30/2022 12:05 AM    Glucose 103 (H) 08/30/2022 12:05 AM    BUN 9 08/30/2022 12:05 AM    Creatinine 0.97 08/30/2022 12:05 AM    BUN/Creatinine ratio 9 (L) 08/30/2022 12:05 AM    GFR est AA >60 08/30/2022 12:05 AM    GFR est non-AA >60 08/30/2022 12:05 AM    Calcium 9.6 08/30/2022 12:05 AM    Bilirubin, total 1.2 (H) 08/24/2022 09:19 AM    Alk.  phosphatase 69 08/24/2022 09:19 AM    Protein, total 8.8 (H) 08/24/2022 09:19 AM    Albumin 3.9 08/24/2022 09:19 AM    Globulin 4.9 (H) 08/24/2022 09:19 AM    A-G Ratio 0.8 (L) 08/24/2022 09:19 AM    ALT (SGPT) 17 08/24/2022 09:19 AM     CrCl cannot be calculated (Unknown ideal weight.). INR History:   (normal INR range 0.8-1.2)     Date   INR   PT   Dose/Comments  11/23/22 1.4  17.2 2 mg Mon, Wed, Fri; 4 mg daily ROW  11/09/22 1.3  15.2 2 mg Mon, Wed, Fri; 4 mg daily ROW  10/18/22 2.2  26.9 2 mg Mon, Wed, Fri; 4 mg daily ROW  09/27/22 2.0  23.5 2 mg Mon, Wed, Fri; 4 mg daily ROW  09/20/22 1.5  18.6 hold dose 9/13 then take 4 mg Mon, Tue; 2 mg daily ROW  09/13/22 3.6  43.0 hold doses 9/6, 9/7 then 2 mg Thur, Fri, Sat; 4 mg daily ROW  09/06/22 6.0  72.2 4 mg daily  08/03/22 1.9  22.9 4 mg daily  07/13/22 2.3  27.6 4 mg daily  06/29/22 2.5  30.6 4 mg daily  06/15/22 2.7  32.0 4 mg daily  06/10/22 2.2   4 mg daily      Medications that can increase  INR: omeprazole   Medications that can decrease INR: N/A    A/P:       Anticoagulation:  Considering Mr. India Funes past history, todays findings, and per Anticoagulation Collaborative Practice Agreement/Protocol:    Fingerstick POC INR (1.4) is Subtherapeutic for INR goal today. Change warfarin to 2 mg Mon, Wed; 4 mg daily ROW, except hold warfarin doses 12/1-12/5. Restart warfarin under the guidance of the provider on 12/6. INR is 1.4 and subtherapeutic. Patient denies missed doses of warfarin or changes to his medications. Patient reports consistent intake of vitamin K foods. Over the past 7 days, patient has taken 22 mg of warfarin. Patient will increase weekly dose from 22 mg to 24 mg (~9%) and patient will take 2 mg Mon, Wed; 4 mg daily ROW. Patient reports an upcoming colonoscopy procedure on 12/6. Patient will hold warfarin doses 12/1-12/5. Patient will take Lovenox injections as instructed by the [facility]. Patient will resume warfarin and Lovenox on 12/6 under the guidance of the provider. Patient will recheck INR in 1 week(s) post colonoscopy. Patient was instructed to schedule an appointment in 3 week(s) prior to leaving the clinic. Medication reconciliation was completed during the visit.     There are no discontinued medications. A full discussion of the nature of anticoagulants has been carried out. A full discussion of the need for frequent and regular monitoring, precise dosage adjustment and compliance was stressed. Side effects of potential bleeding were discussed and Mr. Kika Ambrose was instructed to call [phone] if there are any signs of abnormal bleeding. Mr. Kika Ambrose was instructed to avoid any OTC items containing aspirin or ibuprofen and prior to starting any new OTC products to consult with his physician or pharmacist to ensure no drug interactions are present. Mr. Kika Ambrose was instructed to avoid any major changes in his general diet and to avoid alcohol consumption. Mr. Kika Ambrose was provided information in the AVS that includes topics on understanding coumadin therapy, drug interaction considerations, vitamin K and coumadin use, interactions with foods and supplements containing vitamin K, and the use of herbal products. Mr. Kika Ambrose verbalized his understanding of all instructions and will call the office with any questions, concerns, or signs of abnormal bleeding or blood clot. Notifications of recommendations will be sent to Dr. Luis F Shirley MD for review.     Thank you,  Duglas Negro, [id number] [facility] Tracking Only    Intervention Detail: Adherence Monitorin, Dose Adjustment: 1, reason: Therapy Optimization, and Lab(s) Ordered  Total # of Interventions Recommended: 3  Total # of Interventions Accepted: 3  Time Spent (min): 20

## 2022-11-23 ENCOUNTER — ANTI-COAG VISIT (OUTPATIENT)
Dept: PHARMACY | Age: 47
End: 2022-11-23
Payer: COMMERCIAL

## 2022-11-23 DIAGNOSIS — Z95.2 S/P AVR: Primary | ICD-10-CM

## 2022-11-23 LAB
INR BLD: 1.4
PT POC: 17.2 SECONDS
VALID INTERNAL CONTROL?: YES

## 2022-11-23 PROCEDURE — 85610 PROTHROMBIN TIME: CPT

## 2022-11-23 PROCEDURE — 99212 OFFICE O/P EST SF 10 MIN: CPT

## 2022-11-23 RX ORDER — ENOXAPARIN SODIUM 100 MG/ML
70 INJECTION SUBCUTANEOUS EVERY 12 HOURS
Qty: 1 EACH | Refills: 0 | Status: SHIPPED | OUTPATIENT
Start: 2022-11-23 | End: 2022-12-03

## 2022-12-13 ENCOUNTER — ANTI-COAG VISIT (OUTPATIENT)
Dept: PHARMACY | Age: 47
End: 2022-12-13
Payer: COMMERCIAL

## 2022-12-13 DIAGNOSIS — Z95.2 S/P AVR: Primary | ICD-10-CM

## 2022-12-13 LAB
INR BLD: 1.1
PT POC: 12.9 SECONDS
VALID INTERNAL CONTROL?: YES

## 2022-12-13 PROCEDURE — 99212 OFFICE O/P EST SF 10 MIN: CPT

## 2022-12-13 PROCEDURE — 85610 PROTHROMBIN TIME: CPT

## 2022-12-13 NOTE — PATIENT INSTRUCTIONS
Today your INR was 1.1 . Your goal INR is  1.5-2.5 . You have a 2 mg and 4 mg tablet of Coumadin (warfarin). Take Coumadin (warfarin) as follows: Take 8 mg today (12/13/22) and then take 2 mg every Monday; and 4 mg daily rest of the week. Come back in 1 week(s) for your next finger stick/INR blood test.        Avoid any over the counter items containing aspirin or ibuprofen, and avoid great swings in general diet. Avoid alcohol consumption. Please notify the INR nurse if you are started on any new medication including over the counter or herbal supplements. Also, please notify your INR nurse if any of your other prescription or over the counter medications have been discontinued. Call [facility] at [phone] if you have any signs of abnormal bleeding/blood clot.  ------------------------------------------------------------------------------------------------------------------  Taking Warfarin Safely: Care Instructions    Your Care Instructions  Warfarin is a medicine that you take to prevent blood clots. It is often called a blood thinner. Doctors give warfarin (such as Coumadin) to reduce the risk of blood clots. You may be at risk for blood clots if you have atrial fibrillation or deep vein thrombosis. Some other health problems may also put you at risk. Warfarin slows the amount of time it takes for your blood to clot. It can cause bleeding problems. Even if you've been taking warfarin for a while, it's important to know how to take it safely. Foods and other medicines can affect the way warfarin works. Some can make warfarin work too well. This can cause bleeding problems. And some can make it work poorly, so that it does not prevent blood clots very well. You will need regular blood tests to check how long it takes for your blood to form a clot. This test is called a PT or prothrombin time test. The result of the test is called an INR level.  Depending on the test results, your doctor or anticoagulation clinic may adjust your dose of warfarin. Follow-up care is a key part of your treatment and safety. Be sure to make and go to all appointments, and call your doctor if you are having problems. It's also a good idea to know your test results and keep a list of the medicines you take. How can you care for yourself at home? Take warfarin safely  Take your warfarin at the same time each day. If you miss a dose of warfarin, don't take an extra dose to make up for it. Your doctor can tell you exactly what to do so you don't take too much or too little. Wear medical alert jewelry that lets others know that you take warfarin. You can buy this at most drugstores. Don't take warfarin if you are pregnant or planning to get pregnant. Talk to your doctor about how you can prevent getting pregnant while you are taking it. Don't change your dose or stop taking warfarin unless your doctor tells you to. Effects of medicines and food on warfarin  Don't start or stop taking any medicines, vitamins, or natural remedies unless you first talk to your doctor. Many medicines can affect how warfarin works. These include aspirin and other pain relievers, over-the-counter medicines, multivitamins, dietary supplements, and herbal products. Tell all of your doctors and pharmacists that you take warfarin. Some prescription medicines can affect how warfarin works. Keep the amount of vitamin K in your diet about the same from day to day. Do not suddenly eat a lot more or a lot less food that is rich in vitamin K than you usually do. Vitamin K affects how warfarin works and how your blood clots. Talk with your doctor before making big changes in your diet. Vitamin K is in many foods, such as:  Leafy greens, such as kale, cabbage, spinach, Swiss chard, and lettuce. Canola and soybean oils. Green vegetables, such as asparagus, broccoli, and [place] sprouts.   Vegetable drinks, green tea leaves, and some dietary supplement drinks. Avoid cranberry juice and other cranberry products. They can increase the effects of warfarin. Limit your use of alcohol. Avoid bleeding by preventing falls and injuries  Wear slippers or shoes with nonskid soles. Remove throw rugs and clutter. Rearrange furniture and electrical cords to keep them out of walking paths. Keep stairways, porches, and outside walkways well lit. Use night-lights in hallways and bathrooms. Be extra careful when you work with sharp tools or knives. When should you call for help? Call 911 anytime you think you may need emergency care. For example, call if:  You have a sudden, severe headache that is different from past headaches. Call your doctor now or seek immediate medical care if:  You have any abnormal bleeding, such as:  Nosebleeds. Vaginal bleeding that is different (heavier, more frequent, at a different time of the month) than what you are used to. Bloody or black stools, or rectal bleeding. Bloody or pink urine. Watch closely for changes in your health, and be sure to contact your doctor if you have any problems. Where can you learn more? Go to http://dov-osmel.info/. Enter W537 in the search box to learn more about \"Taking Warfarin Safely: Care Instructions. \"  Current as of: January 27, 2016  Content Version: 11.1  © 8627-1933 [employer]. Care instructions adapted under license by [employer] (which disclaims liability or warranty for this information). If you have questions about a medical condition or this instruction, always ask your healthcare professional. [place] [address] any warranty or liability for your use of this information.

## 2022-12-13 NOTE — PROGRESS NOTES
Pharmacy Progress Note - Anticoagulation Management    S/O:  Mr. Harshal Cool  is a 49 y.o. male seen today for anticoagulation management for the diagnosis of Aortic Valve Replacement. HPI: At last visit (11/23), INR was 1.4 and subtherapeutic. Patient denied missed doses of warfarin or changes to his medications. Patient reported consistent intake of vitamin K foods. Over the past 7 days, patient has taken 22 mg of warfarin. Patient will increase weekly dose from 22 mg to 24 mg (~9%) and patient will take 2 mg Mon, Wed; 4 mg daily ROW. Patient reported an upcoming colonoscopy procedure on 12/6. Patient will hold warfarin doses 12/1-12/5. Patient will take Lovenox injections as instructed by the [facility]. Patient will resume warfarin and Lovenox on 12/6 under the guidance of the provider. Patient will recheck INR in 1 week(s) post colonoscopy. Interim History:    Warfarin start date: 5/4/22  INR Goal: 1.5-2.5 (On-X)  Current warfarin regimen: 2 mg Mon, Wed; 4 mg daily ROW, except hold warfarin doses 12/1-12/5. Restart warfarin under the guidance of the provider on 12/6  Warfarin tablet strength:   2 mg and 4 mg  Duration of therapy: Indefinite    Today's pertinent positives includes:  No significant changes since last visit      Results for orders placed or performed in visit on 12/13/22   POC PROTHROMBIN W/INR   Result Value Ref Range    VALID INTERNAL CONTROL POC Yes     Prothrombin time (POC) 12.9 seconds    INR POC 1.1        Adherence:   Able to recall regimen? YES  Miss/extra dose? NO  Need refill? NO      Upcoming procedure(s):  YES    Patient had a colonoscopy on 12/6/22. Per Dr. Primo Dill, patient will require Lovenox therapy for the interruption in warfarin therapy. Also, patient will be bridged with Lovenox and warfarin post procedure.       Past Medical History:   Diagnosis Date    Aortic stenosis     Bicuspid aortic valve     GERD (gastroesophageal reflux disease)     Hiatal hernia     Severe aortic stenosis 3/22/2022     Allergies   Allergen Reactions    Soap Rash     Dial Soap     Current Outpatient Medications   Medication Sig    metoprolol tartrate (LOPRESSOR) 25 mg tablet TAKE 1/2 TABLET BY MOUTH TWICE A DAY    warfarin (COUMADIN) 4 mg tablet Take 1 tablet (4 mg) by mouth every Tuesday, Thursday, Saturday and Sunday or take as directed by the clinic.    warfarin (COUMADIN) 2 mg tablet Take 1 tablet (2 mg) by mouth every Monday, Wednesday and Friday or take as directed by the clinic.    melatonin 10 mg tab Take 1 Tablet by mouth daily as needed (sleep). acetaminophen (TYLENOL) 325 mg tablet Take 650 mg by mouth every four (4) hours as needed for Pain (mild). aspirin 81 mg chewable tablet Take 1 Tablet by mouth daily. Omeprazole delayed release (PRILOSEC D/R) 20 mg tablet Take 20 mg by mouth daily. No current facility-administered medications for this visit.      Wt Readings from Last 3 Encounters:   10/27/22 (P) 164 lb (74.4 kg)   10/25/22 165 lb (74.8 kg)   10/21/22 164 lb (74.4 kg)     BP Readings from Last 3 Encounters:   09/02/22 101/67   08/30/22 127/71   08/03/22 110/80     Pulse Readings from Last 3 Encounters:   09/02/22 100   08/30/22 84   06/28/22 70     Lab Results   Component Value Date/Time    WBC 8.3 08/30/2022 12:05 AM    HGB 12.1 08/30/2022 12:05 AM    HCT 36.2 (L) 08/30/2022 12:05 AM    PLATELET 873 (H) 83/28/0283 12:05 AM    MCV 91.4 08/30/2022 12:05 AM     Lab Results   Component Value Date/Time    Sodium 136 08/30/2022 12:05 AM    Potassium 4.2 08/30/2022 12:05 AM    Chloride 104 08/30/2022 12:05 AM    CO2 25 08/30/2022 12:05 AM    Anion gap 7 08/30/2022 12:05 AM    Glucose 103 (H) 08/30/2022 12:05 AM    BUN 9 08/30/2022 12:05 AM    Creatinine 0.97 08/30/2022 12:05 AM    BUN/Creatinine ratio 9 (L) 08/30/2022 12:05 AM    GFR est AA >60 08/30/2022 12:05 AM    GFR est non-AA >60 08/30/2022 12:05 AM    Calcium 9.6 08/30/2022 12:05 AM    Bilirubin, total 1.2 (H) 08/24/2022 09:19 AM    Alk. phosphatase 69 08/24/2022 09:19 AM    Protein, total 8.8 (H) 08/24/2022 09:19 AM    Albumin 3.9 08/24/2022 09:19 AM    Globulin 4.9 (H) 08/24/2022 09:19 AM    A-G Ratio 0.8 (L) 08/24/2022 09:19 AM    ALT (SGPT) 17 08/24/2022 09:19 AM     CrCl cannot be calculated (Unknown ideal weight.). INR History:   (normal INR range 0.8-1.2)     Date   INR   PT   Dose/Comments  12/13/22 1.1  12.9 2 mg Mon, Wed; 4 mg daily ROW, hold 12/1-12/5. Restart under the guidance of the provider on 12/6.  11/23/22 1.4  17.2 2 mg Mon, Wed, Fri; 4 mg daily ROW  11/09/22 1.3  15.2 2 mg Mon, Wed, Fri; 4 mg daily ROW  10/18/22 2.2  26.9 2 mg Mon, Wed, Fri; 4 mg daily ROW  09/27/22 2.0  23.5 2 mg Mon, Wed, Fri; 4 mg daily ROW  09/20/22 1.5  18.6 hold dose 9/13 then take 4 mg Mon, Tue; 2 mg daily ROW  09/13/22 3.6  43.0 hold doses 9/6, 9/7 then 2 mg Thur, Fri, Sat; 4 mg daily ROW  09/06/22 6.0  72.2 4 mg daily  08/03/22 1.9  22.9 4 mg daily  07/13/22 2.3  27.6 4 mg daily  06/29/22 2.5  30.6 4 mg daily  06/15/22 2.7  32.0 4 mg daily  06/10/22 2.2   4 mg daily      Medications that can increase  INR: omeprazole   Medications that can decrease INR: N/A    A/P:       Anticoagulation:  Considering Mr. Minerva Pompa past history, todays findings, and per Anticoagulation Collaborative Practice Agreement/Protocol:    Fingerstick POC INR (1.1) is Subtherapeutic for INR goal today. Change warfarin to 8 mg today (12/13) and then take 2 mg Mon; 4 mg daily ROW   INR is 1.1 and subtherapeutic. Patient denies missed doses of warfarin or changes to his medications. Patient reports consistent intake of vitamin K foods. Patient restarted warfarin on 12/6 post colonoscopy. Over the past 7 days, patient has taken 24 mg of warfarin. Patient will increase weekly dose from 24 mg to 26 mg (~8%) and patient will take 8 mg today (12/13/22) and then take 2 mg Mon; 4 mg daily ROW. Patient will recheck INR in 1 week(s).       Patient was instructed to schedule an appointment in 1 week(s) prior to leaving the clinic. Medication reconciliation was completed during the visit. There are no discontinued medications. A full discussion of the nature of anticoagulants has been carried out. A full discussion of the need for frequent and regular monitoring, precise dosage adjustment and compliance was stressed. Side effects of potential bleeding were discussed and Mr. Chavo Chiu was instructed to call [phone] if there are any signs of abnormal bleeding. Mr. Chavo Chiu was instructed to avoid any OTC items containing aspirin or ibuprofen and prior to starting any new OTC products to consult with his physician or pharmacist to ensure no drug interactions are present. Mr. Chavo Chiu was instructed to avoid any major changes in his general diet and to avoid alcohol consumption. Mr. Chavo Chiu was provided information in the AVS that includes topics on understanding coumadin therapy, drug interaction considerations, vitamin K and coumadin use, interactions with foods and supplements containing vitamin K, and the use of herbal products. Mr. Chavo Chiu verbalized his understanding of all instructions and will call the office with any questions, concerns, or signs of abnormal bleeding or blood clot. Notifications of recommendations will be sent to Dr. Oumar Chung MD for review.     Thank you,  Edita Markwall, [id number] Tracking Only    Intervention Detail: Adherence Monitorin, Dose Adjustment: 1, reason: Therapy Optimization, and Lab(s) Ordered  Total # of Interventions Recommended: 3  Total # of Interventions Accepted: 3  Time Spent (min): 20

## 2022-12-20 ENCOUNTER — ANTI-COAG VISIT (OUTPATIENT)
Dept: PHARMACY | Age: 47
End: 2022-12-20
Payer: COMMERCIAL

## 2022-12-20 DIAGNOSIS — Z95.2 S/P AVR: Primary | ICD-10-CM

## 2022-12-20 LAB
INR BLD: 1.5
PT POC: 18.3 SECONDS
VALID INTERNAL CONTROL?: YES

## 2022-12-20 PROCEDURE — 99211 OFF/OP EST MAY X REQ PHY/QHP: CPT

## 2022-12-20 PROCEDURE — 85610 PROTHROMBIN TIME: CPT

## 2022-12-20 NOTE — PROGRESS NOTES
Pharmacy Progress Note - Anticoagulation Management    S/O:  Mr. Ya Thomas  is a 49 y.o. male seen today for anticoagulation management for the diagnosis of Aortic Valve Replacement. HPI: At last visit (12/13), INR was 1.1 and subtherapeutic. Patient denied missed doses of warfarin or changes to his medications. Patient reported consistent intake of vitamin K foods. Patient restarted warfarin on 12/6 post colonoscopy. Over the past 7 days, patient has taken 24 mg of warfarin. Patient will increase weekly dose from 24 mg to 26 mg (~8%) and patient will take 8 mg today (12/13/22) and then take 2 mg Mon; 4 mg daily ROW. Patient will recheck INR in 1 week(s). Interim History:    Warfarin start date: 5/4/22  INR Goal: 1.5-2.5 (On-X)  Current warfarin regimen: 8 mg today (12/13) and then take 2 mg Mon; 4 mg daily ROW  Warfarin tablet strength:   2 mg and 4 mg  Duration of therapy: Indefinite    Today's pertinent positives includes:  No significant changes since last visit      Results for orders placed or performed in visit on 12/20/22   POC PROTHROMBIN W/INR   Result Value Ref Range    VALID INTERNAL CONTROL POC Yes     Prothrombin time (POC) 18.3 seconds    INR POC 1.5        Adherence:   Able to recall regimen? YES  Miss/extra dose? NO  Need refill?  NO      Upcoming procedure(s):  N/A      Past Medical History:   Diagnosis Date    Aortic stenosis     Bicuspid aortic valve     GERD (gastroesophageal reflux disease)     Hiatal hernia     Severe aortic stenosis 3/22/2022     Allergies   Allergen Reactions    Soap Rash     Dial Soap     Current Outpatient Medications   Medication Sig    warfarin (COUMADIN) 2 mg tablet Take 1 tablet (2 mg) by mouth every Monday or take as directed by the clinic.    warfarin (COUMADIN) 4 mg tablet Take 1 tablet (4 mg) by mouth every Tuesday, Wednesday, Thursday, Friday, Saturday and Sunday or take as directed by the clinic.    metoprolol tartrate (LOPRESSOR) 25 mg tablet TAKE 1/2 TABLET BY MOUTH TWICE A DAY    melatonin 10 mg tab Take 1 Tablet by mouth daily as needed (sleep). acetaminophen (TYLENOL) 325 mg tablet Take 650 mg by mouth every four (4) hours as needed for Pain (mild). aspirin 81 mg chewable tablet Take 1 Tablet by mouth daily. Omeprazole delayed release (PRILOSEC D/R) 20 mg tablet Take 20 mg by mouth daily. No current facility-administered medications for this visit. Wt Readings from Last 3 Encounters:   10/27/22 (P) 164 lb (74.4 kg)   10/25/22 165 lb (74.8 kg)   10/21/22 164 lb (74.4 kg)     BP Readings from Last 3 Encounters:   09/02/22 101/67   08/30/22 127/71   08/03/22 110/80     Pulse Readings from Last 3 Encounters:   09/02/22 100   08/30/22 84   06/28/22 70     Lab Results   Component Value Date/Time    WBC 8.3 08/30/2022 12:05 AM    HGB 12.1 08/30/2022 12:05 AM    HCT 36.2 (L) 08/30/2022 12:05 AM    PLATELET 820 (H) 51/01/9835 12:05 AM    MCV 91.4 08/30/2022 12:05 AM     Lab Results   Component Value Date/Time    Sodium 136 08/30/2022 12:05 AM    Potassium 4.2 08/30/2022 12:05 AM    Chloride 104 08/30/2022 12:05 AM    CO2 25 08/30/2022 12:05 AM    Anion gap 7 08/30/2022 12:05 AM    Glucose 103 (H) 08/30/2022 12:05 AM    BUN 9 08/30/2022 12:05 AM    Creatinine 0.97 08/30/2022 12:05 AM    BUN/Creatinine ratio 9 (L) 08/30/2022 12:05 AM    GFR est AA >60 08/30/2022 12:05 AM    GFR est non-AA >60 08/30/2022 12:05 AM    Calcium 9.6 08/30/2022 12:05 AM    Bilirubin, total 1.2 (H) 08/24/2022 09:19 AM    Alk. phosphatase 69 08/24/2022 09:19 AM    Protein, total 8.8 (H) 08/24/2022 09:19 AM    Albumin 3.9 08/24/2022 09:19 AM    Globulin 4.9 (H) 08/24/2022 09:19 AM    A-G Ratio 0.8 (L) 08/24/2022 09:19 AM    ALT (SGPT) 17 08/24/2022 09:19 AM     CrCl cannot be calculated (Unknown ideal weight.).       INR History:   (normal INR range 0.8-1.2)     Date   INR   PT   Dose/Comments  12/20/22 1.5  18.3 8 mg today (12/13) and then take 2 mg Mon; 4 mg daily ROW   12/13/22 1.1  12.9 2 mg Mon, Wed; 4 mg daily ROW, hold 12/1-12/5. Restart under the guidance of the provider on 12/6.  11/23/22 1.4  17.2 2 mg Mon, Wed, Fri; 4 mg daily ROW  11/09/22 1.3  15.2 2 mg Mon, Wed, Fri; 4 mg daily ROW  10/18/22 2.2  26.9 2 mg Mon, Wed, Fri; 4 mg daily ROW  09/27/22 2.0  23.5 2 mg Mon, Wed, Fri; 4 mg daily ROW  09/20/22 1.5  18.6 hold dose 9/13 then take 4 mg Mon, Tue; 2 mg daily ROW  09/13/22 3.6  43.0 hold doses 9/6, 9/7 then 2 mg Thur, Fri, Sat; 4 mg daily ROW  09/06/22 6.0  72.2 4 mg daily  08/03/22 1.9  22.9 4 mg daily  07/13/22 2.3  27.6 4 mg daily  06/29/22 2.5  30.6 4 mg daily  06/15/22 2.7  32.0 4 mg daily  06/10/22 2.2   4 mg daily      Medications that can increase  INR: omeprazole   Medications that can decrease INR: N/A    A/P:       Anticoagulation:  Considering Mr. Brandie Clarke past history, todays findings, and per Anticoagulation Collaborative Practice Agreement/Protocol:    Fingerstick POC INR (1.5) is Therapeutic for INR goal today. Continue warfarin 2 mg Mon; 4 mg daily ROW   INR is 1.5 and therapeutic. Patient denies changes to his medications and reports consistent intake of vitamin K foods. Patient will continue current regimen and recheck INR in 2 week(s). Patient was instructed to schedule an appointment in 2 week(s) prior to leaving the clinic. Medication reconciliation was completed during the visit. There are no discontinued medications. A full discussion of the nature of anticoagulants has been carried out. A full discussion of the need for frequent and regular monitoring, precise dosage adjustment and compliance was stressed. Side effects of potential bleeding were discussed and Mr. Arcadio Gee was instructed to call [phone] if there are any signs of abnormal bleeding.   Mr. Arcadio Gee was instructed to avoid any OTC items containing aspirin or ibuprofen and prior to starting any new OTC products to consult with his physician or pharmacist to ensure no drug interactions are present. Mr. Santo Holland was instructed to avoid any major changes in his general diet and to avoid alcohol consumption. Mr. Santo Holland was provided information in the AVS that includes topics on understanding coumadin therapy, drug interaction considerations, vitamin K and coumadin use, interactions with foods and supplements containing vitamin K, and the use of herbal products. Mr. Santo Holland verbalized his understanding of all instructions and will call the office with any questions, concerns, or signs of abnormal bleeding or blood clot. Notifications of recommendations will be sent to Dr. Steven Ruvalcaba MD for review.     Thank you,  Marvina Holter, [phone] [facility] Tracking Only    Intervention Detail: Adherence Monitorin and Lab(s) Ordered  Total # of Interventions Recommended: 2  Total # of Interventions Accepted: 2  Time Spent (min): 20

## 2022-12-20 NOTE — PATIENT INSTRUCTIONS
Today your INR was 1.5 . Your goal INR is  1.5-2.5 . You have a 2 mg and 4 mg tablet of Coumadin (warfarin). Take Coumadin (warfarin) as follows: Take 2 mg every Monday; and 4 mg daily rest of the week. Come back in 2 week(s) for your next finger stick/INR blood test.        Avoid any over the counter items containing aspirin or ibuprofen, and avoid great swings in general diet. Avoid alcohol consumption. Please notify the INR nurse if you are started on any new medication including over the counter or herbal supplements. Also, please notify your INR nurse if any of your other prescription or over the counter medications have been discontinued. Call [facility] at [phone] if you have any signs of abnormal bleeding/blood clot.  ------------------------------------------------------------------------------------------------------------------  Taking Warfarin Safely: Care Instructions    Your Care Instructions  Warfarin is a medicine that you take to prevent blood clots. It is often called a blood thinner. Doctors give warfarin (such as Coumadin) to reduce the risk of blood clots. You may be at risk for blood clots if you have atrial fibrillation or deep vein thrombosis. Some other health problems may also put you at risk. Warfarin slows the amount of time it takes for your blood to clot. It can cause bleeding problems. Even if you've been taking warfarin for a while, it's important to know how to take it safely. Foods and other medicines can affect the way warfarin works. Some can make warfarin work too well. This can cause bleeding problems. And some can make it work poorly, so that it does not prevent blood clots very well. You will need regular blood tests to check how long it takes for your blood to form a clot. This test is called a PT or prothrombin time test. The result of the test is called an INR level.  Depending on the test results, your doctor or anticoagulation clinic may adjust your dose of warfarin. Follow-up care is a key part of your treatment and safety. Be sure to make and go to all appointments, and call your doctor if you are having problems. It's also a good idea to know your test results and keep a list of the medicines you take. How can you care for yourself at home? Take warfarin safely  Take your warfarin at the same time each day. If you miss a dose of warfarin, don't take an extra dose to make up for it. Your doctor can tell you exactly what to do so you don't take too much or too little. Wear medical alert jewelry that lets others know that you take warfarin. You can buy this at most drugstores. Don't take warfarin if you are pregnant or planning to get pregnant. Talk to your doctor about how you can prevent getting pregnant while you are taking it. Don't change your dose or stop taking warfarin unless your doctor tells you to. Effects of medicines and food on warfarin  Don't start or stop taking any medicines, vitamins, or natural remedies unless you first talk to your doctor. Many medicines can affect how warfarin works. These include aspirin and other pain relievers, over-the-counter medicines, multivitamins, dietary supplements, and herbal products. Tell all of your doctors and pharmacists that you take warfarin. Some prescription medicines can affect how warfarin works. Keep the amount of vitamin K in your diet about the same from day to day. Do not suddenly eat a lot more or a lot less food that is rich in vitamin K than you usually do. Vitamin K affects how warfarin works and how your blood clots. Talk with your doctor before making big changes in your diet. Vitamin K is in many foods, such as:  Leafy greens, such as kale, cabbage, spinach, Swiss chard, and lettuce. Canola and soybean oils. Green vegetables, such as asparagus, broccoli, and [place] sprouts.   Vegetable drinks, green tea leaves, and some dietary supplement drinks. Avoid cranberry juice and other cranberry products. They can increase the effects of warfarin. Limit your use of alcohol. Avoid bleeding by preventing falls and injuries  Wear slippers or shoes with nonskid soles. Remove throw rugs and clutter. Rearrange furniture and electrical cords to keep them out of walking paths. Keep stairways, porches, and outside walkways well lit. Use night-lights in hallways and bathrooms. Be extra careful when you work with sharp tools or knives. When should you call for help? Call 911 anytime you think you may need emergency care. For example, call if:  You have a sudden, severe headache that is different from past headaches. Call your doctor now or seek immediate medical care if:  You have any abnormal bleeding, such as:  Nosebleeds. Vaginal bleeding that is different (heavier, more frequent, at a different time of the month) than what you are used to. Bloody or black stools, or rectal bleeding. Bloody or pink urine. Watch closely for changes in your health, and be sure to contact your doctor if you have any problems. Where can you learn more? Go to http://www.gray.com/. Enter P306 in the search box to learn more about \"Taking Warfarin Safely: Care Instructions. \"  Current as of: January 27, 2016  Content Version: 11.1  © 7018-0366 [employer]. Care instructions adapted under license by [employer] (which disclaims liability or warranty for this information). If you have questions about a medical condition or this instruction, always ask your healthcare professional. [place] [address] any warranty or liability for your use of this information.

## 2023-01-04 ENCOUNTER — ANTI-COAG VISIT (OUTPATIENT)
Dept: PHARMACY | Age: 48
End: 2023-01-04
Payer: COMMERCIAL

## 2023-01-04 DIAGNOSIS — Z95.2 S/P AVR: Primary | ICD-10-CM

## 2023-01-04 LAB
INR BLD: 1.6
PT POC: 18.6 SECONDS
VALID INTERNAL CONTROL?: YES

## 2023-01-04 PROCEDURE — 99211 OFF/OP EST MAY X REQ PHY/QHP: CPT

## 2023-01-04 PROCEDURE — 85610 PROTHROMBIN TIME: CPT

## 2023-01-04 NOTE — PATIENT INSTRUCTIONS
Today your INR was 1.6 . Your goal INR is  1.5-2.5 . You have a 2 mg and 4 mg tablet of Coumadin (warfarin). Take Coumadin (warfarin) as follows: Take 2 mg every Monday; and 4 mg daily rest of the week. Come back in 3 week(s) for your next finger stick/INR blood test.        Avoid any over the counter items containing aspirin or ibuprofen, and avoid great swings in general diet. Avoid alcohol consumption. Please notify the INR nurse if you are started on any new medication including over the counter or herbal supplements. Also, please notify your INR nurse if any of your other prescription or over the counter medications have been discontinued. Call [facility] at [phone] if you have any signs of abnormal bleeding/blood clot.  ------------------------------------------------------------------------------------------------------------------  Taking Warfarin Safely: Care Instructions    Your Care Instructions  Warfarin is a medicine that you take to prevent blood clots. It is often called a blood thinner. Doctors give warfarin (such as Coumadin) to reduce the risk of blood clots. You may be at risk for blood clots if you have atrial fibrillation or deep vein thrombosis. Some other health problems may also put you at risk. Warfarin slows the amount of time it takes for your blood to clot. It can cause bleeding problems. Even if you've been taking warfarin for a while, it's important to know how to take it safely. Foods and other medicines can affect the way warfarin works. Some can make warfarin work too well. This can cause bleeding problems. And some can make it work poorly, so that it does not prevent blood clots very well. You will need regular blood tests to check how long it takes for your blood to form a clot. This test is called a PT or prothrombin time test. The result of the test is called an INR level.  Depending on the test results, your doctor or anticoagulation clinic may adjust your dose of warfarin. Follow-up care is a key part of your treatment and safety. Be sure to make and go to all appointments, and call your doctor if you are having problems. It's also a good idea to know your test results and keep a list of the medicines you take. How can you care for yourself at home? Take warfarin safely  Take your warfarin at the same time each day. If you miss a dose of warfarin, don't take an extra dose to make up for it. Your doctor can tell you exactly what to do so you don't take too much or too little. Wear medical alert jewelry that lets others know that you take warfarin. You can buy this at most drugstores. Don't take warfarin if you are pregnant or planning to get pregnant. Talk to your doctor about how you can prevent getting pregnant while you are taking it. Don't change your dose or stop taking warfarin unless your doctor tells you to. Effects of medicines and food on warfarin  Don't start or stop taking any medicines, vitamins, or natural remedies unless you first talk to your doctor. Many medicines can affect how warfarin works. These include aspirin and other pain relievers, over-the-counter medicines, multivitamins, dietary supplements, and herbal products. Tell all of your doctors and pharmacists that you take warfarin. Some prescription medicines can affect how warfarin works. Keep the amount of vitamin K in your diet about the same from day to day. Do not suddenly eat a lot more or a lot less food that is rich in vitamin K than you usually do. Vitamin K affects how warfarin works and how your blood clots. Talk with your doctor before making big changes in your diet. Vitamin K is in many foods, such as:  Leafy greens, such as kale, cabbage, spinach, Swiss chard, and lettuce. Canola and soybean oils. Green vegetables, such as asparagus, broccoli, and [place] sprouts.   Vegetable drinks, green tea leaves, and some dietary supplement drinks. Avoid cranberry juice and other cranberry products. They can increase the effects of warfarin. Limit your use of alcohol. Avoid bleeding by preventing falls and injuries  Wear slippers or shoes with nonskid soles. Remove throw rugs and clutter. Rearrange furniture and electrical cords to keep them out of walking paths. Keep stairways, porches, and outside walkways well lit. Use night-lights in hallways and bathrooms. Be extra careful when you work with sharp tools or knives. When should you call for help? Call 911 anytime you think you may need emergency care. For example, call if:  You have a sudden, severe headache that is different from past headaches. Call your doctor now or seek immediate medical care if:  You have any abnormal bleeding, such as:  Nosebleeds. Vaginal bleeding that is different (heavier, more frequent, at a different time of the month) than what you are used to. Bloody or black stools, or rectal bleeding. Bloody or pink urine. Watch closely for changes in your health, and be sure to contact your doctor if you have any problems. Where can you learn more? Go to http://www.gray.com/. Enter F605 in the search box to learn more about \"Taking Warfarin Safely: Care Instructions. \"  Current as of: January 27, 2016  Content Version: 11.1  © 9848-3011 [employer]. Care instructions adapted under license by [employer] (which disclaims liability or warranty for this information). If you have questions about a medical condition or this instruction, always ask your healthcare professional. [place] [address] any warranty or liability for your use of this information.

## 2023-01-04 NOTE — PROGRESS NOTES
Pharmacy Progress Note - Anticoagulation Management    S/O:  Mr. Corrina Braun  is a 49 y.o. male seen today for anticoagulation management for the diagnosis of Aortic Valve Replacement. HPI: At last visit (12/20), INR was 1.5 and therapeutic. Patient denied changes to his medications and reported consistent intake of vitamin K foods. Patient will continue current regimen and recheck INR in 2 week(s). Interim History:    Warfarin start date: 5/4/22  INR Goal: 1.5-2.5 (On-X)  Current warfarin regimen: 2 mg Mon; 4 mg daily ROW  Warfarin tablet strength:   2 mg and 4 mg  Duration of therapy: Indefinite    Today's pertinent positives includes:  No significant changes since last visit      Results for orders placed or performed in visit on 01/04/23   POC PROTHROMBIN W/INR   Result Value Ref Range    VALID INTERNAL CONTROL POC Yes     Prothrombin time (POC) 18.6 seconds    INR POC 1.6        Adherence:   Able to recall regimen? YES  Miss/extra dose? NO  Need refill? NO      Upcoming procedure(s):  N/A      Past Medical History:   Diagnosis Date    Aortic stenosis     Bicuspid aortic valve     GERD (gastroesophageal reflux disease)     Hiatal hernia     Severe aortic stenosis 3/22/2022     Allergies   Allergen Reactions    Soap Rash     Dial Soap     Current Outpatient Medications   Medication Sig    warfarin (COUMADIN) 2 mg tablet Take 1 tablet (2 mg) by mouth every Monday or take as directed by the clinic.    warfarin (COUMADIN) 4 mg tablet Take 1 tablet (4 mg) by mouth every Tuesday, Wednesday, Thursday, Friday, Saturday and Sunday or take as directed by the clinic.    metoprolol tartrate (LOPRESSOR) 25 mg tablet TAKE 1/2 TABLET BY MOUTH TWICE A DAY    melatonin 10 mg tab Take 1 Tablet by mouth daily as needed (sleep). acetaminophen (TYLENOL) 325 mg tablet Take 650 mg by mouth every four (4) hours as needed for Pain (mild). aspirin 81 mg chewable tablet Take 1 Tablet by mouth daily.     Omeprazole delayed release (PRILOSEC D/R) 20 mg tablet Take 20 mg by mouth daily. No current facility-administered medications for this visit. Wt Readings from Last 3 Encounters:   10/27/22 (P) 164 lb (74.4 kg)   10/25/22 165 lb (74.8 kg)   10/21/22 164 lb (74.4 kg)     BP Readings from Last 3 Encounters:   09/02/22 101/67   08/30/22 127/71   08/03/22 110/80     Pulse Readings from Last 3 Encounters:   09/02/22 100   08/30/22 84   06/28/22 70     Lab Results   Component Value Date/Time    WBC 8.3 08/30/2022 12:05 AM    HGB 12.1 08/30/2022 12:05 AM    HCT 36.2 (L) 08/30/2022 12:05 AM    PLATELET 124 (H) 99/09/2264 12:05 AM    MCV 91.4 08/30/2022 12:05 AM     Lab Results   Component Value Date/Time    Sodium 136 08/30/2022 12:05 AM    Potassium 4.2 08/30/2022 12:05 AM    Chloride 104 08/30/2022 12:05 AM    CO2 25 08/30/2022 12:05 AM    Anion gap 7 08/30/2022 12:05 AM    Glucose 103 (H) 08/30/2022 12:05 AM    BUN 9 08/30/2022 12:05 AM    Creatinine 0.97 08/30/2022 12:05 AM    BUN/Creatinine ratio 9 (L) 08/30/2022 12:05 AM    GFR est AA >60 08/30/2022 12:05 AM    GFR est non-AA >60 08/30/2022 12:05 AM    Calcium 9.6 08/30/2022 12:05 AM    Bilirubin, total 1.2 (H) 08/24/2022 09:19 AM    Alk. phosphatase 69 08/24/2022 09:19 AM    Protein, total 8.8 (H) 08/24/2022 09:19 AM    Albumin 3.9 08/24/2022 09:19 AM    Globulin 4.9 (H) 08/24/2022 09:19 AM    A-G Ratio 0.8 (L) 08/24/2022 09:19 AM    ALT (SGPT) 17 08/24/2022 09:19 AM     CrCl cannot be calculated (Unknown ideal weight.). INR History:   (normal INR range 0.8-1.2)     Date   INR   PT   Dose/Comments  01/04/23 1.6  18.6 2 mg Mon; 4 mg daily ROW  12/20/22 1.5  18.3 8 mg today (12/13) and then take 2 mg Mon; 4 mg daily ROW   12/13/22 1.1  12.9 2 mg Mon, Wed; 4 mg daily ROW, hold 12/1-12/5.  Restart under the guidance of the provider on 12/6.  11/23/22 1.4  17.2 2 mg Mon, Wed, Fri; 4 mg daily ROW  11/09/22 1.3  15.2 2 mg Mon, Wed, Fri; 4 mg daily ROW  10/18/22 2.2  26.9 2 mg Mon, Wed, Fri; 4 mg daily ROW  09/27/22 2.0  23.5 2 mg Mon, Wed, Fri; 4 mg daily ROW  09/20/22 1.5  18.6 hold dose 9/13 then take 4 mg Mon, Tue; 2 mg daily ROW  09/13/22 3.6  43.0 hold doses 9/6, 9/7 then 2 mg Thur, Fri, Sat; 4 mg daily ROW  09/06/22 6.0  72.2 4 mg daily  08/03/22 1.9  22.9 4 mg daily  07/13/22 2.3  27.6 4 mg daily  06/29/22 2.5  30.6 4 mg daily  06/15/22 2.7  32.0 4 mg daily  06/10/22 2.2   4 mg daily      Medications that can increase  INR: omeprazole   Medications that can decrease INR: N/A    A/P:       Anticoagulation:  Considering Mr. Romy Andres past history, todays findings, and per Anticoagulation Collaborative Practice Agreement/Protocol:    Fingerstick POC INR (1.6) is Therapeutic for INR goal today. Continue warfarin 2 mg Mon; 4 mg daily ROW   INR is 1.6 and therapeutic. Patient denies changes to his medications and reports consistent intake of vitamin K foods. Patient will continue current regimen and recheck INR in 2 week(s). Patient was instructed to schedule an appointment in 2 week(s) prior to leaving the clinic. Medication reconciliation was completed during the visit. There are no discontinued medications. A full discussion of the nature of anticoagulants has been carried out. A full discussion of the need for frequent and regular monitoring, precise dosage adjustment and compliance was stressed. Side effects of potential bleeding were discussed and Mr. Chela Elizondo was instructed to call [phone] if there are any signs of abnormal bleeding. Mr. Chela Elizondo was instructed to avoid any OTC items containing aspirin or ibuprofen and prior to starting any new OTC products to consult with his physician or pharmacist to ensure no drug interactions are present. Mr. Chela Elizondo was instructed to avoid any major changes in his general diet and to avoid alcohol consumption.     Mr. Chela Elizondo was provided information in the AVS that includes topics on understanding coumadin therapy, drug interaction considerations, vitamin K and coumadin use, interactions with foods and supplements containing vitamin K, and the use of herbal products. . Silvestre Valenzuela verbalized his understanding of all instructions and will call the office with any questions, concerns, or signs of abnormal bleeding or blood clot. Notifications of recommendations will be sent to Dr. Silvia Oswald MD for review.     Thank you,  Ebonie Richards, [address] Tracking Only    Intervention Detail: Adherence Monitorin and Lab(s) Ordered  Total # of Interventions Recommended: 2  Total # of Interventions Accepted: 2  Time Spent (min): 15

## 2023-01-21 DIAGNOSIS — Z95.2 S/P AVR: ICD-10-CM

## 2023-01-23 RX ORDER — METOPROLOL TARTRATE 25 MG/1
TABLET, FILM COATED ORAL
Qty: 90 TABLET | Refills: 1 | Status: SHIPPED | OUTPATIENT
Start: 2023-01-23

## 2023-01-24 NOTE — PATIENT INSTRUCTIONS
Today your INR was 1.3 . Your goal INR is  1.5-2.5 . You have a 2 mg and 4 mg tablet of Coumadin (warfarin). Take Coumadin (warfarin) as follows: Take 4 mg daily. Come back in 3 week(s) for your next finger stick/INR blood test.        Avoid any over the counter items containing aspirin or ibuprofen, and avoid great swings in general diet. Avoid alcohol consumption. Please notify the INR nurse if you are started on any new medication including over the counter or herbal supplements. Also, please notify your INR nurse if any of your other prescription or over the counter medications have been discontinued. Call [facility] at [phone] if you have any signs of abnormal bleeding/blood clot.  ------------------------------------------------------------------------------------------------------------------  Taking Warfarin Safely: Care Instructions    Your Care Instructions  Warfarin is a medicine that you take to prevent blood clots. It is often called a blood thinner. Doctors give warfarin (such as Coumadin) to reduce the risk of blood clots. You may be at risk for blood clots if you have atrial fibrillation or deep vein thrombosis. Some other health problems may also put you at risk. Warfarin slows the amount of time it takes for your blood to clot. It can cause bleeding problems. Even if you've been taking warfarin for a while, it's important to know how to take it safely. Foods and other medicines can affect the way warfarin works. Some can make warfarin work too well. This can cause bleeding problems. And some can make it work poorly, so that it does not prevent blood clots very well. You will need regular blood tests to check how long it takes for your blood to form a clot. This test is called a PT or prothrombin time test. The result of the test is called an INR level.  Depending on the test results, your doctor or anticoagulation clinic may adjust your dose of warfarin. Follow-up care is a key part of your treatment and safety. Be sure to make and go to all appointments, and call your doctor if you are having problems. It's also a good idea to know your test results and keep a list of the medicines you take. How can you care for yourself at home? Take warfarin safely  Take your warfarin at the same time each day. If you miss a dose of warfarin, don't take an extra dose to make up for it. Your doctor can tell you exactly what to do so you don't take too much or too little. Wear medical alert jewelry that lets others know that you take warfarin. You can buy this at most drugstores. Don't take warfarin if you are pregnant or planning to get pregnant. Talk to your doctor about how you can prevent getting pregnant while you are taking it. Don't change your dose or stop taking warfarin unless your doctor tells you to. Effects of medicines and food on warfarin  Don't start or stop taking any medicines, vitamins, or natural remedies unless you first talk to your doctor. Many medicines can affect how warfarin works. These include aspirin and other pain relievers, over-the-counter medicines, multivitamins, dietary supplements, and herbal products. Tell all of your doctors and pharmacists that you take warfarin. Some prescription medicines can affect how warfarin works. Keep the amount of vitamin K in your diet about the same from day to day. Do not suddenly eat a lot more or a lot less food that is rich in vitamin K than you usually do. Vitamin K affects how warfarin works and how your blood clots. Talk with your doctor before making big changes in your diet. Vitamin K is in many foods, such as:  Leafy greens, such as kale, cabbage, spinach, Swiss chard, and lettuce. Canola and soybean oils. Green vegetables, such as asparagus, broccoli, and [place] sprouts. Vegetable drinks, green tea leaves, and some dietary supplement drinks.   Avoid cranberry juice and other cranberry products. They can increase the effects of warfarin. Limit your use of alcohol. Avoid bleeding by preventing falls and injuries  Wear slippers or shoes with nonskid soles. Remove throw rugs and clutter. Rearrange furniture and electrical cords to keep them out of walking paths. Keep stairways, porches, and outside walkways well lit. Use night-lights in hallways and bathrooms. Be extra careful when you work with sharp tools or knives. When should you call for help? Call 911 anytime you think you may need emergency care. For example, call if:  You have a sudden, severe headache that is different from past headaches. Call your doctor now or seek immediate medical care if:  You have any abnormal bleeding, such as:  Nosebleeds. Vaginal bleeding that is different (heavier, more frequent, at a different time of the month) than what you are used to. Bloody or black stools, or rectal bleeding. Bloody or pink urine. Watch closely for changes in your health, and be sure to contact your doctor if you have any problems. Where can you learn more? Go to http://www.gray.com/. Enter G343 in the search box to learn more about \"Taking Warfarin Safely: Care Instructions. \"  Current as of: January 27, 2016  Content Version: 11.1  © 5365-9766 [employer]. Care instructions adapted under license by [employer] (which disclaims liability or warranty for this information). If you have questions about a medical condition or this instruction, always ask your healthcare professional. [place] [address] any warranty or liability for your use of this information.

## 2023-01-24 NOTE — PROGRESS NOTES
Pharmacy Progress Note - Anticoagulation Management    S/O:  Mr. Hali Rosales  is a 49 y.o. male seen today for anticoagulation management for the diagnosis of Aortic Valve Replacement. HPI: At last visit (01/04/23), INR was 1.6 and therapeutic. Patient denies changes to his medications and reports consistent intake of vitamin K foods. Patient will continue current regimen and recheck INR in 2 week(s). Interim History:    Warfarin start date: 5/4/22  INR Goal: 1.5-2.5 (On-X)  Current warfarin regimen: 2 mg Mon; 4 mg daily ROW  Warfarin tablet strength:   2 mg and 4 mg  Duration of therapy: Indefinite    Today's pertinent positives includes:  No significant changes since last visit      Results for orders placed or performed in visit on 01/25/23   POC PROTHROMBIN W/INR   Result Value Ref Range    VALID INTERNAL CONTROL POC Yes     Prothrombin time (POC) 16.1 seconds    INR POC 1.3        Adherence:   Able to recall regimen? YES  Miss/extra dose? NO  Need refill? NO      Upcoming procedure(s):  N/A      Past Medical History:   Diagnosis Date    Aortic stenosis     Bicuspid aortic valve     GERD (gastroesophageal reflux disease)     Hiatal hernia     Severe aortic stenosis 3/22/2022     Allergies   Allergen Reactions    Soap Rash     Dial Soap     Current Outpatient Medications   Medication Sig    metoprolol tartrate (LOPRESSOR) 25 mg tablet TAKE 1/2 TABLET BY MOUTH TWICE A DAY    warfarin (COUMADIN) 2 mg tablet Take 1 tablet (2 mg) by mouth every Monday or take as directed by the clinic.    warfarin (COUMADIN) 4 mg tablet Take 1 tablet (4 mg) by mouth every Tuesday, Wednesday, Thursday, Friday, Saturday and Sunday or take as directed by the clinic.    melatonin 10 mg tab Take 1 Tablet by mouth daily as needed (sleep). acetaminophen (TYLENOL) 325 mg tablet Take 650 mg by mouth every four (4) hours as needed for Pain (mild). aspirin 81 mg chewable tablet Take 1 Tablet by mouth daily.     Omeprazole delayed release (PRILOSEC D/R) 20 mg tablet Take 20 mg by mouth daily. No current facility-administered medications for this visit. Wt Readings from Last 3 Encounters:   10/27/22 (P) 164 lb (74.4 kg)   10/25/22 165 lb (74.8 kg)   10/21/22 164 lb (74.4 kg)     BP Readings from Last 3 Encounters:   09/02/22 101/67   08/30/22 127/71   08/03/22 110/80     Pulse Readings from Last 3 Encounters:   09/02/22 100   08/30/22 84   06/28/22 70     Lab Results   Component Value Date/Time    WBC 8.3 08/30/2022 12:05 AM    HGB 12.1 08/30/2022 12:05 AM    HCT 36.2 (L) 08/30/2022 12:05 AM    PLATELET 223 (H) 86/43/5706 12:05 AM    MCV 91.4 08/30/2022 12:05 AM     Lab Results   Component Value Date/Time    Sodium 136 08/30/2022 12:05 AM    Potassium 4.2 08/30/2022 12:05 AM    Chloride 104 08/30/2022 12:05 AM    CO2 25 08/30/2022 12:05 AM    Anion gap 7 08/30/2022 12:05 AM    Glucose 103 (H) 08/30/2022 12:05 AM    BUN 9 08/30/2022 12:05 AM    Creatinine 0.97 08/30/2022 12:05 AM    BUN/Creatinine ratio 9 (L) 08/30/2022 12:05 AM    GFR est AA >60 08/30/2022 12:05 AM    GFR est non-AA >60 08/30/2022 12:05 AM    Calcium 9.6 08/30/2022 12:05 AM    Bilirubin, total 1.2 (H) 08/24/2022 09:19 AM    Alk. phosphatase 69 08/24/2022 09:19 AM    Protein, total 8.8 (H) 08/24/2022 09:19 AM    Albumin 3.9 08/24/2022 09:19 AM    Globulin 4.9 (H) 08/24/2022 09:19 AM    A-G Ratio 0.8 (L) 08/24/2022 09:19 AM    ALT (SGPT) 17 08/24/2022 09:19 AM     CrCl cannot be calculated (Unknown ideal weight.). INR History:   (normal INR range 0.8-1.2)     Date   INR   PT   Dose/Comments  01/25/23 1.3  16.1 2 mg Mon; 4 mg daily ROW   01/04/23 1.6  18.6 2 mg Mon; 4 mg daily ROW  12/20/22 1.5  18.3 8 mg today (12/13) and then take 2 mg Mon; 4 mg daily ROW   12/13/22 1.1  12.9 2 mg Mon, Wed; 4 mg daily ROW, hold 12/1-12/5.  Restart under the guidance of the provider on 12/6.  11/23/22 1.4  17.2 2 mg Mon, Wed, Fri; 4 mg daily ROW  11/09/22 1.3  15.2 2 mg Mon, Wed, Fri; 4 mg daily ROW  10/18/22 2.2  26.9 2 mg Mon, Wed, Fri; 4 mg daily ROW  09/27/22 2.0  23.5 2 mg Mon, Wed, Fri; 4 mg daily ROW  09/20/22 1.5  18.6 hold dose 9/13 then take 4 mg Mon, Tue; 2 mg daily ROW  09/13/22 3.6  43.0 hold doses 9/6, 9/7 then 2 mg Thur, Fri, Sat; 4 mg daily ROW  09/06/22 6.0  72.2 4 mg daily  08/03/22 1.9  22.9 4 mg daily  07/13/22 2.3  27.6 4 mg daily  06/29/22 2.5  30.6 4 mg daily  06/15/22 2.7  32.0 4 mg daily  06/10/22 2.2   4 mg daily      Medications that can increase  INR: omeprazole   Medications that can decrease INR: N/A    A/P:       Anticoagulation:  Considering Mr. Lynnette Rodriguez past history, todays findings, and per Anticoagulation Collaborative Practice Agreement/Protocol:    Fingerstick POC INR (1.3) is Subtherapeutic for INR goal today. Change warfarin to 4 mg daily. INR is 1.3 and Subtherapeutic. Patient reports no medication changes, no missed doses and a consistent diet of Vitamin K foods. Patient instructed to increase weekly dose from 26mg to 28mg (7.7% increase) and take 4mg Daily. Patient was instructed to make appt. to recheck INR in 3 week(s). Patient was instructed to schedule an appointment in 3 week(s) prior to leaving the clinic. Medication reconciliation was completed during the visit. There are no discontinued medications. A full discussion of the nature of anticoagulants has been carried out. A full discussion of the need for frequent and regular monitoring, precise dosage adjustment and compliance was stressed. Side effects of potential bleeding were discussed and Mr. Pebbles Ruiz was instructed to call [phone] if there are any signs of abnormal bleeding. Mr. Pebbles Ruiz was instructed to avoid any OTC items containing aspirin or ibuprofen and prior to starting any new OTC products to consult with his physician or pharmacist to ensure no drug interactions are present.   Mr. Pebbles Ruiz was instructed to avoid any major changes in his general diet and to avoid alcohol consumption. Mr. Sneha Viveros was provided information in the AVS that includes topics on understanding coumadin therapy, drug interaction considerations, vitamin K and coumadin use, interactions with foods and supplements containing vitamin K, and the use of herbal products. Mr. Sneha Viveros verbalized his understanding of all instructions and will call the office with any questions, concerns, or signs of abnormal bleeding or blood clot. Notifications of recommendations will be sent to Dr. Katy Jefferson MD for review.     Thank you,  Clifford Shay 19 Tracking Only    Intervention Detail: Adherence Monitorin, Dose Adjustment: 1, reason: Therapy Optimization, and Lab(s) Ordered  Total # of Interventions Recommended: 3  Total # of Interventions Accepted: 3  Time Spent (min): 20

## 2023-01-25 ENCOUNTER — ANTI-COAG VISIT (OUTPATIENT)
Dept: PHARMACY | Age: 48
End: 2023-01-25
Payer: COMMERCIAL

## 2023-01-25 DIAGNOSIS — Z95.2 S/P AVR: Primary | ICD-10-CM

## 2023-01-25 LAB
INR BLD: 1.3
PT POC: 16.1 SECONDS
VALID INTERNAL CONTROL?: YES

## 2023-01-25 PROCEDURE — 85610 PROTHROMBIN TIME: CPT

## 2023-01-25 PROCEDURE — 99212 OFFICE O/P EST SF 10 MIN: CPT

## 2023-02-14 NOTE — PROGRESS NOTES
Pharmacy Progress Note - Anticoagulation Management    S/O:  Mr. Tomas Wright  is a 49 y.o. male seen today for anticoagulation management for the diagnosis of Aortic Valve Replacement. HPI: At last visit (1/25), INR was 1.3 and subtherapeutic. Patient reported no medication changes, no missed doses and a consistent diet of vitamin K foods. Patient instructed to increase weekly dose from 26 mg to 28 mg (7.7% increase) and take 4mg daily. Patient was instructed to make appt. to recheck INR in 3 week(s). Interim History:    Warfarin start date: 5/4/22  INR Goal: 1.5-2.5 (On-X)  Current warfarin regimen: 2 mg Mon; 4 mg daily ROW  Warfarin tablet strength:   2 mg and 4 mg  Duration of therapy: Indefinite      Results for orders placed or performed in visit on 01/25/23   POC PROTHROMBIN W/INR   Result Value Ref Range    VALID INTERNAL CONTROL POC Yes     Prothrombin time (POC) 16.1 seconds    INR POC 1.3        Today's pertinent positives includes:  No significant changes since last visit      Adherence:   Able to recall regimen? YES  Miss/extra dose? NO  Need refill? NO      Upcoming procedure(s):  N/A      Past Medical History:   Diagnosis Date    Aortic stenosis     Bicuspid aortic valve     GERD (gastroesophageal reflux disease)     Hiatal hernia     Severe aortic stenosis 3/22/2022     Allergies   Allergen Reactions    Soap Rash     Dial Soap     Current Outpatient Medications   Medication Sig    metoprolol tartrate (LOPRESSOR) 25 mg tablet TAKE 1/2 TABLET BY MOUTH TWICE A DAY    warfarin (COUMADIN) 2 mg tablet Take 1 tablet (2 mg) by mouth every Monday or take as directed by the clinic.    warfarin (COUMADIN) 4 mg tablet Take 1 tablet (4 mg) by mouth every Tuesday, Wednesday, Thursday, Friday, Saturday and Sunday or take as directed by the clinic.    melatonin 10 mg tab Take 1 Tablet by mouth daily as needed (sleep).     acetaminophen (TYLENOL) 325 mg tablet Take 650 mg by mouth every four (4) hours as needed for Pain (mild). aspirin 81 mg chewable tablet Take 1 Tablet by mouth daily. Omeprazole delayed release (PRILOSEC D/R) 20 mg tablet Take 20 mg by mouth daily. No current facility-administered medications for this visit. Wt Readings from Last 3 Encounters:   10/27/22 (P) 164 lb (74.4 kg)   10/25/22 165 lb (74.8 kg)   10/21/22 164 lb (74.4 kg)     BP Readings from Last 3 Encounters:   09/02/22 101/67   08/30/22 127/71   08/03/22 110/80     Pulse Readings from Last 3 Encounters:   09/02/22 100   08/30/22 84   06/28/22 70     Lab Results   Component Value Date/Time    WBC 8.3 08/30/2022 12:05 AM    HGB 12.1 08/30/2022 12:05 AM    HCT 36.2 (L) 08/30/2022 12:05 AM    PLATELET 468 (H) 76/06/4483 12:05 AM    MCV 91.4 08/30/2022 12:05 AM     Lab Results   Component Value Date/Time    Sodium 136 08/30/2022 12:05 AM    Potassium 4.2 08/30/2022 12:05 AM    Chloride 104 08/30/2022 12:05 AM    CO2 25 08/30/2022 12:05 AM    Anion gap 7 08/30/2022 12:05 AM    Glucose 103 (H) 08/30/2022 12:05 AM    BUN 9 08/30/2022 12:05 AM    Creatinine 0.97 08/30/2022 12:05 AM    BUN/Creatinine ratio 9 (L) 08/30/2022 12:05 AM    GFR est AA >60 08/30/2022 12:05 AM    GFR est non-AA >60 08/30/2022 12:05 AM    Calcium 9.6 08/30/2022 12:05 AM    Bilirubin, total 1.2 (H) 08/24/2022 09:19 AM    Alk. phosphatase 69 08/24/2022 09:19 AM    Protein, total 8.8 (H) 08/24/2022 09:19 AM    Albumin 3.9 08/24/2022 09:19 AM    Globulin 4.9 (H) 08/24/2022 09:19 AM    A-G Ratio 0.8 (L) 08/24/2022 09:19 AM    ALT (SGPT) 17 08/24/2022 09:19 AM     CrCl cannot be calculated (Unknown ideal weight.). INR History:   (normal INR range 0.8-1.2)     Date   INR   PT   Dose/Comments  02/15/23 3.0  35.9 4 mg daily  01/25/23 1.3  16.1 2 mg Mon; 4 mg daily ROW   01/04/23 1.6  18.6 2 mg Mon; 4 mg daily ROW  12/20/22 1.5  18.3 8 mg today (12/13) and then take 2 mg Mon; 4 mg daily ROW   12/13/22 1.1  12.9 2 mg Mon, Wed; 4 mg daily ROW, hold 12/1-12/5. Restart under the guidance of the provider on 12/6.  11/23/22 1.4  17.2 2 mg Mon, Wed, Fri; 4 mg daily ROW  11/09/22 1.3  15.2 2 mg Mon, Wed, Fri; 4 mg daily ROW  10/18/22 2.2  26.9 2 mg Mon, Wed, Fri; 4 mg daily ROW  09/27/22 2.0  23.5 2 mg Mon, Wed, Fri; 4 mg daily ROW  09/20/22 1.5  18.6 hold dose 9/13 then take 4 mg Mon, Tue; 2 mg daily ROW  09/13/22 3.6  43.0 hold doses 9/6, 9/7 then 2 mg Thur, Fri, Sat; 4 mg daily ROW  09/06/22 6.0  72.2 4 mg daily  08/03/22 1.9  22.9 4 mg daily  07/13/22 2.3  27.6 4 mg daily      Medications that can increase  INR: omeprazole   Medications that can decrease INR: N/A    A/P:       Anticoagulation:  Considering Mr. Rodrick Douglas past history, todays findings, and per Anticoagulation Collaborative Practice Agreement/Protocol:    Fingerstick POC INR (3.0) is Supratherapeutic for INR goal today. Continue warfarin 4 mg daily. INR is 3.0 and supratherapeutic. Patient denies extra doses of warfarin or changes to his medications. Patient reports consistent intake of vitamin K foods. Patient's INR was subtherapeutic (1.3) at last visit to the [address] [place] [address], therefore patient will continue 4 mg daily. Patient states he will incorporate 1 to 2 servings of food with vitamin K into his diet weekly. Patient will recheck INR in 2 week(s). Patient was instructed to schedule an appointment in 2 week(s) prior to leaving the clinic. Medication reconciliation was completed during the visit. There are no discontinued medications. A full discussion of the nature of anticoagulants has been carried out. A full discussion of the need for frequent and regular monitoring, precise dosage adjustment and compliance was stressed. Side effects of potential bleeding were discussed and Mr. Tony Hein was instructed to call [phone] if there are any signs of abnormal bleeding.   Mr. Tony Hein was instructed to avoid any OTC items containing aspirin or ibuprofen and prior to starting any new OTC products to consult with his physician or pharmacist to ensure no drug interactions are present. Mr. Tru Mcmahon was instructed to avoid any major changes in his general diet and to avoid alcohol consumption. Mr. Tru Mcmahon was provided information in the AVS that includes topics on understanding coumadin therapy, drug interaction considerations, vitamin K and coumadin use, interactions with foods and supplements containing vitamin K, and the use of herbal products. Mr. Tru Mcmahon verbalized his understanding of all instructions and will call the office with any questions, concerns, or signs of abnormal bleeding or blood clot. Notifications of recommendations will be sent to Dr. Andrea Costello MD for review.     Thank you,  Alwin Gottron, [id number] Tracking Only    Intervention Detail: Adherence Monitorin and Lab(s) Ordered  Total # of Interventions Recommended: 2  Total # of Interventions Accepted: 2  Time Spent (min): 15

## 2023-02-14 NOTE — PATIENT INSTRUCTIONS
Today your INR was 3.0 . Your goal INR is  1.5-2.5 . You have a 2 mg and 4 mg tablet of Coumadin (warfarin). Take Coumadin (warfarin) as follows: Take 4 mg daily. Come back in 2 week(s) for your next finger stick/INR blood test.        Avoid any over the counter items containing aspirin or ibuprofen, and avoid great swings in general diet. Avoid alcohol consumption. Please notify the INR nurse if you are started on any new medication including over the counter or herbal supplements. Also, please notify your INR nurse if any of your other prescription or over the counter medications have been discontinued. Call [facility] at [phone] if you have any signs of abnormal bleeding/blood clot.  ------------------------------------------------------------------------------------------------------------------  Taking Warfarin Safely: Care Instructions    Your Care Instructions  Warfarin is a medicine that you take to prevent blood clots. It is often called a blood thinner. Doctors give warfarin (such as Coumadin) to reduce the risk of blood clots. You may be at risk for blood clots if you have atrial fibrillation or deep vein thrombosis. Some other health problems may also put you at risk. Warfarin slows the amount of time it takes for your blood to clot. It can cause bleeding problems. Even if you've been taking warfarin for a while, it's important to know how to take it safely. Foods and other medicines can affect the way warfarin works. Some can make warfarin work too well. This can cause bleeding problems. And some can make it work poorly, so that it does not prevent blood clots very well. You will need regular blood tests to check how long it takes for your blood to form a clot. This test is called a PT or prothrombin time test. The result of the test is called an INR level.  Depending on the test results, your doctor or anticoagulation clinic may adjust your dose of warfarin. Follow-up care is a key part of your treatment and safety. Be sure to make and go to all appointments, and call your doctor if you are having problems. It's also a good idea to know your test results and keep a list of the medicines you take. How can you care for yourself at home? Take warfarin safely  Take your warfarin at the same time each day. If you miss a dose of warfarin, don't take an extra dose to make up for it. Your doctor can tell you exactly what to do so you don't take too much or too little. Wear medical alert jewelry that lets others know that you take warfarin. You can buy this at most drugstores. Don't take warfarin if you are pregnant or planning to get pregnant. Talk to your doctor about how you can prevent getting pregnant while you are taking it. Don't change your dose or stop taking warfarin unless your doctor tells you to. Effects of medicines and food on warfarin  Don't start or stop taking any medicines, vitamins, or natural remedies unless you first talk to your doctor. Many medicines can affect how warfarin works. These include aspirin and other pain relievers, over-the-counter medicines, multivitamins, dietary supplements, and herbal products. Tell all of your doctors and pharmacists that you take warfarin. Some prescription medicines can affect how warfarin works. Keep the amount of vitamin K in your diet about the same from day to day. Do not suddenly eat a lot more or a lot less food that is rich in vitamin K than you usually do. Vitamin K affects how warfarin works and how your blood clots. Talk with your doctor before making big changes in your diet. Vitamin K is in many foods, such as:  Leafy greens, such as kale, cabbage, spinach, Swiss chard, and lettuce. Canola and soybean oils. Green vegetables, such as asparagus, broccoli, and [place] sprouts. Vegetable drinks, green tea leaves, and some dietary supplement drinks.   Avoid cranberry juice and other cranberry products. They can increase the effects of warfarin. Limit your use of alcohol. Avoid bleeding by preventing falls and injuries  Wear slippers or shoes with nonskid soles. Remove throw rugs and clutter. Rearrange furniture and electrical cords to keep them out of walking paths. Keep stairways, porches, and outside walkways well lit. Use night-lights in hallways and bathrooms. Be extra careful when you work with sharp tools or knives. When should you call for help? Call 911 anytime you think you may need emergency care. For example, call if:  You have a sudden, severe headache that is different from past headaches. Call your doctor now or seek immediate medical care if:  You have any abnormal bleeding, such as:  Nosebleeds. Vaginal bleeding that is different (heavier, more frequent, at a different time of the month) than what you are used to. Bloody or black stools, or rectal bleeding. Bloody or pink urine. Watch closely for changes in your health, and be sure to contact your doctor if you have any problems. Where can you learn more? Go to http://dov-osmel.info/. Enter G255 in the search box to learn more about \"Taking Warfarin Safely: Care Instructions. \"  Current as of: January 27, 2016  Content Version: 11.1  © 5600-2571 [employer]. Care instructions adapted under license by [employer] (which disclaims liability or warranty for this information). If you have questions about a medical condition or this instruction, always ask your healthcare professional. [place] [address] any warranty or liability for your use of this information.

## 2023-02-15 ENCOUNTER — ANTI-COAG VISIT (OUTPATIENT)
Dept: PHARMACY | Age: 48
End: 2023-02-15
Payer: COMMERCIAL

## 2023-02-15 DIAGNOSIS — Z95.2 S/P AVR: Primary | ICD-10-CM

## 2023-02-15 LAB
INR BLD: 3
PT POC: 35.9 SECONDS
VALID INTERNAL CONTROL?: YES

## 2023-02-15 PROCEDURE — 85610 PROTHROMBIN TIME: CPT

## 2023-02-15 PROCEDURE — 99211 OFF/OP EST MAY X REQ PHY/QHP: CPT

## 2023-02-27 ENCOUNTER — OFFICE VISIT (OUTPATIENT)
Age: 48
End: 2023-02-27
Payer: COMMERCIAL

## 2023-02-27 VITALS
OXYGEN SATURATION: 98 % | HEART RATE: 62 BPM | WEIGHT: 168 LBS | RESPIRATION RATE: 18 BRPM | BODY MASS INDEX: 22.75 KG/M2 | DIASTOLIC BLOOD PRESSURE: 86 MMHG | HEIGHT: 72 IN | SYSTOLIC BLOOD PRESSURE: 120 MMHG

## 2023-02-27 DIAGNOSIS — Q23.1 BICUSPID AORTIC VALVE: ICD-10-CM

## 2023-02-27 DIAGNOSIS — I77.810 ASCENDING AORTA DILATATION (HCC): ICD-10-CM

## 2023-02-27 DIAGNOSIS — Z95.2 S/P AVR: ICD-10-CM

## 2023-02-27 DIAGNOSIS — E78.2 MIXED HYPERLIPIDEMIA: ICD-10-CM

## 2023-02-27 DIAGNOSIS — I35.0 SEVERE AORTIC STENOSIS: Primary | ICD-10-CM

## 2023-02-27 PROCEDURE — 99214 OFFICE O/P EST MOD 30 MIN: CPT | Performed by: INTERNAL MEDICINE

## 2023-02-27 PROCEDURE — 93000 ELECTROCARDIOGRAM COMPLETE: CPT | Performed by: INTERNAL MEDICINE

## 2023-02-27 RX ORDER — AMOXICILLIN 500 MG/1
2000 CAPSULE ORAL AS NEEDED
Qty: 4 CAPSULE | Refills: 4 | Status: SHIPPED | OUTPATIENT
Start: 2023-02-27

## 2023-02-27 NOTE — LETTER
2/27/2023    Patient: Inocente Schrader   YOB: 1975   Date of Visit: 2/27/2023     Rachel Oquendo MD  [address]  [address]  Via In Basket    Dear Rachel Oquendo MD,      Thank you for referring Mr. Augustin Milan to [facility] for evaluation. My notes for this consultation are attached. If you have questions, please do not hesitate to call me. I look forward to following your patient along with you.       Sincerely,    Onelia Adams MD

## 2023-02-27 NOTE — PROGRESS NOTES
[facility] [address], [address] [place] [address]  [phone]    [address] [place] [address] Way      Subjective:      Luan Sierra is a 49 y.o. male is here for routine f/u. The patient was last seen by us in 2022. Today, the patient denies chest pain/ shortness of breath, orthopnea, PND, LE edema, palpitations, syncope, or presyncope. Patient Active Problem List    Diagnosis Date Noted    Diverticulitis of colon with perforation 2022    Aortic stenosis 2022    S/P AVR 2022    AS (aortic stenosis) 2022    Severe aortic stenosis 2022    Bicuspid aortic valve 2022    Hiatal hernia       Appa Estefana Cushing, MD  Past Medical History:   Diagnosis Date    Aortic stenosis     Bicuspid aortic valve     GERD (gastroesophageal reflux disease)     Hiatal hernia     Severe aortic stenosis 3/22/2022      Past Surgical History:   Procedure Laterality Date    HX ADENOIDECTOMY      HX COLONOSCOPY      HX ENDOSCOPY      HX HEART CATHETERIZATION  2022     Allergies   Allergen Reactions    Soap Rash     Dial Soap      Family History   Problem Relation Age of Onset    No Known Problems Father     Lung Cancer Maternal Grandmother     Coronary Art Dis Maternal Grandfather     Heart Attack Maternal Grandfather     Hypertension Paternal Grandfather     Hypertension Mother     No Known Problems Sister     Stroke Paternal Grandmother       Social History     Socioeconomic History    Marital status:      Spouse name: Genesis Avila     Number of children: Not on file    Years of education: Not on file    Highest education level: Not on file   Occupational History    Not on file   Tobacco Use    Smoking status: Former     Packs/day: 0.75     Years: 25.00     Pack years: 18.75     Types: Cigarettes     Quit date: 2017     Years since quittin.6    Smokeless tobacco: Never   Vaping Use    Vaping Use: Never used   Substance and Sexual Activity    Alcohol use:  Yes Alcohol/week: 4.0 standard drinks     Types: 2 Glasses of wine, 1 Cans of beer, 1 Shots of liquor per week     Comment: 3-5/week     Drug use: Not Currently    Sexual activity: Not on file   Other Topics Concern     Service No    Blood Transfusions No    Caffeine Concern No    Occupational Exposure No    Hobby Hazards No    Sleep Concern No    Stress Concern No    Weight Concern No    Special Diet No    Back Care No    Exercise Yes     Comment: daily walks, 30 mins    Bike Helmet No    Seat Belt Yes    Self-Exams No   Social History Narrative    Not on file     Social Determinants of Health     Financial Resource Strain: Not on file   Food Insecurity: Not on file   Transportation Needs: Not on file   Physical Activity: Not on file   Stress: Not on file   Social Connections: Not on file   Intimate Partner Violence: Not on file   Housing Stability: Not on file      Current Outpatient Medications   Medication Sig    metoprolol tartrate (LOPRESSOR) 25 mg tablet TAKE 1/2 TABLET BY MOUTH TWICE A DAY    warfarin (COUMADIN) 2 mg tablet Take 1 tablet (2 mg) by mouth every Monday or take as directed by the clinic.    warfarin (COUMADIN) 4 mg tablet Take 1 tablet (4 mg) by mouth every Tuesday, Wednesday, Thursday, Friday, Saturday and Sunday or take as directed by the clinic.    melatonin 10 mg tab Take 1 Tablet by mouth daily as needed (sleep). acetaminophen (TYLENOL) 325 mg tablet Take 650 mg by mouth every four (4) hours as needed for Pain (mild). aspirin 81 mg chewable tablet Take 1 Tablet by mouth daily. Omeprazole delayed release (PRILOSEC D/R) 20 mg tablet Take 20 mg by mouth daily. No current facility-administered medications for this visit.          Review of Symptoms:  11 systems reviewed, negative other than as stated in the HPI    Physical ExamPhysical Exam:    Vitals:    02/27/23 0942   BP: 120/86   Pulse: 62   Resp: 18   SpO2: 98%   Weight: 168 lb (76.2 kg)   Height: 6' (1.829 m)     Body mass index is 22.78 kg/m². General PE  Gen:  NAD  Mental Status - Alert. General Appearance - Not in acute distress. HEENT:  PERRL, no carotid bruits or JVD  Chest and Lung Exam   Inspection: Accessory muscles - No use of accessory muscles in breathing. Auscultation:   Breath sounds: - Normal.   Cardiovascular   Inspection: Jugular vein - Bilateral - Inspection Normal.   Palpation/Percussion:   Apical Impulse: - Normal.   Auscultation: Rhythm - Regular. Heart Sounds - S1 WNL and S2 WNL. No S3 or S4. Murmurs & Other Heart Sounds: Auscultation of the heart reveals - No Murmurs. Peripheral Vascular   Upper Extremity: Inspection - Bilateral - No Cyanotic nailbeds or Digital clubbing. Lower Extremity:   Palpation: Edema - Bilateral - No edema. Abdomen:   Soft, non-tender, bowel sounds are active. Neuro: A&O times 3, CN and motor grossly WNL    Labs:   Lab Results   Component Value Date/Time    Cholesterol, total 204 (H) 06/09/2020 09:54 AM    HDL Cholesterol 50 06/09/2020 09:54 AM    LDL, calculated 131 (H) 06/09/2020 09:54 AM    Triglyceride 114 06/09/2020 09:54 AM     No results found for: CPK, CPKX, CPX  Lab Results   Component Value Date/Time    Sodium 136 08/30/2022 12:05 AM    Potassium 4.2 08/30/2022 12:05 AM    Chloride 104 08/30/2022 12:05 AM    CO2 25 08/30/2022 12:05 AM    Anion gap 7 08/30/2022 12:05 AM    Glucose 103 (H) 08/30/2022 12:05 AM    BUN 9 08/30/2022 12:05 AM    Creatinine 0.97 08/30/2022 12:05 AM    BUN/Creatinine ratio 9 (L) 08/30/2022 12:05 AM    GFR est AA >60 08/30/2022 12:05 AM    GFR est non-AA >60 08/30/2022 12:05 AM    Calcium 9.6 08/30/2022 12:05 AM    Bilirubin, total 1.2 (H) 08/24/2022 09:19 AM    Alk.  phosphatase 69 08/24/2022 09:19 AM    Protein, total 8.8 (H) 08/24/2022 09:19 AM    Albumin 3.9 08/24/2022 09:19 AM    Globulin 4.9 (H) 08/24/2022 09:19 AM    A-G Ratio 0.8 (L) 08/24/2022 09:19 AM    ALT (SGPT) 17 08/24/2022 09:19 AM       EKG:  NSR, RBBB    08/03/22    ECHO ADULT COMPLETE 08/03/2022 8/3/2022    Interpretation Summary    Left Ventricle: Normal left ventricular systolic function with a visually estimated EF of 55 - 60%. Left ventricle size is normal. Normal wall thickness. Normal wall motion. Normal diastolic function. Aortic Valve: Medtronic On-X bileaflet mechanical valve with a size of 23 mm. Mild regurgitation with multiple jets. No stenosis. AV mean gradient is 9 mmHg. Mitral Valve: Mild regurgitation. Pulmonic Valve: Mild regurgitation. Aorta: Normal sized aortic root. Mildly dilated ascending aorta. Ao Ascending diameter is 4.0 cm. Signed by: Juan Pablo Hope MD on 8/3/2022 12:27 PM             Assessment:          ICD-10-CM ICD-9-CM    1. Severe aortic stenosis  I35.0 424.1 AMB POC EKG ROUTINE W/ 12 LEADS, INTER & REP      2. Bicuspid aortic valve  Q23.1 746.4       3. Mixed hyperlipidemia  E78.2 272.2       4. Ascending aorta dilatation (HCC)  I77.810 447.71       5. S/P AVR  Z95.2 V43.3           Orders Placed This Encounter    AMB POC EKG ROUTINE W/ 12 LEADS, INTER & REP     Order Specific Question:   Reason for Exam:     Answer:   routine        Plan:     Bicuspid AV stenosis s/p AVR with #23mm On-X mechanical valve  on 5/3/2022. Echo August 2022 with LVEF 55 to 60%, mild mitral regurgitation, mild aortic regurgitation through stable On X valve  Echo 2/2022 with LVEF 55-60% biscuspid AV with severe stenosis. Mean PG 71 mmHg, MUNIRA 0.8 cm2  Echo done 1/2021 with preserved LVEF 55-60% with severe aortic stenosis with bicuspid aortic valve. Mean PG 39.91 mmHg,  MUNIRA 0.91 cm^2  Abd/Chest CTA done 2/2021 with ectatic ascending aorta with mild aortic atherosclerosis  Last seen by valve clinic 5/31/2022  Cont coumadin. INR goal 2-3 x 3 months and then 1.5-2.5 thereafter.    INR followed by Coumadin Clinic  Cont 12.5mg Metoprolol   Stop aspirin, now he is 9 months past surgery  Echo August 2022 as noted above stable but with mild aortic valve regurgitation mild mitral regurgitation-repeat echo now 6 months later  He knows he needs endocarditis prophylaxis prior to any dental procedure-prescribed     Normal coronary arteries per [facility] in 3/2022     Bp well controlled. The patient will monitor BP at home and call if generally >140/80. Ascending aortic enlargement  Chest CTA 3/2022: There is dilatation of ascending thoracic aorta measuring 4.1 x 3.9 cm. Previously this measured 3.8 x 3.7 cm. Recommend periodic CTA surveillance        RBBB     HLD  11/2020     Labs and lipids per PCP     Continue current care and f/u in 1 year if echo is stable.       Whit Chao MD

## 2023-03-01 ENCOUNTER — ANTI-COAG VISIT (OUTPATIENT)
Dept: PHARMACY | Age: 48
End: 2023-03-01
Payer: COMMERCIAL

## 2023-03-01 DIAGNOSIS — Z95.2 S/P AVR: Primary | ICD-10-CM

## 2023-03-01 LAB
INR BLD: 1.5
PT POC: 17.9 SECONDS
VALID INTERNAL CONTROL?: YES

## 2023-03-01 PROCEDURE — 99211 OFF/OP EST MAY X REQ PHY/QHP: CPT

## 2023-03-01 PROCEDURE — 85610 PROTHROMBIN TIME: CPT

## 2023-03-01 NOTE — PATIENT INSTRUCTIONS
Today your INR was 1.5 . Your goal INR is  1.5-2.5 . You have a 2 mg and 4 mg tablet of Coumadin (warfarin). Take Coumadin (warfarin) as follows: Take 4 mg daily. Come back in 2 week(s) for your next finger stick/INR blood test.        Avoid any over the counter items containing aspirin or ibuprofen, and avoid great swings in general diet. Avoid alcohol consumption. Please notify the INR nurse if you are started on any new medication including over the counter or herbal supplements. Also, please notify your INR nurse if any of your other prescription or over the counter medications have been discontinued. Call [facility] at [phone] if you have any signs of abnormal bleeding/blood clot.  ------------------------------------------------------------------------------------------------------------------  Taking Warfarin Safely: Care Instructions    Your Care Instructions  Warfarin is a medicine that you take to prevent blood clots. It is often called a blood thinner. Doctors give warfarin (such as Coumadin) to reduce the risk of blood clots. You may be at risk for blood clots if you have atrial fibrillation or deep vein thrombosis. Some other health problems may also put you at risk. Warfarin slows the amount of time it takes for your blood to clot. It can cause bleeding problems. Even if you've been taking warfarin for a while, it's important to know how to take it safely. Foods and other medicines can affect the way warfarin works. Some can make warfarin work too well. This can cause bleeding problems. And some can make it work poorly, so that it does not prevent blood clots very well. You will need regular blood tests to check how long it takes for your blood to form a clot. This test is called a PT or prothrombin time test. The result of the test is called an INR level.  Depending on the test results, your doctor or anticoagulation clinic may adjust your dose of warfarin. Follow-up care is a key part of your treatment and safety. Be sure to make and go to all appointments, and call your doctor if you are having problems. It's also a good idea to know your test results and keep a list of the medicines you take. How can you care for yourself at home? Take warfarin safely  Take your warfarin at the same time each day. If you miss a dose of warfarin, don't take an extra dose to make up for it. Your doctor can tell you exactly what to do so you don't take too much or too little. Wear medical alert jewelry that lets others know that you take warfarin. You can buy this at most drugstores. Don't take warfarin if you are pregnant or planning to get pregnant. Talk to your doctor about how you can prevent getting pregnant while you are taking it. Don't change your dose or stop taking warfarin unless your doctor tells you to. Effects of medicines and food on warfarin  Don't start or stop taking any medicines, vitamins, or natural remedies unless you first talk to your doctor. Many medicines can affect how warfarin works. These include aspirin and other pain relievers, over-the-counter medicines, multivitamins, dietary supplements, and herbal products. Tell all of your doctors and pharmacists that you take warfarin. Some prescription medicines can affect how warfarin works. Keep the amount of vitamin K in your diet about the same from day to day. Do not suddenly eat a lot more or a lot less food that is rich in vitamin K than you usually do. Vitamin K affects how warfarin works and how your blood clots. Talk with your doctor before making big changes in your diet. Vitamin K is in many foods, such as:  Leafy greens, such as kale, cabbage, spinach, Swiss chard, and lettuce. Canola and soybean oils. Green vegetables, such as asparagus, broccoli, and [place] sprouts. Vegetable drinks, green tea leaves, and some dietary supplement drinks.   Avoid cranberry juice and other cranberry products. They can increase the effects of warfarin. Limit your use of alcohol. Avoid bleeding by preventing falls and injuries  Wear slippers or shoes with nonskid soles. Remove throw rugs and clutter. Rearrange furniture and electrical cords to keep them out of walking paths. Keep stairways, porches, and outside walkways well lit. Use night-lights in hallways and bathrooms. Be extra careful when you work with sharp tools or knives. When should you call for help? Call 911 anytime you think you may need emergency care. For example, call if:  You have a sudden, severe headache that is different from past headaches. Call your doctor now or seek immediate medical care if:  You have any abnormal bleeding, such as:  Nosebleeds. Vaginal bleeding that is different (heavier, more frequent, at a different time of the month) than what you are used to. Bloody or black stools, or rectal bleeding. Bloody or pink urine. Watch closely for changes in your health, and be sure to contact your doctor if you have any problems. Where can you learn more? Go to http://www.gray.com/. Enter M444 in the search box to learn more about \"Taking Warfarin Safely: Care Instructions. \"  Current as of: January 27, 2016  Content Version: 11.1  © 3195-5279 [employer]. Care instructions adapted under license by [employer] (which disclaims liability or warranty for this information). If you have questions about a medical condition or this instruction, always ask your healthcare professional. [place] [address] any warranty or liability for your use of this information.

## 2023-03-01 NOTE — PROGRESS NOTES
Pharmacy Progress Note - Anticoagulation Management    S/O:  Mr. Dominga Whalen  is a 49 y.o. male seen today for anticoagulation management for the diagnosis of Aortic Valve Replacement. HPI: At last visit (2/15), INR was 3.0 and supratherapeutic. Patient denied extra doses of warfarin or changes to his medications. Patient reported consistent intake of vitamin K foods. Patient's INR was subtherapeutic (1.3) at last visit to the [facility], therefore patient will continue 4 mg daily. Patient stated he will incorporate 1 to 2 servings of food with vitamin K into his diet weekly. Patient will recheck INR in 2 week(s). Interim History:    Warfarin start date: 5/4/22  INR Goal: 1.5-2.5 (On-X)  Current warfarin regimen: 4 mg daily  Warfarin tablet strength:   2 mg and 4 mg  Duration of therapy: Indefinite      Results for orders placed or performed in visit on 03/01/23   POC PROTHROMBIN W/INR   Result Value Ref Range    VALID INTERNAL CONTROL POC Yes     Prothrombin time (POC) 17.9 seconds    INR POC 1.5        Today's pertinent positives includes:  Medication change    Patient reports no longer taking aspirin. Adherence:   Able to recall regimen? YES  Miss/extra dose? NO  Need refill? NO      Upcoming procedure(s):  N/A      Past Medical History:   Diagnosis Date    Aortic stenosis     Bicuspid aortic valve     GERD (gastroesophageal reflux disease)     Hiatal hernia     Severe aortic stenosis 3/22/2022     Allergies   Allergen Reactions    Soap Rash     Dial Soap     Current Outpatient Medications   Medication Sig    amoxicillin (AMOXIL) 500 mg capsule Take 4 Capsules by mouth as needed for PRN Reason (Other) (1 hour prior to any dental cleaning or procedure).     metoprolol tartrate (LOPRESSOR) 25 mg tablet TAKE 1/2 TABLET BY MOUTH TWICE A DAY    warfarin (COUMADIN) 2 mg tablet Take 1 tablet (2 mg) by mouth every Monday or take as directed by the clinic.    warfarin (COUMADIN) 4 mg tablet Take 1 tablet (4 mg) by mouth every Tuesday, Wednesday, Thursday, Friday, Saturday and Sunday or take as directed by the clinic.    melatonin 10 mg tab Take 1 Tablet by mouth daily as needed (sleep). acetaminophen (TYLENOL) 325 mg tablet Take 650 mg by mouth every four (4) hours as needed for Pain (mild). Omeprazole delayed release (PRILOSEC D/R) 20 mg tablet Take 20 mg by mouth daily. No current facility-administered medications for this visit. Wt Readings from Last 3 Encounters:   02/27/23 168 lb (76.2 kg)   10/27/22 (P) 164 lb (74.4 kg)   10/25/22 165 lb (74.8 kg)     BP Readings from Last 3 Encounters:   02/27/23 120/86   09/02/22 101/67   08/30/22 127/71     Pulse Readings from Last 3 Encounters:   02/27/23 62   09/02/22 100   08/30/22 84     Lab Results   Component Value Date/Time    WBC 8.3 08/30/2022 12:05 AM    HGB 12.1 08/30/2022 12:05 AM    HCT 36.2 (L) 08/30/2022 12:05 AM    PLATELET 557 (H) 97/74/3936 12:05 AM    MCV 91.4 08/30/2022 12:05 AM     Lab Results   Component Value Date/Time    Sodium 136 08/30/2022 12:05 AM    Potassium 4.2 08/30/2022 12:05 AM    Chloride 104 08/30/2022 12:05 AM    CO2 25 08/30/2022 12:05 AM    Anion gap 7 08/30/2022 12:05 AM    Glucose 103 (H) 08/30/2022 12:05 AM    BUN 9 08/30/2022 12:05 AM    Creatinine 0.97 08/30/2022 12:05 AM    BUN/Creatinine ratio 9 (L) 08/30/2022 12:05 AM    GFR est AA >60 08/30/2022 12:05 AM    GFR est non-AA >60 08/30/2022 12:05 AM    Calcium 9.6 08/30/2022 12:05 AM    Bilirubin, total 1.2 (H) 08/24/2022 09:19 AM    Alk. phosphatase 69 08/24/2022 09:19 AM    Protein, total 8.8 (H) 08/24/2022 09:19 AM    Albumin 3.9 08/24/2022 09:19 AM    Globulin 4.9 (H) 08/24/2022 09:19 AM    A-G Ratio 0.8 (L) 08/24/2022 09:19 AM    ALT (SGPT) 17 08/24/2022 09:19 AM     CrCl cannot be calculated (Patient's most recent lab result is older than the maximum 180 days allowed.).       INR History:   (normal INR range 0.8-1.2)     Date   INR   PT Dose/Comments  03/01/23 1.5  17.9 4 mg daily  02/15/23 3.0  35.9 4 mg daily  01/25/23 1.3  16.1 2 mg Mon; 4 mg daily ROW   01/04/23 1.6  18.6 2 mg Mon; 4 mg daily ROW  12/20/22 1.5  18.3 8 mg today (12/13) and then take 2 mg Mon; 4 mg daily ROW   12/13/22 1.1  12.9 2 mg Mon, Wed; 4 mg daily ROW, hold 12/1-12/5. Restart under the guidance of the provider on 12/6.  11/23/22 1.4  17.2 2 mg Mon, Wed, Fri; 4 mg daily ROW  11/09/22 1.3  15.2 2 mg Mon, Wed, Fri; 4 mg daily ROW  10/18/22 2.2  26.9 2 mg Mon, Wed, Fri; 4 mg daily ROW  09/27/22 2.0  23.5 2 mg Mon, Wed, Fri; 4 mg daily ROW  09/20/22 1.5  18.6 hold dose 9/13 then take 4 mg Mon, Tue; 2 mg daily ROW  09/13/22 3.6  43.0 hold doses 9/6, 9/7 then 2 mg Thur, Fri, Sat; 4 mg daily ROW  09/06/22 6.0  72.2 4 mg daily  08/03/22 1.9  22.9 4 mg daily  07/13/22 2.3  27.6 4 mg daily      Medications that can increase  INR: omeprazole   Medications that can decrease INR: N/A    A/P:       Anticoagulation:  Considering Mr. Lizabeth Sesay past history, todays findings, and per Anticoagulation Collaborative Practice Agreement/Protocol:    Fingerstick POC INR (1.5) is Therapeutic for INR goal today. Continue warfarin 4 mg daily. INR is 1.5 and therapeutic. Patient reports consistent intake of vitamin K foods. Patient reports no longer taking aspirin. Patient will continue current regimen and recheck INR in 2 week(s). Patient was instructed to schedule an appointment in 2 week(s) prior to leaving the clinic. Medication reconciliation was completed during the visit. Medications Discontinued During This Encounter   Medication Reason    aspirin 81 mg chewable tablet LIST CLEANUP           A full discussion of the nature of anticoagulants has been carried out. A full discussion of the need for frequent and regular monitoring, precise dosage adjustment and compliance was stressed.   Side effects of potential bleeding were discussed and Mr. Erica Ortiz was instructed to call [phone] if there are any signs of abnormal bleeding. Mr. Diana Yuan was instructed to avoid any OTC items containing aspirin or ibuprofen and prior to starting any new OTC products to consult with his physician or pharmacist to ensure no drug interactions are present. Mr. Diana Yuan was instructed to avoid any major changes in his general diet and to avoid alcohol consumption. Mr. Diana Yuan was provided information in the AVS that includes topics on understanding coumadin therapy, drug interaction considerations, vitamin K and coumadin use, interactions with foods and supplements containing vitamin K, and the use of herbal products. Mr. Diana Yuan verbalized his understanding of all instructions and will call the office with any questions, concerns, or signs of abnormal bleeding or blood clot. Notifications of recommendations will be sent to Dr. Dillan Webb MD for review.     Thank you,  Stephanie Teixeira, [id number] Tracking Only    Intervention Detail: Adherence Monitorin and Lab(s) Ordered  Total # of Interventions Recommended: 2  Total # of Interventions Accepted: 2  Time Spent (min): 15

## 2023-03-14 NOTE — PROGRESS NOTES
Pharmacy Progress Note - Anticoagulation Management    S/O:  Mr. Humera Thorpe  is a 49 y.o. male seen today for anticoagulation management for the diagnosis of Aortic Valve Replacement. HPI: At last visit (3/1), INR was 1.5 and therapeutic. Patient reported consistent intake of vitamin K foods. Patient reported no longer taking aspirin. Patient will continue current regimen and recheck INR in 2 week(s). Interim History:    Warfarin start date: 5/4/22  INR Goal: 1.5-2.5 (On-X)  Current warfarin regimen: 4 mg daily  Warfarin tablet strength:   2 mg and 4 mg  Duration of therapy: Indefinite      Results for orders placed or performed in visit on 03/15/23   POC PROTHROMBIN W/INR   Result Value Ref Range    VALID INTERNAL CONTROL POC Yes     Prothrombin time (POC) 17.7 seconds    INR POC 1.5        Today's pertinent positives includes:  No significant changes since last visit      Adherence:   Able to recall regimen? YES  Miss/extra dose? NO  Need refill? YES      Upcoming procedure(s):  N/A      Past Medical History:   Diagnosis Date    Aortic stenosis     Bicuspid aortic valve     GERD (gastroesophageal reflux disease)     Hiatal hernia     Severe aortic stenosis 3/22/2022     Allergies   Allergen Reactions    Soap Rash     Dial Soap     Current Outpatient Medications   Medication Sig    warfarin (COUMADIN) 4 mg tablet Take 1 tablet (4 mg) by mouth every day or take as directed by the clinic.    amoxicillin (AMOXIL) 500 mg capsule Take 4 Capsules by mouth as needed for PRN Reason (Other) (1 hour prior to any dental cleaning or procedure). metoprolol tartrate (LOPRESSOR) 25 mg tablet TAKE 1/2 TABLET BY MOUTH TWICE A DAY    warfarin (COUMADIN) 2 mg tablet Take 1 tablet (2 mg) by mouth every Monday or take as directed by the clinic.    melatonin 10 mg tab Take 1 Tablet by mouth daily as needed (sleep). acetaminophen (TYLENOL) 325 mg tablet Take 650 mg by mouth every four (4) hours as needed for Pain (mild). Omeprazole delayed release (PRILOSEC D/R) 20 mg tablet Take 20 mg by mouth daily. No current facility-administered medications for this visit. Wt Readings from Last 3 Encounters:   02/27/23 168 lb (76.2 kg)   10/27/22 (P) 164 lb (74.4 kg)   10/25/22 165 lb (74.8 kg)     BP Readings from Last 3 Encounters:   02/27/23 120/86   09/02/22 101/67   08/30/22 127/71     Pulse Readings from Last 3 Encounters:   02/27/23 62   09/02/22 100   08/30/22 84     Lab Results   Component Value Date/Time    WBC 8.3 08/30/2022 12:05 AM    HGB 12.1 08/30/2022 12:05 AM    HCT 36.2 (L) 08/30/2022 12:05 AM    PLATELET 838 (H) 42/63/9241 12:05 AM    MCV 91.4 08/30/2022 12:05 AM     Lab Results   Component Value Date/Time    Sodium 136 08/30/2022 12:05 AM    Potassium 4.2 08/30/2022 12:05 AM    Chloride 104 08/30/2022 12:05 AM    CO2 25 08/30/2022 12:05 AM    Anion gap 7 08/30/2022 12:05 AM    Glucose 103 (H) 08/30/2022 12:05 AM    BUN 9 08/30/2022 12:05 AM    Creatinine 0.97 08/30/2022 12:05 AM    BUN/Creatinine ratio 9 (L) 08/30/2022 12:05 AM    GFR est AA >60 08/30/2022 12:05 AM    GFR est non-AA >60 08/30/2022 12:05 AM    Calcium 9.6 08/30/2022 12:05 AM    Bilirubin, total 1.2 (H) 08/24/2022 09:19 AM    Alk. phosphatase 69 08/24/2022 09:19 AM    Protein, total 8.8 (H) 08/24/2022 09:19 AM    Albumin 3.9 08/24/2022 09:19 AM    Globulin 4.9 (H) 08/24/2022 09:19 AM    A-G Ratio 0.8 (L) 08/24/2022 09:19 AM    ALT (SGPT) 17 08/24/2022 09:19 AM     CrCl cannot be calculated (Patient's most recent lab result is older than the maximum 180 days allowed.).       INR History:   (normal INR range 0.8-1.2)     Date   INR   PT   Dose/Comments  03/15/23 1.5  17.7 4 mg daily  03/01/23 1.5  17.9 4 mg daily  02/15/23 3.0  35.9 4 mg daily  01/25/23 1.3  16.1 2 mg Mon; 4 mg daily ROW   01/04/23 1.6  18.6 2 mg Mon; 4 mg daily ROW  12/20/22 1.5  18.3 8 mg today (12/13) and then take 2 mg Mon; 4 mg daily ROW   12/13/22 1.1  12.9 2 mg Mon, Wed; 4 mg daily ROW, hold 12/1-12/5. Restart under the guidance of the provider on 12/6.  11/23/22 1.4  17.2 2 mg Mon, Wed, Fri; 4 mg daily ROW  11/09/22 1.3  15.2 2 mg Mon, Wed, Fri; 4 mg daily ROW  10/18/22 2.2  26.9 2 mg Mon, Wed, Fri; 4 mg daily ROW  09/27/22 2.0  23.5 2 mg Mon, Wed, Fri; 4 mg daily ROW  09/20/22 1.5  18.6 hold dose 9/13 then take 4 mg Mon, Tue; 2 mg daily ROW  09/13/22 3.6  43.0 hold doses 9/6, 9/7 then 2 mg Thur, Fri, Sat; 4 mg daily ROW  09/06/22 6.0  72.2 4 mg daily  08/03/22 1.9  22.9 4 mg daily  07/13/22 2.3  27.6 4 mg daily      Medications that can increase  INR: omeprazole   Medications that can decrease INR: N/A    A/P:       Anticoagulation:  Considering Mr. Nikia Perkins past history, todays findings, and per Anticoagulation Collaborative Practice Agreement/Protocol:    Fingerstick POC INR (1.5) is Therapeutic for INR goal today. Continue warfarin 4 mg daily. INR is 1.5 and therapeutic. Patient denies changes to his medications and reports consistent intake of vitamin K foods. Patient will continue current regimen and recheck INR in 3 week(s). Patient was instructed to schedule an appointment in 3 week(s) prior to leaving the clinic. Medication reconciliation was completed during the visit. There are no discontinued medications. A full discussion of the nature of anticoagulants has been carried out. A full discussion of the need for frequent and regular monitoring, precise dosage adjustment and compliance was stressed. Side effects of potential bleeding were discussed and Mr. Jose Bhatia was instructed to call [phone] if there are any signs of abnormal bleeding. Mr. Jose Bhatia was instructed to avoid any OTC items containing aspirin or ibuprofen and prior to starting any new OTC products to consult with his physician or pharmacist to ensure no drug interactions are present.   Mr. Jose Bhatia was instructed to avoid any major changes in his general diet and to avoid alcohol consumption. Mr. Italo Fitzpatrick was provided information in the AVS that includes topics on understanding coumadin therapy, drug interaction considerations, vitamin K and coumadin use, interactions with foods and supplements containing vitamin K, and the use of herbal products. Mr. Italo Fitzpatrick verbalized his understanding of all instructions and will call the office with any questions, concerns, or signs of abnormal bleeding or blood clot. Notifications of recommendations will be sent to Dr. Eleno Khalil MD for review.     Thank you,  Wol Members, [id number] [facility] Tracking Only    Intervention Detail: Adherence Monitorin, Lab(s) Ordered, and Refill(s) Provided   Total # of Interventions Recommended: 3  Total # of Interventions Accepted: 3  Time Spent (min): 20

## 2023-03-14 NOTE — PATIENT INSTRUCTIONS
Today your INR was 1.5 . Your goal INR is  1.5 - 2.5 . You have a 2 mg and 4 mg tablet of Coumadin (warfarin). Take Coumadin (warfarin) as follows: Take 4 mg daily. Come back in 3 week(s) for your next finger stick/INR blood test.        Avoid any over the counter items containing aspirin or ibuprofen, and avoid great swings in general diet. Avoid alcohol consumption. Please notify the INR nurse if you are started on any new medication including over the counter or herbal supplements. Also, please notify your INR nurse if any of your other prescription or over the counter medications have been discontinued. Call [facility] at [phone] if you have any signs of abnormal bleeding/blood clot.  ------------------------------------------------------------------------------------------------------------------  Taking Warfarin Safely: Care Instructions    Your Care Instructions  Warfarin is a medicine that you take to prevent blood clots. It is often called a blood thinner. Doctors give warfarin (such as Coumadin) to reduce the risk of blood clots. You may be at risk for blood clots if you have atrial fibrillation or deep vein thrombosis. Some other health problems may also put you at risk. Warfarin slows the amount of time it takes for your blood to clot. It can cause bleeding problems. Even if you've been taking warfarin for a while, it's important to know how to take it safely. Foods and other medicines can affect the way warfarin works. Some can make warfarin work too well. This can cause bleeding problems. And some can make it work poorly, so that it does not prevent blood clots very well. You will need regular blood tests to check how long it takes for your blood to form a clot. This test is called a PT or prothrombin time test. The result of the test is called an INR level.  Depending on the test results, your doctor or anticoagulation clinic may adjust your dose of warfarin. Follow-up care is a key part of your treatment and safety. Be sure to make and go to all appointments, and call your doctor if you are having problems. It's also a good idea to know your test results and keep a list of the medicines you take. How can you care for yourself at home? Take warfarin safely  Take your warfarin at the same time each day. If you miss a dose of warfarin, don't take an extra dose to make up for it. Your doctor can tell you exactly what to do so you don't take too much or too little. Wear medical alert jewelry that lets others know that you take warfarin. You can buy this at most drugstores. Don't take warfarin if you are pregnant or planning to get pregnant. Talk to your doctor about how you can prevent getting pregnant while you are taking it. Don't change your dose or stop taking warfarin unless your doctor tells you to. Effects of medicines and food on warfarin  Don't start or stop taking any medicines, vitamins, or natural remedies unless you first talk to your doctor. Many medicines can affect how warfarin works. These include aspirin and other pain relievers, over-the-counter medicines, multivitamins, dietary supplements, and herbal products. Tell all of your doctors and pharmacists that you take warfarin. Some prescription medicines can affect how warfarin works. Keep the amount of vitamin K in your diet about the same from day to day. Do not suddenly eat a lot more or a lot less food that is rich in vitamin K than you usually do. Vitamin K affects how warfarin works and how your blood clots. Talk with your doctor before making big changes in your diet. Vitamin K is in many foods, such as:  Leafy greens, such as kale, cabbage, spinach, Swiss chard, and lettuce. Canola and soybean oils. Green vegetables, such as asparagus, broccoli, and [place] sprouts. Vegetable drinks, green tea leaves, and some dietary supplement drinks.   Avoid cranberry juice and other cranberry products. They can increase the effects of warfarin. Limit your use of alcohol. Avoid bleeding by preventing falls and injuries  Wear slippers or shoes with nonskid soles. Remove throw rugs and clutter. Rearrange furniture and electrical cords to keep them out of walking paths. Keep stairways, porches, and outside walkways well lit. Use night-lights in hallways and bathrooms. Be extra careful when you work with sharp tools or knives. When should you call for help? Call 911 anytime you think you may need emergency care. For example, call if:  You have a sudden, severe headache that is different from past headaches. Call your doctor now or seek immediate medical care if:  You have any abnormal bleeding, such as:  Nosebleeds. Vaginal bleeding that is different (heavier, more frequent, at a different time of the month) than what you are used to. Bloody or black stools, or rectal bleeding. Bloody or pink urine. Watch closely for changes in your health, and be sure to contact your doctor if you have any problems. Where can you learn more? Go to http://dov-osmel.info/. Enter G925 in the search box to learn more about \"Taking Warfarin Safely: Care Instructions. \"  Current as of: January 27, 2016  Content Version: 11.1  © 4465-9389 [employer]. Care instructions adapted under license by [employer] (which disclaims liability or warranty for this information). If you have questions about a medical condition or this instruction, always ask your healthcare professional. [place] [address] any warranty or liability for your use of this information.

## 2023-03-15 ENCOUNTER — ANTI-COAG VISIT (OUTPATIENT)
Dept: PHARMACY | Age: 48
End: 2023-03-15
Payer: COMMERCIAL

## 2023-03-15 DIAGNOSIS — Z95.2 S/P AVR: Primary | ICD-10-CM

## 2023-03-15 LAB
INR BLD: 1.5
PT POC: 17.7 SECONDS
VALID INTERNAL CONTROL?: YES

## 2023-03-15 PROCEDURE — 85610 PROTHROMBIN TIME: CPT

## 2023-03-15 PROCEDURE — 99212 OFFICE O/P EST SF 10 MIN: CPT

## 2023-03-22 ENCOUNTER — ANCILLARY PROCEDURE (OUTPATIENT)
Dept: CARDIOLOGY CLINIC | Age: 48
End: 2023-03-22

## 2023-03-22 VITALS — BODY MASS INDEX: 22.75 KG/M2 | HEIGHT: 72 IN | WEIGHT: 168 LBS

## 2023-03-22 DIAGNOSIS — Q23.1 BICUSPID AORTIC VALVE: ICD-10-CM

## 2023-03-22 DIAGNOSIS — I35.0 SEVERE AORTIC STENOSIS: ICD-10-CM

## 2023-03-22 DIAGNOSIS — I77.810 ASCENDING AORTA DILATATION (HCC): ICD-10-CM

## 2023-03-22 DIAGNOSIS — Z95.2 S/P AVR: ICD-10-CM

## 2023-03-22 DIAGNOSIS — E78.2 MIXED HYPERLIPIDEMIA: ICD-10-CM

## 2023-03-25 LAB
ECHO AO ASC DIAM: 3.7 CM
ECHO AO ASCENDING AORTA INDEX: 1.87 CM/M2
ECHO AV AREA PEAK VELOCITY: 1.2 CM2
ECHO AV AREA VTI: 1.4 CM2
ECHO AV AREA/BSA PEAK VELOCITY: 0.6 CM2/M2
ECHO AV AREA/BSA VTI: 0.7 CM2/M2
ECHO AV MEAN GRADIENT: 9 MMHG
ECHO AV MEAN VELOCITY: 1.4 M/S
ECHO AV PEAK GRADIENT: 16 MMHG
ECHO AV PEAK VELOCITY: 2 M/S
ECHO AV VELOCITY RATIO: 0.35
ECHO AV VTI: 36.8 CM
ECHO LA DIAMETER INDEX: 1.82 CM/M2
ECHO LA DIAMETER: 3.6 CM
ECHO LA VOL 2C: 44 ML (ref 18–58)
ECHO LA VOL 4C: 67 ML (ref 18–58)
ECHO LA VOL BP: 54 ML (ref 18–58)
ECHO LA VOL/BSA BIPLANE: 27 ML/M2 (ref 16–34)
ECHO LA VOLUME AREA LENGTH: 58 ML
ECHO LA VOLUME INDEX A2C: 22 ML/M2 (ref 16–34)
ECHO LA VOLUME INDEX A4C: 34 ML/M2 (ref 16–34)
ECHO LA VOLUME INDEX AREA LENGTH: 29 ML/M2 (ref 16–34)
ECHO LV EDV A2C: 68 ML
ECHO LV EDV A4C: 92 ML
ECHO LV EDV BP: 81 ML (ref 67–155)
ECHO LV EDV INDEX A4C: 46 ML/M2
ECHO LV EDV INDEX BP: 41 ML/M2
ECHO LV EDV NDEX A2C: 34 ML/M2
ECHO LV EJECTION FRACTION A2C: 61 %
ECHO LV EJECTION FRACTION A4C: 50 %
ECHO LV EJECTION FRACTION BIPLANE: 56 % (ref 55–100)
ECHO LV ESV A2C: 26 ML
ECHO LV ESV A4C: 45 ML
ECHO LV ESV BP: 36 ML (ref 22–58)
ECHO LV ESV INDEX A2C: 13 ML/M2
ECHO LV ESV INDEX A4C: 23 ML/M2
ECHO LV ESV INDEX BP: 18 ML/M2
ECHO LV FRACTIONAL SHORTENING: 41 % (ref 28–44)
ECHO LV INTERNAL DIMENSION DIASTOLE INDEX: 2.83 CM/M2
ECHO LV INTERNAL DIMENSION DIASTOLIC: 5.6 CM (ref 4.2–5.9)
ECHO LV INTERNAL DIMENSION SYSTOLIC INDEX: 1.67 CM/M2
ECHO LV INTERNAL DIMENSION SYSTOLIC: 3.3 CM
ECHO LV IVSD: 1.2 CM (ref 0.6–1)
ECHO LV MASS 2D: 264.7 G (ref 88–224)
ECHO LV MASS INDEX 2D: 133.7 G/M2 (ref 49–115)
ECHO LV POSTERIOR WALL DIASTOLIC: 1.1 CM (ref 0.6–1)
ECHO LV RELATIVE WALL THICKNESS RATIO: 0.39
ECHO LVOT AREA: 3.5 CM2
ECHO LVOT AV VTI INDEX: 0.4
ECHO LVOT DIAM: 2.1 CM
ECHO LVOT MEAN GRADIENT: 1 MMHG
ECHO LVOT PEAK GRADIENT: 2 MMHG
ECHO LVOT PEAK VELOCITY: 0.7 M/S
ECHO LVOT STROKE VOLUME INDEX: 26.1 ML/M2
ECHO LVOT SV: 51.6 ML
ECHO LVOT VTI: 14.9 CM
ECHO MV A VELOCITY: 0.54 M/S
ECHO MV E DECELERATION TIME (DT): 316.7 MS
ECHO MV E VELOCITY: 0.89 M/S
ECHO MV E/A RATIO: 1.65
ECHO PULMONARY ARTERY END DIASTOLIC PRESSURE: 6 MMHG
ECHO PV MAX VELOCITY: 1 M/S
ECHO PV PEAK GRADIENT: 4 MMHG
ECHO PV REGURGITANT MAX VELOCITY: 1.2 M/S
ECHO RV TAPSE: 1.1 CM (ref 1.7–?)

## 2023-04-06 ENCOUNTER — ANTI-COAG VISIT (OUTPATIENT)
Dept: PHARMACY | Age: 48
End: 2023-04-06
Payer: COMMERCIAL

## 2023-04-06 PROCEDURE — 85610 PROTHROMBIN TIME: CPT

## 2023-04-06 PROCEDURE — 99211 OFF/OP EST MAY X REQ PHY/QHP: CPT

## 2023-04-06 NOTE — PATIENT INSTRUCTIONS
Today your INR was 1.8 . Your goal INR is  1.5 - 2.5 . You have a 2 mg and 4 mg tablet of Coumadin (warfarin). Take Coumadin (warfarin) as follows: Take 4 mg every day. Come back in 4 week(s) for your next finger stick/INR blood test.        Avoid any over the counter items containing aspirin or ibuprofen, and avoid great swings in general diet. Avoid alcohol consumption. Please notify the INR pharmacist if you are started on any new medication including over the counter or herbal supplements. Also, please notify your INR pharmacist if any of your other prescription or over the counter medications have been discontinued. Call [facility] at [phone] if you have any signs of abnormal bleeding/blood clot.  ------------------------------------------------------------------------------------------------------------------  Taking Warfarin Safely: Care Instructions    Your Care Instructions  Warfarin is a medicine that you take to prevent blood clots. It is often called a blood thinner. Doctors give warfarin (such as Coumadin) to reduce the risk of blood clots. You may be at risk for blood clots if you have atrial fibrillation or deep vein thrombosis. Some other health problems may also put you at risk. Warfarin slows the amount of time it takes for your blood to clot. It can cause bleeding problems. Even if you've been taking warfarin for a while, it's important to know how to take it safely. Foods and other medicines can affect the way warfarin works. Some can make warfarin work too well. This can cause bleeding problems. And some can make it work poorly, so that it does not prevent blood clots very well. You will need regular blood tests to check how long it takes for your blood to form a clot. This test is called a PT or prothrombin time test. The result of the test is called an INR level.  Depending on the test results, your doctor or anticoagulation clinic may adjust your dose of warfarin. Follow-up care is a key part of your treatment and safety. Be sure to make and go to all appointments, and call your doctor if you are having problems. It's also a good idea to know your test results and keep a list of the medicines you take. How can you care for yourself at home? Take warfarin safely  Take your warfarin at the same time each day. If you miss a dose of warfarin, don't take an extra dose to make up for it. Your doctor can tell you exactly what to do so you don't take too much or too little. Wear medical alert jewelry that lets others know that you take warfarin. You can buy this at most drugstores. Don't take warfarin if you are pregnant or planning to get pregnant. Talk to your doctor about how you can prevent getting pregnant while you are taking it. Don't change your dose or stop taking warfarin unless your doctor tells you to. Effects of medicines and food on warfarin  Don't start or stop taking any medicines, vitamins, or natural remedies unless you first talk to your doctor. Many medicines can affect how warfarin works. These include aspirin and other pain relievers, over-the-counter medicines, multivitamins, dietary supplements, and herbal products. Tell all of your doctors and pharmacists that you take warfarin. Some prescription medicines can affect how warfarin works. Keep the amount of vitamin K in your diet about the same from day to day. Do not suddenly eat a lot more or a lot less food that is rich in vitamin K than you usually do. Vitamin K affects how warfarin works and how your blood clots. Talk with your doctor before making big changes in your diet. Vitamin K is in many foods, such as:  Leafy greens, such as kale, cabbage, spinach, Swiss chard, and lettuce. Canola and soybean oils. Green vegetables, such as asparagus, broccoli, and [place] sprouts. Vegetable drinks, green tea leaves, and some dietary supplement drinks.   Avoid cranberry juice and other cranberry products. They can increase the effects of warfarin. Limit your use of alcohol. Avoid bleeding by preventing falls and injuries  Wear slippers or shoes with nonskid soles. Remove throw rugs and clutter. Rearrange furniture and electrical cords to keep them out of walking paths. Keep stairways, porches, and outside walkways well lit. Use night-lights in hallways and bathrooms. Be extra careful when you work with sharp tools or knives. When should you call for help? Call 911 anytime you think you may need emergency care. For example, call if:  You have a sudden, severe headache that is different from past headaches. Call your doctor now or seek immediate medical care if:  You have any abnormal bleeding, such as:  Nosebleeds. Vaginal bleeding that is different (heavier, more frequent, at a different time of the month) than what you are used to. Bloody or black stools, or rectal bleeding. Bloody or pink urine. Watch closely for changes in your health, and be sure to contact your doctor if you have any problems. Where can you learn more? Go to http://www.gray.com/. Enter A137 in the search box to learn more about \"Taking Warfarin Safely: Care Instructions. \"  Current as of: January 27, 2016  Content Version: 11.1  © 5352-4404 [employer]. Care instructions adapted under license by [employer] (which disclaims liability or warranty for this information). If you have questions about a medical condition or this instruction, always ask your healthcare professional. [place] [address] any warranty or liability for your use of this information.

## 2023-04-06 NOTE — PROGRESS NOTES
Pharmacy Progress Note - Anticoagulation Management    S/O:  Mr. Chad Wheeler  is a 49 y.o. male seen today for anticoagulation management for the diagnosis of Aortic Valve Replacement. HPI: At last visit (3/15), INR was 1.5 and therapeutic. Patient denied changes to his medications and reported consistent intake of vitamin K foods. Patient will continue current regimen and recheck INR in 3 week(s). Interim History:    Warfarin start date: 5/4/22  INR Goal: 1.5-2.5 (On-X)  Current warfarin regimen: 4 mg daily  Warfarin tablet strength:   2 mg and 4 mg  Duration of therapy: Indefinite      Results for orders placed or performed in visit on 04/06/23   POC PROTHROMBIN W/INR   Result Value Ref Range    VALID INTERNAL CONTROL POC Yes     Prothrombin time (POC) 21.1 seconds    INR POC 1.8        Today's pertinent positives includes:  No significant changes since last visit      Adherence:   Able to recall regimen? YES  Miss/extra dose? NO  Need refill? NO      Upcoming procedure(s):  N/A      Past Medical History:   Diagnosis Date    Aortic stenosis     Bicuspid aortic valve     GERD (gastroesophageal reflux disease)     Hiatal hernia     Severe aortic stenosis 3/22/2022     Allergies   Allergen Reactions    Soap Rash     Dial Soap     Current Outpatient Medications   Medication Sig    warfarin (COUMADIN) 4 mg tablet Take 1 tablet (4 mg) by mouth every day or take as directed by the clinic.    amoxicillin (AMOXIL) 500 mg capsule Take 4 Capsules by mouth as needed for PRN Reason (Other) (1 hour prior to any dental cleaning or procedure). metoprolol tartrate (LOPRESSOR) 25 mg tablet TAKE 1/2 TABLET BY MOUTH TWICE A DAY    warfarin (COUMADIN) 2 mg tablet Take 1 tablet (2 mg) by mouth every Monday or take as directed by the clinic.    melatonin 10 mg tab Take 1 Tablet by mouth daily as needed (sleep). acetaminophen (TYLENOL) 325 mg tablet Take 650 mg by mouth every four (4) hours as needed for Pain (mild). Omeprazole delayed release (PRILOSEC D/R) 20 mg tablet Take 20 mg by mouth daily. No current facility-administered medications for this visit. Wt Readings from Last 3 Encounters:   03/22/23 168 lb (76.2 kg)   02/27/23 168 lb (76.2 kg)   10/27/22 (P) 164 lb (74.4 kg)     BP Readings from Last 3 Encounters:   02/27/23 120/86   09/02/22 101/67   08/30/22 127/71     Pulse Readings from Last 3 Encounters:   02/27/23 62   09/02/22 100   08/30/22 84     Lab Results   Component Value Date/Time    WBC 8.3 08/30/2022 12:05 AM    HGB 12.1 08/30/2022 12:05 AM    HCT 36.2 (L) 08/30/2022 12:05 AM    PLATELET 552 (H) 05/13/4344 12:05 AM    MCV 91.4 08/30/2022 12:05 AM     Lab Results   Component Value Date/Time    Sodium 136 08/30/2022 12:05 AM    Potassium 4.2 08/30/2022 12:05 AM    Chloride 104 08/30/2022 12:05 AM    CO2 25 08/30/2022 12:05 AM    Anion gap 7 08/30/2022 12:05 AM    Glucose 103 (H) 08/30/2022 12:05 AM    BUN 9 08/30/2022 12:05 AM    Creatinine 0.97 08/30/2022 12:05 AM    BUN/Creatinine ratio 9 (L) 08/30/2022 12:05 AM    GFR est AA >60 08/30/2022 12:05 AM    GFR est non-AA >60 08/30/2022 12:05 AM    Calcium 9.6 08/30/2022 12:05 AM    Bilirubin, total 1.2 (H) 08/24/2022 09:19 AM    Alk. phosphatase 69 08/24/2022 09:19 AM    Protein, total 8.8 (H) 08/24/2022 09:19 AM    Albumin 3.9 08/24/2022 09:19 AM    Globulin 4.9 (H) 08/24/2022 09:19 AM    A-G Ratio 0.8 (L) 08/24/2022 09:19 AM    ALT (SGPT) 17 08/24/2022 09:19 AM     CrCl cannot be calculated (Patient's most recent lab result is older than the maximum 180 days allowed.).       INR History:   (normal INR range 0.8-1.2)     Date   INR   PT   Dose/Comments  04/06/23 1.8  21.1 4 mg daily  03/15/23 1.5  17.7 4 mg daily  03/01/23 1.5  17.9 4 mg daily  02/15/23 3.0  35.9 4 mg daily  01/25/23 1.3  16.1 2 mg Mon; 4 mg daily ROW   01/04/23 1.6  18.6 2 mg Mon; 4 mg daily ROW  12/20/22 1.5  18.3 8 mg today (12/13) and then take 2 mg Mon; 4 mg daily ROW   12/13/22 1.1  12.9 2 mg Mon, Wed; 4 mg daily ROW, hold 12/1-12/5. Restart under the guidance of the provider on 12/6.  11/23/22 1.4  17.2 2 mg Mon, Wed, Fri; 4 mg daily ROW  11/09/22 1.3  15.2 2 mg Mon, Wed, Fri; 4 mg daily ROW  10/18/22 2.2  26.9 2 mg Mon, Wed, Fri; 4 mg daily ROW  09/27/22 2.0  23.5 2 mg Mon, Wed, Fri; 4 mg daily ROW  09/20/22 1.5  18.6 hold dose 9/13 then take 4 mg Mon, Tue; 2 mg daily ROW  09/13/22 3.6  43.0 hold doses 9/6, 9/7 then 2 mg Thur, Fri, Sat; 4 mg daily ROW  09/06/22 6.0  72.2 4 mg daily  08/03/22 1.9  22.9 4 mg daily  07/13/22 2.3  27.6 4 mg daily      Medications that can increase  INR: omeprazole   Medications that can decrease INR: N/A    A/P:       Anticoagulation:  Considering Mr. Sean Neves past history, todays findings, and per Anticoagulation Collaborative Practice Agreement/Protocol:    Fingerstick POC INR (1.8) is Therapeutic for INR goal today. Continue warfarin 4 mg daily. INR is 1.8 and therapeutic. Patient denies changes to his medications and reports consistent intake of vitamin K foods. Patient will continue current regimen and recheck INR in 4 week(s). Patient was instructed to schedule an appointment in 4 week(s) prior to leaving the clinic. Medication reconciliation was completed during the visit. There are no discontinued medications. A full discussion of the nature of anticoagulants has been carried out. A full discussion of the need for frequent and regular monitoring, precise dosage adjustment and compliance was stressed. Side effects of potential bleeding were discussed and Mr. Nader Fonseca was instructed to call [phone] if there are any signs of abnormal bleeding. Mr. Nader Fonseca was instructed to avoid any OTC items containing aspirin or ibuprofen and prior to starting any new OTC products to consult with his physician or pharmacist to ensure no drug interactions are present.   Mr. Nader Oketo was instructed to avoid any major changes in his general diet and to avoid alcohol consumption. Mr. Shira Garvey was provided information in the AVS that includes topics on understanding coumadin therapy, drug interaction considerations, vitamin K and coumadin use, interactions with foods and supplements containing vitamin K, and the use of herbal products. Mr. Shira Garvey verbalized his understanding of all instructions and will call the office with any questions, concerns, or signs of abnormal bleeding or blood clot. Notifications of recommendations will be sent to Dr. Nidia Cook MD for review.     Thank you,  Yue Castillo, [address] Tracking Only    Intervention Detail: Adherence Monitorin and Lab(s) Ordered  Total # of Interventions Recommended: 2  Total # of Interventions Accepted: 2  Time Spent (min): 15

## 2023-05-04 ENCOUNTER — ANTI-COAG VISIT (OUTPATIENT)
Dept: PHARMACY | Age: 48
End: 2023-05-04
Payer: COMMERCIAL

## 2023-05-04 DIAGNOSIS — Z95.2 S/P AVR: Primary | ICD-10-CM

## 2023-05-04 LAB
INR BLD: 2
PT POC: 24.3 SECONDS
VALID INTERNAL CONTROL?: YES

## 2023-05-04 PROCEDURE — 85610 PROTHROMBIN TIME: CPT

## 2023-05-04 PROCEDURE — 99211 OFF/OP EST MAY X REQ PHY/QHP: CPT

## 2023-06-01 ENCOUNTER — ANTI-COAG VISIT (OUTPATIENT)
Facility: HOSPITAL | Age: 48
End: 2023-06-01

## 2023-06-01 DIAGNOSIS — Z95.2 S/P AVR: Primary | ICD-10-CM

## 2023-06-01 LAB — INTERNATIONAL NORMALIZATION RATIO, POC: 1.5 (ref 1.5–2.5)

## 2023-06-01 NOTE — PATIENT INSTRUCTIONS
anticoagulation clinic may adjust your dose of warfarin. Follow-up care is a key part of your treatment and safety. Be sure to make and go to all appointments, and call your doctor if you are having problems. It's also a good idea to know your test results and keep a list of the medicines you take. How can you care for yourself at home? Take warfarin safely  Take your warfarin at the same time each day. If you miss a dose of warfarin, don't take an extra dose to make up for it. Your doctor can tell you exactly what to do so you don't take too much or too little. Wear medical alert jewelry that lets others know that you take warfarin. You can buy this at most drugstores. Don't take warfarin if you are pregnant or planning to get pregnant. Talk to your doctor about how you can prevent getting pregnant while you are taking it. Don't change your dose or stop taking warfarin unless your doctor tells you to. Effects of medicines and food on warfarin  Don't start or stop taking any medicines, vitamins, or natural remedies unless you first talk to your doctor. Many medicines can affect how warfarin works. These include aspirin and other pain relievers, over-the-counter medicines, multivitamins, dietary supplements, and herbal products. Tell all of your doctors and pharmacists that you take warfarin. Some prescription medicines can affect how warfarin works. Keep the amount of vitamin K in your diet about the same from day to day. Do not suddenly eat a lot more or a lot less food that is rich in vitamin K than you usually do. Vitamin K affects how warfarin works and how your blood clots. Talk with your doctor before making big changes in your diet. Vitamin K is in many foods, such as:  Leafy greens, such as kale, cabbage, spinach, Swiss chard, and lettuce. Canola and soybean oils. Green vegetables, such as asparagus, broccoli, and [place] sprouts.   Vegetable drinks, green tea leaves, and some dietary

## 2023-06-01 NOTE — PROGRESS NOTES
Pharmacy Progress Note - Anticoagulation Management    S/O:  Mr. Alina Clayton  is a 49 y.o. male seen today for anticoagulation management for the diagnosis of Aortic Valve Replacement. HPI: At last visit (5/4), the patient's INR was 2.0 and therapeutic. Patient denied changes to his medications and reported consistent intake of vitamin K foods. Patient will continue current regimen and recheck INR in 4 week(s). Interim History:    Warfarin start date: 5/4/22  INR Goal: 1.5-2.5 (On-X)  Current warfarin regimen: 4 mg daily  Warfarin tablet strength:   2 mg and 4 mg  Duration of therapy: Indefinite       Results for orders placed or performed in visit on 06/01/23   POCT INR   Result Value Ref Range    INR,(POC) 1.5 1.5 - 2.5       Today's pertinent positives includes:  No significant changes since last visit      Adherence:   Able to recall regimen? YES  Miss/extra dose? NO  Need refill? NO    Upcoming procedure(s):  N/A      Past Medical History:   Diagnosis Date    Aortic stenosis     Bicuspid aortic valve     GERD (gastroesophageal reflux disease)     Hiatal hernia     Severe aortic stenosis 3/22/2022     Allergies   Allergen Reactions    Soap Rash     Dial Soap     Current Outpatient Medications   Medication Sig    acetaminophen (TYLENOL) 325 MG tablet Take 650 mg by mouth every 4 hours as needed    amoxicillin (AMOXIL) 500 MG capsule Take 2,000 mg by mouth as needed    Melatonin 10 MG TABS Take 1 tablet by mouth daily as needed    metoprolol tartrate (LOPRESSOR) 25 MG tablet Take 0.5 tablets by mouth 2 times daily    omeprazole (PRILOSEC OTC) 20 MG tablet Take 20 mg by mouth daily    warfarin (COUMADIN) 2 MG tablet Take 1 tablet (2 mg) by mouth every Monday or take as directed by the clinic.    warfarin (COUMADIN) 4 MG tablet Take 1 tablet (4 mg) by mouth every day or take as directed by the clinic. No current facility-administered medications for this visit.      Wt Readings from Last 3 Encounters:

## 2023-06-29 ENCOUNTER — ANTI-COAG VISIT (OUTPATIENT)
Facility: HOSPITAL | Age: 48
End: 2023-06-29
Payer: COMMERCIAL

## 2023-06-29 DIAGNOSIS — Z95.2 S/P AVR: Primary | ICD-10-CM

## 2023-06-29 LAB — INTERNATIONAL NORMALIZATION RATIO, POC: 1.9 (ref 1.5–2.5)

## 2023-06-29 PROCEDURE — 99211 OFF/OP EST MAY X REQ PHY/QHP: CPT

## 2023-06-29 PROCEDURE — 85610 PROTHROMBIN TIME: CPT

## 2023-07-20 DIAGNOSIS — Z95.2 PRESENCE OF PROSTHETIC HEART VALVE: ICD-10-CM

## 2023-07-27 ENCOUNTER — ANTI-COAG VISIT (OUTPATIENT)
Facility: HOSPITAL | Age: 48
End: 2023-07-27
Payer: COMMERCIAL

## 2023-07-27 DIAGNOSIS — Z95.2 S/P AVR: Primary | ICD-10-CM

## 2023-07-27 LAB — INTERNATIONAL NORMALIZATION RATIO, POC: 1.8 (ref 1.5–2.5)

## 2023-07-27 PROCEDURE — 99211 OFF/OP EST MAY X REQ PHY/QHP: CPT

## 2023-07-27 PROCEDURE — 85610 PROTHROMBIN TIME: CPT

## 2023-07-27 NOTE — PROGRESS NOTES
Pharmacy Progress Note - Anticoagulation Management    S/O:  Mr. Farhan Meyer  is a 49 y.o. male seen today for anticoagulation management for the diagnosis of Aortic Valve Replacement. HPI: At last visit (6/29), the patient's INR was 1.9 and therapeutic. Patient denied any changes to his medications and reported consistent intake of vitamin K foods. Patient will continue current regimen and recheck INR in 4 week(s). Interim History:    Warfarin start date: 5/4/22  INR Goal: 1.5-2.5 (On-X)  Current warfarin regimen: 4 mg daily  Warfarin tablet strength:   2 mg and 4 mg  Duration of therapy: Indefinite       Results for orders placed or performed in visit on 07/27/23   POCT INR   Result Value Ref Range    INR,(POC) 1.8 1.5 - 2.5       Today's pertinent positives includes:  No significant changes since last visit      Adherence:   Able to recall regimen? YES  Miss/extra dose? NO  Need refill? NO    Upcoming procedure(s):  N/A      Past Medical History:   Diagnosis Date    Aortic stenosis     Bicuspid aortic valve     GERD (gastroesophageal reflux disease)     Hiatal hernia     Severe aortic stenosis 3/22/2022     Allergies   Allergen Reactions    Soap Rash     Dial Soap     Current Outpatient Medications   Medication Sig    metoprolol tartrate (LOPRESSOR) 25 MG tablet TAKE 1/2 TABLET BY MOUTH TWICE A DAY    acetaminophen (TYLENOL) 325 MG tablet Take 650 mg by mouth every 4 hours as needed    amoxicillin (AMOXIL) 500 MG capsule Take 2,000 mg by mouth as needed    Melatonin 10 MG TABS Take 1 tablet by mouth daily as needed    omeprazole (PRILOSEC OTC) 20 MG tablet Take 20 mg by mouth daily    warfarin (COUMADIN) 2 MG tablet Take 1 tablet (2 mg) by mouth every Monday or take as directed by the clinic.    warfarin (COUMADIN) 4 MG tablet Take 1 tablet (4 mg) by mouth every day or take as directed by the clinic. No current facility-administered medications for this visit.      Wt Readings from Last 3 Encounters:

## 2023-07-27 NOTE — PATIENT INSTRUCTIONS
Today your INR was 1.8 . Your goal INR is  1.5-2.5 . You have a   2 mg and 4 mg tablet of Coumadin (warfarin). Take Coumadin (warfarin) as follows: Take 4 mg every day. Come back in 4 week(s) for your next finger stick/INR blood test.        Avoid any over the counter items containing aspirin or ibuprofen, and avoid great swings in general diet. Avoid alcohol consumption. Please notify the INR pharmacist if you are started on any new medication including over the counter or herbal supplements. Also, please notify your INR pharmacist if any of your other prescription or over the counter medications have been discontinued. Call [facility] at [phone] if you have any signs of abnormal bleeding/blood clot.  ------------------------------------------------------------------------------------------------------------------  Taking Warfarin Safely: Care Instructions    Your Care Instructions  Warfarin is a medicine that you take to prevent blood clots. It is often called a blood thinner. Doctors give warfarin (such as Coumadin) to reduce the risk of blood clots. You may be at risk for blood clots if you have atrial fibrillation or deep vein thrombosis. Some other health problems may also put you at risk. Warfarin slows the amount of time it takes for your blood to clot. It can cause bleeding problems. Even if you've been taking warfarin for a while, it's important to know how to take it safely. Foods and other medicines can affect the way warfarin works. Some can make warfarin work too well. This can cause bleeding problems. And some can make it work poorly, so that it does not prevent blood clots very well. You will need regular blood tests to check how long it takes for your blood to form a clot. This test is called a PT or prothrombin time test. The result of the test is called an INR level.  Depending on the test results, your doctor or

## 2023-08-18 NOTE — ANESTHESIA PROCEDURE NOTES
A (GUIDEWIRE RUNTHROUGH 3CM 180CM .36MM VASC RADOPQ X FLPY STRL) guidewire was removed. Arterial Line Placement    Start time: 5/3/2022 7:15 AM  End time: 5/3/2022 7:30 AM  Performed by: Ramya Ravi MD  Authorized by: Ramya Ravi MD     Pre-Procedure  Indications:  Arterial pressure monitoring and blood sampling  Preanesthetic Checklist: patient identified, risks and benefits discussed, anesthesia consent, site marked, patient being monitored, timeout performed and patient being monitored      Procedure:   Prep:  Chlorhexidine  Seldinger Technique?: Yes    Orientation:  Left  Location:  Ulnar artery  Catheter size:  20 G  Number of attempts:  3 or more    Assessment:   Post-procedure:  Line secured and sterile dressing applied  Patient Tolerance:  Patient tolerated the procedure well with no immediate complications  Comment:   3 attempts at radial , unable to thread.

## 2023-08-23 NOTE — PATIENT INSTRUCTIONS
Today your INR was 1.7 . Your goal INR is  1.5-2.5 . You have 4 mg tablets of Coumadin (warfarin). Take Coumadin (warfarin) as follows: Take 4 mg every day. Come back in 4 week(s) for your next finger stick/INR blood test.        Avoid any over the counter items containing aspirin or ibuprofen, and avoid great swings in general diet. Avoid alcohol consumption. Please notify the INR pharmacist if you are started on any new medication including over the counter or herbal supplements. Also, please notify your INR pharmacist if any of your other prescription or over the counter medications have been discontinued. Call [facility] at [phone] if you have any signs of abnormal bleeding/blood clot.  ------------------------------------------------------------------------------------------------------------------  Taking Warfarin Safely: Care Instructions    Your Care Instructions  Warfarin is a medicine that you take to prevent blood clots. It is often called a blood thinner. Doctors give warfarin (such as Coumadin) to reduce the risk of blood clots. You may be at risk for blood clots if you have atrial fibrillation or deep vein thrombosis. Some other health problems may also put you at risk. Warfarin slows the amount of time it takes for your blood to clot. It can cause bleeding problems. Even if you've been taking warfarin for a while, it's important to know how to take it safely. Foods and other medicines can affect the way warfarin works. Some can make warfarin work too well. This can cause bleeding problems. And some can make it work poorly, so that it does not prevent blood clots very well. You will need regular blood tests to check how long it takes for your blood to form a clot. This test is called a PT or prothrombin time test. The result of the test is called an INR level.  Depending on the test results, your doctor or anticoagulation

## 2023-08-23 NOTE — PROGRESS NOTES
Pharmacy Progress Note - Anticoagulation Management    S/O:  Mr. Lonny Garcias  is a 51 y.o. male seen today for anticoagulation management for the diagnosis of Aortic Valve Replacement. HPI: At last visit (07/27), the patient's INR was 1.8 and therapeutic. Patient denied any changes to his medications and reported a consistent intake of vitamin K foods. Patient will continue current regimen and recheck INR in 4 week(s). Interim History:    Warfarin start date: 5/4/22  INR Goal: 1.5-2.5 (On-X)  Current warfarin regimen: 4 mg daily  Warfarin tablet strength:  4 mg  Duration of therapy: Indefinite       Results for orders placed or performed in visit on 08/24/23   POCT INR   Result Value Ref Range    INR,(POC) 1.7 1.5 - 2.5         Today's pertinent positives includes:  No significant changes since last visit    He reports that he is using 4mg tablets now. Adherence:   Able to recall regimen? YES  Miss/extra dose? NO  Need refill? NO    Upcoming procedure(s):  N/A      Past Medical History:   Diagnosis Date    Aortic stenosis     Bicuspid aortic valve     GERD (gastroesophageal reflux disease)     Hiatal hernia     Severe aortic stenosis 3/22/2022     Allergies   Allergen Reactions    Soap Rash     Dial Soap     Current Outpatient Medications   Medication Sig    metoprolol tartrate (LOPRESSOR) 25 MG tablet TAKE 1/2 TABLET BY MOUTH TWICE A DAY    acetaminophen (TYLENOL) 325 MG tablet Take 650 mg by mouth every 4 hours as needed    amoxicillin (AMOXIL) 500 MG capsule Take 2,000 mg by mouth as needed    Melatonin 10 MG TABS Take 1 tablet by mouth daily as needed    omeprazole (PRILOSEC OTC) 20 MG tablet Take 20 mg by mouth daily    warfarin (COUMADIN) 2 MG tablet Take 1 tablet (2 mg) by mouth every Monday or take as directed by the clinic.    warfarin (COUMADIN) 4 MG tablet Take 1 tablet (4 mg) by mouth every day or take as directed by the clinic. No current facility-administered medications for this visit.

## 2023-08-24 ENCOUNTER — ANTI-COAG VISIT (OUTPATIENT)
Facility: HOSPITAL | Age: 48
End: 2023-08-24
Payer: COMMERCIAL

## 2023-08-24 DIAGNOSIS — Z95.2 PRESENCE OF PROSTHETIC HEART VALVE: ICD-10-CM

## 2023-08-24 DIAGNOSIS — Z95.2 S/P AVR: Primary | ICD-10-CM

## 2023-08-24 LAB — INTERNATIONAL NORMALIZATION RATIO, POC: 1.7 (ref 1.5–2.5)

## 2023-08-24 PROCEDURE — 99211 OFF/OP EST MAY X REQ PHY/QHP: CPT

## 2023-09-15 NOTE — PROGRESS NOTES
Pharmacy Progress Note - Anticoagulation Management    S/O:  Mr. Jessica Medellin  is a 51 y.o. male seen today for anticoagulation management for the diagnosis of Aortic Valve Replacement. HPI: At last visit (8/24), the patient's INR was 1.7 and therapeutic. Patient denied any changes to his medications and reported consistent intake of vitamin K foods. Patient will continue current regimen and recheck INR in 4 week(s). Interim History:    Warfarin start date: 5/4/22  INR Goal: 1.5-2.5 (On-X)  Current warfarin regimen: 4 mg daily  Warfarin tablet strength:  4 mg  Duration of therapy: Indefinite       Results for orders placed or performed in visit on 09/21/23   POCT INR   Result Value Ref Range    INR,(POC) 2.0 1.5 - 2.5       Today's pertinent positives includes:  No significant changes since last visit      Adherence:   Able to recall regimen? YES  Miss/extra dose? NO  Need refill? NO    Upcoming procedure(s):  N/A      Past Medical History:   Diagnosis Date    Aortic stenosis     Bicuspid aortic valve     GERD (gastroesophageal reflux disease)     Hiatal hernia     Severe aortic stenosis 3/22/2022     Allergies   Allergen Reactions    Soap Rash     Dial Soap     Current Outpatient Medications   Medication Sig    metoprolol tartrate (LOPRESSOR) 25 MG tablet TAKE 1/2 TABLET BY MOUTH TWICE A DAY    acetaminophen (TYLENOL) 325 MG tablet Take 650 mg by mouth every 4 hours as needed    amoxicillin (AMOXIL) 500 MG capsule Take 2,000 mg by mouth as needed    Melatonin 10 MG TABS Take 1 tablet by mouth daily as needed    omeprazole (PRILOSEC OTC) 20 MG tablet Take 20 mg by mouth daily    warfarin (COUMADIN) 2 MG tablet Take 1 tablet (2 mg) by mouth every Monday or take as directed by the clinic.    warfarin (COUMADIN) 4 MG tablet Take 1 tablet (4 mg) by mouth every day or take as directed by the clinic. No current facility-administered medications for this visit.      Wt Readings from Last 3 Encounters:   03/22/23

## 2023-09-21 ENCOUNTER — ANTI-COAG VISIT (OUTPATIENT)
Facility: HOSPITAL | Age: 48
End: 2023-09-21
Payer: COMMERCIAL

## 2023-09-21 DIAGNOSIS — Z95.2 S/P AVR: Primary | ICD-10-CM

## 2023-09-21 LAB — INTERNATIONAL NORMALIZATION RATIO, POC: 2 (ref 1.5–2.5)

## 2023-09-21 PROCEDURE — 85610 PROTHROMBIN TIME: CPT

## 2023-09-21 PROCEDURE — 99211 OFF/OP EST MAY X REQ PHY/QHP: CPT

## 2023-09-26 NOTE — PATIENT INSTRUCTIONS
Today your INR was 1.6 . Your goal INR is  1.5-2.5 . You have 4 mg tablets of Coumadin (warfarin). Take Coumadin (warfarin) as follows: Take 4 mg every day. Come back in 5 week(s) for your next finger stick/INR blood test.        Avoid any over the counter items containing aspirin or ibuprofen, and avoid great swings in general diet. Avoid alcohol consumption. Please notify the INR pharmacist if you are started on any new medication including over the counter or herbal supplements. Also, please notify your INR pharmacist if any of your other prescription or over the counter medications have been discontinued. Call [facility] at [phone] if you have any signs of abnormal bleeding/blood clot.  ------------------------------------------------------------------------------------------------------------------  Taking Warfarin Safely: Care Instructions    Your Care Instructions  Warfarin is a medicine that you take to prevent blood clots. It is often called a blood thinner. Doctors give warfarin (such as Coumadin) to reduce the risk of blood clots. You may be at risk for blood clots if you have atrial fibrillation or deep vein thrombosis. Some other health problems may also put you at risk. Warfarin slows the amount of time it takes for your blood to clot. It can cause bleeding problems. Even if you've been taking warfarin for a while, it's important to know how to take it safely. Foods and other medicines can affect the way warfarin works. Some can make warfarin work too well. This can cause bleeding problems. And some can make it work poorly, so that it does not prevent blood clots very well. You will need regular blood tests to check how long it takes for your blood to form a clot. This test is called a PT or prothrombin time test. The result of the test is called an INR level.  Depending on the test results, your doctor or anticoagulation

## 2023-09-26 NOTE — PROGRESS NOTES
03/22/23 76.2 kg (168 lb)   02/27/23 76.2 kg (168 lb)   10/06/22 71.7 kg (158 lb)     BP Readings from Last 3 Encounters:   02/27/23 120/86   09/02/22 101/67   08/03/22 110/80     Pulse Readings from Last 3 Encounters:   02/27/23 62   09/02/22 100   06/28/22 70     Lab Results   Component Value Date/Time    WBC 8.3 08/30/2022 12:05 AM    HGB 12.1 08/30/2022 12:05 AM    HCT 36.2 08/30/2022 12:05 AM     08/30/2022 12:05 AM    MCV 91.4 08/30/2022 12:05 AM     Lab Results   Component Value Date/Time     08/30/2022 12:05 AM    K 4.2 08/30/2022 12:05 AM     08/30/2022 12:05 AM    CO2 25 08/30/2022 12:05 AM    BUN 9 08/30/2022 12:05 AM    GFRAA >60 08/30/2022 12:05 AM    GLOB 4.9 08/24/2022 09:19 AM    ALT 17 08/24/2022 09:19 AM     CrCl cannot be calculated (Patient's most recent lab result is older than the maximum 180 days allowed.). INR History:   (normal INR range 0.8-1.2)     Date   INR   PT   Dose/Comments  10/19/23 1.6  19.1 4 mg daily  09/21/23 2.0  24.2 4 mg daily  08/24/23 1.7  20.2 4 mg daily  07/27/23 1.8  21.4 4 mg daily  06/29/23 1.9  23.2 4 mg daily  06/01/23 1.5  17.8 4 mg daily  05/04/23          2.0                   24.3     4 mg daily  04/06/23          1.8                   21.1     4 mg daily  03/15/23          1.5                   17.7     4 mg daily  03/01/23          1.5                   17.9     4 mg daily    Medications that can increase  INR: omeprazole  Medications that can decrease INR: None    A/P:       Anticoagulation:  Considering Mr. Hailee Redding past history, todays findings, and per Anticoagulation Collaborative Practice Agreement/Protocol:    Fingerstick POC INR (1.6) is Therapeutic for INR goal today. Continue warfarin 4 mg daily  Patient's INR is 1.6 and therapeutic. Patient denies any changes to his medications and reports consistent intake of vitamin K foods.  Patient will continue current warfarin regimen 4 mg daily and recheck INR in 4

## 2023-10-19 ENCOUNTER — ANTI-COAG VISIT (OUTPATIENT)
Facility: HOSPITAL | Age: 48
End: 2023-10-19
Payer: COMMERCIAL

## 2023-10-19 DIAGNOSIS — Z95.2 S/P AVR: Primary | ICD-10-CM

## 2023-10-19 LAB — INTERNATIONAL NORMALIZATION RATIO, POC: 1.6 (ref 1.5–2.5)

## 2023-10-19 PROCEDURE — 85610 PROTHROMBIN TIME: CPT

## 2023-10-19 PROCEDURE — 99211 OFF/OP EST MAY X REQ PHY/QHP: CPT

## 2023-10-24 ENCOUNTER — NURSE TRIAGE (OUTPATIENT)
Dept: OTHER | Facility: CLINIC | Age: 48
End: 2023-10-24

## 2023-10-24 NOTE — TELEPHONE ENCOUNTER
[facility] [place] [facility]    Received call from [facility] at [facility] with [employer]. Subjective: Caller states \"upper abdominal pain\"     Current Symptoms: Pain below xyphoid process. Patient had previous drainage tube from open heart surgery 1 year ago at site of pain. Pain comes with certain body movements and only lasts a few seconds. Denies SOB. Onset: 1 week ago; improving    Pain Severity: 0/10; Temperature: denies    What has been tried: tylenol    Recommended disposition: See PCP within 24 Hours    Care advice provided, patient verbalizes understanding; denies any other questions or concerns; instructed to call back for any new or worsening symptoms. Patient/Caller agrees with recommended disposition; writer provided warm transfer to [facility] at [facility] for appointment scheduling    Attention Provider: Thank you for allowing me to participate in the care of your patient. The patient was connected to triage in response to information provided to the ECC/PSC. Please do not respond through this encounter as the response is not directed to a shared pool. Reason for Disposition   Pain is mainly in upper abdomen  (if needed ask: \"is it mainly above the belly button? \")   [1] MILD pain (e.g., does not interfere with normal activities) AND [2] comes and goes (cramps) AND [3] present > 72 hours  (Exception:  This same abdominal pain is a chronic symptom recurrent or ongoing AND present > 4 weeks.)    Protocols used: Abdominal Pain - Male-ADULT-AH, Abdominal Pain - Upper-ADULT-AH

## 2023-10-25 SDOH — ECONOMIC STABILITY: FOOD INSECURITY: WITHIN THE PAST 12 MONTHS, THE FOOD YOU BOUGHT JUST DIDN'T LAST AND YOU DIDN'T HAVE MONEY TO GET MORE.: NEVER TRUE

## 2023-10-25 SDOH — ECONOMIC STABILITY: FOOD INSECURITY: WITHIN THE PAST 12 MONTHS, YOU WORRIED THAT YOUR FOOD WOULD RUN OUT BEFORE YOU GOT MONEY TO BUY MORE.: NEVER TRUE

## 2023-10-25 SDOH — ECONOMIC STABILITY: INCOME INSECURITY: HOW HARD IS IT FOR YOU TO PAY FOR THE VERY BASICS LIKE FOOD, HOUSING, MEDICAL CARE, AND HEATING?: NOT HARD AT ALL

## 2023-10-25 SDOH — ECONOMIC STABILITY: TRANSPORTATION INSECURITY
IN THE PAST 12 MONTHS, HAS LACK OF TRANSPORTATION KEPT YOU FROM MEETINGS, WORK, OR FROM GETTING THINGS NEEDED FOR DAILY LIVING?: NO

## 2023-10-25 SDOH — ECONOMIC STABILITY: HOUSING INSECURITY
IN THE LAST 12 MONTHS, WAS THERE A TIME WHEN YOU DID NOT HAVE A STEADY PLACE TO SLEEP OR SLEPT IN A SHELTER (INCLUDING NOW)?: NO

## 2023-10-26 ENCOUNTER — OFFICE VISIT (OUTPATIENT)
Age: 48
End: 2023-10-26
Payer: COMMERCIAL

## 2023-10-26 VITALS
TEMPERATURE: 97.9 F | DIASTOLIC BLOOD PRESSURE: 88 MMHG | WEIGHT: 166.2 LBS | HEIGHT: 72 IN | BODY MASS INDEX: 22.51 KG/M2 | OXYGEN SATURATION: 99 % | HEART RATE: 58 BPM | RESPIRATION RATE: 14 BRPM | SYSTOLIC BLOOD PRESSURE: 144 MMHG

## 2023-10-26 DIAGNOSIS — R10.13 EPIGASTRIC PAIN: ICD-10-CM

## 2023-10-26 DIAGNOSIS — R07.9 CHEST PAIN, UNSPECIFIED TYPE: Primary | ICD-10-CM

## 2023-10-26 LAB
ALBUMIN SERPL-MCNC: 3.8 G/DL (ref 3.5–5)
ALBUMIN/GLOB SERPL: 1.2 (ref 1.1–2.2)
ALP SERPL-CCNC: 52 U/L (ref 45–117)
ALT SERPL-CCNC: 24 U/L (ref 12–78)
ANION GAP SERPL CALC-SCNC: 2 MMOL/L (ref 5–15)
AST SERPL-CCNC: 12 U/L (ref 15–37)
BASOPHILS # BLD: 0.1 K/UL (ref 0–0.1)
BASOPHILS NFR BLD: 1 % (ref 0–1)
BILIRUB SERPL-MCNC: 0.4 MG/DL (ref 0.2–1)
BUN SERPL-MCNC: 21 MG/DL (ref 6–20)
BUN/CREAT SERPL: 19 (ref 12–20)
CALCIUM SERPL-MCNC: 9.3 MG/DL (ref 8.5–10.1)
CHLORIDE SERPL-SCNC: 109 MMOL/L (ref 97–108)
CO2 SERPL-SCNC: 29 MMOL/L (ref 21–32)
CREAT SERPL-MCNC: 1.08 MG/DL (ref 0.7–1.3)
DIFFERENTIAL METHOD BLD: ABNORMAL
EOSINOPHIL # BLD: 0.2 K/UL (ref 0–0.4)
EOSINOPHIL NFR BLD: 5 % (ref 0–7)
ERYTHROCYTE [DISTWIDTH] IN BLOOD BY AUTOMATED COUNT: 13.5 % (ref 11.5–14.5)
GLOBULIN SER CALC-MCNC: 3.2 G/DL (ref 2–4)
GLUCOSE SERPL-MCNC: 105 MG/DL (ref 65–100)
HCT VFR BLD AUTO: 43.3 % (ref 36.6–50.3)
HGB BLD-MCNC: 14.4 G/DL (ref 12.1–17)
IMM GRANULOCYTES # BLD AUTO: 0 K/UL (ref 0–0.04)
IMM GRANULOCYTES NFR BLD AUTO: 0 % (ref 0–0.5)
LYMPHOCYTES # BLD: 1.1 K/UL (ref 0.8–3.5)
LYMPHOCYTES NFR BLD: 24 % (ref 12–49)
MCH RBC QN AUTO: 33.5 PG (ref 26–34)
MCHC RBC AUTO-ENTMCNC: 33.3 G/DL (ref 30–36.5)
MCV RBC AUTO: 100.7 FL (ref 80–99)
MONOCYTES # BLD: 0.4 K/UL (ref 0–1)
MONOCYTES NFR BLD: 9 % (ref 5–13)
NEUTS SEG # BLD: 2.9 K/UL (ref 1.8–8)
NEUTS SEG NFR BLD: 61 % (ref 32–75)
NRBC # BLD: 0 K/UL (ref 0–0.01)
NRBC BLD-RTO: 0 PER 100 WBC
PLATELET # BLD AUTO: 270 K/UL (ref 150–400)
PMV BLD AUTO: 10.1 FL (ref 8.9–12.9)
POTASSIUM SERPL-SCNC: 4.9 MMOL/L (ref 3.5–5.1)
PROT SERPL-MCNC: 7 G/DL (ref 6.4–8.2)
RBC # BLD AUTO: 4.3 M/UL (ref 4.1–5.7)
SODIUM SERPL-SCNC: 140 MMOL/L (ref 136–145)
WBC # BLD AUTO: 4.8 K/UL (ref 4.1–11.1)

## 2023-10-26 PROCEDURE — 93000 ELECTROCARDIOGRAM COMPLETE: CPT | Performed by: STUDENT IN AN ORGANIZED HEALTH CARE EDUCATION/TRAINING PROGRAM

## 2023-10-26 PROCEDURE — 99213 OFFICE O/P EST LOW 20 MIN: CPT | Performed by: STUDENT IN AN ORGANIZED HEALTH CARE EDUCATION/TRAINING PROGRAM

## 2023-10-26 NOTE — PROGRESS NOTES
Chief Complaint   Patient presents with    Abdominal Pain     With certain movements  X 1 week          1. Have you been to the ER, urgent care clinic since your last visit? Hospitalized since your last visit? No    2. Have you seen or consulted any other health care providers outside of the [address] [place] [address] Avenue since your last visit? Include any pap smears or colon screening.  No
Aortic Valve: [employer] On-X bileaflet mechanical valve with a size of 23 mm. No stenosis. AV mean gradient is 9 mmHg. Signed by: Giuliana Morales MD on 3/25/2023 12:46 PM    10/26/23 EKG: Mild sinus bradycardia, RBBB similar to prior. ASSESSMENT and PLAN  1. Chest pain, unspecified type  -     CT CHEST ABDOMEN W CONTRAST; Future  -     AMB POC EKG ROUTINE  2. Epigastric pain  -     CT CHEST ABDOMEN W CONTRAST; Future  -     Comprehensive Metabolic Panel; Future  -     CBC with Auto Differential; Future       Follow up:  Return if symptoms worsen or fail to improve.     Simon Chavira MD

## 2023-10-27 ENCOUNTER — HOSPITAL ENCOUNTER (OUTPATIENT)
Facility: HOSPITAL | Age: 48
Discharge: HOME | End: 2023-10-27
Attending: STUDENT IN AN ORGANIZED HEALTH CARE EDUCATION/TRAINING PROGRAM
Payer: COMMERCIAL

## 2023-10-27 DIAGNOSIS — R07.9 CHEST PAIN, UNSPECIFIED TYPE: ICD-10-CM

## 2023-10-27 DIAGNOSIS — R10.13 EPIGASTRIC PAIN: ICD-10-CM

## 2023-10-27 PROCEDURE — 71260 CT THORAX DX C+: CPT

## 2023-10-27 PROCEDURE — 6360000004 HC RX CONTRAST MEDICATION: Performed by: STUDENT IN AN ORGANIZED HEALTH CARE EDUCATION/TRAINING PROGRAM

## 2023-10-27 RX ADMIN — IOPAMIDOL 100 ML: 755 INJECTION, SOLUTION INTRAVENOUS at 07:56

## 2023-10-30 NOTE — RESULT ENCOUNTER NOTE
Lakshmi Sera,     The CT of your chest and abdomen did not show any abnormal findings that would explain your current symptoms. Your symptoms are most likely due to costochondritis or inflammation of the cartilage of your rib cage.      Kavya Huber MD

## 2023-11-20 ENCOUNTER — ANTI-COAG VISIT (OUTPATIENT)
Facility: HOSPITAL | Age: 48
End: 2023-11-20
Payer: COMMERCIAL

## 2023-11-20 DIAGNOSIS — Z95.2 S/P AVR: Primary | ICD-10-CM

## 2023-11-20 LAB — INTERNATIONAL NORMALIZATION RATIO, POC: 2.8 (ref 1.5–2.5)

## 2023-11-20 PROCEDURE — 99211 OFF/OP EST MAY X REQ PHY/QHP: CPT

## 2023-11-20 PROCEDURE — 85610 PROTHROMBIN TIME: CPT

## 2023-11-20 NOTE — PROGRESS NOTES
Pharmacy Progress Note - Anticoagulation Management    S/O:  Mr. Elda Carolina  is a 51 y.o. male seen today for anticoagulation management for the diagnosis of Aortic Valve Replacement. HPI: At last visit (10/19), the patient's INR was 1.6 and therapeutic. Patient denied any changes to his medications and reported consistent intake of vitamin K foods. Patient will continue current warfarin regimen 4 mg daily and recheck INR in 4 week(s). Interim History:    Warfarin start date: 5/4/22  INR Goal: 1.5-2.5 (On-X)  Current warfarin regimen: 4 mg daily  Warfarin tablet strength:  4 mg  Duration of therapy: Indefinite       Results for orders placed or performed in visit on 11/20/23   POCT INR   Result Value Ref Range    INR,(POC) 2.8 (A) 1.5 - 2.5       Today's pertinent positives includes:  No significant changes since last visit      Adherence:   Able to recall regimen? YES  Miss/extra dose? NO  Need refill? NO    Upcoming procedure(s):  N/A      Past Medical History:   Diagnosis Date    Aortic stenosis     Bicuspid aortic valve     GERD (gastroesophageal reflux disease)     Hiatal hernia     Severe aortic stenosis 3/22/2022     Allergies   Allergen Reactions    Soap Rash     Dial Soap     Current Outpatient Medications   Medication Sig    metoprolol tartrate (LOPRESSOR) 25 MG tablet TAKE 1/2 TABLET BY MOUTH TWICE A DAY    acetaminophen (TYLENOL) 325 MG tablet Take 2 tablets by mouth every 4 hours as needed    amoxicillin (AMOXIL) 500 MG capsule Take 4 capsules by mouth as needed    Melatonin 10 MG TABS Take 1 tablet by mouth daily as needed    omeprazole (PRILOSEC OTC) 20 MG tablet Take 1 tablet by mouth daily    warfarin (COUMADIN) 2 MG tablet Take 1 tablet (2 mg) by mouth every Monday or take as directed by the clinic. (Patient not taking: Reported on 10/26/2023)    warfarin (COUMADIN) 4 MG tablet Take 1 tablet (4 mg) by mouth every day or take as directed by the clinic.      No current facility-administered

## 2023-12-05 NOTE — PROGRESS NOTES
Pharmacy Progress Note - Anticoagulation Management    S/O:  Mr. Chacha Padgett  is a 51 y.o. male seen today for anticoagulation management for the diagnosis of Aortic Valve Replacement. HPI: At last visit (11/20), the patient's INR was 2.8 and supratherapeutic. Patient denied any extra doses of warfarin and denied changes to his medications. Patient reported consistent intake of vitamin K foods. Patient has been previously therapeutic on current warfarin regimen and will continue warfarin 4 mg daily. Encouraged patient to eat a serving of food with vitamin K today. Patient will recheck INR in 2 week(s). Interim History:    Warfarin start date: 5/4/22  INR Goal: 1.5-2.5 (On-X)  Current warfarin regimen: 4 mg daily  Warfarin tablet strength:  4 mg  Duration of therapy: Indefinite       Results for orders placed or performed in visit on 12/06/23   POCT INR   Result Value Ref Range    INR,(POC) 1.7 1.5 - 2.5       Today's pertinent positives includes:  No significant changes since last visit      Adherence:   Able to recall regimen? YES  Miss/extra dose? NO  Need refill? NO    Upcoming procedure(s):  N/A      Past Medical History:   Diagnosis Date    Aortic stenosis     Bicuspid aortic valve     GERD (gastroesophageal reflux disease)     Hiatal hernia     Severe aortic stenosis 3/22/2022     Allergies   Allergen Reactions    Soap Rash     Dial Soap     Current Outpatient Medications   Medication Sig    metoprolol tartrate (LOPRESSOR) 25 MG tablet TAKE 1/2 TABLET BY MOUTH TWICE A DAY    acetaminophen (TYLENOL) 325 MG tablet Take 2 tablets by mouth every 4 hours as needed    amoxicillin (AMOXIL) 500 MG capsule Take 4 capsules by mouth as needed    Melatonin 10 MG TABS Take 1 tablet by mouth daily as needed    omeprazole (PRILOSEC OTC) 20 MG tablet Take 1 tablet by mouth daily    warfarin (COUMADIN) 2 MG tablet Take 1 tablet (2 mg) by mouth every Monday or take as directed by the clinic.  (Patient not taking:

## 2023-12-05 NOTE — PATIENT INSTRUCTIONS
Today your INR was 1.7 . Your goal INR is  1.5-2.5 . You have 4 mg tablets of Coumadin (warfarin). Take Coumadin (warfarin) as follows: Take 4 mg every day. Come back in 4 week(s) for your next finger stick/INR blood test.        Avoid any over the counter items containing aspirin or ibuprofen, and avoid great swings in general diet. Avoid alcohol consumption. Please notify the INR pharmacist if you are started on any new medication including over the counter or herbal supplements. Also, please notify your INR pharmacist if any of your other prescription or over the counter medications have been discontinued. Call [facility] at [phone] if you have any signs of abnormal bleeding/blood clot.  ------------------------------------------------------------------------------------------------------------------  Taking Warfarin Safely: Care Instructions    Your Care Instructions  Warfarin is a medicine that you take to prevent blood clots. It is often called a blood thinner. Doctors give warfarin (such as Coumadin) to reduce the risk of blood clots. You may be at risk for blood clots if you have atrial fibrillation or deep vein thrombosis. Some other health problems may also put you at risk. Warfarin slows the amount of time it takes for your blood to clot. It can cause bleeding problems. Even if you've been taking warfarin for a while, it's important to know how to take it safely. Foods and other medicines can affect the way warfarin works. Some can make warfarin work too well. This can cause bleeding problems. And some can make it work poorly, so that it does not prevent blood clots very well. You will need regular blood tests to check how long it takes for your blood to form a clot. This test is called a PT or prothrombin time test. The result of the test is called an INR level.  Depending on the test results, your doctor or anticoagulation

## 2023-12-06 ENCOUNTER — ANTI-COAG VISIT (OUTPATIENT)
Facility: HOSPITAL | Age: 48
End: 2023-12-06
Payer: COMMERCIAL

## 2023-12-06 DIAGNOSIS — Z95.2 S/P AVR: Primary | ICD-10-CM

## 2023-12-06 LAB — INTERNATIONAL NORMALIZATION RATIO, POC: 1.7 (ref 1.5–2.5)

## 2023-12-06 PROCEDURE — 85610 PROTHROMBIN TIME: CPT

## 2023-12-06 PROCEDURE — 99211 OFF/OP EST MAY X REQ PHY/QHP: CPT

## 2024-01-03 ENCOUNTER — ANTI-COAG VISIT (OUTPATIENT)
Facility: HOSPITAL | Age: 49
End: 2024-01-03
Payer: COMMERCIAL

## 2024-01-03 DIAGNOSIS — Z95.2 S/P AVR: Primary | ICD-10-CM

## 2024-01-03 LAB — INTERNATIONAL NORMALIZATION RATIO, POC: 2 (ref 1.5–2.5)

## 2024-01-03 PROCEDURE — 85610 PROTHROMBIN TIME: CPT

## 2024-01-03 PROCEDURE — 99211 OFF/OP EST MAY X REQ PHY/QHP: CPT

## 2024-01-03 NOTE — PATIENT INSTRUCTIONS
Today your INR was 2.0 .      Your goal INR is  1.5-2.5 .    You have 4 mg tablets of Coumadin (warfarin).   Take Coumadin (warfarin) as follows:    Take 4 mg every day.      Come back in 4 week(s) for your next finger stick/INR blood test.        Avoid any over the counter items containing aspirin or ibuprofen, and avoid great swings in general diet.  Avoid alcohol consumption.  Please notify the INR pharmacist if you are started on any new medication including over the counter or herbal supplements. Also, please notify your INR pharmacist if any of your other prescription or over the counter medications have been discontinued.     Call [facility] at [phone] if you have any signs of abnormal bleeding/blood clot.  ------------------------------------------------------------------------------------------------------------------  Taking Warfarin Safely: Care Instructions    Your Care Instructions  Warfarin is a medicine that you take to prevent blood clots. It is often called a blood thinner. Doctors give warfarin (such as Coumadin) to reduce the risk of blood clots. You may be at risk for blood clots if you have atrial fibrillation or deep vein thrombosis. Some other health problems may also put you at risk.  Warfarin slows the amount of time it takes for your blood to clot. It can cause bleeding problems. Even if you've been taking warfarin for a while, it's important to know how to take it safely.  Foods and other medicines can affect the way warfarin works. Some can make warfarin work too well. This can cause bleeding problems. And some can make it work poorly, so that it does not prevent blood clots very well.  You will need regular blood tests to check how long it takes for your blood to form a clot. This test is called a PT or prothrombin time test. The result of the test is called an INR level. Depending on the test results, your doctor or anticoagulation

## 2024-01-03 NOTE — PROGRESS NOTES
Pharmacy Progress Note - Anticoagulation Management    S/O:  Mr. Rajat Farrell  is a 48 y.o. male seen today for anticoagulation management for the diagnosis of Aortic Valve Replacement.     HPI: At last visit (12/6), the patient's INR was 1.7 and therapeutic. Patient denied any changes to his medications and reported consistent intake of vitamin K foods. Patient will continue current warfarin regimen of 4 mg daily and recheck INR in 4 week(s).     Interim History:    Warfarin start date: 5/4/22  INR Goal: 1.5-2.5 (On-X)  Current warfarin regimen: 4 mg daily  Warfarin tablet strength:  4 mg  Duration of therapy: Indefinite       Results for orders placed or performed in visit on 01/03/24   POCT INR   Result Value Ref Range    INR,(POC) 2.0 1.5 - 2.5       Today's pertinent positives includes:  No significant changes since last visit      Adherence:   Able to recall regimen? YES  Miss/extra dose? NO  Need refill? NO    Upcoming procedure(s):  N/A      Past Medical History:   Diagnosis Date    Aortic stenosis     Bicuspid aortic valve     GERD (gastroesophageal reflux disease)     Hiatal hernia     Severe aortic stenosis 3/22/2022     Allergies   Allergen Reactions    Soap Rash     Dial Soap     Current Outpatient Medications   Medication Sig    warfarin (COUMADIN) 4 MG tablet Take 1 tablet (4 mg) by mouth every day or take as directed by the clinic.    metoprolol tartrate (LOPRESSOR) 25 MG tablet TAKE 1/2 TABLET BY MOUTH TWICE A DAY    acetaminophen (TYLENOL) 325 MG tablet Take 2 tablets by mouth every 4 hours as needed    amoxicillin (AMOXIL) 500 MG capsule Take 4 capsules by mouth as needed    Melatonin 10 MG TABS Take 1 tablet by mouth daily as needed    omeprazole (PRILOSEC OTC) 20 MG tablet Take 1 tablet by mouth daily    warfarin (COUMADIN) 2 MG tablet Take 1 tablet (2 mg) by mouth every Monday or take as directed by the clinic. (Patient not taking: Reported on 10/26/2023)     No current facility-administered

## 2024-01-17 DIAGNOSIS — Z95.2 PRESENCE OF PROSTHETIC HEART VALVE: ICD-10-CM

## 2024-01-29 NOTE — PATIENT INSTRUCTIONS
drinks.  Avoid cranberry juice and other cranberry products. They can increase the effects of warfarin.  Limit your use of alcohol.    Avoid bleeding by preventing falls and injuries  Wear slippers or shoes with nonskid soles.  Remove throw rugs and clutter.  Rearrange furniture and electrical cords to keep them out of walking paths.  Keep stairways, porches, and outside walkways well lit. Use night-lights in hallways and bathrooms.  Be extra careful when you work with sharp tools or knives.    When should you call for help?  Call 911 anytime you think you may need emergency care. For example, call if:  You have a sudden, severe headache that is different from past headaches.  Call your doctor now or seek immediate medical care if:  You have any abnormal bleeding, such as:  Nosebleeds.  Vaginal bleeding that is different (heavier, more frequent, at a different time of the month) than what you are used to.  Bloody or black stools, or rectal bleeding.  Bloody or pink urine.  Watch closely for changes in your health, and be sure to contact your doctor if you have any problems.    Where can you learn more?  Go to http://www.[employer].net/[employer]ow[employer]Connections.  Enter N655 in the search box to learn more about \"Taking Warfarin Safely: Care Instructions.\"  Current as of: January 27, 2016  Content Version: 11.1  © 0303-6082 [employer]. Care instructions adapted under license by [employer] (which disclaims liability or warranty for this information). If you have questions about a medical condition or this instruction, always ask your healthcare professional. [employer] disclaims any warranty or liability for your use of this information.

## 2024-01-29 NOTE — PROGRESS NOTES
Pharmacy Progress Note - Anticoagulation Management    S/O:  Mr. Rajat Farrell  is a 48 y.o. male seen today for anticoagulation management for the diagnosis of Aortic Valve Replacement.     HPI: At last visit (1/3), the patient's INR was 2.0 and therapeutic. Patient denied any changes to his medications and reported consistent intake of vitamin K foods. Patient will continue current warfarin regimen of 4 mg daily and recheck INR in 4 week(s).     Interim History:    Warfarin start date: 5/4/22  INR Goal: 1.5-2.5 (On-X)  Current warfarin regimen: 4 mg daily  Warfarin tablet strength:  4 mg  Duration of therapy: Indefinite       Results for orders placed or performed in visit on 01/31/24   POCT INR   Result Value Ref Range    INR,(POC) 1.7 1.5 - 2.5       Today's pertinent positives includes:  No significant changes since last visit      Adherence:   Able to recall regimen? YES  Miss/extra dose? NO  Need refill? NO    Upcoming procedure(s):  N/A      Past Medical History:   Diagnosis Date    Aortic stenosis     Bicuspid aortic valve     GERD (gastroesophageal reflux disease)     Hiatal hernia     Severe aortic stenosis 3/22/2022     Allergies   Allergen Reactions    Soap Rash     Dial Soap     Current Outpatient Medications   Medication Sig    metoprolol tartrate (LOPRESSOR) 25 MG tablet TAKE 1/2 TABLET TWICE A DAY BY MOUTH    warfarin (COUMADIN) 4 MG tablet Take 1 tablet (4 mg) by mouth every day or take as directed by the clinic.    acetaminophen (TYLENOL) 325 MG tablet Take 2 tablets by mouth every 4 hours as needed    amoxicillin (AMOXIL) 500 MG capsule Take 4 capsules by mouth as needed    Melatonin 10 MG TABS Take 1 tablet by mouth daily as needed    omeprazole (PRILOSEC OTC) 20 MG tablet Take 1 tablet by mouth daily    warfarin (COUMADIN) 2 MG tablet Take 1 tablet (2 mg) by mouth every Monday or take as directed by the clinic. (Patient not taking: Reported on 10/26/2023)     No current facility-administered

## 2024-01-31 ENCOUNTER — ANTI-COAG VISIT (OUTPATIENT)
Facility: HOSPITAL | Age: 49
End: 2024-01-31
Payer: COMMERCIAL

## 2024-01-31 DIAGNOSIS — Z95.2 S/P AVR: Primary | ICD-10-CM

## 2024-01-31 LAB — INTERNATIONAL NORMALIZATION RATIO, POC: 1.7 (ref 1.5–2.5)

## 2024-01-31 PROCEDURE — 85610 PROTHROMBIN TIME: CPT

## 2024-01-31 PROCEDURE — 99211 OFF/OP EST MAY X REQ PHY/QHP: CPT

## 2024-02-19 ENCOUNTER — TELEPHONE (OUTPATIENT)
Age: 49
End: 2024-02-19

## 2024-02-19 NOTE — TELEPHONE ENCOUNTER
Desire-please advise I changed his metoprolol to tartrate to succinate.  I already put in the prescription.  Thanks

## 2024-02-24 NOTE — PROGRESS NOTES
Pharmacy Progress Note - Anticoagulation Management    S/O:  Mr. Rajat Farrell  is a 48 y.o. male seen today for anticoagulation management for the diagnosis of Aortic Valve Replacement.     HPI: At last visit (1/31), the patient's INR was 1.7 and therapeutic. Patient denied any changes to his medications and reported consistent intake of vitamin K foods. Patient will continue current warfarin regimen of 4 mg daily and recheck INR in 4 week(s).      Interim History:    Warfarin start date: 5/4/22  INR Goal: 1.5-2.5 (On-X)  Current warfarin regimen: 4 mg daily  Warfarin tablet strength:  4 mg  Duration of therapy: Indefinite       Results for orders placed or performed in visit on 02/29/24   POCT INR   Result Value Ref Range    INR,(POC) 1.6 1.5 - 2.5       Today's pertinent positives includes:  No significant changes since last visit      Adherence:   Able to recall regimen? YES  Miss/extra dose? NO  Need refill? NO    Upcoming procedure(s):  N/A    Past Medical History:   Diagnosis Date    Aortic stenosis     Bicuspid aortic valve     GERD (gastroesophageal reflux disease)     Hiatal hernia     Severe aortic stenosis 3/22/2022     Allergies   Allergen Reactions    Soap Rash     Dial Soap     Current Outpatient Medications   Medication Sig    metoprolol succinate (TOPROL XL) 25 MG extended release tablet Take 1 tablet by mouth daily Replaces metoprolol to tartrate for dosing convenience    warfarin (COUMADIN) 4 MG tablet Take 1 tablet (4 mg) by mouth every day or take as directed by the clinic.    acetaminophen (TYLENOL) 325 MG tablet Take 2 tablets by mouth every 4 hours as needed    amoxicillin (AMOXIL) 500 MG capsule Take 4 capsules by mouth as needed    Melatonin 10 MG TABS Take 1 tablet by mouth daily as needed    omeprazole (PRILOSEC OTC) 20 MG tablet Take 1 tablet by mouth daily    warfarin (COUMADIN) 2 MG tablet Take 1 tablet (2 mg) by mouth every Monday or take as directed by the clinic. (Patient not taking:

## 2024-02-24 NOTE — PATIENT INSTRUCTIONS
drinks.  Avoid cranberry juice and other cranberry products. They can increase the effects of warfarin.  Limit your use of alcohol.    Avoid bleeding by preventing falls and injuries  Wear slippers or shoes with nonskid soles.  Remove throw rugs and clutter.  Rearrange furniture and electrical cords to keep them out of walking paths.  Keep stairways, porches, and outside walkways well lit. Use night-lights in hallways and bathrooms.  Be extra careful when you work with sharp tools or knives.    When should you call for help?  Call 911 anytime you think you may need emergency care. For example, call if:  You have a sudden, severe headache that is different from past headaches.  Call your doctor now or seek immediate medical care if:  You have any abnormal bleeding, such as:  Nosebleeds.  Vaginal bleeding that is different (heavier, more frequent, at a different time of the month) than what you are used to.  Bloody or black stools, or rectal bleeding.  Bloody or pink urine.  Watch closely for changes in your health, and be sure to contact your doctor if you have any problems.    Where can you learn more?  Go to http://www.[employer].net/[employer]Connections.  Enter N655 in the search box to learn more about \"Taking Warfarin Safely: Care Instructions.\"  Current as of: January 27, 2016  Content Version: 11.1  © 7519-4514 [employer]. Care instructions adapted under license by [employer] (which disclaims liability or warranty for this information). If you have questions about a medical condition or this instruction, always ask your healthcare professional. [employer] disclaims any warranty or liability for your use of this information.

## 2024-02-29 ENCOUNTER — ANTI-COAG VISIT (OUTPATIENT)
Facility: HOSPITAL | Age: 49
End: 2024-02-29
Payer: COMMERCIAL

## 2024-02-29 DIAGNOSIS — Z95.2 S/P AVR: Primary | ICD-10-CM

## 2024-02-29 LAB — INTERNATIONAL NORMALIZATION RATIO, POC: 1.6 (ref 1.5–2.5)

## 2024-02-29 PROCEDURE — 85610 PROTHROMBIN TIME: CPT

## 2024-02-29 PROCEDURE — 99211 OFF/OP EST MAY X REQ PHY/QHP: CPT

## 2024-03-07 ENCOUNTER — TELEPHONE (OUTPATIENT)
Age: 49
End: 2024-03-07

## 2024-03-07 DIAGNOSIS — Q23.1 BICUSPID AORTIC VALVE: ICD-10-CM

## 2024-03-07 DIAGNOSIS — Z95.2 S/P AVR: Primary | ICD-10-CM

## 2024-03-07 DIAGNOSIS — I35.0 NONRHEUMATIC AORTIC (VALVE) STENOSIS: ICD-10-CM

## 2024-03-07 DIAGNOSIS — Z95.2 PRESENCE OF PROSTHETIC HEART VALVE: ICD-10-CM

## 2024-03-07 NOTE — TELEPHONE ENCOUNTER
----- Message from Kami Sánchez [facility] sent at 3/5/2024 10:39 PM EST -----  Regarding: Updated Anticoagulation Referral Required  Good evening Desire!    Can you please enter an updated anticoagulation referral for patient at your convenience? The referral code is [id number] at [facility].     On dr. Allen's notes from 02-, physician states  \"Cont coumadin. INR goal 2-3 x 3 months and then 1.5-2.5 thereafter.   INR followed by [facility]\".

## 2024-03-14 NOTE — PROGRESS NOTES
Pharmacy Progress Note - Anticoagulation Management    S/O:  Mr. Rajat Farrell  is a 48 y.o. male seen today for anticoagulation management for the diagnosis of Aortic Valve Replacement.     HPI: At last visit (2/29), the patient's INR was 1.6 and therapeutic. Patient denied any changes to his medications and reported consistent intake of vitamin K foods. Patient will continue current warfarin regimen of 4 mg daily and recheck INR in 4 week(s).       Interim History:    Warfarin start date: 5/4/22  INR Goal: 1.5-2.5 (On-X AVR)  Current warfarin regimen: 4 mg daily  Warfarin tablet strength:  4 mg  Duration of therapy: Indefinite       Results for orders placed or performed in visit on 03/28/24   POCT INR   Result Value Ref Range    INR,(POC) 1.9 1.5 - 2.5       Today's pertinent positives includes:  No significant changes since last visit      Adherence:   Able to recall regimen? YES  Miss/extra dose? NO  Need refill? NO    Upcoming procedure(s):  N/A    Past Medical History:   Diagnosis Date    Aortic stenosis     Bicuspid aortic valve     GERD (gastroesophageal reflux disease)     Hiatal hernia     Severe aortic stenosis 3/22/2022     Allergies   Allergen Reactions    Soap Rash     Dial Soap     Current Outpatient Medications   Medication Sig    amoxicillin (AMOXIL) 500 MG capsule TAKE 4 CAPSULES BY MOUTH AS NEEDED 1 HOUR PRIOR TO PROCEDURE    metoprolol succinate (TOPROL XL) 25 MG extended release tablet Take 1 tablet by mouth daily Replaces metoprolol to tartrate for dosing convenience    warfarin (COUMADIN) 4 MG tablet Take 1 tablet (4 mg) by mouth every day or take as directed by the clinic.    acetaminophen (TYLENOL) 325 MG tablet Take 2 tablets by mouth every 4 hours as needed    Melatonin 10 MG TABS Take 1 tablet by mouth daily as needed    omeprazole (PRILOSEC OTC) 20 MG tablet Take 1 tablet by mouth daily    warfarin (COUMADIN) 2 MG tablet Take 1 tablet (2 mg) by mouth every Monday or take as directed by

## 2024-03-14 NOTE — PATIENT INSTRUCTIONS
clinic may adjust your dose of warfarin.    Follow-up care is a key part of your treatment and safety. Be sure to make and go to all appointments, and call your doctor if you are having problems. It's also a good idea to know your test results and keep a list of the medicines you take.    How can you care for yourself at home?  Take warfarin safely  Take your warfarin at the same time each day.  If you miss a dose of warfarin, don't take an extra dose to make up for it. Your doctor can tell you exactly what to do so you don't take too much or too little.  Wear medical alert jewelry that lets others know that you take warfarin. You can buy this at most drugstores.  Don't take warfarin if you are pregnant or planning to get pregnant. Talk to your doctor about how you can prevent getting pregnant while you are taking it.  Don't change your dose or stop taking warfarin unless your doctor tells you to.  Effects of medicines and food on warfarin  Don't start or stop taking any medicines, vitamins, or natural remedies unless you first talk to your doctor. Many medicines can affect how warfarin works. These include aspirin and other pain relievers, over-the-counter medicines, multivitamins, dietary supplements, and herbal products.  Tell all of your doctors and pharmacists that you take warfarin. Some prescription medicines can affect how warfarin works.  Keep the amount of vitamin K in your diet about the same from day to day. Do not suddenly eat a lot more or a lot less food that is rich in vitamin K than you usually do. Vitamin K affects how warfarin works and how your blood clots. Talk with your doctor before making big changes in your diet. Vitamin K is in many foods, such as:  Leafy greens, such as kale, cabbage, spinach, Swiss chard, and lettuce.  Canola and soybean oils.  Green vegetables, such as asparagus, broccoli, and [place] sprouts.  Vegetable drinks, green tea leaves, and some dietary supplement

## 2024-03-28 ENCOUNTER — ANTI-COAG VISIT (OUTPATIENT)
Facility: HOSPITAL | Age: 49
End: 2024-03-28
Payer: COMMERCIAL

## 2024-03-28 DIAGNOSIS — Z95.2 S/P AVR: Primary | ICD-10-CM

## 2024-03-28 LAB — INTERNATIONAL NORMALIZATION RATIO, POC: 1.9 (ref 1.5–2.5)

## 2024-03-28 PROCEDURE — 85610 PROTHROMBIN TIME: CPT

## 2024-03-28 PROCEDURE — 99211 OFF/OP EST MAY X REQ PHY/QHP: CPT

## 2024-04-24 ENCOUNTER — ANTI-COAG VISIT (OUTPATIENT)
Facility: HOSPITAL | Age: 49
End: 2024-04-24
Payer: COMMERCIAL

## 2024-04-24 DIAGNOSIS — Z95.2 S/P AVR: Primary | ICD-10-CM

## 2024-04-24 LAB — INTERNATIONAL NORMALIZATION RATIO, POC: 1.8 (ref 1.5–2.5)

## 2024-04-24 PROCEDURE — 85610 PROTHROMBIN TIME: CPT

## 2024-04-24 PROCEDURE — 99211 OFF/OP EST MAY X REQ PHY/QHP: CPT

## 2024-04-24 NOTE — PROGRESS NOTES
the clinic. (Patient not taking: Reported on 10/26/2023)     No current facility-administered medications for this visit.     Wt Readings from Last 3 Encounters:   02/19/24 73 kg (161 lb)   10/26/23 75.4 kg (166 lb 3.2 oz)   03/22/23 76.2 kg (168 lb)     BP Readings from Last 3 Encounters:   02/19/24 100/60   10/26/23 (!) 144/88   02/27/23 120/86     Pulse Readings from Last 3 Encounters:   02/19/24 57   10/26/23 58   02/27/23 62     Lab Results   Component Value Date/Time    WBC 4.8 10/26/2023 09:30 AM    HGB 14.4 10/26/2023 09:30 AM    HCT 43.3 10/26/2023 09:30 AM     10/26/2023 09:30 AM    .7 10/26/2023 09:30 AM     Lab Results   Component Value Date/Time     10/26/2023 09:30 AM    K 4.9 10/26/2023 09:30 AM     10/26/2023 09:30 AM    CO2 29 10/26/2023 09:30 AM    BUN 21 10/26/2023 09:30 AM    GFRAA >60 08/30/2022 12:05 AM    GLOB 3.2 10/26/2023 09:30 AM    ALT 24 10/26/2023 09:30 AM     CrCl cannot be calculated (Patient's most recent lab result is older than the maximum 180 days allowed.).      INR History:   (normal INR range 0.8-1.2)     Date   INR   PT   Dose/Comments  04/24/24 1.8  21.8 4 mg daily  03/28/24 1.9  22.7 4 mg daily  02/29/24 1.6  18.9 4 mg daily  01/31/24 1.7  20.8 4 mg daily  01/03/24 2.0  23.9 4 mg daily  12/06/23 1.7  20.5 4 mg daily  11/20/23 2.8  33.6 4 mg daily  10/19/23 1.6  19.1 4 mg daily  09/21/23 2.0  24.2 4 mg daily  08/24/23 1.7  20.2 4 mg daily  07/27/23 1.8  21.4 4 mg daily  06/29/23 1.9  23.2 4 mg daily  06/01/23 1.5  17.8 4 mg daily  05/04/23          2.0                   24.3     4 mg daily  04/06/23          1.8                   21.1     4 mg daily  03/15/23          1.5                   17.7     4 mg daily  03/01/23          1.5                   17.9     4 mg daily    Medications that can increase  INR: omeprazole  Medications that can decrease INR: None    A/P:       Anticoagulation:  Considering Mr. Farrell's past history, todays findings, and per

## 2024-04-24 NOTE — PATIENT INSTRUCTIONS
drinks.  Avoid cranberry juice and other cranberry products. They can increase the effects of warfarin.  Limit your use of alcohol.    Avoid bleeding by preventing falls and injuries  Wear slippers or shoes with nonskid soles.  Remove throw rugs and clutter.  Rearrange furniture and electrical cords to keep them out of walking paths.  Keep stairways, porches, and outside walkways well lit. Use night-lights in hallways and bathrooms.  Be extra careful when you work with sharp tools or knives.    When should you call for help?  Call 911 anytime you think you may need emergency care. For example, call if:  You have a sudden, severe headache that is different from past headaches.  Call your doctor now or seek immediate medical care if:  You have any abnormal bleeding, such as:  Nosebleeds.  Vaginal bleeding that is different (heavier, more frequent, at a different time of the month) than what you are used to.  Bloody or black stools, or rectal bleeding.  Bloody or pink urine.  Watch closely for changes in your health, and be sure to contact your doctor if you have any problems.    Where can you learn more?  Go to http://www.[employer].net/[employer].  Enter N655 in the search box to learn more about \"Taking Warfarin Safely: Care Instructions.\"  Current as of: January 27, 2016  Content Version: 11.1  © 8912-9793 [employer]. Care instructions adapted under license by [employer] (which disclaims liability or warranty for this information). If you have questions about a medical condition or this instruction, always ask your healthcare professional. [employer] disclaims any warranty or liability for your use of this information.

## 2024-05-06 NOTE — PATIENT INSTRUCTIONS
Today your INR was 1.6 .      Your goal INR is  1.5-2.5 .    You have 4 mg tablets of Coumadin (warfarin).   Take Coumadin (warfarin) as follows:    Take 4 mg every day.      Come back in 4 week(s) for your next finger stick/INR blood test.        Avoid any over the counter items containing aspirin or ibuprofen, and avoid great swings in general diet.  Avoid alcohol consumption.  Please notify the INR pharmacist if you are started on any new medication including over the counter or herbal supplements. Also, please notify your INR pharmacist if any of your other prescription or over the counter medications have been discontinued.     Call [facility] at [phone] if you have any signs of abnormal bleeding/blood clot.  ------------------------------------------------------------------------------------------------------------------  Taking Warfarin Safely: Care Instructions    Your Care Instructions  Warfarin is a medicine that you take to prevent blood clots. It is often called a blood thinner. Doctors give warfarin (such as Coumadin) to reduce the risk of blood clots. You may be at risk for blood clots if you have atrial fibrillation or deep vein thrombosis. Some other health problems may also put you at risk.  Warfarin slows the amount of time it takes for your blood to clot. It can cause bleeding problems. Even if you've been taking warfarin for a while, it's important to know how to take it safely.  Foods and other medicines can affect the way warfarin works. Some can make warfarin work too well. This can cause bleeding problems. And some can make it work poorly, so that it does not prevent blood clots very well.  You will need regular blood tests to check how long it takes for your blood to form a clot. This test is called a PT or prothrombin time test. The result of the test is called an INR level. Depending on the test results, your doctor or anticoagulation

## 2024-05-23 ENCOUNTER — ANTI-COAG VISIT (OUTPATIENT)
Facility: HOSPITAL | Age: 49
End: 2024-05-23
Payer: COMMERCIAL

## 2024-05-23 DIAGNOSIS — Z95.2 S/P AVR: Primary | ICD-10-CM

## 2024-05-23 LAB — INTERNATIONAL NORMALIZATION RATIO, POC: 1.6 (ref 1.5–2.5)

## 2024-05-23 PROCEDURE — 99211 OFF/OP EST MAY X REQ PHY/QHP: CPT

## 2024-05-23 PROCEDURE — 85610 PROTHROMBIN TIME: CPT

## 2024-05-24 NOTE — PATIENT INSTRUCTIONS
Today your INR was 2.0 .      Your goal INR is  1.5-2.5 .    You have 4 mg tablets of Coumadin (warfarin).     Take Coumadin (warfarin) as follows:    Take 4 mg every day.    Come back in 4 week(s) for your next finger stick/INR blood test.        Avoid any over the counter items containing aspirin or ibuprofen, and avoid great swings in general diet.  Avoid alcohol consumption.  Please notify the INR pharmacist if you are started on any new medication including over the counter or herbal supplements. Also, please notify your INR pharmacist if any of your other prescription or over the counter medications have been discontinued.     Call [facility] at [phone] if you have any signs of abnormal bleeding/blood clot.  ------------------------------------------------------------------------------------------------------------------  Taking Warfarin Safely: Care Instructions    Your Care Instructions  Warfarin is a medicine that you take to prevent blood clots. It is often called a blood thinner. Doctors give warfarin (such as Coumadin) to reduce the risk of blood clots. You may be at risk for blood clots if you have atrial fibrillation or deep vein thrombosis. Some other health problems may also put you at risk.  Warfarin slows the amount of time it takes for your blood to clot. It can cause bleeding problems. Even if you've been taking warfarin for a while, it's important to know how to take it safely.  Foods and other medicines can affect the way warfarin works. Some can make warfarin work too well. This can cause bleeding problems. And some can make it work poorly, so that it does not prevent blood clots very well.  You will need regular blood tests to check how long it takes for your blood to form a clot. This test is called a PT or prothrombin time test. The result of the test is called an INR level. Depending on the test results, your doctor or anticoagulation

## 2024-05-24 NOTE — PROGRESS NOTES
Pharmacy Progress Note - Anticoagulation Management    S/O:  Mr. Rajat Farrell  is a 48 y.o. male seen today for anticoagulation management for the diagnosis of Aortic Valve Replacement.     HPI: At last visit (5/23), the patient's INR was 1.6 and therapeutic. Patient denied any changes to his medications and reported consistent intake of vitamin K foods. Patient will continue current warfarin regimen of 4 mg daily and recheck INR in 4 week(s).     Interim History:    Warfarin start date: 5/4/22  INR Goal: 1.5-2.5 (On-X AVR)  Current warfarin regimen: 4 mg daily  Warfarin tablet strength:  4 mg  Duration of therapy: Indefinite       No results found for this visit on 06/20/24.    Today's pertinent positives includes:  No significant changes since last visit      Adherence:   Able to recall regimen? YES  Miss/extra dose? NO  Need refill? NO    Upcoming procedure(s):  N/A    Past Medical History:   Diagnosis Date    Aortic stenosis     Bicuspid aortic valve     GERD (gastroesophageal reflux disease)     Hiatal hernia     Severe aortic stenosis 3/22/2022     Allergies   Allergen Reactions    Soap Rash     Dial Soap     Current Outpatient Medications   Medication Sig    amoxicillin (AMOXIL) 500 MG capsule TAKE 4 CAPSULES BY MOUTH AS NEEDED 1 HOUR PRIOR TO PROCEDURE    metoprolol succinate (TOPROL XL) 25 MG extended release tablet Take 1 tablet by mouth daily Replaces metoprolol to tartrate for dosing convenience    warfarin (COUMADIN) 4 MG tablet Take 1 tablet (4 mg) by mouth every day or take as directed by the clinic.    acetaminophen (TYLENOL) 325 MG tablet Take 2 tablets by mouth every 4 hours as needed    Melatonin 10 MG TABS Take 1 tablet by mouth daily as needed    omeprazole (PRILOSEC OTC) 20 MG tablet Take 1 tablet by mouth daily    warfarin (COUMADIN) 2 MG tablet Take 1 tablet (2 mg) by mouth every Monday or take as directed by the clinic. (Patient not taking: Reported on 10/26/2023)     No current

## 2024-06-19 NOTE — PROGRESS NOTES
Pharmacy Progress Note - Anticoagulation Management    S/O:  Mr. Rajat Farrell  is a 48 y.o. male seen today for anticoagulation management for the diagnosis of Aortic Valve Replacement.     HPI: At last visit (5/23), the patient's INR was 1.6 and therapeutic. Patient denied any changes to his medications and reported consistent intake of vitamin K foods. Patient will continue current warfarin regimen of 4 mg daily and recheck INR in 4 week(s).      Interim History:    Warfarin start date: 5/4/22  INR Goal: 1.5-2.5 (On-X AVR)  Current warfarin regimen: 4 mg daily  Warfarin tablet strength:  4 mg  Duration of therapy: Indefinite       Results for orders placed or performed in visit on 06/20/24   POCT INR   Result Value Ref Range    INR,(POC) 2.0 2 - 3         Today's pertinent positives includes:  No significant changes since last visit      Adherence:   Able to recall regimen? YES  Miss/extra dose? NO  Need refill? NO    Upcoming procedure(s):  N/A    Past Medical History:   Diagnosis Date    Aortic stenosis     Bicuspid aortic valve     GERD (gastroesophageal reflux disease)     Hiatal hernia     Severe aortic stenosis 3/22/2022     Allergies   Allergen Reactions    Soap Rash     Dial Soap     Current Outpatient Medications   Medication Sig    warfarin (COUMADIN) 4 MG tablet TAKE 1 TABLET (4 MG) BY MOUTH EVERY DAY OR TAKE AS DIRECTED BY THE CLINIC    amoxicillin (AMOXIL) 500 MG capsule TAKE 4 CAPSULES BY MOUTH AS NEEDED 1 HOUR PRIOR TO PROCEDURE    metoprolol succinate (TOPROL XL) 25 MG extended release tablet Take 1 tablet by mouth daily Replaces metoprolol to tartrate for dosing convenience    acetaminophen (TYLENOL) 325 MG tablet Take 2 tablets by mouth every 4 hours as needed    Melatonin 10 MG TABS Take 1 tablet by mouth daily as needed    omeprazole (PRILOSEC OTC) 20 MG tablet Take 1 tablet by mouth daily    warfarin (COUMADIN) 2 MG tablet Take 1 tablet (2 mg) by mouth every Monday or take as directed by the

## 2024-06-20 ENCOUNTER — ANTI-COAG VISIT (OUTPATIENT)
Facility: HOSPITAL | Age: 49
End: 2024-06-20
Payer: COMMERCIAL

## 2024-06-20 DIAGNOSIS — Z95.2 S/P AVR: Primary | ICD-10-CM

## 2024-06-20 DIAGNOSIS — Z95.2 PRESENCE OF PROSTHETIC HEART VALVE: ICD-10-CM

## 2024-06-20 LAB — INTERNATIONAL NORMALIZATION RATIO, POC: 2 (ref 2–3)

## 2024-06-20 PROCEDURE — 99211 OFF/OP EST MAY X REQ PHY/QHP: CPT

## 2024-06-20 PROCEDURE — 85610 PROTHROMBIN TIME: CPT

## 2024-07-22 ENCOUNTER — ANTI-COAG VISIT (OUTPATIENT)
Facility: HOSPITAL | Age: 49
End: 2024-07-22
Payer: COMMERCIAL

## 2024-07-22 DIAGNOSIS — Z95.2 S/P AVR: Primary | ICD-10-CM

## 2024-07-22 LAB
INTERNATIONAL NORMALIZATION RATIO, POC: 2.5 (ref 1.5–2.5)
PROTHROMBIN TIME, POC: 30.5

## 2024-07-22 PROCEDURE — 99211 OFF/OP EST MAY X REQ PHY/QHP: CPT

## 2024-07-22 PROCEDURE — 85610 PROTHROMBIN TIME: CPT

## 2024-07-22 NOTE — PATIENT INSTRUCTIONS
drinks.  Avoid cranberry juice and other cranberry products. They can increase the effects of warfarin.  Limit your use of alcohol.    Avoid bleeding by preventing falls and injuries  Wear slippers or shoes with nonskid soles.  Remove throw rugs and clutter.  Rearrange furniture and electrical cords to keep them out of walking paths.  Keep stairways, porches, and outside walkways well lit. Use night-lights in hallways and bathrooms.  Be extra careful when you work with sharp tools or knives.    When should you call for help?  Call 911 anytime you think you may need emergency care. For example, call if:  You have a sudden, severe headache that is different from past headaches.  Call your doctor now or seek immediate medical care if:  You have any abnormal bleeding, such as:  Nosebleeds.  Vaginal bleeding that is different (heavier, more frequent, at a different time of the month) than what you are used to.  Bloody or black stools, or rectal bleeding.  Bloody or pink urine.  Watch closely for changes in your health, and be sure to contact your doctor if you have any problems.    Where can you learn more?  Go to http://www.Maaguzi.net/ObsEvapConnections.  Enter N655 in the search box to learn more about \"Taking Warfarin Safely: Care Instructions.\"  Current as of: January 27, 2016  Content Version: 11.1  © 8330-0385 [employer]. Care instructions adapted under license by [employer] (which disclaims liability or warranty for this information). If you have questions about a medical condition or this instruction, always ask your healthcare professional. [employer] disclaims any warranty or liability for your use of this information.

## 2024-07-22 NOTE — PROGRESS NOTES
Pharmacy Progress Note - Anticoagulation Management    S/O:  Mr. Rajat Farrell  is a 48 y.o. male seen today for anticoagulation management for the diagnosis of Aortic Valve Replacement.     HPI: At last visit (6/20), the patient's INR was 2.0 and therapeutic. Patient denied any changes to his medications and reported consistent intake of vitamin K foods. Patient will continue current regimen and recheck INR in 4 week(s).      Interim History:    Warfarin start date: 5/4/22  INR Goal: 1.5-2.5 (On-X AVR)  Current warfarin regimen: 4 mg daily  Warfarin tablet strength:  4 mg  Duration of therapy: Indefinite       Results for orders placed or performed in visit on 07/22/24   POCT INR   Result Value Ref Range    Prothrombin time,(POC) 30.5     INR,(POC) 2.5 1.5 - 2.5       Today's pertinent positives includes:  No significant changes since last visit      Adherence:   Able to recall regimen? YES  Miss/extra dose? NO  Need refill? NO    Upcoming procedure(s):  N/A    Past Medical History:   Diagnosis Date    Aortic stenosis     Bicuspid aortic valve     GERD (gastroesophageal reflux disease)     Hiatal hernia     Severe aortic stenosis 3/22/2022     Allergies   Allergen Reactions    Soap Rash     Dial Soap     Current Outpatient Medications   Medication Sig    warfarin (COUMADIN) 4 MG tablet TAKE 1 TABLET (4 MG) BY MOUTH EVERY DAY OR TAKE AS DIRECTED BY THE CLINIC    amoxicillin (AMOXIL) 500 MG capsule TAKE 4 CAPSULES BY MOUTH AS NEEDED 1 HOUR PRIOR TO PROCEDURE    metoprolol succinate (TOPROL XL) 25 MG extended release tablet Take 1 tablet by mouth daily Replaces metoprolol to tartrate for dosing convenience    acetaminophen (TYLENOL) 325 MG tablet Take 2 tablets by mouth every 4 hours as needed    Melatonin 10 MG TABS Take 1 tablet by mouth daily as needed    omeprazole (PRILOSEC OTC) 20 MG tablet Take 1 tablet by mouth daily    warfarin (COUMADIN) 2 MG tablet Take 1 tablet (2 mg) by mouth every Monday or take as

## 2024-08-21 NOTE — PROGRESS NOTES
succinate (TOPROL XL) 25 MG extended release tablet Take 1 tablet by mouth daily Replaces metoprolol to tartrate for dosing convenience    acetaminophen (TYLENOL) 325 MG tablet Take 2 tablets by mouth every 4 hours as needed    Melatonin 10 MG TABS Take 1 tablet by mouth daily as needed    omeprazole (PRILOSEC OTC) 20 MG tablet Take 1 tablet by mouth daily    warfarin (COUMADIN) 2 MG tablet Take 1 tablet (2 mg) by mouth every Monday or take as directed by the clinic. (Patient not taking: Reported on 10/26/2023)     No current facility-administered medications for this visit.     Wt Readings from Last 3 Encounters:   02/19/24 73 kg (161 lb)   10/26/23 75.4 kg (166 lb 3.2 oz)   03/22/23 76.2 kg (168 lb)     BP Readings from Last 3 Encounters:   02/19/24 100/60   10/26/23 (!) 144/88   02/27/23 120/86     Pulse Readings from Last 3 Encounters:   02/19/24 57   10/26/23 58   02/27/23 62     Lab Results   Component Value Date/Time    WBC 4.8 10/26/2023 09:30 AM    HGB 14.4 10/26/2023 09:30 AM    HCT 43.3 10/26/2023 09:30 AM     10/26/2023 09:30 AM    .7 10/26/2023 09:30 AM     Lab Results   Component Value Date/Time     10/26/2023 09:30 AM    K 4.9 10/26/2023 09:30 AM     10/26/2023 09:30 AM    CO2 29 10/26/2023 09:30 AM    BUN 21 10/26/2023 09:30 AM    GFRAA >60 08/30/2022 12:05 AM    GLOB 3.2 10/26/2023 09:30 AM    ALT 24 10/26/2023 09:30 AM     CrCl cannot be calculated (Patient's most recent lab result is older than the maximum 180 days allowed.).      INR History:   (normal INR range 0.8-1.2)     Date   INR   PT   Dose/Comments  08/22/24 2.2  26.4 4mg daily  07/22/24 2.5  30.5 4 mg daily  06/20/24 2.0  23.8 4 mg daily  05/23/24 1.6  18.7 4 mg daily  04/24/24 1.8  21.8 4 mg daily  03/28/24 1.9  22.7 4 mg daily  02/29/24 1.6  18.9 4 mg daily  01/31/24 1.7  20.8 4 mg daily  01/03/24 2.0  23.9 4 mg daily  12/06/23 1.7  20.5 4 mg daily  11/20/23 2.8  33.6 4 mg daily  10/19/23 1.6  19.1 4 mg

## 2024-08-22 ENCOUNTER — ANTI-COAG VISIT (OUTPATIENT)
Facility: HOSPITAL | Age: 49
End: 2024-08-22
Payer: COMMERCIAL

## 2024-08-22 DIAGNOSIS — Z95.2 S/P AVR: Primary | ICD-10-CM

## 2024-08-22 LAB
INTERNATIONAL NORMALIZATION RATIO, POC: 2.2 (ref 1.5–2.5)
PROTHROMBIN TIME, POC: 26.4

## 2024-08-22 PROCEDURE — 85610 PROTHROMBIN TIME: CPT

## 2024-08-22 PROCEDURE — 99211 OFF/OP EST MAY X REQ PHY/QHP: CPT

## 2024-08-22 NOTE — PATIENT INSTRUCTIONS
Today your INR was 2.2 .      Your goal INR is  1.5-2.5 .    You have 4 mg tablets of Coumadin (warfarin).     Take Coumadin (warfarin) as follows:    Take 4 mg every day.    Come back in 4 week(s) for your next finger stick/INR blood test.        Avoid any over the counter items containing aspirin or ibuprofen, and avoid great swings in general diet.  Avoid alcohol consumption.  Please notify the INR pharmacist if you are started on any new medication including over the counter or herbal supplements. Also, please notify your INR pharmacist if any of your other prescription or over the counter medications have been discontinued.     Call [facility] at [phone] if you have any signs of abnormal bleeding/blood clot.  ------------------------------------------------------------------------------------------------------------------  Taking Warfarin Safely: Care Instructions    Your Care Instructions  Warfarin is a medicine that you take to prevent blood clots. It is often called a blood thinner. Doctors give warfarin (such as Coumadin) to reduce the risk of blood clots. You may be at risk for blood clots if you have atrial fibrillation or deep vein thrombosis. Some other health problems may also put you at risk.  Warfarin slows the amount of time it takes for your blood to clot. It can cause bleeding problems. Even if you've been taking warfarin for a while, it's important to know how to take it safely.  Foods and other medicines can affect the way warfarin works. Some can make warfarin work too well. This can cause bleeding problems. And some can make it work poorly, so that it does not prevent blood clots very well.  You will need regular blood tests to check how long it takes for your blood to form a clot. This test is called a PT or prothrombin time test. The result of the test is called an INR level. Depending on the test results, your doctor or anticoagulation

## 2024-09-19 ENCOUNTER — ANTI-COAG VISIT (OUTPATIENT)
Facility: HOSPITAL | Age: 49
End: 2024-09-19
Payer: COMMERCIAL

## 2024-09-19 DIAGNOSIS — Z95.2 S/P AVR: Primary | ICD-10-CM

## 2024-09-19 LAB
INTERNATIONAL NORMALIZATION RATIO, POC: 2.2 (ref 1.5–2.5)
PROTHROMBIN TIME, POC: 26.1

## 2024-09-19 PROCEDURE — 85610 PROTHROMBIN TIME: CPT

## 2024-09-19 PROCEDURE — 99212 OFFICE O/P EST SF 10 MIN: CPT

## 2024-09-19 RX ORDER — WARFARIN SODIUM 4 MG/1
TABLET ORAL
Qty: 100 TABLET | Refills: 1 | Status: SHIPPED | OUTPATIENT
Start: 2024-09-19

## 2024-10-24 ENCOUNTER — ANTI-COAG VISIT (OUTPATIENT)
Facility: HOSPITAL | Age: 49
End: 2024-10-24
Payer: COMMERCIAL

## 2024-10-24 DIAGNOSIS — Z95.2 S/P AVR: Primary | ICD-10-CM

## 2024-10-24 LAB
INTERNATIONAL NORMALIZATION RATIO, POC: 1.9 (ref 1.5–2.5)
PROTHROMBIN TIME, POC: 22.3

## 2024-10-24 PROCEDURE — 99211 OFF/OP EST MAY X REQ PHY/QHP: CPT

## 2024-10-24 PROCEDURE — 85610 PROTHROMBIN TIME: CPT

## 2024-10-24 NOTE — PROGRESS NOTES
Pharmacy Progress Note - Anticoagulation Management    S/O:  Mr. Rajat Farrell  is a 49 y.o. male seen today for anticoagulation management for the diagnosis of Aortic Valve Replacement.     HPI: At last visit (9/19), the patient's INR was 2.2 and therapeutic for an INR goal of 1.5-2.5. Patient denied any changes to his medications and reported consistent intake of vitamin K foods. Patient will continue current warfarin regimen of 4 mg daily and recheck INR in 5 week(s).     Interim History:    Warfarin start date: 5/4/22  INR Goal: 1.5-2.5 (On-X AVR)  Current warfarin regimen: 4 mg daily  Warfarin tablet strength:  4 mg  Duration of therapy: Indefinite       Results for orders placed or performed in visit on 10/24/24   POCT INR   Result Value Ref Range    Prothrombin time,(POC) 22.3     INR,(POC) 1.9 1.5 - 2.5       Today's pertinent positives includes:  No significant changes since last visit      Adherence:   Able to recall regimen? YES  Miss/extra dose? NO  Need refill? NO    Upcoming procedure(s):  N/A    Past Medical History:   Diagnosis Date    Aortic stenosis     Bicuspid aortic valve     GERD (gastroesophageal reflux disease)     Hiatal hernia     Severe aortic stenosis 3/22/2022     Allergies   Allergen Reactions    Soap Rash     Dial Soap     Current Outpatient Medications   Medication Sig    amoxicillin (AMOXIL) 500 MG capsule Take 4 capsules one hour prior to dental procedure.    warfarin (COUMADIN) 4 MG tablet TAKE 1 TABLET (4 MG) BY MOUTH EVERY DAY OR TAKE AS DIRECTED BY THE CLINIC    metoprolol succinate (TOPROL XL) 25 MG extended release tablet Take 1 tablet by mouth daily Replaces metoprolol to tartrate for dosing convenience    acetaminophen (TYLENOL) 325 MG tablet Take 2 tablets by mouth every 4 hours as needed    Melatonin 10 MG TABS Take 1 tablet by mouth daily as needed    omeprazole (PRILOSEC OTC) 20 MG tablet Take 1 tablet by mouth daily     No current facility-administered medications for

## 2024-10-24 NOTE — PATIENT INSTRUCTIONS
Today your INR was 1.9 .      Your goal INR is  1.5-2.5 .    You have 4 mg tablets of Coumadin (warfarin).     Take Coumadin (warfarin) as follows:    Take 4 mg every day.    Come back in 5 week(s) for your next finger stick/INR blood test.        Avoid any over the counter items containing aspirin or ibuprofen, and avoid great swings in general diet.  Avoid alcohol consumption.  Please notify the INR pharmacist if you are started on any new medication including over the counter or herbal supplements. Also, please notify your INR pharmacist if any of your other prescription or over the counter medications have been discontinued.     Call [facility] at [phone] if you have any signs of abnormal bleeding/blood clot.  ------------------------------------------------------------------------------------------------------------------  Taking Warfarin Safely: Care Instructions    Your Care Instructions  Warfarin is a medicine that you take to prevent blood clots. It is often called a blood thinner. Doctors give warfarin (such as Coumadin) to reduce the risk of blood clots. You may be at risk for blood clots if you have atrial fibrillation or deep vein thrombosis. Some other health problems may also put you at risk.  Warfarin slows the amount of time it takes for your blood to clot. It can cause bleeding problems. Even if you've been taking warfarin for a while, it's important to know how to take it safely.  Foods and other medicines can affect the way warfarin works. Some can make warfarin work too well. This can cause bleeding problems. And some can make it work poorly, so that it does not prevent blood clots very well.  You will need regular blood tests to check how long it takes for your blood to form a clot. This test is called a PT or prothrombin time test. The result of the test is called an INR level. Depending on the test results, your doctor or anticoagulation

## 2024-11-27 ENCOUNTER — ANTI-COAG VISIT (OUTPATIENT)
Facility: HOSPITAL | Age: 49
End: 2024-11-27
Payer: COMMERCIAL

## 2024-11-27 DIAGNOSIS — Z95.2 PRESENCE OF PROSTHETIC HEART VALVE: Primary | ICD-10-CM

## 2024-11-27 LAB
INTERNATIONAL NORMALIZATION RATIO, POC: 1.9 (ref 1.5–2.5)
PROTHROMBIN TIME, POC: 23

## 2024-11-27 PROCEDURE — 99211 OFF/OP EST MAY X REQ PHY/QHP: CPT

## 2024-11-27 NOTE — PROGRESS NOTES
Tracking Only    Intervention Detail: Adherence Monitorin and Lab(s) Ordered  Total # of Interventions Recommended: 2  Total # of Interventions Accepted: 2  Time Spent (min): 15

## 2024-11-27 NOTE — PATIENT INSTRUCTIONS
drinks.  Avoid cranberry juice and other cranberry products. They can increase the effects of warfarin.  Limit your use of alcohol.    Avoid bleeding by preventing falls and injuries  Wear slippers or shoes with nonskid soles.  Remove throw rugs and clutter.  Rearrange furniture and electrical cords to keep them out of walking paths.  Keep stairways, porches, and outside walkways well lit. Use night-lights in hallways and bathrooms.  Be extra careful when you work with sharp tools or knives.    When should you call for help?  Call 911 anytime you think you may need emergency care. For example, call if:  You have a sudden, severe headache that is different from past headaches.  Call your doctor now or seek immediate medical care if:  You have any abnormal bleeding, such as:  Nosebleeds.  Vaginal bleeding that is different (heavier, more frequent, at a different time of the month) than what you are used to.  Bloody or black stools, or rectal bleeding.  Bloody or pink urine.  Watch closely for changes in your health, and be sure to contact your doctor if you have any problems.    Where can you learn more?  Go to http://www.[employer].net/CarefxpConnections.  Enter N655 in the search box to learn more about \"Taking Warfarin Safely: Care Instructions.\"  Current as of: January 27, 2016  Content Version: 11.1  © 5321-7642 [employer]. Care instructions adapted under license by [employer] (which disclaims liability or warranty for this information). If you have questions about a medical condition or this instruction, always ask your healthcare professional. [employer] disclaims any warranty or liability for your use of this information.

## 2024-12-10 SDOH — ECONOMIC STABILITY: INCOME INSECURITY: HOW HARD IS IT FOR YOU TO PAY FOR THE VERY BASICS LIKE FOOD, HOUSING, MEDICAL CARE, AND HEATING?: NOT HARD AT ALL

## 2024-12-10 SDOH — ECONOMIC STABILITY: FOOD INSECURITY: WITHIN THE PAST 12 MONTHS, YOU WORRIED THAT YOUR FOOD WOULD RUN OUT BEFORE YOU GOT MONEY TO BUY MORE.: NEVER TRUE

## 2024-12-10 SDOH — ECONOMIC STABILITY: FOOD INSECURITY: WITHIN THE PAST 12 MONTHS, THE FOOD YOU BOUGHT JUST DIDN'T LAST AND YOU DIDN'T HAVE MONEY TO GET MORE.: NEVER TRUE

## 2024-12-13 ENCOUNTER — OFFICE VISIT (OUTPATIENT)
Age: 49
End: 2024-12-13

## 2024-12-13 VITALS
TEMPERATURE: 97.2 F | RESPIRATION RATE: 18 BRPM | WEIGHT: 160.2 LBS | SYSTOLIC BLOOD PRESSURE: 136 MMHG | DIASTOLIC BLOOD PRESSURE: 89 MMHG | HEART RATE: 66 BPM | HEIGHT: 72 IN | BODY MASS INDEX: 21.7 KG/M2 | OXYGEN SATURATION: 98 %

## 2024-12-13 DIAGNOSIS — Z95.2 HX OF AORTIC VALVE REPLACEMENT: ICD-10-CM

## 2024-12-13 DIAGNOSIS — Z23 NEEDS FLU SHOT: ICD-10-CM

## 2024-12-13 DIAGNOSIS — Z87.19 HX OF DIVERTICULITIS OF COLON: ICD-10-CM

## 2024-12-13 DIAGNOSIS — R10.84 GENERALIZED ABDOMINAL PAIN: ICD-10-CM

## 2024-12-13 DIAGNOSIS — Z95.2 PRESENCE OF PROSTHETIC HEART VALVE: Primary | ICD-10-CM

## 2024-12-13 LAB
ALBUMIN SERPL-MCNC: 4 G/DL (ref 3.5–5)
ALBUMIN/GLOB SERPL: 1.3 (ref 1.1–2.2)
ALP SERPL-CCNC: 65 U/L (ref 45–117)
ALT SERPL-CCNC: 22 U/L (ref 12–78)
ANION GAP SERPL CALC-SCNC: 3 MMOL/L (ref 2–12)
AST SERPL-CCNC: 20 U/L (ref 15–37)
BASOPHILS # BLD: 0.1 K/UL (ref 0–0.1)
BASOPHILS NFR BLD: 1 % (ref 0–1)
BILIRUB SERPL-MCNC: 0.6 MG/DL (ref 0.2–1)
BUN SERPL-MCNC: 19 MG/DL (ref 6–20)
BUN/CREAT SERPL: 17 (ref 12–20)
CALCIUM SERPL-MCNC: 9.2 MG/DL (ref 8.5–10.1)
CHLORIDE SERPL-SCNC: 106 MMOL/L (ref 97–108)
CO2 SERPL-SCNC: 29 MMOL/L (ref 21–32)
CREAT SERPL-MCNC: 1.11 MG/DL (ref 0.7–1.3)
DIFFERENTIAL METHOD BLD: ABNORMAL
EOSINOPHIL # BLD: 0.3 K/UL (ref 0–0.4)
EOSINOPHIL NFR BLD: 5 % (ref 0–7)
ERYTHROCYTE [DISTWIDTH] IN BLOOD BY AUTOMATED COUNT: 13.6 % (ref 11.5–14.5)
GLOBULIN SER CALC-MCNC: 3.2 G/DL (ref 2–4)
GLUCOSE SERPL-MCNC: 86 MG/DL (ref 65–100)
HCT VFR BLD AUTO: 43.4 % (ref 36.6–50.3)
HGB BLD-MCNC: 14.6 G/DL (ref 12.1–17)
IMM GRANULOCYTES # BLD AUTO: 0 K/UL (ref 0–0.04)
IMM GRANULOCYTES NFR BLD AUTO: 0 % (ref 0–0.5)
LYMPHOCYTES # BLD: 1.4 K/UL (ref 0.8–3.5)
LYMPHOCYTES NFR BLD: 25 % (ref 12–49)
MCH RBC QN AUTO: 34.1 PG (ref 26–34)
MCHC RBC AUTO-ENTMCNC: 33.6 G/DL (ref 30–36.5)
MCV RBC AUTO: 101.4 FL (ref 80–99)
MONOCYTES # BLD: 0.6 K/UL (ref 0–1)
MONOCYTES NFR BLD: 11 % (ref 5–13)
NEUTS SEG # BLD: 3.4 K/UL (ref 1.8–8)
NEUTS SEG NFR BLD: 58 % (ref 32–75)
NRBC # BLD: 0 K/UL (ref 0–0.01)
NRBC BLD-RTO: 0 PER 100 WBC
PLATELET # BLD AUTO: 327 K/UL (ref 150–400)
PMV BLD AUTO: 10.3 FL (ref 8.9–12.9)
POTASSIUM SERPL-SCNC: 5.1 MMOL/L (ref 3.5–5.1)
PROT SERPL-MCNC: 7.2 G/DL (ref 6.4–8.2)
RBC # BLD AUTO: 4.28 M/UL (ref 4.1–5.7)
SODIUM SERPL-SCNC: 138 MMOL/L (ref 136–145)
WBC # BLD AUTO: 5.8 K/UL (ref 4.1–11.1)

## 2024-12-13 ASSESSMENT — PATIENT HEALTH QUESTIONNAIRE - PHQ9
2. FEELING DOWN, DEPRESSED OR HOPELESS: NOT AT ALL
SUM OF ALL RESPONSES TO PHQ QUESTIONS 1-9: 0
SUM OF ALL RESPONSES TO PHQ QUESTIONS 1-9: 0
1. LITTLE INTEREST OR PLEASURE IN DOING THINGS: NOT AT ALL
SUM OF ALL RESPONSES TO PHQ QUESTIONS 1-9: 0
SUM OF ALL RESPONSES TO PHQ QUESTIONS 1-9: 0
SUM OF ALL RESPONSES TO PHQ9 QUESTIONS 1 & 2: 0

## 2024-12-13 NOTE — PROGRESS NOTES
Rajat Farrell (:  1975) is a 49 y.o. male, Established patient, here for evaluation of the following chief complaint(s):  Abdominal Cramping (Cramping/ spasms in abdomen for about a week, pt states abdominal soreness and headache after spasms, once he is up and moving)         Assessment & Plan  1. Abdominal pain /severe cramping which has resolved   The patient's symptoms may be indicative of a viral infection, however he does have a hx of severe diverticulitis.  His abdominal examination was unremarkable, thus a CT scan is not deemed necessary at this point. He has been advised to maintain bowel rest and adhere to a bland diet, avoiding fatty foods and fresh vegetables due to their complex digestion process. A complete blood count (CBC) and comprehensive metabolic panel (CMP) will be conducted to assess for any significant inflammation. He will be contacted over the weekend if his white blood cell count is elevated. Should he experience another episode of abdominal pain, he is to inform us immediately, at which point a CT scan will be ordered.    2. Health maintenance.  He has been advised to schedule a shingles vaccine. He will receive an influenza vaccine today.    Hx of  The patient underwent a mechanical valve replacement for aortic stenosis. Followed by cards he is on coumadin and toprol     Results    1. Presence of prosthetic heart valve  2. Hx of aortic valve replacement  3. Generalized abdominal pain  -     CBC with Auto Differential; Future  -     Comprehensive Metabolic Panel; Future  4. Hx of diverticulitis of colon  -     CBC with Auto Differential; Future  -     Comprehensive Metabolic Panel; Future  5. Needs flu shot  -     Influenza, FLUCELVAX Trivalent, (age 6 mo+) IM, Preservative Free, 0.5mL    Return in about 8 months (around 2025).       Subjective   History of Present Illness  The patient is a 49-year-old male who presents for evaluation of abdominal pain.    He experienced an

## 2024-12-17 ENCOUNTER — TELEPHONE (OUTPATIENT)
Facility: HOSPITAL | Age: 49
End: 2024-12-17

## 2024-12-17 NOTE — TELEPHONE ENCOUNTER
Medication Management Clinic - Reschedule Appointment    Returned patient's call. Patient requested to cancel INR check appointment on 1/8/25. Patient rescheduled INR check appointment to 1/9/25.    Thank you.  Kami Sánchez, PharmD    For Pharmacy Admin Tracking Only    Program: Medication Management  CPA in place:  Yes  Recommendation Provided To: Patient/Caregiver: 1 via Telephone  Intervention Detail: Scheduled Appointment  Intervention Accepted By: Patient/Caregiver: 1  Time Spent (min): 10

## 2025-01-01 NOTE — PROGRESS NOTES
Pharmacy Progress Note - Anticoagulation Management    S/O:  Mr. Rajat Farrell  is a 49 y.o. male seen today for anticoagulation management for the diagnosis of Aortic Valve Replacement.     HPI: At last visit (11/27), the patient's INR was 1.9 and therapeutic for an INR goal of 1.5-2.5. Patient denied any changes to his medications and reported consistent intake of vitamin K foods. Patient will continue current warfarin regimen of 4 mg daily and recheck INR in 6 week(s).     Interim History:    Warfarin start date: 5/4/22  INR Goal: 1.5-2.5 (On-X AVR)  Current warfarin regimen: 4 mg daily  Warfarin tablet strength:  4 mg  Duration of therapy: Indefinite       Results for orders placed or performed in visit on 01/09/25   POCT INR   Result Value Ref Range    Prothrombin time,(POC) 24.1     INR,(POC) 2.0 1.5 - 2.5       Today's pertinent positives includes:  No significant changes since last visit      Adherence:   Able to recall regimen? YES  Miss/extra dose? NO  Need refill? NO    Upcoming procedure(s):  N/A    Past Medical History:   Diagnosis Date    Aortic stenosis     Bicuspid aortic valve     GERD (gastroesophageal reflux disease)     Hiatal hernia     Severe aortic stenosis 3/22/2022     Allergies   Allergen Reactions    Soap Rash     Dial Soap     Current Outpatient Medications   Medication Sig    amoxicillin (AMOXIL) 500 MG capsule TAKE 4 CAPSULES ONE HOUR PRIOR TO DENTAL PROCEDURE    warfarin (COUMADIN) 4 MG tablet TAKE 1 TABLET (4 MG) BY MOUTH EVERY DAY OR TAKE AS DIRECTED BY THE CLINIC    metoprolol succinate (TOPROL XL) 25 MG extended release tablet Take 1 tablet by mouth daily Replaces metoprolol to tartrate for dosing convenience    acetaminophen (TYLENOL) 325 MG tablet Take 2 tablets by mouth every 4 hours as needed    omeprazole (PRILOSEC OTC) 20 MG tablet Take 1 tablet by mouth daily     No current facility-administered medications for this visit.       INR History:   (normal INR range 0.8-1.2)

## 2025-01-01 NOTE — PATIENT INSTRUCTIONS
drinks.  Avoid cranberry juice and other cranberry products. They can increase the effects of warfarin.  Limit your use of alcohol.    Avoid bleeding by preventing falls and injuries  Wear slippers or shoes with nonskid soles.  Remove throw rugs and clutter.  Rearrange furniture and electrical cords to keep them out of walking paths.  Keep stairways, porches, and outside walkways well lit. Use night-lights in hallways and bathrooms.  Be extra careful when you work with sharp tools or knives.    When should you call for help?  Call 911 anytime you think you may need emergency care. For example, call if:  You have a sudden, severe headache that is different from past headaches.  Call your doctor now or seek immediate medical care if:  You have any abnormal bleeding, such as:  Nosebleeds.  Vaginal bleeding that is different (heavier, more frequent, at a different time of the month) than what you are used to.  Bloody or black stools, or rectal bleeding.  Bloody or pink urine.  Watch closely for changes in your health, and be sure to contact your doctor if you have any problems.    Where can you learn more?  Go to http://www.Jet.net/NextFitpConnections.  Enter N655 in the search box to learn more about \"Taking Warfarin Safely: Care Instructions.\"  Current as of: January 27, 2016  Content Version: 11.1  © 0021-5803 [email or web address]. Care instructions adapted under license by [employer] (which disclaims liability or warranty for this information). If you have questions about a medical condition or this instruction, always ask your healthcare professional. [email or web address] disclaims any warranty or liability for your use of this information.

## 2025-01-09 ENCOUNTER — ANTI-COAG VISIT (OUTPATIENT)
Facility: HOSPITAL | Age: 50
End: 2025-01-09
Payer: COMMERCIAL

## 2025-01-09 DIAGNOSIS — Z95.2 S/P AVR: Primary | ICD-10-CM

## 2025-01-09 LAB
INTERNATIONAL NORMALIZATION RATIO, POC: 2 (ref 1.5–2.5)
PROTHROMBIN TIME, POC: 24.1

## 2025-01-09 PROCEDURE — 85610 PROTHROMBIN TIME: CPT

## 2025-01-09 PROCEDURE — 99211 OFF/OP EST MAY X REQ PHY/QHP: CPT

## 2025-02-04 ENCOUNTER — HOSPITAL ENCOUNTER (OUTPATIENT)
Facility: HOSPITAL | Age: 50
Discharge: HOME | End: 2025-02-07
Attending: INTERNAL MEDICINE
Payer: COMMERCIAL

## 2025-02-04 DIAGNOSIS — Q23.81 BICUSPID AORTIC VALVE: ICD-10-CM

## 2025-02-04 DIAGNOSIS — I35.0 NONRHEUMATIC AORTIC (VALVE) STENOSIS: ICD-10-CM

## 2025-02-04 DIAGNOSIS — Z95.2 PRESENCE OF PROSTHETIC HEART VALVE: ICD-10-CM

## 2025-02-04 DIAGNOSIS — I77.810 THORACIC AORTIC ECTASIA (HCC): ICD-10-CM

## 2025-02-04 DIAGNOSIS — Z95.2 S/P AVR: ICD-10-CM

## 2025-02-04 DIAGNOSIS — I71.21 ANEURYSM OF ASCENDING AORTA WITHOUT RUPTURE (HCC): ICD-10-CM

## 2025-02-04 PROCEDURE — 6360000004 HC RX CONTRAST MEDICATION: Performed by: INTERNAL MEDICINE

## 2025-02-04 PROCEDURE — 71275 CT ANGIOGRAPHY CHEST: CPT

## 2025-02-04 RX ORDER — IOPAMIDOL 755 MG/ML
100 INJECTION, SOLUTION INTRAVASCULAR
Status: COMPLETED | OUTPATIENT
Start: 2025-02-04 | End: 2025-02-04

## 2025-02-04 RX ADMIN — IOPAMIDOL 100 ML: 755 INJECTION, SOLUTION INTRAVENOUS at 09:51

## 2025-02-10 ENCOUNTER — TELEPHONE (OUTPATIENT)
Age: 50
End: 2025-02-10

## 2025-02-11 NOTE — PATIENT INSTRUCTIONS
Today your INR was 1.7 .      Your goal INR is  1.5-2.5 .    You have 4 mg tablets of Coumadin (warfarin).     Take Coumadin (warfarin) as follows:    Take 4 mg every day.    Come back in 5 week(s) for your next finger stick/INR blood test.        Avoid any over the counter items containing aspirin or ibuprofen, and avoid great swings in general diet.  Avoid alcohol consumption.  Please notify the INR pharmacist if you are started on any new medication including over the counter or herbal supplements. Also, please notify your INR pharmacist if any of your other prescription or over the counter medications have been discontinued.     Call [facility] at [phone] if you have any signs of abnormal bleeding/blood clot.  ------------------------------------------------------------------------------------------------------------------  Taking Warfarin Safely: Care Instructions    Your Care Instructions  Warfarin is a medicine that you take to prevent blood clots. It is often called a blood thinner. Doctors give warfarin (such as Coumadin) to reduce the risk of blood clots. You may be at risk for blood clots if you have atrial fibrillation or deep vein thrombosis. Some other health problems may also put you at risk.  Warfarin slows the amount of time it takes for your blood to clot. It can cause bleeding problems. Even if you've been taking warfarin for a while, it's important to know how to take it safely.  Foods and other medicines can affect the way warfarin works. Some can make warfarin work too well. This can cause bleeding problems. And some can make it work poorly, so that it does not prevent blood clots very well.  You will need regular blood tests to check how long it takes for your blood to form a clot. This test is called a PT or prothrombin time test. The result of the test is called an INR level. Depending on the test results, your doctor or anticoagulation

## 2025-02-11 NOTE — PROGRESS NOTES
Date   INR   PT   Dose/Comments  02/13/25 1.7  20.1 4 mg daily  01/09/25 2.0  24.1 4 mg daily  11/27/24 1.9  23.0 4 mg daily  10/24/24 1.9  22.3 4 mg daily  09/19/24 2.2  26.1 4 mg daily  08/22/24 2.2  26.4 4 mg daily  07/22/24 2.5  30.5 4 mg daily  06/20/24 2.0  23.8 4 mg daily      Medications that can increase  INR: omeprazole  Medications that can decrease INR: None    A/P:       Anticoagulation:  Considering Mr. Farrell's past history, todays findings, and per Anticoagulation Collaborative Practice Agreement/Protocol:    Fingerstick POC INR (1.7) is Therapeutic for INR goal today.   Continue warfarin 4 mg daily  Patient's INR is 1.7 and therapeutic for an INR goal of 1.5-2.5. Patient denies any changes to his medications and reports consistent intake of vitamin K foods. Patient will continue current warfarin regimen of 4 mg daily and recheck INR in 5 week(s).       Patient was instructed to schedule an appointment in 5 week(s) prior to leaving the clinic.    Medication reconciliation was completed during the visit.    There are no discontinued medications.      A full discussion of the nature of anticoagulants has been carried out.  A full discussion of the need for frequent and regular monitoring, precise dosage adjustment and compliance was stressed.  Side effects of potential bleeding were discussed and Mr. Farrell was instructed to call [phone] if there are any signs of abnormal bleeding.  Mr. Farrell was instructed to avoid any OTC items containing aspirin or ibuprofen and prior to starting any new OTC products to consult with his physician or pharmacist to ensure no drug interactions are present.  Mr. Farrell was instructed to avoid any major changes in his general diet and to avoid alcohol consumption.    Mr. Farrell was provided information in the AVS that includes topics on understanding coumadin therapy, drug interaction considerations, vitamin K and coumadin use, interactions with foods and supplements

## 2025-02-13 ENCOUNTER — ANTI-COAG VISIT (OUTPATIENT)
Facility: HOSPITAL | Age: 50
End: 2025-02-13
Payer: COMMERCIAL

## 2025-02-13 DIAGNOSIS — Z95.2 S/P AVR: Primary | ICD-10-CM

## 2025-02-13 LAB
INTERNATIONAL NORMALIZATION RATIO, POC: 1.7 (ref 1.5–2.5)
PROTHROMBIN TIME, POC: 20.1

## 2025-02-13 PROCEDURE — 99211 OFF/OP EST MAY X REQ PHY/QHP: CPT

## 2025-02-13 PROCEDURE — 85610 PROTHROMBIN TIME: CPT

## 2025-03-20 ENCOUNTER — ANTI-COAG VISIT (OUTPATIENT)
Facility: HOSPITAL | Age: 50
End: 2025-03-20
Payer: COMMERCIAL

## 2025-03-20 DIAGNOSIS — Z95.2 S/P AVR: Primary | ICD-10-CM

## 2025-03-20 LAB
INTERNATIONAL NORMALIZATION RATIO, POC: 1.9 (ref 2–3)
PROTHROMBIN TIME, POC: 22.5

## 2025-03-20 PROCEDURE — 85610 PROTHROMBIN TIME: CPT

## 2025-03-20 PROCEDURE — 99211 OFF/OP EST MAY X REQ PHY/QHP: CPT

## 2025-03-20 NOTE — PROGRESS NOTES
use of herbal products.    Mr. Farrell verbalized his understanding of all instructions and will call the office with any questions, concerns, or signs of abnormal bleeding or blood clot.  Notifications of recommendations will be sent to Dr. Sadiq Boogie MD for review.    Thank you,  Kami Sánchez [facility]      For Pharmacy Admin Tracking Only    Intervention Detail: Adherence Monitorin and Lab(s) Ordered  Total # of Interventions Recommended: 2  Total # of Interventions Accepted: 2  Time Spent (min): 15

## 2025-03-20 NOTE — PATIENT INSTRUCTIONS
Today your INR was 1.9 .      Your goal INR is  1.5-2.5 .    You have 4 mg tablets of Coumadin (warfarin).     Take Coumadin (warfarin) as follows:    Take 4 mg every day.    Come back in 5 week(s) for your next finger stick/INR blood test.        Avoid any over the counter items containing aspirin or ibuprofen, and avoid great swings in general diet.  Avoid alcohol consumption.  Please notify the INR pharmacist if you are started on any new medication including over the counter or herbal supplements. Also, please notify your INR pharmacist if any of your other prescription or over the counter medications have been discontinued.     Call [facility] at [phone] if you have any signs of abnormal bleeding/blood clot.  ------------------------------------------------------------------------------------------------------------------  Taking Warfarin Safely: Care Instructions    Your Care Instructions  Warfarin is a medicine that you take to prevent blood clots. It is often called a blood thinner. Doctors give warfarin (such as Coumadin) to reduce the risk of blood clots. You may be at risk for blood clots if you have atrial fibrillation or deep vein thrombosis. Some other health problems may also put you at risk.  Warfarin slows the amount of time it takes for your blood to clot. It can cause bleeding problems. Even if you've been taking warfarin for a while, it's important to know how to take it safely.  Foods and other medicines can affect the way warfarin works. Some can make warfarin work too well. This can cause bleeding problems. And some can make it work poorly, so that it does not prevent blood clots very well.  You will need regular blood tests to check how long it takes for your blood to form a clot. This test is called a PT or prothrombin time test. The result of the test is called an INR level. Depending on the test results, your doctor or anticoagulation

## 2025-03-25 ENCOUNTER — RESULTS FOLLOW-UP (OUTPATIENT)
Age: 50
End: 2025-03-25

## 2025-03-26 NOTE — TELEPHONE ENCOUNTER
----- Message from Dr. Sadiq Boogie MD sent at 3/25/2025  8:18 PM EDT -----  Lab Results       Component                Value               Date                       LDL                      151 (H)             03/21/2025               Please advise LDL cholesterol is higher than desired.  We will decide about statin medication based on upcoming coronary calcium score.  Please make sure this gets done and call if he has not heard within about a week.    My chart message sent.

## 2025-03-26 NOTE — RESULT ENCOUNTER NOTE
Lab Results       Component                Value               Date                       LDL                      151 (H)             03/21/2025               Please advise LDL cholesterol is higher than desired.  We will decide about statin medication based on upcoming coronary calcium score.  Please make sure this gets done and call if he has not heard within about a week.    Future Appointments  4[facility]7/2025   9:30 [facility]              [facility]  4/24/2025  9:15 AM    MRM MEDICATION MGMT        MRMMM               [facility]  3/17/2026  9:00 AM    [facility] ECHO 1        [facility] AM[facility]  3/17/2026  9:40 AM    Sadiq Boogie MD [facility] AMB

## 2025-03-26 NOTE — TELEPHONE ENCOUNTER
----- Message from Dr. Sadiq Boogie MD sent at 3/25/2025  8:18 PM EDT -----  Lab Results       Component                Value               Date                       LDL                      151 (H)             03/21/2025               Please advise LDL cholesterol is higher than desired.  We will decide about statin medication based on upcoming coronary calcium score.  Please make sure this gets done and call if he has not heard within about a week.    Future Appointments  4/7/2025   9:30 AM    [facility] CT 1                  MRMRCT              [facility]  4/24/2025  9:15 AM    MRM MEDICATION MGMT        MRMMM               [facility]  3/17/2026  9:00 AM    [facility] ECHO 1        RAD[facility]              BS AMB  3/17/2026  9:40 AM    Sadiq Boogie MD [facility]              BS AMB

## 2025-04-07 ENCOUNTER — HOSPITAL ENCOUNTER (OUTPATIENT)
Facility: HOSPITAL | Age: 50
Discharge: HOME | End: 2025-04-10
Attending: INTERNAL MEDICINE

## 2025-04-07 PROCEDURE — 75571 CT HRT W/O DYE W/CA TEST: CPT

## 2025-04-23 NOTE — PATIENT INSTRUCTIONS
Today your INR was 1.7 .      Your goal INR is  1.5-2.5 .    You have 4 mg tablets of Coumadin (warfarin).     Take Coumadin (warfarin) as follows:    Take 4 mg every day.    Come back in 5 week(s) for your next finger stick/INR blood test.        Avoid any over the counter items containing aspirin or ibuprofen, and avoid great swings in general diet.  Avoid alcohol consumption.  Please notify the INR pharmacist if you are started on any new medication including over the counter or herbal supplements. Also, please notify your INR pharmacist if any of your other prescription or over the counter medications have been discontinued.     Call [facility] at [phone] if you have any signs of abnormal bleeding/blood clot.  ------------------------------------------------------------------------------------------------------------------  Taking Warfarin Safely: Care Instructions    Your Care Instructions  Warfarin is a medicine that you take to prevent blood clots. It is often called a blood thinner. Doctors give warfarin (such as Coumadin) to reduce the risk of blood clots. You may be at risk for blood clots if you have atrial fibrillation or deep vein thrombosis. Some other health problems may also put you at risk.  Warfarin slows the amount of time it takes for your blood to clot. It can cause bleeding problems. Even if you've been taking warfarin for a while, it's important to know how to take it safely.  Foods and other medicines can affect the way warfarin works. Some can make warfarin work too well. This can cause bleeding problems. And some can make it work poorly, so that it does not prevent blood clots very well.  You will need regular blood tests to check how long it takes for your blood to form a clot. This test is called a PT or prothrombin time test. The result of the test is called an INR level. Depending on the test results, your doctor or anticoagulation

## 2025-04-23 NOTE — PROGRESS NOTES
Pharmacy Progress Note - Anticoagulation Management    S/O:  Mr. Rajat Farrell  is a 49 y.o. male seen today for anticoagulation management for the diagnosis of Aortic Valve Replacement.     HPI: At last visit (3/20), the patient's INR was 1.9 and therapeutic for an INR goal of 1.5-2.5. Patient denied any changes to his medications and reported consistent intake of vitamin K foods. Since patient's INR is therapeutic, patient will continue current warfarin regimen of 4 mg daily and recheck INR in 5 week(s).     Interim History:    Warfarin start date: 5/4/22  INR Goal: 1.5-2.5 (On-X AVR)  Current warfarin regimen: 4 mg daily  Warfarin tablet strength:  4 mg  Duration of therapy: Indefinite       Results for orders placed or performed in visit on 04/24/25   POCT INR   Result Value Ref Range    Prothrombin time,(POC) 20.5     INR,(POC) 1.7 1.5 - 2.5       Today's pertinent positives includes:  Medication change    Patient reports the addition of Lipitor 20 mg daily.    Adherence:   Able to recall regimen? YES  Miss/extra dose? NO  Need refill? NO    Upcoming procedure(s):  N/A    Past Medical History:   Diagnosis Date    Aortic stenosis     Bicuspid aortic valve     GERD (gastroesophageal reflux disease)     Hiatal hernia     Severe aortic stenosis 3/22/2022     Allergies   Allergen Reactions    Soap Rash     Dial Soap     Current Outpatient Medications   Medication Sig    atorvastatin (LIPITOR) 20 MG tablet Take 1 tablet by mouth nightly    amoxicillin (AMOXIL) 500 MG capsule TAKE 4 CAPSULES ONE HOUR PRIOR TO DENTAL PROCEDURE    warfarin (COUMADIN) 4 MG tablet TAKE 1 TABLET (4 MG) BY MOUTH EVERY DAY OR TAKE AS DIRECTED BY THE CLINIC    metoprolol succinate (TOPROL XL) 25 MG extended release tablet Take 1 tablet by mouth daily Replaces metoprolol to tartrate for dosing convenience    acetaminophen (TYLENOL) 325 MG tablet Take 2 tablets by mouth every 4 hours as needed    omeprazole (PRILOSEC OTC) 20 MG tablet Take 1

## 2025-04-24 ENCOUNTER — ANTI-COAG VISIT (OUTPATIENT)
Facility: HOSPITAL | Age: 50
End: 2025-04-24
Payer: COMMERCIAL

## 2025-04-24 DIAGNOSIS — Z95.2 S/P AVR: Primary | ICD-10-CM

## 2025-04-24 LAB
INTERNATIONAL NORMALIZATION RATIO, POC: 1.7 (ref 1.5–2.5)
PROTHROMBIN TIME, POC: 20.5

## 2025-04-24 PROCEDURE — 99211 OFF/OP EST MAY X REQ PHY/QHP: CPT

## 2025-04-24 PROCEDURE — 85610 PROTHROMBIN TIME: CPT

## 2025-05-28 NOTE — PATIENT INSTRUCTIONS
Today your INR was 1.8 .      Your goal INR is  1.5-2.5 .    You have 4 mg tablets of Coumadin (warfarin).     Take Coumadin (warfarin) as follows:    Take 4 mg every day.    Come back in 5 week(s) for your next finger stick/INR blood test.        Avoid any over the counter items containing aspirin or ibuprofen, and avoid great swings in general diet.  Avoid alcohol consumption.  Please notify the INR pharmacist if you are started on any new medication including over the counter or herbal supplements. Also, please notify your INR pharmacist if any of your other prescription or over the counter medications have been discontinued.     Call [facility] at [phone] if you have any signs of abnormal bleeding/blood clot.  ------------------------------------------------------------------------------------------------------------------  Taking Warfarin Safely: Care Instructions    Your Care Instructions  Warfarin is a medicine that you take to prevent blood clots. It is often called a blood thinner. Doctors give warfarin (such as Coumadin) to reduce the risk of blood clots. You may be at risk for blood clots if you have atrial fibrillation or deep vein thrombosis. Some other health problems may also put you at risk.  Warfarin slows the amount of time it takes for your blood to clot. It can cause bleeding problems. Even if you've been taking warfarin for a while, it's important to know how to take it safely.  Foods and other medicines can affect the way warfarin works. Some can make warfarin work too well. This can cause bleeding problems. And some can make it work poorly, so that it does not prevent blood clots very well.  You will need regular blood tests to check how long it takes for your blood to form a clot. This test is called a PT or prothrombin time test. The result of the test is called an INR level. Depending on the test results, your doctor or anticoagulation

## 2025-05-28 NOTE — PROGRESS NOTES
Pharmacy Progress Note - Anticoagulation Management    S/O:  Mr. Rajat Farrell  is a 49 y.o. male seen today for anticoagulation management for the diagnosis of Aortic Valve Replacement.     HPI: At last visit (4/24), the patient's INR was 1.7 and therapeutic for an INR goal of 1.5-2.5. Patient reported consistent intake of vitamin K foods. Patient reported the addition of Lipitor 20 mg daily. Since patient's INR is therapeutic, patient will continue current warfarin regimen of 4 mg daily and recheck INR in 5 week(s).    Interim History:    Warfarin start date: 5/4/22  INR Goal: 1.5-2.5 (On-X AVR)  Current warfarin regimen: 4 mg daily  Warfarin tablet strength:  4 mg  Duration of therapy: Indefinite       Results for orders placed or performed in visit on 05/29/25   POCT INR   Result Value Ref Range    Prothrombin time,(POC) 21.8     INR,(POC) 1.8 1.5 - 2.5       Today's pertinent positives includes:  No significant changes since last visit    Adherence:   Able to recall regimen? YES  Miss/extra dose? NO  Need refill? NO    Upcoming procedure(s):  N/A    Past Medical History:   Diagnosis Date    Aortic stenosis     Bicuspid aortic valve     GERD (gastroesophageal reflux disease)     Hiatal hernia     Severe aortic stenosis 3/22/2022     Allergies   Allergen Reactions    Soap Rash     Dial Soap     Current Outpatient Medications   Medication Sig    metoprolol succinate (TOPROL XL) 25 MG extended release tablet TAKE 1 TABLET BY MOUTH DAILY REPLACES METOPROLOL TO TARTRATE FOR DOSING CONVENIENCE    atorvastatin (LIPITOR) 20 MG tablet Take 1 tablet by mouth nightly    amoxicillin (AMOXIL) 500 MG capsule TAKE 4 CAPSULES ONE HOUR PRIOR TO DENTAL PROCEDURE    warfarin (COUMADIN) 4 MG tablet TAKE 1 TABLET (4 MG) BY MOUTH EVERY DAY OR TAKE AS DIRECTED BY THE CLINIC    acetaminophen (TYLENOL) 325 MG tablet Take 2 tablets by mouth every 4 hours as needed    omeprazole (PRILOSEC OTC) 20 MG tablet Take 1 tablet by mouth daily

## 2025-05-29 ENCOUNTER — ANTI-COAG VISIT (OUTPATIENT)
Facility: HOSPITAL | Age: 50
End: 2025-05-29
Payer: COMMERCIAL

## 2025-05-29 DIAGNOSIS — Z95.2 S/P AVR: Primary | ICD-10-CM

## 2025-05-29 LAB
INTERNATIONAL NORMALIZATION RATIO, POC: 1.8 (ref 1.5–2.5)
PROTHROMBIN TIME, POC: 21.8

## 2025-05-29 PROCEDURE — 85610 PROTHROMBIN TIME: CPT

## 2025-05-29 PROCEDURE — 99211 OFF/OP EST MAY X REQ PHY/QHP: CPT

## 2025-07-02 NOTE — PATIENT INSTRUCTIONS
Today your INR was 2.4  .      Your goal INR is  1.5-2.5 .    You have 4 mg tablets of Coumadin (warfarin).     Take Coumadin (warfarin) as follows:    Take 4 mg every day.    Come back in 4 week(s) for your next finger stick/INR blood test.        Avoid any over the counter items containing aspirin or ibuprofen, and avoid great swings in general diet.  Avoid alcohol consumption.  Please notify the INR pharmacist if you are started on any new medication including over the counter or herbal supplements. Also, please notify your INR pharmacist if any of your other prescription or over the counter medications have been discontinued.     Call [facility] at [phone] if you have any signs of abnormal bleeding/blood clot.  ------------------------------------------------------------------------------------------------------------------  Taking Warfarin Safely: Care Instructions    Your Care Instructions  Warfarin is a medicine that you take to prevent blood clots. It is often called a blood thinner. Doctors give warfarin (such as Coumadin) to reduce the risk of blood clots. You may be at risk for blood clots if you have atrial fibrillation or deep vein thrombosis. Some other health problems may also put you at risk.  Warfarin slows the amount of time it takes for your blood to clot. It can cause bleeding problems. Even if you've been taking warfarin for a while, it's important to know how to take it safely.  Foods and other medicines can affect the way warfarin works. Some can make warfarin work too well. This can cause bleeding problems. And some can make it work poorly, so that it does not prevent blood clots very well.  You will need regular blood tests to check how long it takes for your blood to form a clot. This test is called a PT or prothrombin time test. The result of the test is called an INR level. Depending on the test results, your doctor or

## 2025-07-02 NOTE — PROGRESS NOTES
Pharmacy Progress Note - Anticoagulation Management    S/O:  Mr. Rajat Farrell  is a 49 y.o. male seen today for anticoagulation management for the diagnosis of Aortic Valve Replacement.     HPI: At last visit (5/29), the patient's INR was 1.8 and therapeutic for an INR goal of 1.5-2.5. Patient reported consistent intake of vitamin K foods. Patient reported no medication changes. Since patient's INR remained therapeutic, patient will continue current warfarin regimen of 4 mg daily and recheck INR in 5 week(s).     Interim History:    Warfarin start date: 5/4/22  INR Goal: 1.5-2.5 (On-X AVR)  Current warfarin regimen: 4 mg daily  Warfarin tablet strength:  4 mg  Duration of therapy: Indefinite       Results for orders placed or performed in visit on 07/03/25   POCT INR   Result Value Ref Range    Prothrombin time,(POC) 28.9     INR,(POC) 2.4 1.5 - 2.5         Today's pertinent positives includes:  No significant changes since last visit    Adherence:   Able to recall regimen? YES  Miss/extra dose? NO  Need refill? NO    Upcoming procedure(s):  N/A    Past Medical History:   Diagnosis Date    Aortic stenosis     Bicuspid aortic valve     GERD (gastroesophageal reflux disease)     Hiatal hernia     Severe aortic stenosis 3/22/2022     Allergies   Allergen Reactions    Soap Rash     Dial Soap     Current Outpatient Medications   Medication Sig    metoprolol succinate (TOPROL XL) 25 MG extended release tablet TAKE 1 TABLET BY MOUTH DAILY REPLACES METOPROLOL TO TARTRATE FOR DOSING CONVENIENCE    atorvastatin (LIPITOR) 20 MG tablet Take 1 tablet by mouth nightly    amoxicillin (AMOXIL) 500 MG capsule TAKE 4 CAPSULES ONE HOUR PRIOR TO DENTAL PROCEDURE    warfarin (COUMADIN) 4 MG tablet TAKE 1 TABLET (4 MG) BY MOUTH EVERY DAY OR TAKE AS DIRECTED BY THE CLINIC    acetaminophen (TYLENOL) 325 MG tablet Take 2 tablets by mouth every 4 hours as needed    omeprazole (PRILOSEC OTC) 20 MG tablet Take 1 tablet by mouth daily     No

## 2025-07-03 ENCOUNTER — ANTI-COAG VISIT (OUTPATIENT)
Facility: HOSPITAL | Age: 50
End: 2025-07-03
Payer: COMMERCIAL

## 2025-07-03 DIAGNOSIS — Z95.2 S/P AVR: Primary | ICD-10-CM

## 2025-07-03 DIAGNOSIS — Z95.2 PRESENCE OF PROSTHETIC HEART VALVE: ICD-10-CM

## 2025-07-03 LAB
INTERNATIONAL NORMALIZATION RATIO, POC: 2.4 (ref 1.5–2.5)
PROTHROMBIN TIME, POC: 28.9

## 2025-07-03 PROCEDURE — 99211 OFF/OP EST MAY X REQ PHY/QHP: CPT

## 2025-07-03 PROCEDURE — 85610 PROTHROMBIN TIME: CPT

## 2025-07-31 ENCOUNTER — ANTI-COAG VISIT (OUTPATIENT)
Facility: HOSPITAL | Age: 50
End: 2025-07-31
Payer: COMMERCIAL

## 2025-07-31 DIAGNOSIS — Z95.2 PRESENCE OF PROSTHETIC HEART VALVE: ICD-10-CM

## 2025-07-31 DIAGNOSIS — Z95.2 S/P AVR: Primary | ICD-10-CM

## 2025-07-31 LAB
INTERNATIONAL NORMALIZATION RATIO, POC: 2.2 (ref 1.5–2.5)
PROTHROMBIN TIME, POC: 0

## 2025-07-31 PROCEDURE — 99211 OFF/OP EST MAY X REQ PHY/QHP: CPT

## 2025-07-31 PROCEDURE — 85610 PROTHROMBIN TIME: CPT

## 2025-07-31 NOTE — PROGRESS NOTES
Pharmacy Progress Note - Anticoagulation Management    S/O:  Mr. Rajat Farrell  is a 49 y.o. male seen today for anticoagulation management for the diagnosis of Aortic Valve Replacement.     HPI: At last visit (7/03), the patient's INR was 2.4 and therapeutic for an INR goal of 1.5-2.5. Patient reported consistent intake of vitamin K foods. Patient reported no medication changes. Since patient's INR remained therapeutic, patient will continue current warfarin regimen of 4 mg daily and recheck INR in 4 week(s).      Interim History:    Warfarin start date: 5/4/22  INR Goal: 1.5-2.5 (On-X AVR)  Current warfarin regimen: 4 mg daily  Warfarin tablet strength:  4 mg  Duration of therapy: Indefinite       Results for orders placed or performed in visit on 07/31/25   POCT INR   Result Value Ref Range    Prothrombin time,(POC) 0.0     INR,(POC) 2.2 1.5 - 2.5           Today's pertinent positives includes:  No significant changes since last visit    Adherence:   Able to recall regimen? YES  Miss/extra dose? NO  Need refill? NO    Upcoming procedure(s):  N/A    Past Medical History:   Diagnosis Date    Aortic stenosis     Bicuspid aortic valve     GERD (gastroesophageal reflux disease)     Hiatal hernia     Severe aortic stenosis 3/22/2022     Allergies   Allergen Reactions    Soap Rash     Dial Soap     Current Outpatient Medications   Medication Sig    metoprolol succinate (TOPROL XL) 25 MG extended release tablet TAKE 1 TABLET BY MOUTH DAILY REPLACES METOPROLOL TO TARTRATE FOR DOSING CONVENIENCE    atorvastatin (LIPITOR) 20 MG tablet Take 1 tablet by mouth nightly    amoxicillin (AMOXIL) 500 MG capsule TAKE 4 CAPSULES ONE HOUR PRIOR TO DENTAL PROCEDURE    warfarin (COUMADIN) 4 MG tablet TAKE 1 TABLET (4 MG) BY MOUTH EVERY DAY OR TAKE AS DIRECTED BY THE CLINIC    acetaminophen (TYLENOL) 325 MG tablet Take 2 tablets by mouth every 4 hours as needed    omeprazole (PRILOSEC OTC) 20 MG tablet Take 1 tablet by mouth daily     No

## 2025-07-31 NOTE — PATIENT INSTRUCTIONS
supplement drinks.  Avoid cranberry juice and other cranberry products. They can increase the effects of warfarin.  Limit your use of alcohol.    Avoid bleeding by preventing falls and injuries  Wear slippers or shoes with nonskid soles.  Remove throw rugs and clutter.  Rearrange furniture and electrical cords to keep them out of walking paths.  Keep stairways, porches, and outside walkways well lit. Use night-lights in hallways and bathrooms.  Be extra careful when you work with sharp tools or knives.    When should you call for help?  Call 911 anytime you think you may need emergency care. For example, call if:  You have a sudden, severe headache that is different from past headaches.  Call your doctor now or seek immediate medical care if:  You have any abnormal bleeding, such as:  Nosebleeds.  Vaginal bleeding that is different (heavier, more frequent, at a different time of the month) than what you are used to.  Bloody or black stools, or rectal bleeding.  Bloody or pink urine.  Watch closely for changes in your health, and be sure to contact your doctor if you have any problems.    Where can you learn more?  Go to http://www.[employer].net/CheezburgerpConnections.  Enter N655 in the search box to learn more about \"Taking Warfarin Safely: Care Instructions.\"  Current as of: January 27, 2016  Content Version: 11.1  © 9233-8649 [employer]. Care instructions adapted under license by [employer] (which disclaims liability or warranty for this information). If you have questions about a medical condition or this instruction, always ask your healthcare professional. [employer] disclaims any warranty or liability for your use of this information.

## 2025-08-16 DIAGNOSIS — Z95.2 PRESENCE OF PROSTHETIC HEART VALVE: ICD-10-CM

## 2025-08-18 RX ORDER — WARFARIN SODIUM 4 MG/1
TABLET ORAL
Qty: 100 TABLET | Refills: 1 | Status: SHIPPED | OUTPATIENT
Start: 2025-08-18

## 2025-09-04 ENCOUNTER — ANTI-COAG VISIT (OUTPATIENT)
Facility: HOSPITAL | Age: 50
End: 2025-09-04
Payer: COMMERCIAL

## 2025-09-04 DIAGNOSIS — Z95.2 S/P AVR: Primary | ICD-10-CM

## 2025-09-04 LAB
INTERNATIONAL NORMALIZATION RATIO, POC: 2 (ref 1.5–2.5)
PROTHROMBIN TIME, POC: 24

## 2025-09-04 PROCEDURE — 99211 OFF/OP EST MAY X REQ PHY/QHP: CPT

## 2025-09-04 PROCEDURE — 85610 PROTHROMBIN TIME: CPT

## (undated) LAB
INR BLD: 1.8
PT POC: 21.1 SECONDS
VALID INTERNAL CONTROL?: YES

## (undated) DEVICE — Device

## (undated) DEVICE — 3M™ MEDIPORE™ H SOFT CLOTH SURGICAL TAPE 2864, 4 INCH X 10 YARD (10CM X 9,14M), 12 ROLLS/CASE: Brand: 3M™ MEDIPORE™

## (undated) DEVICE — MEDI-TRACE CADENCE ADULT, DEFIBRILLATION ELECTRODE -RTS  (10 PR/PK) - PHILIPS: Brand: MEDI-TRACE CADENCE

## (undated) DEVICE — SYR 20ML LL STRL LF --

## (undated) DEVICE — SUTURE PROL SZ 4-0 L36IN NONABSORBABLE BLU L26MM SH 1/2 CIR 8521H

## (undated) DEVICE — SOLUTION IV 1000ML 140MEQ/L SOD 5MEQ/L K 3MEQ/L MG 27MEQ/L

## (undated) DEVICE — PRESSURE MONITORING SET: Brand: TRUWAVE

## (undated) DEVICE — BLANKET WRM W25XL64IN NONWOVEN SFT LTWT PLIABLE HYPR

## (undated) DEVICE — SYR 10ML LUER LOK 1/5ML GRAD --

## (undated) DEVICE — SUTURE MCRYL SZ 3-0 L27IN ABSRB UD L19MM PS-2 3/8 CIR PRIM Y427H

## (undated) DEVICE — GLOVE SURG SZ 7.5 L11.2IN THK9.8MIL STRW LTX POLYMER BEAD

## (undated) DEVICE — GLIDESHEATH SLENDER ACCESS KIT: Brand: GLIDESHEATH SLENDER

## (undated) DEVICE — GLIDESHEATH SLENDER TIBIAL PEDAL KIT: Brand: GLIDESHEATH SLENDER TIBIAL PEDAL KIT

## (undated) DEVICE — SET PERF L203CM 12IN RED AND BLU AORT ROOT MULT SLIP CONN

## (undated) DEVICE — CANISTER, RIGID, 3000CC: Brand: MEDLINE INDUSTRIES, INC.

## (undated) DEVICE — SYRINGE ANGIO 10 CC BRL STD PRNT POLYCARB LT BLU MEDALLION

## (undated) DEVICE — COVER,TABLE,HEAVY DUTY,77"X90",STRL: Brand: MEDLINE

## (undated) DEVICE — EZ GLIDE AORTIC CANNULA: Brand: EDWARDS LIFESCIENCES EZ GLIDE AORTIC CANNULA

## (undated) DEVICE — CANNULA AORT ROOT INTRO STD TIP 5FR OVERALL LEN 55IN DLP

## (undated) DEVICE — COR-KNOT® QUICK LOAD® SINGLES: Brand: COR-KNOT® QUICK LOAD®

## (undated) DEVICE — SUTURE ETHBND 2-0 30IN NONABSORB GRN WHT V-5 17MM 1/2 PXX52N

## (undated) DEVICE — SWAN-GANZ TRUE SIZE THERMODULTION CATHETER, 5F: Brand: SWAN-GANZ TRUE SIZE

## (undated) DEVICE — SYSTEM TISS GLUE 5ML CONTAIN SYR PLUNG STD SYR TIP 12MM 5PKS/EA

## (undated) DEVICE — PROVE COVER: Brand: UNBRANDED

## (undated) DEVICE — SYR 3ML LL TIP 1/10ML GRAD --

## (undated) DEVICE — SUT SLK 2 60IN TIE MP BLK --

## (undated) DEVICE — TOURNIQUET 12FR L7IN 3 CLR 1 SNR VENA CAVA DLP

## (undated) DEVICE — RADIFOCUS OPTITORQUE ANGIOGRAPHIC CATHETER: Brand: OPTITORQUE

## (undated) DEVICE — SYR LR LCK 1ML GRAD NSAF 30ML --

## (undated) DEVICE — DRSG BORDR MPLX HEEL 8.7X9.1IN --

## (undated) DEVICE — SUT ETHBND 3-0 36IN SH1 DA GRN --

## (undated) DEVICE — SUT PROL 4-0 30IN SH1 DA BLU --

## (undated) DEVICE — SOL INJ SOD CL 0.9% 500ML BG --

## (undated) DEVICE — 72" ARTERIAL PRESSURE TUBING: Brand: ICU MEDICAL

## (undated) DEVICE — KIT ANGIOGRAPHY CUST MRMC

## (undated) DEVICE — SUTURE PERMA-HAND 0 L18IN NONABSORBABLE BLK CT-1 L36MM 1/2 C021D

## (undated) DEVICE — DRAPE OPHTH 4 PLY SGL FEN

## (undated) DEVICE — OPEN HEART B-RICHMOND: Brand: MEDLINE INDUSTRIES, INC.

## (undated) DEVICE — AGENT HEMSTAT W4XL4IN OXIDIZED REGENERATED CELOS ABSRB SFT

## (undated) DEVICE — Device: Brand: JELCO

## (undated) DEVICE — BAG RED 3PLY 2MIL 30X40 IN

## (undated) DEVICE — DUAL LUMEN STOMACH TUBE MULTI-FUNCTIONAL PORT: Brand: SALEM SUMP

## (undated) DEVICE — UNDERGLOVE SURG SZ 8 FNGR THK0.21MIL GRN LTX BEAD CUF

## (undated) DEVICE — PLEDGET SURG W3/16XL0.25IN THK1.65MM PTFE OVL FELT FOR THE

## (undated) DEVICE — TUBING, SUCTION, 1/4" X 10', STRAIGHT: Brand: MEDLINE

## (undated) DEVICE — DRAPE SLUSH DISC W44XL66IN ST FOR RND BSIN HUSH SLUSH SYS

## (undated) DEVICE — HI-TORQUE VERSACORE FLOPPY GUIDE WIRE SYSTEM 145 CM: Brand: HI-TORQUE VERSACORE

## (undated) DEVICE — SUTURE SZ 7 L18IN NONABSORBABLE SIL CCS L48MM 1/2 CIR STRNM M655G

## (undated) DEVICE — SOLUTION IRRIG 1000ML STRL H2O USP PLAS POUR BTL

## (undated) DEVICE — SPLINT WR POS F/ARTERIAL ACC -- BX/10

## (undated) DEVICE — HEART CATH-MRMC: Brand: MEDLINE INDUSTRIES, INC.

## (undated) DEVICE — 6 FOOT DISPOSABLE EXTENSION CABLE WITH SAFE CONNECT / SCREW-DOWN

## (undated) DEVICE — COR-KNOT MINI® COMBO KITBASE PACKAGE TYPE - KITEACH STERILE PACKAGE KIT CONTAINS (2) SINGLE PATIENT USE COR-KNOT MINI® DEVICES AND (12) COR-KNOT® QUICK LOADS®.: Brand: COR-KNOT MINI®

## (undated) DEVICE — TUBING PRSS MON L6IN PVC M FEM CONN

## (undated) DEVICE — SYSTEM SKIN CLOSURE 42CM DERMABOND PRINEO

## (undated) DEVICE — 3M™ TEGADERM™ TRANSPARENT FILM DRESSING FRAME STYLE, 1626W, 4 IN X 4-3/4 IN (10 CM X 12 CM), 50/CT 4CT/CASE: Brand: 3M™ TEGADERM™

## (undated) DEVICE — GLOVE SURG SZ 65 CRM LTX FREE POLYISOPRENE POLYMER BEAD ANTI

## (undated) DEVICE — SOLUTION IRRIG 1000ML 0.9% SOD CHL USP POUR PLAS BTL

## (undated) DEVICE — DEVICE COMPR REG 24 CM VASC BND

## (undated) DEVICE — YANKAUER,BULB TIP,W/O VENT,RIGID,STERILE: Brand: MEDLINE

## (undated) DEVICE — GOWN,SIRUS,NONRNF,SETINSLV,XL,20/CS: Brand: MEDLINE

## (undated) DEVICE — CATHETER ETER ANGIO L110CM OD5FR ID046IN L75CM 038IN 145DEG CARD

## (undated) DEVICE — TRANSFER PK BLD PROD 300ML --

## (undated) DEVICE — AVID DUAL STAGE VENOUS DRAINAGE CANNULA: Brand: AVID DUAL STAGE VENOUS DRAINAGE CANNULA

## (undated) DEVICE — RETROGRADE CARDIOPLEGIA CATHETER: Brand: EDWARDS LIFESCIENCES RETROGRADE CARDIOPLEGIA CATHETER

## (undated) DEVICE — OPEN HEART A- RICHMOND: Brand: MEDLINE INDUSTRIES, INC.

## (undated) DEVICE — CONNECTOR DRNGE W3/8-0.5XH3/16XL3/16IN 2:1 SIL CARD STR

## (undated) DEVICE — BLADE ES L4IN INSUL EDGE

## (undated) DEVICE — SYR 50ML LR LCK 1ML GRAD NSAF --

## (undated) DEVICE — SUMP PERICARD AD 20FR WT TIP VERSATILE DSGN MULT PRT VENT